# Patient Record
Sex: FEMALE | Race: BLACK OR AFRICAN AMERICAN | NOT HISPANIC OR LATINO | Employment: UNEMPLOYED | URBAN - NONMETROPOLITAN AREA
[De-identification: names, ages, dates, MRNs, and addresses within clinical notes are randomized per-mention and may not be internally consistent; named-entity substitution may affect disease eponyms.]

---

## 2017-05-12 ENCOUNTER — TELEPHONE (OUTPATIENT)
Dept: FAMILY MEDICINE | Age: 37
End: 2017-05-12

## 2023-01-01 ENCOUNTER — APPOINTMENT (OUTPATIENT)
Dept: GENERAL RADIOLOGY | Facility: CLINIC | Age: 43
End: 2023-01-01
Attending: NEUROLOGICAL SURGERY
Payer: COMMERCIAL

## 2023-01-01 ENCOUNTER — TELEPHONE (OUTPATIENT)
Dept: ENDOCRINOLOGY | Facility: CLINIC | Age: 43
End: 2023-01-01
Payer: COMMERCIAL

## 2023-01-01 ENCOUNTER — APPOINTMENT (OUTPATIENT)
Dept: CT IMAGING | Facility: CLINIC | Age: 43
End: 2023-01-01
Attending: NEUROLOGICAL SURGERY
Payer: COMMERCIAL

## 2023-01-01 ENCOUNTER — PATIENT OUTREACH (OUTPATIENT)
Dept: ONCOLOGY | Facility: CLINIC | Age: 43
End: 2023-01-01
Payer: COMMERCIAL

## 2023-01-01 ENCOUNTER — APPOINTMENT (OUTPATIENT)
Dept: PHYSICAL THERAPY | Facility: CLINIC | Age: 43
End: 2023-01-01
Attending: NEUROLOGICAL SURGERY
Payer: COMMERCIAL

## 2023-01-01 ENCOUNTER — APPOINTMENT (OUTPATIENT)
Dept: OCCUPATIONAL THERAPY | Facility: CLINIC | Age: 43
End: 2023-01-01
Attending: NEUROLOGICAL SURGERY
Payer: COMMERCIAL

## 2023-01-01 ENCOUNTER — ANESTHESIA EVENT (OUTPATIENT)
Dept: SURGERY | Facility: CLINIC | Age: 43
End: 2023-01-01
Payer: COMMERCIAL

## 2023-01-01 ENCOUNTER — APPOINTMENT (OUTPATIENT)
Dept: CARDIOLOGY | Facility: CLINIC | Age: 43
End: 2023-01-01
Attending: NURSE PRACTITIONER
Payer: COMMERCIAL

## 2023-01-01 ENCOUNTER — APPOINTMENT (OUTPATIENT)
Dept: OCCUPATIONAL THERAPY | Facility: CLINIC | Age: 43
End: 2023-01-01
Payer: COMMERCIAL

## 2023-01-01 ENCOUNTER — APPOINTMENT (OUTPATIENT)
Dept: SPEECH THERAPY | Facility: CLINIC | Age: 43
End: 2023-01-01
Attending: NEUROLOGICAL SURGERY
Payer: COMMERCIAL

## 2023-01-01 ENCOUNTER — OFFICE VISIT (OUTPATIENT)
Dept: OPHTHALMOLOGY | Facility: CLINIC | Age: 43
End: 2023-01-01
Payer: COMMERCIAL

## 2023-01-01 ENCOUNTER — ANESTHESIA (OUTPATIENT)
Dept: SURGERY | Facility: CLINIC | Age: 43
End: 2023-01-01
Payer: COMMERCIAL

## 2023-01-01 ENCOUNTER — APPOINTMENT (OUTPATIENT)
Dept: ULTRASOUND IMAGING | Facility: CLINIC | Age: 43
End: 2023-01-01
Payer: COMMERCIAL

## 2023-01-01 ENCOUNTER — APPOINTMENT (OUTPATIENT)
Dept: GENERAL RADIOLOGY | Facility: CLINIC | Age: 43
End: 2023-01-01
Payer: COMMERCIAL

## 2023-01-01 ENCOUNTER — PREP FOR PROCEDURE (OUTPATIENT)
Dept: NEUROSURGERY | Facility: CLINIC | Age: 43
End: 2023-01-01
Payer: COMMERCIAL

## 2023-01-01 ENCOUNTER — APPOINTMENT (OUTPATIENT)
Dept: SPEECH THERAPY | Facility: CLINIC | Age: 43
End: 2023-01-01
Payer: COMMERCIAL

## 2023-01-01 ENCOUNTER — APPOINTMENT (OUTPATIENT)
Dept: MRI IMAGING | Facility: CLINIC | Age: 43
End: 2023-01-01
Payer: COMMERCIAL

## 2023-01-01 ENCOUNTER — HOSPITAL ENCOUNTER (INPATIENT)
Facility: CLINIC | Age: 43
LOS: 58 days | Discharge: ANOTHER HOSPITAL | End: 2023-12-18
Attending: NEUROLOGICAL SURGERY | Admitting: NEUROLOGICAL SURGERY
Payer: COMMERCIAL

## 2023-01-01 ENCOUNTER — APPOINTMENT (OUTPATIENT)
Dept: CT IMAGING | Facility: CLINIC | Age: 43
End: 2023-01-01
Payer: COMMERCIAL

## 2023-01-01 ENCOUNTER — TELEPHONE (OUTPATIENT)
Dept: NEUROSURGERY | Facility: CLINIC | Age: 43
End: 2023-01-01

## 2023-01-01 ENCOUNTER — TELEPHONE (OUTPATIENT)
Dept: NEUROSURGERY | Facility: CLINIC | Age: 43
End: 2023-01-01
Payer: COMMERCIAL

## 2023-01-01 ENCOUNTER — APPOINTMENT (OUTPATIENT)
Dept: PHYSICAL THERAPY | Facility: CLINIC | Age: 43
End: 2023-01-01
Payer: COMMERCIAL

## 2023-01-01 ENCOUNTER — APPOINTMENT (OUTPATIENT)
Dept: GENERAL RADIOLOGY | Facility: CLINIC | Age: 43
End: 2023-01-01
Attending: NURSE PRACTITIONER
Payer: COMMERCIAL

## 2023-01-01 ENCOUNTER — APPOINTMENT (OUTPATIENT)
Dept: GENERAL RADIOLOGY | Facility: CLINIC | Age: 43
End: 2023-01-01
Attending: ANESTHESIOLOGY
Payer: COMMERCIAL

## 2023-01-01 ENCOUNTER — APPOINTMENT (OUTPATIENT)
Dept: GENERAL RADIOLOGY | Facility: CLINIC | Age: 43
End: 2023-01-01
Attending: STUDENT IN AN ORGANIZED HEALTH CARE EDUCATION/TRAINING PROGRAM
Payer: COMMERCIAL

## 2023-01-01 ENCOUNTER — TELEPHONE (OUTPATIENT)
Dept: OPHTHALMOLOGY | Facility: CLINIC | Age: 43
End: 2023-01-01
Payer: COMMERCIAL

## 2023-01-01 VITALS
RESPIRATION RATE: 18 BRPM | TEMPERATURE: 98.9 F | BODY MASS INDEX: 40.23 KG/M2 | HEART RATE: 94 BPM | HEIGHT: 61 IN | SYSTOLIC BLOOD PRESSURE: 111 MMHG | DIASTOLIC BLOOD PRESSURE: 80 MMHG | OXYGEN SATURATION: 98 % | WEIGHT: 213.1 LBS

## 2023-01-01 DIAGNOSIS — T88.4XXA HARD TO INTUBATE, INITIAL ENCOUNTER: ICD-10-CM

## 2023-01-01 DIAGNOSIS — H47.20 OPTIC ATROPHY: Primary | ICD-10-CM

## 2023-01-01 DIAGNOSIS — H47.529: ICD-10-CM

## 2023-01-01 DIAGNOSIS — D49.7 PITUITARY TUMOR: ICD-10-CM

## 2023-01-01 LAB
ABO/RH(D): NORMAL
ABO/RH(D): NORMAL
ALBUMIN SERPL BCG-MCNC: 3.1 G/DL (ref 3.5–5.2)
ALBUMIN SERPL BCG-MCNC: 3.5 G/DL (ref 3.5–5.2)
ALBUMIN SERPL BCG-MCNC: 3.9 G/DL (ref 3.5–5.2)
ALBUMIN UR-MCNC: NEGATIVE MG/DL
ALLEN'S TEST: ABNORMAL
ALLEN'S TEST: NO
ALLEN'S TEST: YES
ALP SERPL-CCNC: 30 U/L (ref 40–150)
ALP SERPL-CCNC: 56 U/L (ref 35–104)
ALP SERPL-CCNC: 76 U/L (ref 35–104)
ALT SERPL W P-5'-P-CCNC: 16 U/L (ref 0–50)
ALT SERPL W P-5'-P-CCNC: 20 U/L (ref 0–50)
ALT SERPL W P-5'-P-CCNC: 23 U/L (ref 0–50)
ANION GAP SERPL CALCULATED.3IONS-SCNC: 10 MMOL/L (ref 7–15)
ANION GAP SERPL CALCULATED.3IONS-SCNC: 11 MMOL/L (ref 7–15)
ANION GAP SERPL CALCULATED.3IONS-SCNC: 12 MMOL/L (ref 7–15)
ANION GAP SERPL CALCULATED.3IONS-SCNC: 6 MMOL/L (ref 7–15)
ANION GAP SERPL CALCULATED.3IONS-SCNC: 7 MMOL/L (ref 7–15)
ANION GAP SERPL CALCULATED.3IONS-SCNC: 8 MMOL/L (ref 7–15)
ANION GAP SERPL CALCULATED.3IONS-SCNC: 9 MMOL/L (ref 7–15)
ANTIBODY SCREEN: NEGATIVE
ANTIBODY SCREEN: NEGATIVE
APPEARANCE UR: CLEAR
APTT PPP: 26 SECONDS (ref 22–38)
APTT PPP: 27 SECONDS (ref 22–38)
APTT PPP: 28 SECONDS (ref 22–38)
APTT PPP: 28 SECONDS (ref 22–38)
AST SERPL W P-5'-P-CCNC: 24 U/L (ref 0–45)
AST SERPL W P-5'-P-CCNC: 32 U/L (ref 0–45)
AST SERPL W P-5'-P-CCNC: 62 U/L (ref 0–45)
ATRIAL RATE - MUSE: 106 BPM
ATRIAL RATE - MUSE: 114 BPM
ATRIAL RATE - MUSE: 115 BPM
ATRIAL RATE - MUSE: 126 BPM
ATRIAL RATE - MUSE: 64 BPM
ATRIAL RATE - MUSE: 82 BPM
B2 TRANSFERRIN FLD QL: NEGATIVE
B2 TRANSFERRIN INTERPRETATION: NORMAL
BACTERIA BLD CULT: NO GROWTH
BACTERIA BLD CULT: NO GROWTH
BASE EXCESS BLDA CALC-SCNC: -0.1 MMOL/L (ref -9.6–2)
BASE EXCESS BLDA CALC-SCNC: -0.2 MMOL/L (ref -9.6–2)
BASE EXCESS BLDA CALC-SCNC: -0.2 MMOL/L (ref -9.6–2)
BASE EXCESS BLDA CALC-SCNC: -0.3 MMOL/L (ref -9–1.8)
BASE EXCESS BLDA CALC-SCNC: -0.4 MMOL/L (ref -9.6–2)
BASE EXCESS BLDA CALC-SCNC: -0.7 MMOL/L (ref -9.6–2)
BASE EXCESS BLDA CALC-SCNC: -1 MMOL/L (ref -9.6–2)
BASE EXCESS BLDA CALC-SCNC: -1.3 MMOL/L (ref -9.6–2)
BASE EXCESS BLDA CALC-SCNC: -1.5 MMOL/L (ref -9.6–2)
BASE EXCESS BLDA CALC-SCNC: -1.6 MMOL/L (ref -9.6–2)
BASE EXCESS BLDA CALC-SCNC: -1.9 MMOL/L (ref -9.6–2)
BASE EXCESS BLDA CALC-SCNC: -2.8 MMOL/L (ref -9.6–2)
BASE EXCESS BLDA CALC-SCNC: -8.2 MMOL/L (ref -9.6–2)
BASE EXCESS BLDA CALC-SCNC: 0.1 MMOL/L (ref -9.6–2)
BASE EXCESS BLDA CALC-SCNC: 0.3 MMOL/L (ref -9.6–2)
BASE EXCESS BLDA CALC-SCNC: 1.7 MMOL/L (ref -9–1.8)
BASE EXCESS BLDA CALC-SCNC: 2.7 MMOL/L (ref -9–1.8)
BASE EXCESS BLDA CALC-SCNC: 3.2 MMOL/L (ref -9–1.8)
BASE EXCESS BLDA CALC-SCNC: 3.5 MMOL/L (ref -9–1.8)
BASE EXCESS BLDA CALC-SCNC: 3.8 MMOL/L (ref -9–1.8)
BASE EXCESS BLDV CALC-SCNC: -0.5 MMOL/L (ref -8.1–1.9)
BASE EXCESS BLDV CALC-SCNC: 1.9 MMOL/L (ref -7.7–1.9)
BASE EXCESS BLDV CALC-SCNC: 1.9 MMOL/L (ref -7.7–1.9)
BASE EXCESS BLDV CALC-SCNC: 3.8 MMOL/L (ref -7.7–1.9)
BASE EXCESS BLDV CALC-SCNC: 5.6 MMOL/L (ref -7.7–1.9)
BASE EXCESS BLDV CALC-SCNC: 5.6 MMOL/L (ref -7.7–1.9)
BASE EXCESS BLDV CALC-SCNC: 6.9 MMOL/L (ref -7.7–1.9)
BASOPHILS # BLD AUTO: 0 10E3/UL (ref 0–0.2)
BASOPHILS # BLD AUTO: 0 10E3/UL (ref 0–0.2)
BASOPHILS NFR BLD AUTO: 0 %
BASOPHILS NFR BLD AUTO: 1 %
BILIRUB DIRECT SERPL-MCNC: 0.24 MG/DL (ref 0–0.3)
BILIRUB DIRECT SERPL-MCNC: <0.2 MG/DL (ref 0–0.3)
BILIRUB SERPL-MCNC: 0.6 MG/DL
BILIRUB SERPL-MCNC: 0.6 MG/DL
BILIRUB SERPL-MCNC: 1 MG/DL
BILIRUB UR QL STRIP: NEGATIVE
BLD PROD TYP BPU: NORMAL
BLOOD COMPONENT TYPE: NORMAL
BUN SERPL-MCNC: 10.5 MG/DL (ref 6–20)
BUN SERPL-MCNC: 10.7 MG/DL (ref 6–20)
BUN SERPL-MCNC: 11.8 MG/DL (ref 6–20)
BUN SERPL-MCNC: 12.2 MG/DL (ref 6–20)
BUN SERPL-MCNC: 12.6 MG/DL (ref 6–20)
BUN SERPL-MCNC: 13 MG/DL (ref 6–20)
BUN SERPL-MCNC: 13.2 MG/DL (ref 6–20)
BUN SERPL-MCNC: 13.4 MG/DL (ref 6–20)
BUN SERPL-MCNC: 13.4 MG/DL (ref 6–20)
BUN SERPL-MCNC: 13.5 MG/DL (ref 6–20)
BUN SERPL-MCNC: 13.5 MG/DL (ref 6–20)
BUN SERPL-MCNC: 13.8 MG/DL (ref 6–20)
BUN SERPL-MCNC: 13.9 MG/DL (ref 6–20)
BUN SERPL-MCNC: 13.9 MG/DL (ref 6–20)
BUN SERPL-MCNC: 14.9 MG/DL (ref 6–20)
BUN SERPL-MCNC: 15.2 MG/DL (ref 6–20)
BUN SERPL-MCNC: 15.5 MG/DL (ref 6–20)
BUN SERPL-MCNC: 16.4 MG/DL (ref 6–20)
BUN SERPL-MCNC: 17.3 MG/DL (ref 6–20)
BUN SERPL-MCNC: 17.6 MG/DL (ref 6–20)
BUN SERPL-MCNC: 19 MG/DL (ref 6–20)
BUN SERPL-MCNC: 20.9 MG/DL (ref 6–20)
BUN SERPL-MCNC: 21.1 MG/DL (ref 6–20)
BUN SERPL-MCNC: 21.6 MG/DL (ref 6–20)
BUN SERPL-MCNC: 8.1 MG/DL (ref 6–20)
BUN SERPL-MCNC: 8.4 MG/DL (ref 6–20)
BUN SERPL-MCNC: 8.5 MG/DL (ref 6–20)
BUN SERPL-MCNC: 8.6 MG/DL (ref 6–20)
BUN SERPL-MCNC: 8.7 MG/DL (ref 6–20)
BUN SERPL-MCNC: 8.8 MG/DL (ref 6–20)
BUN SERPL-MCNC: 8.9 MG/DL (ref 6–20)
BUN SERPL-MCNC: 9.2 MG/DL (ref 6–20)
BUN SERPL-MCNC: 9.3 MG/DL (ref 6–20)
BUN SERPL-MCNC: 9.3 MG/DL (ref 6–20)
BUN SERPL-MCNC: 9.4 MG/DL (ref 6–20)
CA-I BLD-MCNC: 3.5 MG/DL (ref 4.4–5.2)
CA-I BLD-MCNC: 4.2 MG/DL (ref 4.4–5.2)
CA-I BLD-MCNC: 4.3 MG/DL (ref 4.4–5.2)
CA-I BLD-MCNC: 4.4 MG/DL (ref 4.4–5.2)
CA-I BLD-MCNC: 4.4 MG/DL (ref 4.4–5.2)
CA-I BLD-MCNC: 4.5 MG/DL (ref 4.4–5.2)
CA-I BLD-MCNC: 4.5 MG/DL (ref 4.4–5.2)
CA-I BLD-MCNC: 4.6 MG/DL (ref 4.4–5.2)
CA-I BLD-MCNC: 4.7 MG/DL (ref 4.4–5.2)
CA-I BLD-MCNC: 4.8 MG/DL (ref 4.4–5.2)
CA-I BLD-MCNC: 4.8 MG/DL (ref 4.4–5.2)
CA-I BLD-MCNC: 5 MG/DL (ref 4.4–5.2)
CA-I BLD-MCNC: 5.1 MG/DL (ref 4.4–5.2)
CALCIUM SERPL-MCNC: 7.9 MG/DL (ref 8.6–10)
CALCIUM SERPL-MCNC: 8 MG/DL (ref 8.6–10)
CALCIUM SERPL-MCNC: 8.1 MG/DL (ref 8.6–10)
CALCIUM SERPL-MCNC: 8.2 MG/DL (ref 8.6–10)
CALCIUM SERPL-MCNC: 8.2 MG/DL (ref 8.6–10)
CALCIUM SERPL-MCNC: 8.5 MG/DL (ref 8.6–10)
CALCIUM SERPL-MCNC: 8.5 MG/DL (ref 8.6–10)
CALCIUM SERPL-MCNC: 8.6 MG/DL (ref 8.6–10)
CALCIUM SERPL-MCNC: 8.7 MG/DL (ref 8.6–10)
CALCIUM SERPL-MCNC: 8.7 MG/DL (ref 8.6–10)
CALCIUM SERPL-MCNC: 8.8 MG/DL (ref 8.6–10)
CALCIUM SERPL-MCNC: 8.9 MG/DL (ref 8.6–10)
CALCIUM SERPL-MCNC: 8.9 MG/DL (ref 8.6–10)
CALCIUM SERPL-MCNC: 9 MG/DL (ref 8.6–10)
CALCIUM SERPL-MCNC: 9.1 MG/DL (ref 8.6–10)
CALCIUM SERPL-MCNC: 9.2 MG/DL (ref 8.6–10)
CALCIUM SERPL-MCNC: 9.3 MG/DL (ref 8.6–10)
CALCIUM SERPL-MCNC: 9.4 MG/DL (ref 8.6–10)
CALCIUM SERPL-MCNC: 9.5 MG/DL (ref 8.6–10)
CALCIUM SERPL-MCNC: 9.7 MG/DL (ref 8.6–10)
CALCIUM SERPL-MCNC: 9.8 MG/DL (ref 8.6–10)
CALCIUM SERPL-MCNC: 9.9 MG/DL (ref 8.6–10)
CALCIUM SERPL-MCNC: 9.9 MG/DL (ref 8.6–10)
CF REDUC 60M P MA LENFR BLD TEG: 4.6 % (ref 0–15)
CFT BLD TEG: 1.2 MINUTE (ref 1–3)
CHLORIDE SERPL-SCNC: 100 MMOL/L (ref 98–107)
CHLORIDE SERPL-SCNC: 101 MMOL/L (ref 98–107)
CHLORIDE SERPL-SCNC: 102 MMOL/L (ref 98–107)
CHLORIDE SERPL-SCNC: 102 MMOL/L (ref 98–107)
CHLORIDE SERPL-SCNC: 103 MMOL/L (ref 98–107)
CHLORIDE SERPL-SCNC: 104 MMOL/L (ref 98–107)
CHLORIDE SERPL-SCNC: 105 MMOL/L (ref 98–107)
CHLORIDE SERPL-SCNC: 106 MMOL/L (ref 98–107)
CHLORIDE SERPL-SCNC: 107 MMOL/L (ref 98–107)
CHLORIDE SERPL-SCNC: 108 MMOL/L (ref 98–107)
CHLORIDE SERPL-SCNC: 109 MMOL/L (ref 98–107)
CHLORIDE SERPL-SCNC: 110 MMOL/L (ref 98–107)
CHLORIDE SERPL-SCNC: 111 MMOL/L (ref 98–107)
CHLORIDE SERPL-SCNC: 112 MMOL/L (ref 98–107)
CHLORIDE SERPL-SCNC: 116 MMOL/L (ref 98–107)
CHLORIDE SERPL-SCNC: 119 MMOL/L (ref 98–107)
CI (COAGULATION INDEX)(Z) NON NATIVE: 4 (ref -3–3)
CLOT ANGLE BLD TEG: 72.8 DEGREES (ref 53–72)
CLOT INIT BLD TEG: 2.4 MINUTE (ref 5–10)
CLOT LYSIS 30M P MA LENFR BLD TEG: 1.3 % (ref 0–8)
CLOT STRENGTH BLD TEG: 10.5 KD/SC (ref 4.5–11)
CODING SYSTEM: NORMAL
COLOR UR AUTO: NORMAL
CORTIS SERPL-MCNC: 13.7 UG/DL
CORTIS SERPL-MCNC: 18.5 UG/DL
CORTIS SERPL-MCNC: 8.8 UG/DL
CREAT SERPL-MCNC: 0.47 MG/DL (ref 0.51–0.95)
CREAT SERPL-MCNC: 0.6 MG/DL (ref 0.51–0.95)
CREAT SERPL-MCNC: 0.68 MG/DL (ref 0.51–0.95)
CREAT SERPL-MCNC: 0.69 MG/DL (ref 0.51–0.95)
CREAT SERPL-MCNC: 0.7 MG/DL (ref 0.51–0.95)
CREAT SERPL-MCNC: 0.7 MG/DL (ref 0.51–0.95)
CREAT SERPL-MCNC: 0.71 MG/DL (ref 0.51–0.95)
CREAT SERPL-MCNC: 0.71 MG/DL (ref 0.51–0.95)
CREAT SERPL-MCNC: 0.73 MG/DL (ref 0.51–0.95)
CREAT SERPL-MCNC: 0.73 MG/DL (ref 0.51–0.95)
CREAT SERPL-MCNC: 0.74 MG/DL (ref 0.51–0.95)
CREAT SERPL-MCNC: 0.75 MG/DL (ref 0.51–0.95)
CREAT SERPL-MCNC: 0.76 MG/DL (ref 0.51–0.95)
CREAT SERPL-MCNC: 0.76 MG/DL (ref 0.51–0.95)
CREAT SERPL-MCNC: 0.77 MG/DL (ref 0.51–0.95)
CREAT SERPL-MCNC: 0.77 MG/DL (ref 0.51–0.95)
CREAT SERPL-MCNC: 0.78 MG/DL (ref 0.51–0.95)
CREAT SERPL-MCNC: 0.8 MG/DL (ref 0.51–0.95)
CREAT SERPL-MCNC: 0.81 MG/DL (ref 0.51–0.95)
CREAT SERPL-MCNC: 0.82 MG/DL (ref 0.51–0.95)
CREAT SERPL-MCNC: 0.82 MG/DL (ref 0.51–0.95)
CREAT SERPL-MCNC: 0.83 MG/DL (ref 0.51–0.95)
CREAT SERPL-MCNC: 0.84 MG/DL (ref 0.51–0.95)
CREAT SERPL-MCNC: 0.84 MG/DL (ref 0.51–0.95)
CREAT SERPL-MCNC: 0.85 MG/DL (ref 0.51–0.95)
CREAT SERPL-MCNC: 0.87 MG/DL (ref 0.51–0.95)
CREAT SERPL-MCNC: 0.88 MG/DL (ref 0.51–0.95)
CREAT SERPL-MCNC: 0.9 MG/DL (ref 0.51–0.95)
CREAT SERPL-MCNC: 0.92 MG/DL (ref 0.51–0.95)
CREAT SERPL-MCNC: 0.92 MG/DL (ref 0.51–0.95)
CREAT SERPL-MCNC: 0.97 MG/DL (ref 0.51–0.95)
CREAT SERPL-MCNC: 1 MG/DL (ref 0.51–0.95)
CROSSMATCH: NORMAL
CRP SERPL-MCNC: 18.8 MG/L
DEPRECATED HCO3 PLAS-SCNC: 22 MMOL/L (ref 22–29)
DEPRECATED HCO3 PLAS-SCNC: 23 MMOL/L (ref 22–29)
DEPRECATED HCO3 PLAS-SCNC: 24 MMOL/L (ref 22–29)
DEPRECATED HCO3 PLAS-SCNC: 24 MMOL/L (ref 22–29)
DEPRECATED HCO3 PLAS-SCNC: 25 MMOL/L (ref 22–29)
DEPRECATED HCO3 PLAS-SCNC: 25 MMOL/L (ref 22–29)
DEPRECATED HCO3 PLAS-SCNC: 26 MMOL/L (ref 22–29)
DEPRECATED HCO3 PLAS-SCNC: 27 MMOL/L (ref 22–29)
DEPRECATED HCO3 PLAS-SCNC: 28 MMOL/L (ref 22–29)
DEPRECATED HCO3 PLAS-SCNC: 29 MMOL/L (ref 22–29)
DEPRECATED HCO3 PLAS-SCNC: 30 MMOL/L (ref 22–29)
DEPRECATED HCO3 PLAS-SCNC: 31 MMOL/L (ref 22–29)
DEPRECATED HCO3 PLAS-SCNC: 32 MMOL/L (ref 22–29)
DIASTOLIC BLOOD PRESSURE - MUSE: NORMAL MMHG
EGFRCR SERPLBLD CKD-EPI 2021: 71 ML/MIN/1.73M2
EGFRCR SERPLBLD CKD-EPI 2021: 74 ML/MIN/1.73M2
EGFRCR SERPLBLD CKD-EPI 2021: 78 ML/MIN/1.73M2
EGFRCR SERPLBLD CKD-EPI 2021: 78 ML/MIN/1.73M2
EGFRCR SERPLBLD CKD-EPI 2021: 80 ML/MIN/1.73M2
EGFRCR SERPLBLD CKD-EPI 2021: 83 ML/MIN/1.73M2
EGFRCR SERPLBLD CKD-EPI 2021: 84 ML/MIN/1.73M2
EGFRCR SERPLBLD CKD-EPI 2021: 86 ML/MIN/1.73M2
EGFRCR SERPLBLD CKD-EPI 2021: 87 ML/MIN/1.73M2
EGFRCR SERPLBLD CKD-EPI 2021: 87 ML/MIN/1.73M2
EGFRCR SERPLBLD CKD-EPI 2021: 89 ML/MIN/1.73M2
EGFRCR SERPLBLD CKD-EPI 2021: 90 ML/MIN/1.73M2
EGFRCR SERPLBLD CKD-EPI 2021: 90 ML/MIN/1.73M2
EGFRCR SERPLBLD CKD-EPI 2021: >90 ML/MIN/1.73M2
EOSINOPHIL # BLD AUTO: 0 10E3/UL (ref 0–0.7)
EOSINOPHIL # BLD AUTO: 0.1 10E3/UL (ref 0–0.7)
EOSINOPHIL NFR BLD AUTO: 0 %
EOSINOPHIL NFR BLD AUTO: 2 %
ERYTHROCYTE [DISTWIDTH] IN BLOOD BY AUTOMATED COUNT: 13.4 % (ref 10–15)
ERYTHROCYTE [DISTWIDTH] IN BLOOD BY AUTOMATED COUNT: 13.5 % (ref 10–15)
ERYTHROCYTE [DISTWIDTH] IN BLOOD BY AUTOMATED COUNT: 13.6 % (ref 10–15)
ERYTHROCYTE [DISTWIDTH] IN BLOOD BY AUTOMATED COUNT: 13.8 % (ref 10–15)
ERYTHROCYTE [DISTWIDTH] IN BLOOD BY AUTOMATED COUNT: 13.9 % (ref 10–15)
ERYTHROCYTE [DISTWIDTH] IN BLOOD BY AUTOMATED COUNT: 14 % (ref 10–15)
ERYTHROCYTE [DISTWIDTH] IN BLOOD BY AUTOMATED COUNT: 14.1 % (ref 10–15)
ERYTHROCYTE [DISTWIDTH] IN BLOOD BY AUTOMATED COUNT: 14.3 % (ref 10–15)
ERYTHROCYTE [DISTWIDTH] IN BLOOD BY AUTOMATED COUNT: 14.3 % (ref 10–15)
ERYTHROCYTE [DISTWIDTH] IN BLOOD BY AUTOMATED COUNT: 14.4 % (ref 10–15)
ERYTHROCYTE [DISTWIDTH] IN BLOOD BY AUTOMATED COUNT: 14.4 % (ref 10–15)
ERYTHROCYTE [DISTWIDTH] IN BLOOD BY AUTOMATED COUNT: 14.5 % (ref 10–15)
ERYTHROCYTE [DISTWIDTH] IN BLOOD BY AUTOMATED COUNT: 14.6 % (ref 10–15)
ERYTHROCYTE [DISTWIDTH] IN BLOOD BY AUTOMATED COUNT: 14.7 % (ref 10–15)
ERYTHROCYTE [DISTWIDTH] IN BLOOD BY AUTOMATED COUNT: 14.9 % (ref 10–15)
ERYTHROCYTE [DISTWIDTH] IN BLOOD BY AUTOMATED COUNT: 14.9 % (ref 10–15)
ERYTHROCYTE [DISTWIDTH] IN BLOOD BY AUTOMATED COUNT: 16 % (ref 10–15)
ERYTHROCYTE [DISTWIDTH] IN BLOOD BY AUTOMATED COUNT: 16.1 % (ref 10–15)
ERYTHROCYTE [DISTWIDTH] IN BLOOD BY AUTOMATED COUNT: 16.1 % (ref 10–15)
ERYTHROCYTE [DISTWIDTH] IN BLOOD BY AUTOMATED COUNT: 16.3 % (ref 10–15)
ERYTHROCYTE [DISTWIDTH] IN BLOOD BY AUTOMATED COUNT: 16.4 % (ref 10–15)
ERYTHROCYTE [DISTWIDTH] IN BLOOD BY AUTOMATED COUNT: 16.6 % (ref 10–15)
ERYTHROCYTE [DISTWIDTH] IN BLOOD BY AUTOMATED COUNT: 16.6 % (ref 10–15)
ERYTHROCYTE [DISTWIDTH] IN BLOOD BY AUTOMATED COUNT: 16.8 % (ref 10–15)
ERYTHROCYTE [DISTWIDTH] IN BLOOD BY AUTOMATED COUNT: 16.9 % (ref 10–15)
ERYTHROCYTE [DISTWIDTH] IN BLOOD BY AUTOMATED COUNT: 17 % (ref 10–15)
ERYTHROCYTE [SEDIMENTATION RATE] IN BLOOD BY WESTERGREN METHOD: 18 MM/HR (ref 0–20)
ESTRADIOL SERPL-MCNC: <5 PG/ML
FIBRINOGEN PPP-MCNC: 225 MG/DL (ref 170–490)
FIBRINOGEN PPP-MCNC: 278 MG/DL (ref 170–490)
FIBRINOGEN PPP-MCNC: 431 MG/DL (ref 170–490)
FSH SERPL IRP2-ACNC: 18 MIU/ML
FSH SERPL IRP2-ACNC: 56.5 MIU/ML
GH SERPL-MCNC: 0.6 UG/L
GH SERPL-MCNC: 0.8 UG/L
GLUCOSE BLD-MCNC: 123 MG/DL (ref 70–99)
GLUCOSE BLD-MCNC: 127 MG/DL (ref 70–99)
GLUCOSE BLD-MCNC: 146 MG/DL (ref 70–99)
GLUCOSE BLD-MCNC: 146 MG/DL (ref 70–99)
GLUCOSE BLD-MCNC: 147 MG/DL (ref 70–99)
GLUCOSE BLD-MCNC: 149 MG/DL (ref 70–99)
GLUCOSE BLD-MCNC: 159 MG/DL (ref 70–99)
GLUCOSE BLD-MCNC: 160 MG/DL (ref 70–99)
GLUCOSE BLD-MCNC: 160 MG/DL (ref 70–99)
GLUCOSE BLD-MCNC: 162 MG/DL (ref 70–99)
GLUCOSE BLD-MCNC: 163 MG/DL (ref 70–99)
GLUCOSE BLD-MCNC: 164 MG/DL (ref 70–99)
GLUCOSE BLD-MCNC: 167 MG/DL (ref 70–99)
GLUCOSE BLD-MCNC: 168 MG/DL (ref 70–99)
GLUCOSE BLD-MCNC: 169 MG/DL (ref 70–99)
GLUCOSE BLD-MCNC: 192 MG/DL (ref 70–99)
GLUCOSE BLDC GLUCOMTR-MCNC: 100 MG/DL (ref 70–99)
GLUCOSE BLDC GLUCOMTR-MCNC: 100 MG/DL (ref 70–99)
GLUCOSE BLDC GLUCOMTR-MCNC: 101 MG/DL (ref 70–99)
GLUCOSE BLDC GLUCOMTR-MCNC: 102 MG/DL (ref 70–99)
GLUCOSE BLDC GLUCOMTR-MCNC: 104 MG/DL (ref 70–99)
GLUCOSE BLDC GLUCOMTR-MCNC: 104 MG/DL (ref 70–99)
GLUCOSE BLDC GLUCOMTR-MCNC: 106 MG/DL (ref 70–99)
GLUCOSE BLDC GLUCOMTR-MCNC: 107 MG/DL (ref 70–99)
GLUCOSE BLDC GLUCOMTR-MCNC: 110 MG/DL (ref 70–99)
GLUCOSE BLDC GLUCOMTR-MCNC: 110 MG/DL (ref 70–99)
GLUCOSE BLDC GLUCOMTR-MCNC: 111 MG/DL (ref 70–99)
GLUCOSE BLDC GLUCOMTR-MCNC: 111 MG/DL (ref 70–99)
GLUCOSE BLDC GLUCOMTR-MCNC: 112 MG/DL (ref 70–99)
GLUCOSE BLDC GLUCOMTR-MCNC: 114 MG/DL (ref 70–99)
GLUCOSE BLDC GLUCOMTR-MCNC: 117 MG/DL (ref 70–99)
GLUCOSE BLDC GLUCOMTR-MCNC: 117 MG/DL (ref 70–99)
GLUCOSE BLDC GLUCOMTR-MCNC: 118 MG/DL (ref 70–99)
GLUCOSE BLDC GLUCOMTR-MCNC: 118 MG/DL (ref 70–99)
GLUCOSE BLDC GLUCOMTR-MCNC: 119 MG/DL (ref 70–99)
GLUCOSE BLDC GLUCOMTR-MCNC: 121 MG/DL (ref 70–99)
GLUCOSE BLDC GLUCOMTR-MCNC: 121 MG/DL (ref 70–99)
GLUCOSE BLDC GLUCOMTR-MCNC: 122 MG/DL (ref 70–99)
GLUCOSE BLDC GLUCOMTR-MCNC: 125 MG/DL (ref 70–99)
GLUCOSE BLDC GLUCOMTR-MCNC: 127 MG/DL (ref 70–99)
GLUCOSE BLDC GLUCOMTR-MCNC: 127 MG/DL (ref 70–99)
GLUCOSE BLDC GLUCOMTR-MCNC: 129 MG/DL (ref 70–99)
GLUCOSE BLDC GLUCOMTR-MCNC: 130 MG/DL (ref 70–99)
GLUCOSE BLDC GLUCOMTR-MCNC: 131 MG/DL (ref 70–99)
GLUCOSE BLDC GLUCOMTR-MCNC: 131 MG/DL (ref 70–99)
GLUCOSE BLDC GLUCOMTR-MCNC: 132 MG/DL (ref 70–99)
GLUCOSE BLDC GLUCOMTR-MCNC: 133 MG/DL (ref 70–99)
GLUCOSE BLDC GLUCOMTR-MCNC: 133 MG/DL (ref 70–99)
GLUCOSE BLDC GLUCOMTR-MCNC: 134 MG/DL (ref 70–99)
GLUCOSE BLDC GLUCOMTR-MCNC: 134 MG/DL (ref 70–99)
GLUCOSE BLDC GLUCOMTR-MCNC: 135 MG/DL (ref 70–99)
GLUCOSE BLDC GLUCOMTR-MCNC: 137 MG/DL (ref 70–99)
GLUCOSE BLDC GLUCOMTR-MCNC: 137 MG/DL (ref 70–99)
GLUCOSE BLDC GLUCOMTR-MCNC: 138 MG/DL (ref 70–99)
GLUCOSE BLDC GLUCOMTR-MCNC: 138 MG/DL (ref 70–99)
GLUCOSE BLDC GLUCOMTR-MCNC: 139 MG/DL (ref 70–99)
GLUCOSE BLDC GLUCOMTR-MCNC: 139 MG/DL (ref 70–99)
GLUCOSE BLDC GLUCOMTR-MCNC: 141 MG/DL (ref 70–99)
GLUCOSE BLDC GLUCOMTR-MCNC: 141 MG/DL (ref 70–99)
GLUCOSE BLDC GLUCOMTR-MCNC: 143 MG/DL (ref 70–99)
GLUCOSE BLDC GLUCOMTR-MCNC: 144 MG/DL (ref 70–99)
GLUCOSE BLDC GLUCOMTR-MCNC: 144 MG/DL (ref 70–99)
GLUCOSE BLDC GLUCOMTR-MCNC: 146 MG/DL (ref 70–99)
GLUCOSE BLDC GLUCOMTR-MCNC: 147 MG/DL (ref 70–99)
GLUCOSE BLDC GLUCOMTR-MCNC: 148 MG/DL (ref 70–99)
GLUCOSE BLDC GLUCOMTR-MCNC: 149 MG/DL (ref 70–99)
GLUCOSE BLDC GLUCOMTR-MCNC: 151 MG/DL (ref 70–99)
GLUCOSE BLDC GLUCOMTR-MCNC: 152 MG/DL (ref 70–99)
GLUCOSE BLDC GLUCOMTR-MCNC: 152 MG/DL (ref 70–99)
GLUCOSE BLDC GLUCOMTR-MCNC: 153 MG/DL (ref 70–99)
GLUCOSE BLDC GLUCOMTR-MCNC: 154 MG/DL (ref 70–99)
GLUCOSE BLDC GLUCOMTR-MCNC: 156 MG/DL (ref 70–99)
GLUCOSE BLDC GLUCOMTR-MCNC: 157 MG/DL (ref 70–99)
GLUCOSE BLDC GLUCOMTR-MCNC: 157 MG/DL (ref 70–99)
GLUCOSE BLDC GLUCOMTR-MCNC: 159 MG/DL (ref 70–99)
GLUCOSE BLDC GLUCOMTR-MCNC: 164 MG/DL (ref 70–99)
GLUCOSE BLDC GLUCOMTR-MCNC: 165 MG/DL (ref 70–99)
GLUCOSE BLDC GLUCOMTR-MCNC: 166 MG/DL (ref 70–99)
GLUCOSE BLDC GLUCOMTR-MCNC: 167 MG/DL (ref 70–99)
GLUCOSE BLDC GLUCOMTR-MCNC: 167 MG/DL (ref 70–99)
GLUCOSE BLDC GLUCOMTR-MCNC: 169 MG/DL (ref 70–99)
GLUCOSE BLDC GLUCOMTR-MCNC: 170 MG/DL (ref 70–99)
GLUCOSE BLDC GLUCOMTR-MCNC: 173 MG/DL (ref 70–99)
GLUCOSE BLDC GLUCOMTR-MCNC: 173 MG/DL (ref 70–99)
GLUCOSE BLDC GLUCOMTR-MCNC: 179 MG/DL (ref 70–99)
GLUCOSE BLDC GLUCOMTR-MCNC: 181 MG/DL (ref 70–99)
GLUCOSE BLDC GLUCOMTR-MCNC: 188 MG/DL (ref 70–99)
GLUCOSE BLDC GLUCOMTR-MCNC: 195 MG/DL (ref 70–99)
GLUCOSE BLDC GLUCOMTR-MCNC: 199 MG/DL (ref 70–99)
GLUCOSE BLDC GLUCOMTR-MCNC: 202 MG/DL (ref 70–99)
GLUCOSE BLDC GLUCOMTR-MCNC: 223 MG/DL (ref 70–99)
GLUCOSE BLDC GLUCOMTR-MCNC: 252 MG/DL (ref 70–99)
GLUCOSE BLDC GLUCOMTR-MCNC: 321 MG/DL (ref 70–99)
GLUCOSE BLDC GLUCOMTR-MCNC: 363 MG/DL (ref 70–99)
GLUCOSE BLDC GLUCOMTR-MCNC: 77 MG/DL (ref 70–99)
GLUCOSE BLDC GLUCOMTR-MCNC: 80 MG/DL (ref 70–99)
GLUCOSE BLDC GLUCOMTR-MCNC: 80 MG/DL (ref 70–99)
GLUCOSE BLDC GLUCOMTR-MCNC: 84 MG/DL (ref 70–99)
GLUCOSE BLDC GLUCOMTR-MCNC: 84 MG/DL (ref 70–99)
GLUCOSE BLDC GLUCOMTR-MCNC: 85 MG/DL (ref 70–99)
GLUCOSE BLDC GLUCOMTR-MCNC: 85 MG/DL (ref 70–99)
GLUCOSE BLDC GLUCOMTR-MCNC: 86 MG/DL (ref 70–99)
GLUCOSE BLDC GLUCOMTR-MCNC: 87 MG/DL (ref 70–99)
GLUCOSE BLDC GLUCOMTR-MCNC: 88 MG/DL (ref 70–99)
GLUCOSE BLDC GLUCOMTR-MCNC: 89 MG/DL (ref 70–99)
GLUCOSE BLDC GLUCOMTR-MCNC: 89 MG/DL (ref 70–99)
GLUCOSE BLDC GLUCOMTR-MCNC: 90 MG/DL (ref 70–99)
GLUCOSE BLDC GLUCOMTR-MCNC: 91 MG/DL (ref 70–99)
GLUCOSE BLDC GLUCOMTR-MCNC: 92 MG/DL (ref 70–99)
GLUCOSE BLDC GLUCOMTR-MCNC: 92 MG/DL (ref 70–99)
GLUCOSE BLDC GLUCOMTR-MCNC: 93 MG/DL (ref 70–99)
GLUCOSE BLDC GLUCOMTR-MCNC: 94 MG/DL (ref 70–99)
GLUCOSE BLDC GLUCOMTR-MCNC: 95 MG/DL (ref 70–99)
GLUCOSE BLDC GLUCOMTR-MCNC: 97 MG/DL (ref 70–99)
GLUCOSE BLDC GLUCOMTR-MCNC: 98 MG/DL (ref 70–99)
GLUCOSE BLDC GLUCOMTR-MCNC: 98 MG/DL (ref 70–99)
GLUCOSE BLDC GLUCOMTR-MCNC: 99 MG/DL (ref 70–99)
GLUCOSE SERPL-MCNC: 101 MG/DL (ref 70–99)
GLUCOSE SERPL-MCNC: 102 MG/DL (ref 70–99)
GLUCOSE SERPL-MCNC: 107 MG/DL (ref 70–99)
GLUCOSE SERPL-MCNC: 110 MG/DL (ref 70–99)
GLUCOSE SERPL-MCNC: 115 MG/DL (ref 70–99)
GLUCOSE SERPL-MCNC: 121 MG/DL (ref 70–99)
GLUCOSE SERPL-MCNC: 122 MG/DL (ref 70–99)
GLUCOSE SERPL-MCNC: 123 MG/DL (ref 70–99)
GLUCOSE SERPL-MCNC: 123 MG/DL (ref 70–99)
GLUCOSE SERPL-MCNC: 126 MG/DL (ref 70–99)
GLUCOSE SERPL-MCNC: 128 MG/DL (ref 70–99)
GLUCOSE SERPL-MCNC: 131 MG/DL (ref 70–99)
GLUCOSE SERPL-MCNC: 133 MG/DL (ref 70–99)
GLUCOSE SERPL-MCNC: 134 MG/DL (ref 70–99)
GLUCOSE SERPL-MCNC: 140 MG/DL (ref 70–99)
GLUCOSE SERPL-MCNC: 141 MG/DL (ref 70–99)
GLUCOSE SERPL-MCNC: 142 MG/DL (ref 70–99)
GLUCOSE SERPL-MCNC: 153 MG/DL (ref 70–99)
GLUCOSE SERPL-MCNC: 158 MG/DL (ref 70–99)
GLUCOSE SERPL-MCNC: 169 MG/DL (ref 70–99)
GLUCOSE SERPL-MCNC: 174 MG/DL (ref 70–99)
GLUCOSE SERPL-MCNC: 186 MG/DL (ref 70–99)
GLUCOSE SERPL-MCNC: 214 MG/DL (ref 70–99)
GLUCOSE SERPL-MCNC: 337 MG/DL (ref 70–99)
GLUCOSE SERPL-MCNC: 87 MG/DL (ref 70–99)
GLUCOSE SERPL-MCNC: 87 MG/DL (ref 70–99)
GLUCOSE SERPL-MCNC: 88 MG/DL (ref 70–99)
GLUCOSE SERPL-MCNC: 89 MG/DL (ref 70–99)
GLUCOSE SERPL-MCNC: 90 MG/DL (ref 70–99)
GLUCOSE SERPL-MCNC: 91 MG/DL (ref 70–99)
GLUCOSE SERPL-MCNC: 91 MG/DL (ref 70–99)
GLUCOSE SERPL-MCNC: 95 MG/DL (ref 70–99)
GLUCOSE SERPL-MCNC: 98 MG/DL (ref 70–99)
GLUCOSE SERPL-MCNC: 99 MG/DL (ref 70–99)
GLUCOSE UR STRIP-MCNC: NEGATIVE MG/DL
HBA1C MFR BLD: 5.8 %
HCG UR QL: NEGATIVE
HCO3 BLD-SCNC: 24 MMOL/L (ref 21–28)
HCO3 BLD-SCNC: 25 MMOL/L (ref 21–28)
HCO3 BLD-SCNC: 29 MMOL/L (ref 21–28)
HCO3 BLD-SCNC: 30 MMOL/L (ref 21–28)
HCO3 BLDA-SCNC: 15 MMOL/L (ref 21–28)
HCO3 BLDA-SCNC: 22 MMOL/L (ref 21–28)
HCO3 BLDA-SCNC: 23 MMOL/L (ref 21–28)
HCO3 BLDA-SCNC: 24 MMOL/L (ref 21–28)
HCO3 BLDV-SCNC: 26 MMOL/L (ref 21–28)
HCO3 BLDV-SCNC: 27 MMOL/L (ref 21–28)
HCO3 BLDV-SCNC: 29 MMOL/L (ref 21–28)
HCO3 BLDV-SCNC: 30 MMOL/L (ref 21–28)
HCO3 BLDV-SCNC: 31 MMOL/L (ref 21–28)
HCO3 BLDV-SCNC: 32 MMOL/L (ref 21–28)
HCO3 BLDV-SCNC: 34 MMOL/L (ref 21–28)
HCT VFR BLD AUTO: 22.4 % (ref 35–47)
HCT VFR BLD AUTO: 22.6 % (ref 35–47)
HCT VFR BLD AUTO: 23.8 % (ref 35–47)
HCT VFR BLD AUTO: 24 % (ref 35–47)
HCT VFR BLD AUTO: 25.3 % (ref 35–47)
HCT VFR BLD AUTO: 25.4 % (ref 35–47)
HCT VFR BLD AUTO: 25.5 % (ref 35–47)
HCT VFR BLD AUTO: 25.6 % (ref 35–47)
HCT VFR BLD AUTO: 25.8 % (ref 35–47)
HCT VFR BLD AUTO: 25.8 % (ref 35–47)
HCT VFR BLD AUTO: 26.4 % (ref 35–47)
HCT VFR BLD AUTO: 26.9 % (ref 35–47)
HCT VFR BLD AUTO: 27.2 % (ref 35–47)
HCT VFR BLD AUTO: 27.2 % (ref 35–47)
HCT VFR BLD AUTO: 27.3 % (ref 35–47)
HCT VFR BLD AUTO: 27.5 % (ref 35–47)
HCT VFR BLD AUTO: 27.5 % (ref 35–47)
HCT VFR BLD AUTO: 28 % (ref 35–47)
HCT VFR BLD AUTO: 28.1 % (ref 35–47)
HCT VFR BLD AUTO: 29.1 % (ref 35–47)
HCT VFR BLD AUTO: 29.3 % (ref 35–47)
HCT VFR BLD AUTO: 34.1 % (ref 35–47)
HCT VFR BLD AUTO: 34.7 % (ref 35–47)
HCT VFR BLD AUTO: 35 % (ref 35–47)
HCT VFR BLD AUTO: 35.7 % (ref 35–47)
HCT VFR BLD AUTO: 36.1 % (ref 35–47)
HCT VFR BLD AUTO: 36.7 % (ref 35–47)
HCT VFR BLD AUTO: 37.1 % (ref 35–47)
HCT VFR BLD AUTO: 37.4 % (ref 35–47)
HCT VFR BLD AUTO: 38.6 % (ref 35–47)
HCT VFR BLD AUTO: 38.9 % (ref 35–47)
HCT VFR BLD AUTO: 38.9 % (ref 35–47)
HCT VFR BLD AUTO: 39.4 % (ref 35–47)
HCT VFR BLD AUTO: 39.6 % (ref 35–47)
HCT VFR BLD AUTO: 40.4 % (ref 35–47)
HCT VFR BLD AUTO: 40.8 % (ref 35–47)
HCT VFR BLD AUTO: 42.2 % (ref 35–47)
HCT VFR BLD AUTO: 43.2 % (ref 35–47)
HCT VFR BLD AUTO: 44.9 % (ref 35–47)
HCT VFR BLD AUTO: 47.9 % (ref 35–47)
HGB BLD-MCNC: 10.5 G/DL (ref 11.7–15.7)
HGB BLD-MCNC: 10.6 G/DL (ref 11.7–15.7)
HGB BLD-MCNC: 10.6 G/DL (ref 11.7–15.7)
HGB BLD-MCNC: 10.7 G/DL (ref 11.7–15.7)
HGB BLD-MCNC: 10.7 G/DL (ref 11.7–15.7)
HGB BLD-MCNC: 10.8 G/DL (ref 11.7–15.7)
HGB BLD-MCNC: 11.1 G/DL (ref 11.7–15.7)
HGB BLD-MCNC: 11.1 G/DL (ref 11.7–15.7)
HGB BLD-MCNC: 11.2 G/DL (ref 11.7–15.7)
HGB BLD-MCNC: 11.3 G/DL (ref 11.7–15.7)
HGB BLD-MCNC: 11.5 G/DL (ref 11.7–15.7)
HGB BLD-MCNC: 11.5 G/DL (ref 11.7–15.7)
HGB BLD-MCNC: 11.7 G/DL (ref 11.7–15.7)
HGB BLD-MCNC: 11.8 G/DL (ref 11.7–15.7)
HGB BLD-MCNC: 12 G/DL (ref 11.7–15.7)
HGB BLD-MCNC: 12.1 G/DL (ref 11.7–15.7)
HGB BLD-MCNC: 12.2 G/DL (ref 11.7–15.7)
HGB BLD-MCNC: 12.4 G/DL (ref 11.7–15.7)
HGB BLD-MCNC: 12.4 G/DL (ref 11.7–15.7)
HGB BLD-MCNC: 12.5 G/DL (ref 11.7–15.7)
HGB BLD-MCNC: 12.5 G/DL (ref 11.7–15.7)
HGB BLD-MCNC: 13.3 G/DL (ref 11.7–15.7)
HGB BLD-MCNC: 13.6 G/DL (ref 11.7–15.7)
HGB BLD-MCNC: 13.6 G/DL (ref 11.7–15.7)
HGB BLD-MCNC: 14.7 G/DL (ref 11.7–15.7)
HGB BLD-MCNC: 7.3 G/DL (ref 11.7–15.7)
HGB BLD-MCNC: 7.7 G/DL (ref 11.7–15.7)
HGB BLD-MCNC: 7.8 G/DL (ref 11.7–15.7)
HGB BLD-MCNC: 7.9 G/DL (ref 11.7–15.7)
HGB BLD-MCNC: 8 G/DL (ref 11.7–15.7)
HGB BLD-MCNC: 8.2 G/DL (ref 11.7–15.7)
HGB BLD-MCNC: 8.3 G/DL (ref 11.7–15.7)
HGB BLD-MCNC: 8.3 G/DL (ref 11.7–15.7)
HGB BLD-MCNC: 8.4 G/DL (ref 11.7–15.7)
HGB BLD-MCNC: 8.5 G/DL (ref 11.7–15.7)
HGB BLD-MCNC: 8.6 G/DL (ref 11.7–15.7)
HGB BLD-MCNC: 8.7 G/DL (ref 11.7–15.7)
HGB BLD-MCNC: 8.8 G/DL (ref 11.7–15.7)
HGB BLD-MCNC: 9 G/DL (ref 11.7–15.7)
HGB BLD-MCNC: 9.3 G/DL (ref 11.7–15.7)
HGB BLD-MCNC: 9.6 G/DL (ref 11.7–15.7)
HGB UR QL STRIP: NEGATIVE
HOLD SPECIMEN: NORMAL
IGF-I BLD-MCNC: 111 NG/ML (ref 69–253)
IGF-I BLD-MCNC: 112 NG/ML (ref 69–253)
IGF-I BLD-MCNC: 325 NG/ML (ref 69–253)
IMM GRANULOCYTES # BLD: 0 10E3/UL
IMM GRANULOCYTES # BLD: 0.1 10E3/UL
IMM GRANULOCYTES NFR BLD: 0 %
IMM GRANULOCYTES NFR BLD: 0 %
INR PPP: 1.02 (ref 0.85–1.15)
INR PPP: 1.19 (ref 0.85–1.15)
INR PPP: 1.25 (ref 0.85–1.15)
INR PPP: 1.42 (ref 0.85–1.15)
INR PPP: 1.44 (ref 0.85–1.15)
INTERPRETATION ECG - MUSE: NORMAL
ISSUE DATE AND TIME: NORMAL
KETONES UR STRIP-MCNC: NEGATIVE MG/DL
LACTATE BLD-SCNC: 2.3 MMOL/L
LACTATE BLD-SCNC: 2.4 MMOL/L
LACTATE BLD-SCNC: 2.5 MMOL/L
LACTATE BLD-SCNC: 2.5 MMOL/L
LACTATE BLD-SCNC: 2.6 MMOL/L
LACTATE BLD-SCNC: 2.6 MMOL/L
LACTATE BLD-SCNC: 2.8 MMOL/L
LACTATE BLD-SCNC: 2.9 MMOL/L
LACTATE BLD-SCNC: 2.9 MMOL/L
LACTATE BLD-SCNC: 3 MMOL/L
LACTATE BLD-SCNC: 4.5 MMOL/L
LACTATE BLD-SCNC: 4.6 MMOL/L
LACTATE BLD-SCNC: 4.9 MMOL/L
LACTATE SERPL-SCNC: 1 MMOL/L (ref 0.7–2)
LACTATE SERPL-SCNC: 1.6 MMOL/L (ref 0.7–2)
LACTATE SERPL-SCNC: 1.8 MMOL/L (ref 0.7–2)
LACTATE SERPL-SCNC: 2.1 MMOL/L (ref 0.7–2)
LACTATE SERPL-SCNC: 2.4 MMOL/L (ref 0.7–2)
LACTATE SERPL-SCNC: 3.6 MMOL/L (ref 0.7–2)
LACTATE SERPL-SCNC: 5.3 MMOL/L (ref 0.7–2)
LACTATE SERPL-SCNC: 5.3 MMOL/L (ref 0.7–2)
LEUKOCYTE ESTERASE UR QL STRIP: NEGATIVE
LH SERPL-ACNC: 24.6 MIU/ML
LH SERPL-ACNC: 8.4 MIU/ML
LVEF ECHO: NORMAL
LYMPHOCYTES # BLD AUTO: 0.6 10E3/UL (ref 0.8–5.3)
LYMPHOCYTES # BLD AUTO: 1.7 10E3/UL (ref 0.8–5.3)
LYMPHOCYTES NFR BLD AUTO: 34 %
LYMPHOCYTES NFR BLD AUTO: 5 %
MAGNESIUM SERPL-MCNC: 1.7 MG/DL (ref 1.7–2.3)
MAGNESIUM SERPL-MCNC: 1.8 MG/DL (ref 1.7–2.3)
MAGNESIUM SERPL-MCNC: 1.9 MG/DL (ref 1.7–2.3)
MAGNESIUM SERPL-MCNC: 1.9 MG/DL (ref 1.7–2.3)
MAGNESIUM SERPL-MCNC: 2 MG/DL (ref 1.7–2.3)
MAGNESIUM SERPL-MCNC: 2.1 MG/DL (ref 1.7–2.3)
MAGNESIUM SERPL-MCNC: 2.2 MG/DL (ref 1.7–2.3)
MAGNESIUM SERPL-MCNC: 2.4 MG/DL (ref 1.7–2.3)
MAGNESIUM SERPL-MCNC: 2.4 MG/DL (ref 1.7–2.3)
MAGNESIUM SERPL-MCNC: 2.5 MG/DL (ref 1.7–2.3)
MAGNESIUM SERPL-MCNC: 2.6 MG/DL (ref 1.7–2.3)
MCF BLD TEG: 67.7 MM (ref 50–70)
MCH RBC QN AUTO: 28.7 PG (ref 26.5–33)
MCH RBC QN AUTO: 28.9 PG (ref 26.5–33)
MCH RBC QN AUTO: 28.9 PG (ref 26.5–33)
MCH RBC QN AUTO: 29 PG (ref 26.5–33)
MCH RBC QN AUTO: 29.1 PG (ref 26.5–33)
MCH RBC QN AUTO: 29.2 PG (ref 26.5–33)
MCH RBC QN AUTO: 29.3 PG (ref 26.5–33)
MCH RBC QN AUTO: 29.5 PG (ref 26.5–33)
MCH RBC QN AUTO: 29.5 PG (ref 26.5–33)
MCH RBC QN AUTO: 29.6 PG (ref 26.5–33)
MCH RBC QN AUTO: 29.7 PG (ref 26.5–33)
MCH RBC QN AUTO: 29.7 PG (ref 26.5–33)
MCH RBC QN AUTO: 29.9 PG (ref 26.5–33)
MCH RBC QN AUTO: 30 PG (ref 26.5–33)
MCH RBC QN AUTO: 30 PG (ref 26.5–33)
MCH RBC QN AUTO: 30.3 PG (ref 26.5–33)
MCH RBC QN AUTO: 30.4 PG (ref 26.5–33)
MCH RBC QN AUTO: 30.4 PG (ref 26.5–33)
MCH RBC QN AUTO: 30.5 PG (ref 26.5–33)
MCH RBC QN AUTO: 30.6 PG (ref 26.5–33)
MCH RBC QN AUTO: 30.7 PG (ref 26.5–33)
MCH RBC QN AUTO: 30.8 PG (ref 26.5–33)
MCH RBC QN AUTO: 30.9 PG (ref 26.5–33)
MCH RBC QN AUTO: 30.9 PG (ref 26.5–33)
MCH RBC QN AUTO: 31 PG (ref 26.5–33)
MCH RBC QN AUTO: 31.1 PG (ref 26.5–33)
MCH RBC QN AUTO: 31.3 PG (ref 26.5–33)
MCH RBC QN AUTO: 31.4 PG (ref 26.5–33)
MCHC RBC AUTO-ENTMCNC: 29.8 G/DL (ref 31.5–36.5)
MCHC RBC AUTO-ENTMCNC: 30 G/DL (ref 31.5–36.5)
MCHC RBC AUTO-ENTMCNC: 30 G/DL (ref 31.5–36.5)
MCHC RBC AUTO-ENTMCNC: 30.3 G/DL (ref 31.5–36.5)
MCHC RBC AUTO-ENTMCNC: 30.5 G/DL (ref 31.5–36.5)
MCHC RBC AUTO-ENTMCNC: 30.6 G/DL (ref 31.5–36.5)
MCHC RBC AUTO-ENTMCNC: 30.7 G/DL (ref 31.5–36.5)
MCHC RBC AUTO-ENTMCNC: 30.8 G/DL (ref 31.5–36.5)
MCHC RBC AUTO-ENTMCNC: 30.9 G/DL (ref 31.5–36.5)
MCHC RBC AUTO-ENTMCNC: 31 G/DL (ref 31.5–36.5)
MCHC RBC AUTO-ENTMCNC: 31 G/DL (ref 31.5–36.5)
MCHC RBC AUTO-ENTMCNC: 31.1 G/DL (ref 31.5–36.5)
MCHC RBC AUTO-ENTMCNC: 31.1 G/DL (ref 31.5–36.5)
MCHC RBC AUTO-ENTMCNC: 31.4 G/DL (ref 31.5–36.5)
MCHC RBC AUTO-ENTMCNC: 31.4 G/DL (ref 31.5–36.5)
MCHC RBC AUTO-ENTMCNC: 31.5 G/DL (ref 31.5–36.5)
MCHC RBC AUTO-ENTMCNC: 31.6 G/DL (ref 31.5–36.5)
MCHC RBC AUTO-ENTMCNC: 32 G/DL (ref 31.5–36.5)
MCHC RBC AUTO-ENTMCNC: 32.2 G/DL (ref 31.5–36.5)
MCHC RBC AUTO-ENTMCNC: 32.6 G/DL (ref 31.5–36.5)
MCHC RBC AUTO-ENTMCNC: 33.1 G/DL (ref 31.5–36.5)
MCHC RBC AUTO-ENTMCNC: 33.2 G/DL (ref 31.5–36.5)
MCHC RBC AUTO-ENTMCNC: 33.7 G/DL (ref 31.5–36.5)
MCHC RBC AUTO-ENTMCNC: 34 G/DL (ref 31.5–36.5)
MCHC RBC AUTO-ENTMCNC: 34.1 G/DL (ref 31.5–36.5)
MCHC RBC AUTO-ENTMCNC: 34.2 G/DL (ref 31.5–36.5)
MCHC RBC AUTO-ENTMCNC: 35.6 G/DL (ref 31.5–36.5)
MCHC RBC AUTO-ENTMCNC: 35.7 G/DL (ref 31.5–36.5)
MCV RBC AUTO: 100 FL (ref 78–100)
MCV RBC AUTO: 101 FL (ref 78–100)
MCV RBC AUTO: 102 FL (ref 78–100)
MCV RBC AUTO: 84 FL (ref 78–100)
MCV RBC AUTO: 86 FL (ref 78–100)
MCV RBC AUTO: 87 FL (ref 78–100)
MCV RBC AUTO: 88 FL (ref 78–100)
MCV RBC AUTO: 89 FL (ref 78–100)
MCV RBC AUTO: 91 FL (ref 78–100)
MCV RBC AUTO: 91 FL (ref 78–100)
MCV RBC AUTO: 92 FL (ref 78–100)
MCV RBC AUTO: 92 FL (ref 78–100)
MCV RBC AUTO: 93 FL (ref 78–100)
MCV RBC AUTO: 94 FL (ref 78–100)
MCV RBC AUTO: 95 FL (ref 78–100)
MCV RBC AUTO: 96 FL (ref 78–100)
MCV RBC AUTO: 97 FL (ref 78–100)
MCV RBC AUTO: 97 FL (ref 78–100)
MCV RBC AUTO: 98 FL (ref 78–100)
MONOCYTES # BLD AUTO: 0.2 10E3/UL (ref 0–1.3)
MONOCYTES # BLD AUTO: 0.4 10E3/UL (ref 0–1.3)
MONOCYTES NFR BLD AUTO: 2 %
MONOCYTES NFR BLD AUTO: 8 %
MRSA DNA SPEC QL NAA+PROBE: NEGATIVE
NEUTROPHILS # BLD AUTO: 12.9 10E3/UL (ref 1.6–8.3)
NEUTROPHILS # BLD AUTO: 2.8 10E3/UL (ref 1.6–8.3)
NEUTROPHILS NFR BLD AUTO: 55 %
NEUTROPHILS NFR BLD AUTO: 93 %
NITRATE UR QL: NEGATIVE
NRBC # BLD AUTO: 0 10E3/UL
NRBC # BLD AUTO: 0 10E3/UL
NRBC BLD AUTO-RTO: 0 /100
NRBC BLD AUTO-RTO: 0 /100
NT-PROBNP SERPL-MCNC: <36 PG/ML (ref 0–450)
O2/TOTAL GAS SETTING VFR VENT: 100 %
O2/TOTAL GAS SETTING VFR VENT: 100 %
O2/TOTAL GAS SETTING VFR VENT: 30 %
O2/TOTAL GAS SETTING VFR VENT: 36 %
O2/TOTAL GAS SETTING VFR VENT: 36 %
O2/TOTAL GAS SETTING VFR VENT: 40 %
O2/TOTAL GAS SETTING VFR VENT: 42 %
O2/TOTAL GAS SETTING VFR VENT: 43 %
O2/TOTAL GAS SETTING VFR VENT: 43 %
O2/TOTAL GAS SETTING VFR VENT: 44 %
O2/TOTAL GAS SETTING VFR VENT: 45 %
O2/TOTAL GAS SETTING VFR VENT: 5 %
O2/TOTAL GAS SETTING VFR VENT: 5 %
O2/TOTAL GAS SETTING VFR VENT: 50 %
O2/TOTAL GAS SETTING VFR VENT: 50 %
O2/TOTAL GAS SETTING VFR VENT: 6 %
O2/TOTAL GAS SETTING VFR VENT: 60 %
O2/TOTAL GAS SETTING VFR VENT: 60 %
OSMOLALITY SERPL: 312 MMOL/KG (ref 275–295)
OSMOLALITY SERPL: 313 MMOL/KG (ref 275–295)
OSMOLALITY UR: 353 MMOL/KG (ref 100–1200)
OXYHGB MFR BLD: 92 % (ref 92–100)
OXYHGB MFR BLD: 94 % (ref 92–100)
OXYHGB MFR BLDV: 48 % (ref 70–75)
OXYHGB MFR BLDV: 69 % (ref 70–75)
OXYHGB MFR BLDV: 71 % (ref 70–75)
P AXIS - MUSE: 28 DEGREES
P AXIS - MUSE: 53 DEGREES
P AXIS - MUSE: 54 DEGREES
P AXIS - MUSE: 61 DEGREES
P AXIS - MUSE: 66 DEGREES
P AXIS - MUSE: 66 DEGREES
PATH REPORT.COMMENTS IMP SPEC: ABNORMAL
PATH REPORT.COMMENTS IMP SPEC: YES
PATH REPORT.FINAL DX SPEC: ABNORMAL
PATH REPORT.GROSS SPEC: ABNORMAL
PATH REPORT.INTRAOP OBS SPEC DOC: ABNORMAL
PATH REPORT.MICROSCOPIC SPEC OTHER STN: ABNORMAL
PATH REPORT.RELEVANT HX SPEC: ABNORMAL
PCO2 BLD: 31 MM HG (ref 35–45)
PCO2 BLD: 35 MM HG (ref 35–45)
PCO2 BLD: 47 MM HG (ref 35–45)
PCO2 BLD: 48 MM HG (ref 35–45)
PCO2 BLD: 49 MM HG (ref 35–45)
PCO2 BLD: 51 MM HG (ref 35–45)
PCO2 BLDA: 24 MM HG (ref 35–45)
PCO2 BLDA: 33 MM HG (ref 35–45)
PCO2 BLDA: 34 MM HG (ref 35–45)
PCO2 BLDA: 35 MM HG (ref 35–45)
PCO2 BLDA: 36 MM HG (ref 35–45)
PCO2 BLDA: 36 MM HG (ref 35–45)
PCO2 BLDA: 37 MM HG (ref 35–45)
PCO2 BLDA: 38 MM HG (ref 35–45)
PCO2 BLDA: 38 MM HG (ref 35–45)
PCO2 BLDV: 41 MM HG (ref 40–50)
PCO2 BLDV: 48 MM HG (ref 40–50)
PCO2 BLDV: 48 MM HG (ref 40–50)
PCO2 BLDV: 51 MM HG (ref 40–50)
PCO2 BLDV: 55 MM HG (ref 40–50)
PCO2 BLDV: 59 MM HG (ref 40–50)
PCO2 BLDV: 63 MM HG (ref 40–50)
PH BLD: 7.38 [PH] (ref 7.35–7.45)
PH BLD: 7.38 [PH] (ref 7.35–7.45)
PH BLD: 7.39 [PH] (ref 7.35–7.45)
PH BLD: 7.4 [PH] (ref 7.35–7.45)
PH BLD: 7.44 [PH] (ref 7.35–7.45)
PH BLD: 7.5 [PH] (ref 7.35–7.45)
PH BLDA: 7.38 [PH] (ref 7.35–7.45)
PH BLDA: 7.39 [PH] (ref 7.35–7.45)
PH BLDA: 7.41 [PH] (ref 7.35–7.45)
PH BLDA: 7.42 [PH] (ref 7.35–7.45)
PH BLDA: 7.43 [PH] (ref 7.35–7.45)
PH BLDA: 7.44 [PH] (ref 7.35–7.45)
PH BLDA: 7.45 [PH] (ref 7.35–7.45)
PH BLDA: 7.45 [PH] (ref 7.35–7.45)
PH BLDA: 7.46 [PH] (ref 7.35–7.45)
PH BLDV: 7.28 [PH] (ref 7.32–7.43)
PH BLDV: 7.34 [PH] (ref 7.32–7.43)
PH BLDV: 7.37 [PH] (ref 7.32–7.43)
PH BLDV: 7.37 [PH] (ref 7.32–7.43)
PH BLDV: 7.38 [PH] (ref 7.32–7.43)
PH BLDV: 7.42 [PH] (ref 7.32–7.43)
PH BLDV: 7.42 [PH] (ref 7.32–7.43)
PH UR STRIP: 5 [PH] (ref 5–7)
PHOSPHATE SERPL-MCNC: 1.6 MG/DL (ref 2.5–4.5)
PHOSPHATE SERPL-MCNC: 2 MG/DL (ref 2.5–4.5)
PHOSPHATE SERPL-MCNC: 2.1 MG/DL (ref 2.5–4.5)
PHOSPHATE SERPL-MCNC: 2.3 MG/DL (ref 2.5–4.5)
PHOSPHATE SERPL-MCNC: 2.3 MG/DL (ref 2.5–4.5)
PHOSPHATE SERPL-MCNC: 2.4 MG/DL (ref 2.5–4.5)
PHOSPHATE SERPL-MCNC: 2.5 MG/DL (ref 2.5–4.5)
PHOSPHATE SERPL-MCNC: 2.5 MG/DL (ref 2.5–4.5)
PHOSPHATE SERPL-MCNC: 2.6 MG/DL (ref 2.5–4.5)
PHOSPHATE SERPL-MCNC: 2.7 MG/DL (ref 2.5–4.5)
PHOSPHATE SERPL-MCNC: 2.8 MG/DL (ref 2.5–4.5)
PHOSPHATE SERPL-MCNC: 3 MG/DL (ref 2.5–4.5)
PHOSPHATE SERPL-MCNC: 3.2 MG/DL (ref 2.5–4.5)
PHOSPHATE SERPL-MCNC: 3.3 MG/DL (ref 2.5–4.5)
PHOSPHATE SERPL-MCNC: 3.4 MG/DL (ref 2.5–4.5)
PHOSPHATE SERPL-MCNC: 3.4 MG/DL (ref 2.5–4.5)
PHOSPHATE SERPL-MCNC: 3.5 MG/DL (ref 2.5–4.5)
PHOSPHATE SERPL-MCNC: 3.5 MG/DL (ref 2.5–4.5)
PHOSPHATE SERPL-MCNC: 3.6 MG/DL (ref 2.5–4.5)
PHOSPHATE SERPL-MCNC: 3.8 MG/DL (ref 2.5–4.5)
PHOSPHATE SERPL-MCNC: 3.8 MG/DL (ref 2.5–4.5)
PHOSPHATE SERPL-MCNC: 3.9 MG/DL (ref 2.5–4.5)
PHOSPHATE SERPL-MCNC: 4.1 MG/DL (ref 2.5–4.5)
PHOSPHATE SERPL-MCNC: 4.3 MG/DL (ref 2.5–4.5)
PHOTO IMAGE: ABNORMAL
PLATELET # BLD AUTO: 122 10E3/UL (ref 150–450)
PLATELET # BLD AUTO: 127 10E3/UL (ref 150–450)
PLATELET # BLD AUTO: 128 10E3/UL (ref 150–450)
PLATELET # BLD AUTO: 135 10E3/UL (ref 150–450)
PLATELET # BLD AUTO: 137 10E3/UL (ref 150–450)
PLATELET # BLD AUTO: 156 10E3/UL (ref 150–450)
PLATELET # BLD AUTO: 158 10E3/UL (ref 150–450)
PLATELET # BLD AUTO: 201 10E3/UL (ref 150–450)
PLATELET # BLD AUTO: 213 10E3/UL (ref 150–450)
PLATELET # BLD AUTO: 227 10E3/UL (ref 150–450)
PLATELET # BLD AUTO: 232 10E3/UL (ref 150–450)
PLATELET # BLD AUTO: 241 10E3/UL (ref 150–450)
PLATELET # BLD AUTO: 260 10E3/UL (ref 150–450)
PLATELET # BLD AUTO: 263 10E3/UL (ref 150–450)
PLATELET # BLD AUTO: 276 10E3/UL (ref 150–450)
PLATELET # BLD AUTO: 279 10E3/UL (ref 150–450)
PLATELET # BLD AUTO: 283 10E3/UL (ref 150–450)
PLATELET # BLD AUTO: 289 10E3/UL (ref 150–450)
PLATELET # BLD AUTO: 301 10E3/UL (ref 150–450)
PLATELET # BLD AUTO: 303 10E3/UL (ref 150–450)
PLATELET # BLD AUTO: 311 10E3/UL (ref 150–450)
PLATELET # BLD AUTO: 313 10E3/UL (ref 150–450)
PLATELET # BLD AUTO: 316 10E3/UL (ref 150–450)
PLATELET # BLD AUTO: 317 10E3/UL (ref 150–450)
PLATELET # BLD AUTO: 332 10E3/UL (ref 150–450)
PLATELET # BLD AUTO: 342 10E3/UL (ref 150–450)
PLATELET # BLD AUTO: 348 10E3/UL (ref 150–450)
PLATELET # BLD AUTO: 353 10E3/UL (ref 150–450)
PLATELET # BLD AUTO: 355 10E3/UL (ref 150–450)
PLATELET # BLD AUTO: 356 10E3/UL (ref 150–450)
PLATELET # BLD AUTO: 358 10E3/UL (ref 150–450)
PLATELET # BLD AUTO: 361 10E3/UL (ref 150–450)
PLATELET # BLD AUTO: 363 10E3/UL (ref 150–450)
PLATELET # BLD AUTO: 363 10E3/UL (ref 150–450)
PLATELET # BLD AUTO: 373 10E3/UL (ref 150–450)
PLATELET # BLD AUTO: 374 10E3/UL (ref 150–450)
PLATELET # BLD AUTO: 375 10E3/UL (ref 150–450)
PLATELET # BLD AUTO: 384 10E3/UL (ref 150–450)
PLATELET # BLD AUTO: 384 10E3/UL (ref 150–450)
PLATELET # BLD AUTO: 396 10E3/UL (ref 150–450)
PO2 BLD: 101 MM HG (ref 80–105)
PO2 BLD: 55 MM HG (ref 80–105)
PO2 BLD: 67 MM HG (ref 80–105)
PO2 BLD: 74 MM HG (ref 80–105)
PO2 BLD: 75 MM HG (ref 80–105)
PO2 BLD: 89 MM HG (ref 80–105)
PO2 BLDA: 102 MM HG (ref 80–105)
PO2 BLDA: 109 MM HG (ref 80–105)
PO2 BLDA: 120 MM HG (ref 80–105)
PO2 BLDA: 123 MM HG (ref 80–105)
PO2 BLDA: 126 MM HG (ref 80–105)
PO2 BLDA: 157 MM HG (ref 80–105)
PO2 BLDA: 79 MM HG (ref 80–105)
PO2 BLDA: 83 MM HG (ref 80–105)
PO2 BLDA: 86 MM HG (ref 80–105)
PO2 BLDA: 89 MM HG (ref 80–105)
PO2 BLDA: 90 MM HG (ref 80–105)
PO2 BLDA: 90 MM HG (ref 80–105)
PO2 BLDA: 94 MM HG (ref 80–105)
PO2 BLDA: 98 MM HG (ref 80–105)
PO2 BLDV: 29 MM HG (ref 25–47)
PO2 BLDV: 32 MM HG (ref 25–47)
PO2 BLDV: 37 MM HG (ref 25–47)
PO2 BLDV: 40 MM HG (ref 25–47)
PO2 BLDV: 42 MM HG (ref 25–47)
PO2 BLDV: 54 MM HG (ref 25–47)
PO2 BLDV: 86 MM HG (ref 25–47)
POTASSIUM BLD-SCNC: 2 MMOL/L (ref 3.5–5)
POTASSIUM BLD-SCNC: 3.3 MMOL/L (ref 3.5–5)
POTASSIUM BLD-SCNC: 3.9 MMOL/L (ref 3.5–5)
POTASSIUM BLD-SCNC: 4 MMOL/L (ref 3.4–5.3)
POTASSIUM BLD-SCNC: 4 MMOL/L (ref 3.5–5)
POTASSIUM BLD-SCNC: 4.1 MMOL/L (ref 3.5–5)
POTASSIUM BLD-SCNC: 4.2 MMOL/L (ref 3.5–5)
POTASSIUM BLD-SCNC: 4.3 MMOL/L (ref 3.5–5)
POTASSIUM BLD-SCNC: 4.4 MMOL/L (ref 3.5–5)
POTASSIUM BLD-SCNC: 4.5 MMOL/L (ref 3.5–5)
POTASSIUM BLD-SCNC: 4.6 MMOL/L (ref 3.5–5)
POTASSIUM BLD-SCNC: 4.7 MMOL/L (ref 3.5–5)
POTASSIUM SERPL-SCNC: 3.5 MMOL/L (ref 3.4–5.3)
POTASSIUM SERPL-SCNC: 3.6 MMOL/L (ref 3.4–5.3)
POTASSIUM SERPL-SCNC: 3.7 MMOL/L (ref 3.4–5.3)
POTASSIUM SERPL-SCNC: 3.8 MMOL/L (ref 3.4–5.3)
POTASSIUM SERPL-SCNC: 3.9 MMOL/L (ref 3.4–5.3)
POTASSIUM SERPL-SCNC: 3.9 MMOL/L (ref 3.4–5.3)
POTASSIUM SERPL-SCNC: 4 MMOL/L (ref 3.4–5.3)
POTASSIUM SERPL-SCNC: 4.1 MMOL/L (ref 3.4–5.3)
POTASSIUM SERPL-SCNC: 4.2 MMOL/L (ref 3.4–5.3)
POTASSIUM SERPL-SCNC: 4.3 MMOL/L (ref 3.4–5.3)
POTASSIUM SERPL-SCNC: 4.4 MMOL/L (ref 3.4–5.3)
POTASSIUM SERPL-SCNC: 4.5 MMOL/L (ref 3.4–5.3)
POTASSIUM SERPL-SCNC: 4.6 MMOL/L (ref 3.4–5.3)
POTASSIUM SERPL-SCNC: 4.7 MMOL/L (ref 3.4–5.3)
POTASSIUM SERPL-SCNC: 4.8 MMOL/L (ref 3.4–5.3)
POTASSIUM SERPL-SCNC: 4.9 MMOL/L (ref 3.4–5.3)
PR INTERVAL - MUSE: 142 MS
PR INTERVAL - MUSE: 154 MS
PR INTERVAL - MUSE: 156 MS
PR INTERVAL - MUSE: 160 MS
PR INTERVAL - MUSE: 160 MS
PR INTERVAL - MUSE: 172 MS
PROCALCITONIN SERPL IA-MCNC: 0.39 NG/ML
PROCALCITONIN SERPL IA-MCNC: 0.77 NG/ML
PROLACTIN SERPL 3RD IS-MCNC: 10 NG/ML (ref 5–23)
PROLACTIN SERPL 3RD IS-MCNC: 14 NG/ML (ref 5–23)
PROT SERPL-MCNC: 4.7 G/DL (ref 6.4–8.3)
PROT SERPL-MCNC: 6.6 G/DL (ref 6.4–8.3)
PROT SERPL-MCNC: 7.6 G/DL (ref 6.4–8.3)
QRS DURATION - MUSE: 72 MS
QRS DURATION - MUSE: 76 MS
QRS DURATION - MUSE: 80 MS
QRS DURATION - MUSE: 82 MS
QRS DURATION - MUSE: 88 MS
QRS DURATION - MUSE: 92 MS
QT - MUSE: 296 MS
QT - MUSE: 306 MS
QT - MUSE: 306 MS
QT - MUSE: 316 MS
QT - MUSE: 364 MS
QT - MUSE: 368 MS
QTC - MUSE: 375 MS
QTC - MUSE: 406 MS
QTC - MUSE: 421 MS
QTC - MUSE: 428 MS
QTC - MUSE: 429 MS
QTC - MUSE: 437 MS
R AXIS - MUSE: 45 DEGREES
R AXIS - MUSE: 46 DEGREES
R AXIS - MUSE: 52 DEGREES
R AXIS - MUSE: 60 DEGREES
R AXIS - MUSE: 60 DEGREES
R AXIS - MUSE: 61 DEGREES
RBC # BLD AUTO: 2.57 10E6/UL (ref 3.8–5.2)
RBC # BLD AUTO: 2.6 10E6/UL (ref 3.8–5.2)
RBC # BLD AUTO: 2.61 10E6/UL (ref 3.8–5.2)
RBC # BLD AUTO: 2.64 10E6/UL (ref 3.8–5.2)
RBC # BLD AUTO: 2.64 10E6/UL (ref 3.8–5.2)
RBC # BLD AUTO: 2.68 10E6/UL (ref 3.8–5.2)
RBC # BLD AUTO: 2.69 10E6/UL (ref 3.8–5.2)
RBC # BLD AUTO: 2.7 10E6/UL (ref 3.8–5.2)
RBC # BLD AUTO: 2.71 10E6/UL (ref 3.8–5.2)
RBC # BLD AUTO: 2.73 10E6/UL (ref 3.8–5.2)
RBC # BLD AUTO: 2.74 10E6/UL (ref 3.8–5.2)
RBC # BLD AUTO: 2.74 10E6/UL (ref 3.8–5.2)
RBC # BLD AUTO: 2.77 10E6/UL (ref 3.8–5.2)
RBC # BLD AUTO: 2.81 10E6/UL (ref 3.8–5.2)
RBC # BLD AUTO: 2.82 10E6/UL (ref 3.8–5.2)
RBC # BLD AUTO: 2.84 10E6/UL (ref 3.8–5.2)
RBC # BLD AUTO: 2.87 10E6/UL (ref 3.8–5.2)
RBC # BLD AUTO: 2.87 10E6/UL (ref 3.8–5.2)
RBC # BLD AUTO: 2.91 10E6/UL (ref 3.8–5.2)
RBC # BLD AUTO: 2.93 10E6/UL (ref 3.8–5.2)
RBC # BLD AUTO: 3.03 10E6/UL (ref 3.8–5.2)
RBC # BLD AUTO: 3.62 10E6/UL (ref 3.8–5.2)
RBC # BLD AUTO: 3.63 10E6/UL (ref 3.8–5.2)
RBC # BLD AUTO: 3.66 10E6/UL (ref 3.8–5.2)
RBC # BLD AUTO: 3.68 10E6/UL (ref 3.8–5.2)
RBC # BLD AUTO: 3.81 10E6/UL (ref 3.8–5.2)
RBC # BLD AUTO: 3.83 10E6/UL (ref 3.8–5.2)
RBC # BLD AUTO: 3.96 10E6/UL (ref 3.8–5.2)
RBC # BLD AUTO: 3.98 10E6/UL (ref 3.8–5.2)
RBC # BLD AUTO: 4.05 10E6/UL (ref 3.8–5.2)
RBC # BLD AUTO: 4.16 10E6/UL (ref 3.8–5.2)
RBC # BLD AUTO: 4.17 10E6/UL (ref 3.8–5.2)
RBC # BLD AUTO: 4.2 10E6/UL (ref 3.8–5.2)
RBC # BLD AUTO: 4.24 10E6/UL (ref 3.8–5.2)
RBC # BLD AUTO: 4.29 10E6/UL (ref 3.8–5.2)
RBC # BLD AUTO: 4.29 10E6/UL (ref 3.8–5.2)
RBC # BLD AUTO: 4.64 10E6/UL (ref 3.8–5.2)
RBC # BLD AUTO: 4.64 10E6/UL (ref 3.8–5.2)
RBC # BLD AUTO: 4.7 10E6/UL (ref 3.8–5.2)
RBC # BLD AUTO: 5.04 10E6/UL (ref 3.8–5.2)
SA TARGET DNA: POSITIVE
SHBG SERPL-SCNC: 11 NMOL/L (ref 30–135)
SODIUM BLD-SCNC: 139 MMOL/L (ref 135–145)
SODIUM BLD-SCNC: 140 MMOL/L (ref 135–145)
SODIUM BLD-SCNC: 141 MMOL/L (ref 135–145)
SODIUM BLD-SCNC: 142 MMOL/L (ref 135–145)
SODIUM BLD-SCNC: 143 MMOL/L (ref 135–145)
SODIUM BLD-SCNC: 145 MMOL/L (ref 135–145)
SODIUM SERPL-SCNC: 138 MMOL/L (ref 135–145)
SODIUM SERPL-SCNC: 139 MMOL/L (ref 135–145)
SODIUM SERPL-SCNC: 140 MMOL/L (ref 135–145)
SODIUM SERPL-SCNC: 141 MMOL/L (ref 135–145)
SODIUM SERPL-SCNC: 142 MMOL/L (ref 135–145)
SODIUM SERPL-SCNC: 143 MMOL/L (ref 135–145)
SODIUM SERPL-SCNC: 144 MMOL/L (ref 135–145)
SODIUM SERPL-SCNC: 145 MMOL/L (ref 135–145)
SODIUM SERPL-SCNC: 146 MMOL/L (ref 135–145)
SODIUM SERPL-SCNC: 147 MMOL/L (ref 135–145)
SODIUM SERPL-SCNC: 148 MMOL/L (ref 135–145)
SODIUM SERPL-SCNC: 149 MMOL/L (ref 135–145)
SODIUM SERPL-SCNC: 150 MMOL/L (ref 135–145)
SODIUM SERPL-SCNC: 151 MMOL/L (ref 135–145)
SODIUM SERPL-SCNC: 151 MMOL/L (ref 135–145)
SODIUM SERPL-SCNC: 152 MMOL/L (ref 135–145)
SODIUM SERPL-SCNC: 153 MMOL/L (ref 135–145)
SODIUM SERPL-SCNC: 153 MMOL/L (ref 135–145)
SP GR UR STRIP: 1 (ref 1–1.03)
SP GR UR STRIP: 1 (ref 1–1.03)
SP GR UR STRIP: 1.01 (ref 1–1.03)
SP GR UR STRIP: 1.01 (ref 1–1.03)
SPECIMEN EXPIRATION DATE: NORMAL
SPECIMEN EXPIRATION DATE: NORMAL
SYSTOLIC BLOOD PRESSURE - MUSE: NORMAL MMHG
T AXIS - MUSE: -77 DEGREES
T AXIS - MUSE: -87 DEGREES
T AXIS - MUSE: -9 DEGREES
T AXIS - MUSE: 11 DEGREES
T AXIS - MUSE: 8 DEGREES
T AXIS - MUSE: 9 DEGREES
T4 FREE SERPL-MCNC: 0.54 NG/DL (ref 0.9–1.7)
T4 FREE SERPL-MCNC: 0.64 NG/DL (ref 0.9–1.7)
T4 FREE SERPL-MCNC: 0.77 NG/DL (ref 0.9–1.7)
T4 FREE SERPL-MCNC: 0.87 NG/DL (ref 0.9–1.7)
T4 FREE SERPL-MCNC: 1.31 NG/DL (ref 0.9–1.7)
T4 SERPL-MCNC: 6.3 UG/DL (ref 4.5–11.7)
TESTOST FREE SERPL-MCNC: 0.61 NG/DL
TESTOST SERPL-MCNC: 21 NG/DL (ref 8–60)
TSH SERPL DL<=0.005 MIU/L-ACNC: 0.19 UIU/ML (ref 0.3–4.2)
TSH SERPL DL<=0.005 MIU/L-ACNC: 0.3 UIU/ML (ref 0.3–4.2)
TSH SERPL DL<=0.005 MIU/L-ACNC: 0.52 UIU/ML (ref 0.3–4.2)
TSH SERPL DL<=0.005 MIU/L-ACNC: 0.85 UIU/ML (ref 0.3–4.2)
TSH SERPL DL<=0.005 MIU/L-ACNC: 0.9 UIU/ML (ref 0.3–4.2)
TSH SERPL DL<=0.005 MIU/L-ACNC: 1 UIU/ML (ref 0.3–4.2)
UNIT ABO/RH: NORMAL
UNIT NUMBER: NORMAL
UNIT STATUS: NORMAL
UNIT TYPE ISBT: 6200
UROBILINOGEN UR STRIP-MCNC: NORMAL MG/DL
VENTRICULAR RATE- MUSE: 106 BPM
VENTRICULAR RATE- MUSE: 114 BPM
VENTRICULAR RATE- MUSE: 115 BPM
VENTRICULAR RATE- MUSE: 126 BPM
VENTRICULAR RATE- MUSE: 64 BPM
VENTRICULAR RATE- MUSE: 82 BPM
WBC # BLD AUTO: 10.1 10E3/UL (ref 4–11)
WBC # BLD AUTO: 11.4 10E3/UL (ref 4–11)
WBC # BLD AUTO: 12.2 10E3/UL (ref 4–11)
WBC # BLD AUTO: 12.3 10E3/UL (ref 4–11)
WBC # BLD AUTO: 12.7 10E3/UL (ref 4–11)
WBC # BLD AUTO: 12.7 10E3/UL (ref 4–11)
WBC # BLD AUTO: 13 10E3/UL (ref 4–11)
WBC # BLD AUTO: 13.3 10E3/UL (ref 4–11)
WBC # BLD AUTO: 13.5 10E3/UL (ref 4–11)
WBC # BLD AUTO: 13.8 10E3/UL (ref 4–11)
WBC # BLD AUTO: 13.8 10E3/UL (ref 4–11)
WBC # BLD AUTO: 13.9 10E3/UL (ref 4–11)
WBC # BLD AUTO: 14 10E3/UL (ref 4–11)
WBC # BLD AUTO: 14.1 10E3/UL (ref 4–11)
WBC # BLD AUTO: 14.1 10E3/UL (ref 4–11)
WBC # BLD AUTO: 14.5 10E3/UL (ref 4–11)
WBC # BLD AUTO: 14.6 10E3/UL (ref 4–11)
WBC # BLD AUTO: 14.6 10E3/UL (ref 4–11)
WBC # BLD AUTO: 14.7 10E3/UL (ref 4–11)
WBC # BLD AUTO: 15.9 10E3/UL (ref 4–11)
WBC # BLD AUTO: 18.5 10E3/UL (ref 4–11)
WBC # BLD AUTO: 25 10E3/UL (ref 4–11)
WBC # BLD AUTO: 4.5 10E3/UL (ref 4–11)
WBC # BLD AUTO: 5.1 10E3/UL (ref 4–11)
WBC # BLD AUTO: 5.1 10E3/UL (ref 4–11)
WBC # BLD AUTO: 5.2 10E3/UL (ref 4–11)
WBC # BLD AUTO: 5.2 10E3/UL (ref 4–11)
WBC # BLD AUTO: 5.5 10E3/UL (ref 4–11)
WBC # BLD AUTO: 5.8 10E3/UL (ref 4–11)
WBC # BLD AUTO: 5.9 10E3/UL (ref 4–11)
WBC # BLD AUTO: 6.2 10E3/UL (ref 4–11)
WBC # BLD AUTO: 6.8 10E3/UL (ref 4–11)
WBC # BLD AUTO: 7 10E3/UL (ref 4–11)
WBC # BLD AUTO: 8.8 10E3/UL (ref 4–11)
WBC # BLD AUTO: 8.8 10E3/UL (ref 4–11)
WBC # BLD AUTO: 9.4 10E3/UL (ref 4–11)
WBC # BLD AUTO: 9.7 10E3/UL (ref 4–11)
WBC # BLD AUTO: 9.9 10E3/UL (ref 4–11)

## 2023-01-01 PROCEDURE — 250N000013 HC RX MED GY IP 250 OP 250 PS 637

## 2023-01-01 PROCEDURE — 36415 COLL VENOUS BLD VENIPUNCTURE: CPT | Performed by: NEUROLOGICAL SURGERY

## 2023-01-01 PROCEDURE — 94640 AIRWAY INHALATION TREATMENT: CPT | Mod: 76

## 2023-01-01 PROCEDURE — 87040 BLOOD CULTURE FOR BACTERIA: CPT | Performed by: NEUROLOGICAL SURGERY

## 2023-01-01 PROCEDURE — 71045 X-RAY EXAM CHEST 1 VIEW: CPT | Mod: 26 | Performed by: RADIOLOGY

## 2023-01-01 PROCEDURE — 84295 ASSAY OF SERUM SODIUM: CPT

## 2023-01-01 PROCEDURE — 94640 AIRWAY INHALATION TREATMENT: CPT

## 2023-01-01 PROCEDURE — 84305 ASSAY OF SOMATOMEDIN: CPT

## 2023-01-01 PROCEDURE — 250N000011 HC RX IP 250 OP 636: Mod: JZ

## 2023-01-01 PROCEDURE — 250N000013 HC RX MED GY IP 250 OP 250 PS 637: Performed by: NEUROLOGICAL SURGERY

## 2023-01-01 PROCEDURE — 120N000002 HC R&B MED SURG/OB UMMC

## 2023-01-01 PROCEDURE — 74018 RADEX ABDOMEN 1 VIEW: CPT

## 2023-01-01 PROCEDURE — 85025 COMPLETE CBC W/AUTO DIFF WBC: CPT | Performed by: INTERNAL MEDICINE

## 2023-01-01 PROCEDURE — 250N000009 HC RX 250: Performed by: ANESTHESIOLOGY

## 2023-01-01 PROCEDURE — 36415 COLL VENOUS BLD VENIPUNCTURE: CPT

## 2023-01-01 PROCEDURE — 93005 ELECTROCARDIOGRAM TRACING: CPT

## 2023-01-01 PROCEDURE — 85027 COMPLETE CBC AUTOMATED: CPT | Performed by: NURSE PRACTITIONER

## 2023-01-01 PROCEDURE — 999N000128 HC STATISTIC PERIPHERAL IV START W/O US GUIDANCE

## 2023-01-01 PROCEDURE — 85610 PROTHROMBIN TIME: CPT

## 2023-01-01 PROCEDURE — 82533 TOTAL CORTISOL: CPT

## 2023-01-01 PROCEDURE — 250N000009 HC RX 250

## 2023-01-01 PROCEDURE — 36592 COLLECT BLOOD FROM PICC: CPT

## 2023-01-01 PROCEDURE — 99231 SBSQ HOSP IP/OBS SF/LOW 25: CPT | Mod: GC | Performed by: NEUROLOGICAL SURGERY

## 2023-01-01 PROCEDURE — 250N000009 HC RX 250: Performed by: NEUROLOGICAL SURGERY

## 2023-01-01 PROCEDURE — 999N000157 HC STATISTIC RCP TIME EA 10 MIN

## 2023-01-01 PROCEDURE — 85027 COMPLETE CBC AUTOMATED: CPT

## 2023-01-01 PROCEDURE — 99232 SBSQ HOSP IP/OBS MODERATE 35: CPT | Mod: 95 | Performed by: NURSE PRACTITIONER

## 2023-01-01 PROCEDURE — 250N000011 HC RX IP 250 OP 636: Performed by: NURSE PRACTITIONER

## 2023-01-01 PROCEDURE — 99291 CRITICAL CARE FIRST HOUR: CPT | Mod: GC | Performed by: PSYCHIATRY & NEUROLOGY

## 2023-01-01 PROCEDURE — 85396 CLOTTING ASSAY WHOLE BLOOD: CPT

## 2023-01-01 PROCEDURE — C1762 CONN TISS, HUMAN(INC FASCIA): HCPCS | Performed by: NEUROLOGICAL SURGERY

## 2023-01-01 PROCEDURE — 83605 ASSAY OF LACTIC ACID: CPT | Performed by: NURSE PRACTITIONER

## 2023-01-01 PROCEDURE — 258N000003 HC RX IP 258 OP 636: Performed by: NEUROLOGICAL SURGERY

## 2023-01-01 PROCEDURE — 250N000011 HC RX IP 250 OP 636: Mod: JZ | Performed by: NEUROLOGICAL SURGERY

## 2023-01-01 PROCEDURE — 84100 ASSAY OF PHOSPHORUS: CPT | Performed by: NEUROLOGICAL SURGERY

## 2023-01-01 PROCEDURE — 85730 THROMBOPLASTIN TIME PARTIAL: CPT

## 2023-01-01 PROCEDURE — 99207 PR NO CHARGE LOS: CPT | Mod: 24 | Performed by: INTERNAL MEDICINE

## 2023-01-01 PROCEDURE — 82803 BLOOD GASES ANY COMBINATION: CPT | Performed by: NURSE PRACTITIONER

## 2023-01-01 PROCEDURE — 83735 ASSAY OF MAGNESIUM: CPT | Performed by: NEUROLOGICAL SURGERY

## 2023-01-01 PROCEDURE — 97535 SELF CARE MNGMENT TRAINING: CPT | Mod: GO | Performed by: OCCUPATIONAL THERAPIST

## 2023-01-01 PROCEDURE — 99231 SBSQ HOSP IP/OBS SF/LOW 25: CPT | Mod: GC | Performed by: INTERNAL MEDICINE

## 2023-01-01 PROCEDURE — 36569 INSJ PICC 5 YR+ W/O IMAGING: CPT

## 2023-01-01 PROCEDURE — 250N000013 HC RX MED GY IP 250 OP 250 PS 637: Performed by: NURSE PRACTITIONER

## 2023-01-01 PROCEDURE — 82310 ASSAY OF CALCIUM: CPT

## 2023-01-01 PROCEDURE — 250N000013 HC RX MED GY IP 250 OP 250 PS 637: Performed by: INTERNAL MEDICINE

## 2023-01-01 PROCEDURE — 250N000011 HC RX IP 250 OP 636

## 2023-01-01 PROCEDURE — 82330 ASSAY OF CALCIUM: CPT

## 2023-01-01 PROCEDURE — 81025 URINE PREGNANCY TEST: CPT

## 2023-01-01 PROCEDURE — 09QW4ZZ REPAIR RIGHT SPHENOID SINUS, PERCUTANEOUS ENDOSCOPIC APPROACH: ICD-10-PCS | Performed by: STUDENT IN AN ORGANIZED HEALTH CARE EDUCATION/TRAINING PROGRAM

## 2023-01-01 PROCEDURE — 84439 ASSAY OF FREE THYROXINE: CPT | Performed by: INTERNAL MEDICINE

## 2023-01-01 PROCEDURE — 71045 X-RAY EXAM CHEST 1 VIEW: CPT

## 2023-01-01 PROCEDURE — 94660 CPAP INITIATION&MGMT: CPT

## 2023-01-01 PROCEDURE — 85610 PROTHROMBIN TIME: CPT | Performed by: NURSE ANESTHETIST, CERTIFIED REGISTERED

## 2023-01-01 PROCEDURE — 258N000003 HC RX IP 258 OP 636: Performed by: NURSE ANESTHETIST, CERTIFIED REGISTERED

## 2023-01-01 PROCEDURE — 85384 FIBRINOGEN ACTIVITY: CPT | Performed by: NURSE ANESTHETIST, CERTIFIED REGISTERED

## 2023-01-01 PROCEDURE — 258N000003 HC RX IP 258 OP 636: Performed by: ANESTHESIOLOGY

## 2023-01-01 PROCEDURE — 00B70ZZ EXCISION OF CEREBRAL HEMISPHERE, OPEN APPROACH: ICD-10-PCS | Performed by: NEUROLOGICAL SURGERY

## 2023-01-01 PROCEDURE — 70450 CT HEAD/BRAIN W/O DYE: CPT

## 2023-01-01 PROCEDURE — 97165 OT EVAL LOW COMPLEX 30 MIN: CPT | Mod: GO

## 2023-01-01 PROCEDURE — 250N000011 HC RX IP 250 OP 636: Mod: JZ | Performed by: NURSE PRACTITIONER

## 2023-01-01 PROCEDURE — 82805 BLOOD GASES W/O2 SATURATION: CPT

## 2023-01-01 PROCEDURE — 250N000012 HC RX MED GY IP 250 OP 636 PS 637

## 2023-01-01 PROCEDURE — 85610 PROTHROMBIN TIME: CPT | Performed by: INTERNAL MEDICINE

## 2023-01-01 PROCEDURE — 82947 ASSAY GLUCOSE BLOOD QUANT: CPT | Performed by: NEUROLOGICAL SURGERY

## 2023-01-01 PROCEDURE — 99232 SBSQ HOSP IP/OBS MODERATE 35: CPT | Performed by: STUDENT IN AN ORGANIZED HEALTH CARE EDUCATION/TRAINING PROGRAM

## 2023-01-01 PROCEDURE — 710N000010 HC RECOVERY PHASE 1, LEVEL 2, PER MIN

## 2023-01-01 PROCEDURE — 82670 ASSAY OF TOTAL ESTRADIOL: CPT

## 2023-01-01 PROCEDURE — 84100 ASSAY OF PHOSPHORUS: CPT

## 2023-01-01 PROCEDURE — 85014 HEMATOCRIT: CPT

## 2023-01-01 PROCEDURE — 85730 THROMBOPLASTIN TIME PARTIAL: CPT | Performed by: ANESTHESIOLOGY

## 2023-01-01 PROCEDURE — 80048 BASIC METABOLIC PNL TOTAL CA: CPT | Performed by: NURSE PRACTITIONER

## 2023-01-01 PROCEDURE — 82803 BLOOD GASES ANY COMBINATION: CPT

## 2023-01-01 PROCEDURE — 97116 GAIT TRAINING THERAPY: CPT | Mod: GP

## 2023-01-01 PROCEDURE — A9585 GADOBUTROL INJECTION: HCPCS | Performed by: NEUROLOGICAL SURGERY

## 2023-01-01 PROCEDURE — 258N000003 HC RX IP 258 OP 636

## 2023-01-01 PROCEDURE — 97535 SELF CARE MNGMENT TRAINING: CPT | Mod: GO

## 2023-01-01 PROCEDURE — 999N000248 HC STATISTIC IV INSERT WITH US BY RN

## 2023-01-01 PROCEDURE — 272N000004 HC RX 272: Performed by: STUDENT IN AN ORGANIZED HEALTH CARE EDUCATION/TRAINING PROGRAM

## 2023-01-01 PROCEDURE — 84436 ASSAY OF TOTAL THYROXINE: CPT

## 2023-01-01 PROCEDURE — 999N000066 HC STATISTIC EXTERNAL/OUTSIDE TRANSPORT

## 2023-01-01 PROCEDURE — 86901 BLOOD TYPING SEROLOGIC RH(D): CPT | Performed by: INTERNAL MEDICINE

## 2023-01-01 PROCEDURE — 92526 ORAL FUNCTION THERAPY: CPT | Mod: GN

## 2023-01-01 PROCEDURE — 250N000009 HC RX 250: Performed by: INTERNAL MEDICINE

## 2023-01-01 PROCEDURE — 80048 BASIC METABOLIC PNL TOTAL CA: CPT

## 2023-01-01 PROCEDURE — 70544 MR ANGIOGRAPHY HEAD W/O DYE: CPT

## 2023-01-01 PROCEDURE — 74018 RADEX ABDOMEN 1 VIEW: CPT | Mod: 26 | Performed by: RADIOLOGY

## 2023-01-01 PROCEDURE — 999N000127 HC STATISTIC PERIPHERAL IV START W US GUIDANCE

## 2023-01-01 PROCEDURE — 93010 ELECTROCARDIOGRAM REPORT: CPT | Performed by: INTERNAL MEDICINE

## 2023-01-01 PROCEDURE — 84443 ASSAY THYROID STIM HORMONE: CPT

## 2023-01-01 PROCEDURE — 200N000002 HC R&B ICU UMMC

## 2023-01-01 PROCEDURE — 84146 ASSAY OF PROLACTIN: CPT

## 2023-01-01 PROCEDURE — 92133 CPTRZD OPH DX IMG PST SGM ON: CPT

## 2023-01-01 PROCEDURE — 250N000011 HC RX IP 250 OP 636: Mod: JZ | Performed by: ANESTHESIOLOGY

## 2023-01-01 PROCEDURE — 83735 ASSAY OF MAGNESIUM: CPT | Performed by: NURSE PRACTITIONER

## 2023-01-01 PROCEDURE — 36415 COLL VENOUS BLD VENIPUNCTURE: CPT | Performed by: NURSE PRACTITIONER

## 2023-01-01 PROCEDURE — 85025 COMPLETE CBC W/AUTO DIFF WBC: CPT | Performed by: NURSE PRACTITIONER

## 2023-01-01 PROCEDURE — 272N000001 HC OR GENERAL SUPPLY STERILE: Performed by: NEUROLOGICAL SURGERY

## 2023-01-01 PROCEDURE — 70553 MRI BRAIN STEM W/O & W/DYE: CPT

## 2023-01-01 PROCEDURE — 80053 COMPREHEN METABOLIC PANEL: CPT | Performed by: NURSE PRACTITIONER

## 2023-01-01 PROCEDURE — 85610 PROTHROMBIN TIME: CPT | Performed by: ANESTHESIOLOGY

## 2023-01-01 PROCEDURE — 85652 RBC SED RATE AUTOMATED: CPT | Performed by: NURSE PRACTITIONER

## 2023-01-01 PROCEDURE — 258N000003 HC RX IP 258 OP 636: Performed by: STUDENT IN AN ORGANIZED HEALTH CARE EDUCATION/TRAINING PROGRAM

## 2023-01-01 PROCEDURE — 250N000013 HC RX MED GY IP 250 OP 250 PS 637: Performed by: STUDENT IN AN ORGANIZED HEALTH CARE EDUCATION/TRAINING PROGRAM

## 2023-01-01 PROCEDURE — 3E033XZ INTRODUCTION OF VASOPRESSOR INTO PERIPHERAL VEIN, PERCUTANEOUS APPROACH: ICD-10-PCS | Performed by: NURSE PRACTITIONER

## 2023-01-01 PROCEDURE — 93306 TTE W/DOPPLER COMPLETE: CPT | Mod: 26 | Performed by: INTERNAL MEDICINE

## 2023-01-01 PROCEDURE — 85014 HEMATOCRIT: CPT | Performed by: STUDENT IN AN ORGANIZED HEALTH CARE EDUCATION/TRAINING PROGRAM

## 2023-01-01 PROCEDURE — 999N000141 HC STATISTIC PRE-PROCEDURE NURSING ASSESSMENT: Performed by: NEUROLOGICAL SURGERY

## 2023-01-01 PROCEDURE — 250N000011 HC RX IP 250 OP 636: Performed by: ANESTHESIOLOGY

## 2023-01-01 PROCEDURE — 84443 ASSAY THYROID STIM HORMONE: CPT | Performed by: INTERNAL MEDICINE

## 2023-01-01 PROCEDURE — 83935 ASSAY OF URINE OSMOLALITY: CPT

## 2023-01-01 PROCEDURE — 88305 TISSUE EXAM BY PATHOLOGIST: CPT | Mod: TC | Performed by: STUDENT IN AN ORGANIZED HEALTH CARE EDUCATION/TRAINING PROGRAM

## 2023-01-01 PROCEDURE — 82330 ASSAY OF CALCIUM: CPT | Performed by: NURSE PRACTITIONER

## 2023-01-01 PROCEDURE — 09BW4ZZ EXCISION OF RIGHT SPHENOID SINUS, PERCUTANEOUS ENDOSCOPIC APPROACH: ICD-10-PCS | Performed by: STUDENT IN AN ORGANIZED HEALTH CARE EDUCATION/TRAINING PROGRAM

## 2023-01-01 PROCEDURE — 255N000002 HC RX 255 OP 636: Mod: JZ | Performed by: NEUROLOGICAL SURGERY

## 2023-01-01 PROCEDURE — 84132 ASSAY OF SERUM POTASSIUM: CPT | Performed by: NEUROLOGICAL SURGERY

## 2023-01-01 PROCEDURE — 94003 VENT MGMT INPAT SUBQ DAY: CPT

## 2023-01-01 PROCEDURE — 84100 ASSAY OF PHOSPHORUS: CPT | Performed by: NURSE PRACTITIONER

## 2023-01-01 PROCEDURE — 83930 ASSAY OF BLOOD OSMOLALITY: CPT | Performed by: NEUROLOGICAL SURGERY

## 2023-01-01 PROCEDURE — 250N000009 HC RX 250: Performed by: NURSE ANESTHETIST, CERTIFIED REGISTERED

## 2023-01-01 PROCEDURE — 999N000185 HC STATISTIC TRANSPORT TIME EA 15 MIN

## 2023-01-01 PROCEDURE — 85384 FIBRINOGEN ACTIVITY: CPT | Performed by: ANESTHESIOLOGY

## 2023-01-01 PROCEDURE — 83735 ASSAY OF MAGNESIUM: CPT

## 2023-01-01 PROCEDURE — 88305 TISSUE EXAM BY PATHOLOGIST: CPT | Mod: 26 | Performed by: PATHOLOGY

## 2023-01-01 PROCEDURE — 99291 CRITICAL CARE FIRST HOUR: CPT | Mod: FS | Performed by: NURSE PRACTITIONER

## 2023-01-01 PROCEDURE — 00U20KZ SUPPLEMENT DURA MATER WITH NONAUTOLOGOUS TISSUE SUBSTITUTE, OPEN APPROACH: ICD-10-PCS | Performed by: NEUROLOGICAL SURGERY

## 2023-01-01 PROCEDURE — 250N000009 HC RX 250: Performed by: NURSE PRACTITIONER

## 2023-01-01 PROCEDURE — 82310 ASSAY OF CALCIUM: CPT | Performed by: NURSE PRACTITIONER

## 2023-01-01 PROCEDURE — 99232 SBSQ HOSP IP/OBS MODERATE 35: CPT | Mod: GC | Performed by: NEUROLOGICAL SURGERY

## 2023-01-01 PROCEDURE — 099R4ZZ DRAINAGE OF LEFT MAXILLARY SINUS, PERCUTANEOUS ENDOSCOPIC APPROACH: ICD-10-PCS | Performed by: STUDENT IN AN ORGANIZED HEALTH CARE EDUCATION/TRAINING PROGRAM

## 2023-01-01 PROCEDURE — 94642 AEROSOL INHALATION TREATMENT: CPT

## 2023-01-01 PROCEDURE — 09TV4ZZ RESECTION OF LEFT ETHMOID SINUS, PERCUTANEOUS ENDOSCOPIC APPROACH: ICD-10-PCS | Performed by: STUDENT IN AN ORGANIZED HEALTH CARE EDUCATION/TRAINING PROGRAM

## 2023-01-01 PROCEDURE — 82248 BILIRUBIN DIRECT: CPT | Performed by: NURSE PRACTITIONER

## 2023-01-01 PROCEDURE — 87641 MR-STAPH DNA AMP PROBE: CPT | Performed by: NURSE PRACTITIONER

## 2023-01-01 PROCEDURE — 85018 HEMOGLOBIN: CPT | Performed by: NURSE PRACTITIONER

## 2023-01-01 PROCEDURE — 70450 CT HEAD/BRAIN W/O DYE: CPT | Mod: 26 | Performed by: RADIOLOGY

## 2023-01-01 PROCEDURE — 83036 HEMOGLOBIN GLYCOSYLATED A1C: CPT | Performed by: NURSE PRACTITIONER

## 2023-01-01 PROCEDURE — 99292 CRITICAL CARE ADDL 30 MIN: CPT | Mod: GC | Performed by: PSYCHIATRY & NEUROLOGY

## 2023-01-01 PROCEDURE — 36415 COLL VENOUS BLD VENIPUNCTURE: CPT | Performed by: INTERNAL MEDICINE

## 2023-01-01 PROCEDURE — P9016 RBC LEUKOCYTES REDUCED: HCPCS | Performed by: ANESTHESIOLOGY

## 2023-01-01 PROCEDURE — 99233 SBSQ HOSP IP/OBS HIGH 50: CPT | Performed by: STUDENT IN AN ORGANIZED HEALTH CARE EDUCATION/TRAINING PROGRAM

## 2023-01-01 PROCEDURE — 250N000011 HC RX IP 250 OP 636: Mod: JZ | Performed by: NURSE ANESTHETIST, CERTIFIED REGISTERED

## 2023-01-01 PROCEDURE — 99254 IP/OBS CNSLTJ NEW/EST MOD 60: CPT | Mod: FS | Performed by: PEDIATRICS

## 2023-01-01 PROCEDURE — 86334 IMMUNOFIX E-PHORESIS SERUM: CPT | Performed by: PSYCHIATRY & NEUROLOGY

## 2023-01-01 PROCEDURE — 64999 UNLISTED PX NERVOUS SYSTEM: CPT | Performed by: NEUROLOGICAL SURGERY

## 2023-01-01 PROCEDURE — 84145 PROCALCITONIN (PCT): CPT

## 2023-01-01 PROCEDURE — 83002 ASSAY OF GONADOTROPIN (LH): CPT

## 2023-01-01 PROCEDURE — 99231 SBSQ HOSP IP/OBS SF/LOW 25: CPT | Mod: 24 | Performed by: INTERNAL MEDICINE

## 2023-01-01 PROCEDURE — 255N000002 HC RX 255 OP 636: Performed by: NEUROLOGICAL SURGERY

## 2023-01-01 PROCEDURE — 250N000011 HC RX IP 250 OP 636: Performed by: NURSE ANESTHETIST, CERTIFIED REGISTERED

## 2023-01-01 PROCEDURE — P9016 RBC LEUKOCYTES REDUCED: HCPCS

## 2023-01-01 PROCEDURE — 250N000009 HC RX 250: Performed by: STUDENT IN AN ORGANIZED HEALTH CARE EDUCATION/TRAINING PROGRAM

## 2023-01-01 PROCEDURE — 4A11X4G MONITORING OF PERIPHERAL NERVOUS ELECTRICAL ACTIVITY, INTRAOPERATIVE, EXTERNAL APPROACH: ICD-10-PCS | Performed by: NEUROLOGICAL SURGERY

## 2023-01-01 PROCEDURE — 70450 CT HEAD/BRAIN W/O DYE: CPT | Mod: 26 | Performed by: STUDENT IN AN ORGANIZED HEALTH CARE EDUCATION/TRAINING PROGRAM

## 2023-01-01 PROCEDURE — 0GB00ZZ EXCISION OF PITUITARY GLAND, OPEN APPROACH: ICD-10-PCS | Performed by: NEUROLOGICAL SURGERY

## 2023-01-01 PROCEDURE — 92610 EVALUATE SWALLOWING FUNCTION: CPT | Mod: GN

## 2023-01-01 PROCEDURE — 84132 ASSAY OF SERUM POTASSIUM: CPT | Performed by: ANESTHESIOLOGY

## 2023-01-01 PROCEDURE — 84295 ASSAY OF SERUM SODIUM: CPT | Performed by: INTERNAL MEDICINE

## 2023-01-01 PROCEDURE — 272N000451 HC KIT SHRLOCK 5FR POWER PICC DOUBLE LUMEN

## 2023-01-01 PROCEDURE — 84295 ASSAY OF SERUM SODIUM: CPT | Performed by: NEUROLOGICAL SURGERY

## 2023-01-01 PROCEDURE — 99222 1ST HOSP IP/OBS MODERATE 55: CPT | Mod: 95 | Performed by: NURSE PRACTITIONER

## 2023-01-01 PROCEDURE — 82330 ASSAY OF CALCIUM: CPT | Performed by: ANESTHESIOLOGY

## 2023-01-01 PROCEDURE — 84132 ASSAY OF SERUM POTASSIUM: CPT

## 2023-01-01 PROCEDURE — 97530 THERAPEUTIC ACTIVITIES: CPT | Mod: GP

## 2023-01-01 PROCEDURE — 61781 SCAN PROC CRANIAL INTRA: CPT | Mod: GC | Performed by: NEUROLOGICAL SURGERY

## 2023-01-01 PROCEDURE — 81003 URINALYSIS AUTO W/O SCOPE: CPT

## 2023-01-01 PROCEDURE — 99232 SBSQ HOSP IP/OBS MODERATE 35: CPT | Mod: GC | Performed by: INTERNAL MEDICINE

## 2023-01-01 PROCEDURE — 92526 ORAL FUNCTION THERAPY: CPT | Mod: GN | Performed by: SPEECH-LANGUAGE PATHOLOGIST

## 2023-01-01 PROCEDURE — 80048 BASIC METABOLIC PNL TOTAL CA: CPT | Performed by: INTERNAL MEDICINE

## 2023-01-01 PROCEDURE — 97530 THERAPEUTIC ACTIVITIES: CPT | Mod: GO

## 2023-01-01 PROCEDURE — 93970 EXTREMITY STUDY: CPT | Mod: 26 | Performed by: RADIOLOGY

## 2023-01-01 PROCEDURE — 250N000011 HC RX IP 250 OP 636: Performed by: NEUROLOGICAL SURGERY

## 2023-01-01 PROCEDURE — 86334 IMMUNOFIX E-PHORESIS SERUM: CPT | Mod: 26 | Performed by: PATHOLOGY

## 2023-01-01 PROCEDURE — 99232 SBSQ HOSP IP/OBS MODERATE 35: CPT | Mod: 24 | Performed by: INTERNAL MEDICINE

## 2023-01-01 PROCEDURE — 36592 COLLECT BLOOD FROM PICC: CPT | Performed by: INTERNAL MEDICINE

## 2023-01-01 PROCEDURE — 97162 PT EVAL MOD COMPLEX 30 MIN: CPT | Mod: GP

## 2023-01-01 PROCEDURE — 258N000003 HC RX IP 258 OP 636: Performed by: INTERNAL MEDICINE

## 2023-01-01 PROCEDURE — 360N000079 HC SURGERY LEVEL 6, PER MIN: Performed by: NEUROLOGICAL SURGERY

## 2023-01-01 PROCEDURE — 99221 1ST HOSP IP/OBS SF/LOW 40: CPT | Mod: GC | Performed by: INTERNAL MEDICINE

## 2023-01-01 PROCEDURE — 00NG0ZZ RELEASE OPTIC NERVE, OPEN APPROACH: ICD-10-PCS | Performed by: NEUROLOGICAL SURGERY

## 2023-01-01 PROCEDURE — 61583 CRANIOFACIAL APPROACH SKULL: CPT | Mod: 22 | Performed by: NEUROLOGICAL SURGERY

## 2023-01-01 PROCEDURE — 61601 RESECT/EXCISE CRANIAL LESION: CPT | Mod: 22 | Performed by: NEUROLOGICAL SURGERY

## 2023-01-01 PROCEDURE — 99233 SBSQ HOSP IP/OBS HIGH 50: CPT | Performed by: INTERNAL MEDICINE

## 2023-01-01 PROCEDURE — 84145 PROCALCITONIN (PCT): CPT | Performed by: NURSE PRACTITIONER

## 2023-01-01 PROCEDURE — 86140 C-REACTIVE PROTEIN: CPT | Performed by: NURSE PRACTITIONER

## 2023-01-01 PROCEDURE — 09QX4ZZ REPAIR LEFT SPHENOID SINUS, PERCUTANEOUS ENDOSCOPIC APPROACH: ICD-10-PCS | Performed by: STUDENT IN AN ORGANIZED HEALTH CARE EDUCATION/TRAINING PROGRAM

## 2023-01-01 PROCEDURE — P9045 ALBUMIN (HUMAN), 5%, 250 ML: HCPCS | Mod: JZ | Performed by: NURSE ANESTHETIST, CERTIFIED REGISTERED

## 2023-01-01 PROCEDURE — 83001 ASSAY OF GONADOTROPIN (FSH): CPT

## 2023-01-01 PROCEDURE — 82803 BLOOD GASES ANY COMBINATION: CPT | Performed by: ANESTHESIOLOGY

## 2023-01-01 PROCEDURE — 70553 MRI BRAIN STEM W/O & W/DYE: CPT | Mod: 26 | Performed by: STUDENT IN AN ORGANIZED HEALTH CARE EDUCATION/TRAINING PROGRAM

## 2023-01-01 PROCEDURE — 88331 PATH CONSLTJ SURG 1 BLK 1SPC: CPT | Mod: 26 | Performed by: SPECIALIST

## 2023-01-01 PROCEDURE — 999N000044 HC STATISTIC CVC DRESSING CHANGE

## 2023-01-01 PROCEDURE — 99292 CRITICAL CARE ADDL 30 MIN: CPT | Mod: FS | Performed by: NURSE PRACTITIONER

## 2023-01-01 PROCEDURE — 09BX4ZZ EXCISION OF LEFT SPHENOID SINUS, PERCUTANEOUS ENDOSCOPIC APPROACH: ICD-10-PCS | Performed by: STUDENT IN AN ORGANIZED HEALTH CARE EDUCATION/TRAINING PROGRAM

## 2023-01-01 PROCEDURE — 84270 ASSAY OF SEX HORMONE GLOBUL: CPT

## 2023-01-01 PROCEDURE — 99231 SBSQ HOSP IP/OBS SF/LOW 25: CPT

## 2023-01-01 PROCEDURE — 8E09XBZ COMPUTER ASSISTED PROCEDURE OF HEAD AND NECK REGION: ICD-10-PCS | Performed by: NEUROLOGICAL SURGERY

## 2023-01-01 PROCEDURE — 93970 EXTREMITY STUDY: CPT

## 2023-01-01 PROCEDURE — 999N000253 HC STATISTIC WEANING TRIALS

## 2023-01-01 PROCEDURE — 272N000473 HC KIT, VPS RHYTHM STYLET

## 2023-01-01 PROCEDURE — 85027 COMPLETE CBC AUTOMATED: CPT | Performed by: NURSE ANESTHETIST, CERTIFIED REGISTERED

## 2023-01-01 PROCEDURE — G0463 HOSPITAL OUTPT CLINIC VISIT: HCPCS

## 2023-01-01 PROCEDURE — 86923 COMPATIBILITY TEST ELECTRIC: CPT

## 2023-01-01 PROCEDURE — 70553 MRI BRAIN STEM W/O & W/DYE: CPT | Mod: 26 | Performed by: RADIOLOGY

## 2023-01-01 PROCEDURE — 999N000065 XR CHEST PORT 1 VIEW

## 2023-01-01 PROCEDURE — 83003 ASSAY GROWTH HORMONE (HGH): CPT

## 2023-01-01 PROCEDURE — 999N000259 HC STATISTIC EXTUBATION

## 2023-01-01 PROCEDURE — 80053 COMPREHEN METABOLIC PANEL: CPT

## 2023-01-01 PROCEDURE — 85027 COMPLETE CBC AUTOMATED: CPT | Performed by: NEUROLOGICAL SURGERY

## 2023-01-01 PROCEDURE — 71045 X-RAY EXAM CHEST 1 VIEW: CPT | Mod: 26 | Performed by: STUDENT IN AN ORGANIZED HEALTH CARE EDUCATION/TRAINING PROGRAM

## 2023-01-01 PROCEDURE — 86901 BLOOD TYPING SEROLOGIC RH(D): CPT | Performed by: NURSE PRACTITIONER

## 2023-01-01 PROCEDURE — 83930 ASSAY OF BLOOD OSMOLALITY: CPT

## 2023-01-01 PROCEDURE — 278N000051 HC OR IMPLANT GENERAL: Performed by: NEUROLOGICAL SURGERY

## 2023-01-01 PROCEDURE — 5A09357 ASSISTANCE WITH RESPIRATORY VENTILATION, LESS THAN 24 CONSECUTIVE HOURS, CONTINUOUS POSITIVE AIRWAY PRESSURE: ICD-10-PCS | Performed by: NEUROLOGICAL SURGERY

## 2023-01-01 PROCEDURE — 250N000011 HC RX IP 250 OP 636: Performed by: STUDENT IN AN ORGANIZED HEALTH CARE EDUCATION/TRAINING PROGRAM

## 2023-01-01 PROCEDURE — C1713 ANCHOR/SCREW BN/BN,TIS/BN: HCPCS | Performed by: NEUROLOGICAL SURGERY

## 2023-01-01 PROCEDURE — 99222 1ST HOSP IP/OBS MODERATE 55: CPT | Mod: GC | Performed by: INTERNAL MEDICINE

## 2023-01-01 PROCEDURE — 370N000017 HC ANESTHESIA TECHNICAL FEE, PER MIN: Performed by: NEUROLOGICAL SURGERY

## 2023-01-01 PROCEDURE — 85730 THROMBOPLASTIN TIME PARTIAL: CPT | Performed by: NURSE ANESTHETIST, CERTIFIED REGISTERED

## 2023-01-01 PROCEDURE — 61210 BURR HOLE IMPLT VENTR CATH: CPT | Mod: 51 | Performed by: NEUROLOGICAL SURGERY

## 2023-01-01 PROCEDURE — 82533 TOTAL CORTISOL: CPT | Performed by: INTERNAL MEDICINE

## 2023-01-01 PROCEDURE — 97530 THERAPEUTIC ACTIVITIES: CPT | Mod: GP | Performed by: REHABILITATION PRACTITIONER

## 2023-01-01 PROCEDURE — 85027 COMPLETE CBC AUTOMATED: CPT | Performed by: ANESTHESIOLOGY

## 2023-01-01 PROCEDURE — 250N000012 HC RX MED GY IP 250 OP 636 PS 637: Mod: JZ | Performed by: NEUROLOGICAL SURGERY

## 2023-01-01 PROCEDURE — 999N000285 HC STATISTIC VASC ACCESS LAB DRAW WITH PIV START

## 2023-01-01 PROCEDURE — 255N000002 HC RX 255 OP 636: Performed by: INTERNAL MEDICINE

## 2023-01-01 PROCEDURE — 258N000003 HC RX IP 258 OP 636: Performed by: NURSE PRACTITIONER

## 2023-01-01 PROCEDURE — 84403 ASSAY OF TOTAL TESTOSTERONE: CPT

## 2023-01-01 PROCEDURE — 999N000208 ECHOCARDIOGRAM COMPLETE

## 2023-01-01 PROCEDURE — 09TU4ZZ RESECTION OF RIGHT ETHMOID SINUS, PERCUTANEOUS ENDOSCOPIC APPROACH: ICD-10-PCS | Performed by: STUDENT IN AN ORGANIZED HEALTH CARE EDUCATION/TRAINING PROGRAM

## 2023-01-01 PROCEDURE — 80048 BASIC METABOLIC PNL TOTAL CA: CPT | Performed by: NEUROLOGICAL SURGERY

## 2023-01-01 PROCEDURE — 92083 EXTENDED VISUAL FIELD XM: CPT

## 2023-01-01 PROCEDURE — 099Q4ZZ DRAINAGE OF RIGHT MAXILLARY SINUS, PERCUTANEOUS ENDOSCOPIC APPROACH: ICD-10-PCS | Performed by: STUDENT IN AN ORGANIZED HEALTH CARE EDUCATION/TRAINING PROGRAM

## 2023-01-01 PROCEDURE — 86923 COMPATIBILITY TEST ELECTRIC: CPT | Performed by: ANESTHESIOLOGY

## 2023-01-01 PROCEDURE — 370N000017 HC ANESTHESIA TECHNICAL FEE, PER MIN

## 2023-01-01 PROCEDURE — 84439 ASSAY OF FREE THYROXINE: CPT

## 2023-01-01 PROCEDURE — 09TM4ZZ RESECTION OF NASAL SEPTUM, PERCUTANEOUS ENDOSCOPIC APPROACH: ICD-10-PCS | Performed by: STUDENT IN AN ORGANIZED HEALTH CARE EDUCATION/TRAINING PROGRAM

## 2023-01-01 PROCEDURE — 250N000025 HC SEVOFLURANE, PER MIN

## 2023-01-01 PROCEDURE — 009600Z DRAINAGE OF CEREBRAL VENTRICLE WITH DRAINAGE DEVICE, OPEN APPROACH: ICD-10-PCS | Performed by: NEUROLOGICAL SURGERY

## 2023-01-01 PROCEDURE — 88331 PATH CONSLTJ SURG 1 BLK 1SPC: CPT | Mod: TC | Performed by: STUDENT IN AN ORGANIZED HEALTH CARE EDUCATION/TRAINING PROGRAM

## 2023-01-01 PROCEDURE — 99207 PR APP CREDIT; MD BILLING SHARED VISIT: CPT | Performed by: STUDENT IN AN ORGANIZED HEALTH CARE EDUCATION/TRAINING PROGRAM

## 2023-01-01 PROCEDURE — 99232 SBSQ HOSP IP/OBS MODERATE 35: CPT | Performed by: PSYCHIATRY & NEUROLOGY

## 2023-01-01 PROCEDURE — 99207 PR NO CHARGE LOS: CPT | Performed by: INTERNAL MEDICINE

## 2023-01-01 PROCEDURE — 36600 WITHDRAWAL OF ARTERIAL BLOOD: CPT

## 2023-01-01 PROCEDURE — A9585 GADOBUTROL INJECTION: HCPCS | Mod: JZ | Performed by: NEUROLOGICAL SURGERY

## 2023-01-01 PROCEDURE — 99222 1ST HOSP IP/OBS MODERATE 55: CPT | Performed by: PHYSICIAN ASSISTANT

## 2023-01-01 PROCEDURE — 250N000025 HC SEVOFLURANE, PER MIN: Performed by: NEUROLOGICAL SURGERY

## 2023-01-01 PROCEDURE — 5A1945Z RESPIRATORY VENTILATION, 24-96 CONSECUTIVE HOURS: ICD-10-PCS | Performed by: NEUROLOGICAL SURGERY

## 2023-01-01 PROCEDURE — 83605 ASSAY OF LACTIC ACID: CPT | Performed by: ANESTHESIOLOGY

## 2023-01-01 PROCEDURE — 86850 RBC ANTIBODY SCREEN: CPT | Performed by: INTERNAL MEDICINE

## 2023-01-01 PROCEDURE — 82374 ASSAY BLOOD CARBON DIOXIDE: CPT | Performed by: NEUROLOGICAL SURGERY

## 2023-01-01 PROCEDURE — C1763 CONN TISS, NON-HUMAN: HCPCS | Performed by: NEUROLOGICAL SURGERY

## 2023-01-01 PROCEDURE — 85730 THROMBOPLASTIN TIME PARTIAL: CPT | Performed by: INTERNAL MEDICINE

## 2023-01-01 PROCEDURE — 99204 OFFICE O/P NEW MOD 45 MIN: CPT

## 2023-01-01 PROCEDURE — 999N000147 HC STATISTIC PT IP EVAL DEFER

## 2023-01-01 PROCEDURE — 85384 FIBRINOGEN ACTIVITY: CPT

## 2023-01-01 PROCEDURE — 83880 ASSAY OF NATRIURETIC PEPTIDE: CPT | Performed by: NURSE PRACTITIONER

## 2023-01-01 PROCEDURE — 999N000076 HC STATISTIC ICP MONITORING

## 2023-01-01 PROCEDURE — 84520 ASSAY OF UREA NITROGEN: CPT | Performed by: NURSE PRACTITIONER

## 2023-01-01 DEVICE — IMP PLATE SYN BURR HOLE COVER 17MM 04.503.023: Type: IMPLANTABLE DEVICE | Site: CRANIAL | Status: FUNCTIONAL

## 2023-01-01 DEVICE — IMPLANTABLE DEVICE: Type: IMPLANTABLE DEVICE | Site: BRAIN | Status: FUNCTIONAL

## 2023-01-01 DEVICE — IMP PLATE SYN BOX 4 HOLE 14X14MM 04.503.065: Type: IMPLANTABLE DEVICE | Site: CRANIAL | Status: FUNCTIONAL

## 2023-01-01 DEVICE — GRAFT DURAGEN 2X2" ID220: Type: IMPLANTABLE DEVICE | Site: BRAIN | Status: FUNCTIONAL

## 2023-01-01 DEVICE — IMP SCR SYN MATRIX LOW PRO 1.5X04MM SELF DRILL 04.503.104.01: Type: IMPLANTABLE DEVICE | Site: CRANIAL | Status: FUNCTIONAL

## 2023-01-01 DEVICE — CLIP L-MINI PERM STR 5MM 45.710: Type: IMPLANTABLE DEVICE | Site: BRAIN | Status: FUNCTIONAL

## 2023-01-01 RX ORDER — HEPARIN SODIUM 5000 [USP'U]/.5ML
5000 INJECTION, SOLUTION INTRAVENOUS; SUBCUTANEOUS EVERY 8 HOURS
Status: DISCONTINUED | OUTPATIENT
Start: 2023-01-01 | End: 2023-01-01

## 2023-01-01 RX ORDER — DEXTROSE MONOHYDRATE 25 G/50ML
25-50 INJECTION, SOLUTION INTRAVENOUS
Status: DISCONTINUED | OUTPATIENT
Start: 2023-01-01 | End: 2023-01-01 | Stop reason: HOSPADM

## 2023-01-01 RX ORDER — LIDOCAINE 40 MG/G
CREAM TOPICAL
Status: DISCONTINUED | OUTPATIENT
Start: 2023-01-01 | End: 2023-01-01 | Stop reason: HOSPADM

## 2023-01-01 RX ORDER — ONDANSETRON 2 MG/ML
4 INJECTION INTRAMUSCULAR; INTRAVENOUS EVERY 30 MIN PRN
Status: CANCELLED | OUTPATIENT
Start: 2023-01-01

## 2023-01-01 RX ORDER — MAGNESIUM SULFATE HEPTAHYDRATE 40 MG/ML
2 INJECTION, SOLUTION INTRAVENOUS ONCE
Status: COMPLETED | OUTPATIENT
Start: 2023-01-01 | End: 2023-01-01

## 2023-01-01 RX ORDER — HEPARIN SODIUM,PORCINE 10 UNIT/ML
5-20 VIAL (ML) INTRAVENOUS
Status: DISCONTINUED | OUTPATIENT
Start: 2023-01-01 | End: 2023-01-01 | Stop reason: HOSPADM

## 2023-01-01 RX ORDER — GADOBUTROL 604.72 MG/ML
10 INJECTION INTRAVENOUS ONCE
Status: COMPLETED | OUTPATIENT
Start: 2023-01-01 | End: 2023-01-01

## 2023-01-01 RX ORDER — LIDOCAINE HYDROCHLORIDE AND EPINEPHRINE 10; 10 MG/ML; UG/ML
INJECTION, SOLUTION INFILTRATION; PERINEURAL PRN
Status: DISCONTINUED | OUTPATIENT
Start: 2023-01-01 | End: 2023-01-01 | Stop reason: HOSPADM

## 2023-01-01 RX ORDER — CALCIUM CARBONATE 500 MG/1
1 TABLET, CHEWABLE ORAL DAILY PRN
DISCHARGE
Start: 2023-01-01 | End: 2023-01-01

## 2023-01-01 RX ORDER — ACETAMINOPHEN 325 MG/1
650 TABLET ORAL EVERY 4 HOURS PRN
Status: DISCONTINUED | OUTPATIENT
Start: 2023-01-01 | End: 2023-01-01

## 2023-01-01 RX ORDER — POTASSIUM PHOS IN 0.9 % NACL 15MMOL/250
15 PLASTIC BAG, INJECTION (ML) INTRAVENOUS ONCE
Status: COMPLETED | OUTPATIENT
Start: 2023-01-01 | End: 2023-01-01

## 2023-01-01 RX ORDER — NOREPINEPHRINE BITARTRATE 0.06 MG/ML
.01-.6 INJECTION, SOLUTION INTRAVENOUS CONTINUOUS
Status: DISCONTINUED | OUTPATIENT
Start: 2023-01-01 | End: 2023-01-01

## 2023-01-01 RX ORDER — METHOCARBAMOL 500 MG/1
500 TABLET, FILM COATED ORAL 4 TIMES DAILY PRN
Status: DISCONTINUED | OUTPATIENT
Start: 2023-01-01 | End: 2023-01-01 | Stop reason: HOSPADM

## 2023-01-01 RX ORDER — CEFAZOLIN SODIUM 500 MG/2.2ML
INJECTION, POWDER, FOR SOLUTION INTRAMUSCULAR; INTRAVENOUS PRN
Status: DISCONTINUED | OUTPATIENT
Start: 2023-01-01 | End: 2023-01-01

## 2023-01-01 RX ORDER — LIDOCAINE 4 G/G
3 PATCH TOPICAL
Status: DISCONTINUED | OUTPATIENT
Start: 2023-01-01 | End: 2023-01-01 | Stop reason: HOSPADM

## 2023-01-01 RX ORDER — DIPHENHYDRAMINE HCL 25 MG
25 CAPSULE ORAL
Status: DISCONTINUED | OUTPATIENT
Start: 2023-01-01 | End: 2023-01-01 | Stop reason: HOSPADM

## 2023-01-01 RX ORDER — HYDROCORTISONE 10 MG/1
10 TABLET ORAL DAILY
Status: DISCONTINUED | OUTPATIENT
Start: 2023-01-01 | End: 2023-01-01

## 2023-01-01 RX ORDER — DESMOPRESSIN ACETATE 0.2 MG/1
0.2 TABLET ORAL AT BEDTIME
Status: DISCONTINUED | OUTPATIENT
Start: 2023-01-01 | End: 2023-01-01

## 2023-01-01 RX ORDER — POTASSIUM CHLORIDE 1.5 G/1.58G
20 POWDER, FOR SOLUTION ORAL ONCE
Status: COMPLETED | OUTPATIENT
Start: 2023-01-01 | End: 2023-01-01

## 2023-01-01 RX ORDER — DESMOPRESSIN ACETATE 0.1 MG/1
0.1 TABLET ORAL ONCE
Status: COMPLETED | OUTPATIENT
Start: 2023-01-01 | End: 2023-01-01

## 2023-01-01 RX ORDER — SENNOSIDES 8.6 MG
8.6 TABLET ORAL 2 TIMES DAILY PRN
Status: DISCONTINUED | OUTPATIENT
Start: 2023-01-01 | End: 2023-01-01

## 2023-01-01 RX ORDER — NALOXONE HYDROCHLORIDE 0.4 MG/ML
0.4 INJECTION, SOLUTION INTRAMUSCULAR; INTRAVENOUS; SUBCUTANEOUS
Status: DISCONTINUED | OUTPATIENT
Start: 2023-01-01 | End: 2023-01-01

## 2023-01-01 RX ORDER — HEPARIN SODIUM 5000 [USP'U]/.5ML
5000 INJECTION, SOLUTION INTRAVENOUS; SUBCUTANEOUS EVERY 8 HOURS
DISCHARGE
Start: 2023-01-01

## 2023-01-01 RX ORDER — NALOXONE HYDROCHLORIDE 0.4 MG/ML
0.2 INJECTION, SOLUTION INTRAMUSCULAR; INTRAVENOUS; SUBCUTANEOUS
Status: DISCONTINUED | OUTPATIENT
Start: 2023-01-01 | End: 2023-01-01 | Stop reason: HOSPADM

## 2023-01-01 RX ORDER — QUETIAPINE FUMARATE 25 MG/1
25 TABLET, FILM COATED ORAL 2 TIMES DAILY PRN
Status: DISCONTINUED | OUTPATIENT
Start: 2023-01-01 | End: 2023-01-01

## 2023-01-01 RX ORDER — CEFAZOLIN SODIUM 2 G/100ML
2 INJECTION, SOLUTION INTRAVENOUS SEE ADMIN INSTRUCTIONS
Status: DISCONTINUED | OUTPATIENT
Start: 2023-01-01 | End: 2023-01-01

## 2023-01-01 RX ORDER — ENOXAPARIN SODIUM 100 MG/ML
40 INJECTION SUBCUTANEOUS EVERY 24 HOURS
Status: DISCONTINUED | OUTPATIENT
Start: 2023-01-01 | End: 2023-01-01

## 2023-01-01 RX ORDER — PANTOPRAZOLE SODIUM 40 MG/1
40 TABLET, DELAYED RELEASE ORAL
Status: DISCONTINUED | OUTPATIENT
Start: 2023-01-01 | End: 2023-01-01

## 2023-01-01 RX ORDER — OXYCODONE HYDROCHLORIDE 5 MG/1
5 TABLET ORAL EVERY 4 HOURS PRN
Status: DISCONTINUED | OUTPATIENT
Start: 2023-01-01 | End: 2023-01-01 | Stop reason: HOSPADM

## 2023-01-01 RX ORDER — GADOBUTROL 604.72 MG/ML
0.1 INJECTION INTRAVENOUS ONCE
Status: COMPLETED | OUTPATIENT
Start: 2023-01-01 | End: 2023-01-01

## 2023-01-01 RX ORDER — AMOXICILLIN 250 MG
1 CAPSULE ORAL 2 TIMES DAILY
Status: DISCONTINUED | OUTPATIENT
Start: 2023-01-01 | End: 2023-01-01

## 2023-01-01 RX ORDER — FUROSEMIDE 10 MG/ML
40 INJECTION INTRAMUSCULAR; INTRAVENOUS 2 TIMES DAILY
Status: DISCONTINUED | OUTPATIENT
Start: 2023-01-01 | End: 2023-01-01

## 2023-01-01 RX ORDER — LIDOCAINE 4 G/G
1 PATCH TOPICAL EVERY 24 HOURS
Status: ON HOLD | COMMUNITY
End: 2023-01-01

## 2023-01-01 RX ORDER — LORAZEPAM 2 MG/ML
INJECTION INTRAMUSCULAR
Status: DISCONTINUED
Start: 2023-01-01 | End: 2023-01-01 | Stop reason: WASHOUT

## 2023-01-01 RX ORDER — NALOXONE HYDROCHLORIDE 0.4 MG/ML
0.4 INJECTION, SOLUTION INTRAMUSCULAR; INTRAVENOUS; SUBCUTANEOUS
Status: DISCONTINUED | OUTPATIENT
Start: 2023-01-01 | End: 2023-01-01 | Stop reason: HOSPADM

## 2023-01-01 RX ORDER — CALCIUM CHLORIDE 100 MG/ML
INJECTION INTRAVENOUS; INTRAVENTRICULAR PRN
Status: DISCONTINUED | OUTPATIENT
Start: 2023-01-01 | End: 2023-01-01

## 2023-01-01 RX ORDER — PREDNISONE 20 MG/1
20 TABLET ORAL DAILY
Status: DISCONTINUED | OUTPATIENT
Start: 2023-01-01 | End: 2023-01-01

## 2023-01-01 RX ORDER — LEVETIRACETAM 500 MG/1
500 TABLET ORAL 2 TIMES DAILY
Status: ON HOLD | COMMUNITY
End: 2023-01-01

## 2023-01-01 RX ORDER — FLUTICASONE PROPIONATE AND SALMETEROL 100; 50 UG/1; UG/1
1 POWDER RESPIRATORY (INHALATION) EVERY 12 HOURS
Status: DISCONTINUED | OUTPATIENT
Start: 2023-01-01 | End: 2023-01-01

## 2023-01-01 RX ORDER — HYDROMORPHONE HCL IN WATER/PF 6 MG/30 ML
0.2 PATIENT CONTROLLED ANALGESIA SYRINGE INTRAVENOUS
Status: DISCONTINUED | OUTPATIENT
Start: 2023-01-01 | End: 2023-01-01

## 2023-01-01 RX ORDER — HYDROCORTISONE 5 MG/1
TABLET ORAL
DISCHARGE
Start: 2023-01-01

## 2023-01-01 RX ORDER — PREDNISONE 20 MG/1
20 TABLET ORAL DAILY
Status: ON HOLD | COMMUNITY
End: 2023-01-01

## 2023-01-01 RX ORDER — SODIUM CHLORIDE, SODIUM LACTATE, POTASSIUM CHLORIDE, CALCIUM CHLORIDE 600; 310; 30; 20 MG/100ML; MG/100ML; MG/100ML; MG/100ML
INJECTION, SOLUTION INTRAVENOUS CONTINUOUS
Status: DISCONTINUED | OUTPATIENT
Start: 2023-01-01 | End: 2023-01-01

## 2023-01-01 RX ORDER — CEFAZOLIN SODIUM 2 G/100ML
2 INJECTION, SOLUTION INTRAVENOUS EVERY 8 HOURS
Qty: 300 ML | Refills: 0 | Status: DISCONTINUED | OUTPATIENT
Start: 2023-01-01 | End: 2023-01-01

## 2023-01-01 RX ORDER — SODIUM CHLORIDE, SODIUM LACTATE, POTASSIUM CHLORIDE, CALCIUM CHLORIDE 600; 310; 30; 20 MG/100ML; MG/100ML; MG/100ML; MG/100ML
INJECTION, SOLUTION INTRAVENOUS CONTINUOUS PRN
Status: DISCONTINUED | OUTPATIENT
Start: 2023-01-01 | End: 2023-01-01

## 2023-01-01 RX ORDER — LIDOCAINE 4 G/G
3 PATCH TOPICAL EVERY 24 HOURS
DISCHARGE
Start: 2023-01-01

## 2023-01-01 RX ORDER — IPRATROPIUM BROMIDE AND ALBUTEROL SULFATE 2.5; .5 MG/3ML; MG/3ML
3 SOLUTION RESPIRATORY (INHALATION)
Status: DISCONTINUED | OUTPATIENT
Start: 2023-01-01 | End: 2023-01-01

## 2023-01-01 RX ORDER — LEVETIRACETAM 500 MG/1
500 TABLET ORAL 2 TIMES DAILY
Status: DISCONTINUED | OUTPATIENT
Start: 2023-01-01 | End: 2023-01-01

## 2023-01-01 RX ORDER — LEVOTHYROXINE SODIUM 100 UG/1
100 TABLET ORAL
DISCHARGE
Start: 2023-01-01 | End: 2023-01-01

## 2023-01-01 RX ORDER — ALBUTEROL SULFATE 90 UG/1
2 AEROSOL, METERED RESPIRATORY (INHALATION) EVERY 6 HOURS PRN
Status: DISCONTINUED | OUTPATIENT
Start: 2023-01-01 | End: 2023-01-01

## 2023-01-01 RX ORDER — ALBUTEROL SULFATE 0.83 MG/ML
1.25 SOLUTION RESPIRATORY (INHALATION) EVERY 8 HOURS PRN
Status: DISCONTINUED | OUTPATIENT
Start: 2023-01-01 | End: 2023-01-01

## 2023-01-01 RX ORDER — OXYMETAZOLINE HYDROCHLORIDE 0.05 G/100ML
SPRAY NASAL PRN
Status: DISCONTINUED | OUTPATIENT
Start: 2023-01-01 | End: 2023-01-01 | Stop reason: HOSPADM

## 2023-01-01 RX ORDER — NICOTINE POLACRILEX 4 MG
15-30 LOZENGE BUCCAL
Status: DISCONTINUED | OUTPATIENT
Start: 2023-01-01 | End: 2023-01-01 | Stop reason: HOSPADM

## 2023-01-01 RX ORDER — PANTOPRAZOLE SODIUM 20 MG/1
20 TABLET, DELAYED RELEASE ORAL
Status: DISCONTINUED | OUTPATIENT
Start: 2023-01-01 | End: 2023-01-01

## 2023-01-01 RX ORDER — LABETALOL HYDROCHLORIDE 5 MG/ML
10 INJECTION, SOLUTION INTRAVENOUS EVERY 4 HOURS PRN
Status: DISCONTINUED | OUTPATIENT
Start: 2023-01-01 | End: 2023-01-01

## 2023-01-01 RX ORDER — LEVOTHYROXINE SODIUM 112 UG/1
112 TABLET ORAL
DISCHARGE
Start: 2023-01-01

## 2023-01-01 RX ORDER — TRETINOIN 0.25 MG/G
CREAM TOPICAL AT BEDTIME
Status: ON HOLD | COMMUNITY
End: 2023-01-01

## 2023-01-01 RX ORDER — LIDOCAINE 4 G/G
2 PATCH TOPICAL
Status: DISCONTINUED | OUTPATIENT
Start: 2023-01-01 | End: 2023-01-01

## 2023-01-01 RX ORDER — SIMETHICONE 80 MG
80 TABLET,CHEWABLE ORAL EVERY 6 HOURS PRN
DISCHARGE
Start: 2023-01-01 | End: 2023-01-01

## 2023-01-01 RX ORDER — CEFEPIME HYDROCHLORIDE 2 G/1
2 INJECTION, POWDER, FOR SOLUTION INTRAVENOUS EVERY 8 HOURS
Qty: 112.5 ML | Refills: 0 | Status: COMPLETED | OUTPATIENT
Start: 2023-01-01 | End: 2023-01-01

## 2023-01-01 RX ORDER — MENTHOL AND METHYL SALICYLATE 7.6; 29 G/100G; G/100G
OINTMENT TOPICAL EVERY 6 HOURS PRN
Status: DISCONTINUED | OUTPATIENT
Start: 2023-01-01 | End: 2023-01-01

## 2023-01-01 RX ORDER — DESMOPRESSIN ACETATE 0.2 MG/1
0.2 TABLET ORAL AT BEDTIME
Qty: 90 TABLET | Refills: 0 | Status: SHIPPED | OUTPATIENT
Start: 2023-01-01 | End: 2023-01-01

## 2023-01-01 RX ORDER — DESMOPRESSIN ACETATE 4 UG/ML
1 INJECTION, SOLUTION INTRAVENOUS; SUBCUTANEOUS EVERY 12 HOURS PRN
Status: DISCONTINUED | OUTPATIENT
Start: 2023-01-01 | End: 2023-01-01

## 2023-01-01 RX ORDER — HYDROMORPHONE HCL IN WATER/PF 6 MG/30 ML
0.2 PATIENT CONTROLLED ANALGESIA SYRINGE INTRAVENOUS EVERY 5 MIN PRN
Status: DISCONTINUED | OUTPATIENT
Start: 2023-01-01 | End: 2023-01-01 | Stop reason: HOSPADM

## 2023-01-01 RX ORDER — AMOXICILLIN 250 MG
2 CAPSULE ORAL 2 TIMES DAILY
DISCHARGE
Start: 2023-01-01 | End: 2023-01-01

## 2023-01-01 RX ORDER — FENTANYL CITRATE 50 UG/ML
INJECTION, SOLUTION INTRAMUSCULAR; INTRAVENOUS PRN
Status: DISCONTINUED | OUTPATIENT
Start: 2023-01-01 | End: 2023-01-01

## 2023-01-01 RX ORDER — PROCHLORPERAZINE MALEATE 10 MG
10 TABLET ORAL EVERY 6 HOURS PRN
Status: DISCONTINUED | OUTPATIENT
Start: 2023-01-01 | End: 2023-01-01 | Stop reason: HOSPADM

## 2023-01-01 RX ORDER — PROPOFOL 10 MG/ML
5-75 INJECTION, EMULSION INTRAVENOUS CONTINUOUS
Status: DISCONTINUED | OUTPATIENT
Start: 2023-01-01 | End: 2023-01-01

## 2023-01-01 RX ORDER — METOPROLOL TARTRATE 1 MG/ML
INJECTION, SOLUTION INTRAVENOUS PRN
Status: DISCONTINUED | OUTPATIENT
Start: 2023-01-01 | End: 2023-01-01

## 2023-01-01 RX ORDER — METHOCARBAMOL 500 MG/1
500 TABLET, FILM COATED ORAL 4 TIMES DAILY PRN
DISCHARGE
Start: 2023-01-01 | End: 2023-01-01

## 2023-01-01 RX ORDER — ONDANSETRON 4 MG/1
4 TABLET, ORALLY DISINTEGRATING ORAL EVERY 6 HOURS PRN
DISCHARGE
Start: 2023-01-01

## 2023-01-01 RX ORDER — DEXAMETHASONE SODIUM PHOSPHATE 4 MG/ML
INJECTION, SOLUTION INTRA-ARTICULAR; INTRALESIONAL; INTRAMUSCULAR; INTRAVENOUS; SOFT TISSUE PRN
Status: DISCONTINUED | OUTPATIENT
Start: 2023-01-01 | End: 2023-01-01

## 2023-01-01 RX ORDER — DIPHENHYDRAMINE HCL 25 MG
25 CAPSULE ORAL
DISCHARGE
Start: 2023-01-01

## 2023-01-01 RX ORDER — FENTANYL CITRATE 50 UG/ML
50 INJECTION, SOLUTION INTRAMUSCULAR; INTRAVENOUS EVERY 5 MIN PRN
Status: DISCONTINUED | OUTPATIENT
Start: 2023-01-01 | End: 2023-01-01 | Stop reason: HOSPADM

## 2023-01-01 RX ORDER — FAMOTIDINE 20 MG/1
20 TABLET, FILM COATED ORAL 2 TIMES DAILY
DISCHARGE
Start: 2023-01-01

## 2023-01-01 RX ORDER — ALBUTEROL SULFATE 0.83 MG/ML
2.5 SOLUTION RESPIRATORY (INHALATION) EVERY 8 HOURS PRN
Status: DISCONTINUED | OUTPATIENT
Start: 2023-01-01 | End: 2023-01-01 | Stop reason: HOSPADM

## 2023-01-01 RX ORDER — ONDANSETRON 4 MG/1
4 TABLET, ORALLY DISINTEGRATING ORAL EVERY 6 HOURS PRN
Status: DISCONTINUED | OUTPATIENT
Start: 2023-01-01 | End: 2023-01-01 | Stop reason: HOSPADM

## 2023-01-01 RX ORDER — NALOXONE HYDROCHLORIDE 0.4 MG/ML
0.2 INJECTION, SOLUTION INTRAMUSCULAR; INTRAVENOUS; SUBCUTANEOUS
Status: DISCONTINUED | OUTPATIENT
Start: 2023-01-01 | End: 2023-01-01

## 2023-01-01 RX ORDER — LIDOCAINE 40 MG/G
CREAM TOPICAL
Status: DISCONTINUED | OUTPATIENT
Start: 2023-01-01 | End: 2023-01-01

## 2023-01-01 RX ORDER — IPRATROPIUM BROMIDE AND ALBUTEROL SULFATE 2.5; .5 MG/3ML; MG/3ML
3 SOLUTION RESPIRATORY (INHALATION) EVERY 4 HOURS PRN
Status: DISCONTINUED | OUTPATIENT
Start: 2023-01-01 | End: 2023-01-01 | Stop reason: HOSPADM

## 2023-01-01 RX ORDER — TRETINOIN 0.25 MG/G
CREAM TOPICAL AT BEDTIME
Status: DISCONTINUED | OUTPATIENT
Start: 2023-01-01 | End: 2023-01-01

## 2023-01-01 RX ORDER — HALOPERIDOL 5 MG/ML
5-10 INJECTION INTRAMUSCULAR ONCE
Status: COMPLETED | OUTPATIENT
Start: 2023-01-01 | End: 2023-01-01

## 2023-01-01 RX ORDER — FUROSEMIDE 10 MG/ML
40 INJECTION INTRAMUSCULAR; INTRAVENOUS ONCE
Status: DISCONTINUED | OUTPATIENT
Start: 2023-01-01 | End: 2023-01-01

## 2023-01-01 RX ORDER — DEXTROSE MONOHYDRATE 25 G/50ML
25-50 INJECTION, SOLUTION INTRAVENOUS
Status: DISCONTINUED | OUTPATIENT
Start: 2023-01-01 | End: 2023-01-01

## 2023-01-01 RX ORDER — LABETALOL HYDROCHLORIDE 5 MG/ML
10-40 INJECTION, SOLUTION INTRAVENOUS EVERY 10 MIN PRN
Status: DISCONTINUED | OUTPATIENT
Start: 2023-01-01 | End: 2023-01-01 | Stop reason: HOSPADM

## 2023-01-01 RX ORDER — CEFAZOLIN SODIUM 2 G/100ML
2 INJECTION, SOLUTION INTRAVENOUS
Status: DISCONTINUED | OUTPATIENT
Start: 2023-01-01 | End: 2023-01-01

## 2023-01-01 RX ORDER — HYDRALAZINE HYDROCHLORIDE 20 MG/ML
10-20 INJECTION INTRAMUSCULAR; INTRAVENOUS EVERY 30 MIN PRN
Status: DISCONTINUED | OUTPATIENT
Start: 2023-01-01 | End: 2023-01-01 | Stop reason: HOSPADM

## 2023-01-01 RX ORDER — FLUTICASONE PROPIONATE AND SALMETEROL 100; 50 UG/1; UG/1
1 POWDER RESPIRATORY (INHALATION) 2 TIMES DAILY
Status: ON HOLD | COMMUNITY
End: 2023-01-01

## 2023-01-01 RX ORDER — IPRATROPIUM BROMIDE AND ALBUTEROL SULFATE 2.5; .5 MG/3ML; MG/3ML
3 SOLUTION RESPIRATORY (INHALATION) EVERY 4 HOURS PRN
DISCHARGE
Start: 2023-01-01 | End: 2023-01-01

## 2023-01-01 RX ORDER — LIDOCAINE 4 G/G
3 PATCH TOPICAL EVERY 24 HOURS
DISCHARGE
Start: 2023-01-01 | End: 2023-01-01

## 2023-01-01 RX ORDER — MULTIPLE VITAMINS W/ MINERALS TAB 9MG-400MCG
1 TAB ORAL DAILY
Status: DISCONTINUED | OUTPATIENT
Start: 2023-01-01 | End: 2023-01-01

## 2023-01-01 RX ORDER — MUSCLE RUB CREAM 100; 150 MG/G; MG/G
CREAM TOPICAL EVERY 6 HOURS PRN
Status: DISCONTINUED | OUTPATIENT
Start: 2023-01-01 | End: 2023-01-01

## 2023-01-01 RX ORDER — DESMOPRESSIN ACETATE 0.2 MG/1
0.2 TABLET ORAL AT BEDTIME
DISCHARGE
Start: 2023-01-01

## 2023-01-01 RX ORDER — DESMOPRESSIN ACETATE 0.1 MG/1
0.15 TABLET ORAL EVERY MORNING
Qty: 135 TABLET | Refills: 0 | Status: SHIPPED | OUTPATIENT
Start: 2023-01-01 | End: 2023-01-01

## 2023-01-01 RX ORDER — ONDANSETRON 4 MG/1
4 TABLET, ORALLY DISINTEGRATING ORAL EVERY 30 MIN PRN
Status: CANCELLED | OUTPATIENT
Start: 2023-01-01

## 2023-01-01 RX ORDER — NICOTINE POLACRILEX 4 MG
15-30 LOZENGE BUCCAL
Status: DISCONTINUED | OUTPATIENT
Start: 2023-01-01 | End: 2023-01-01

## 2023-01-01 RX ORDER — ACETAMINOPHEN 325 MG/1
650 TABLET ORAL EVERY 4 HOURS PRN
DISCHARGE
Start: 2023-01-01

## 2023-01-01 RX ORDER — SODIUM CHLORIDE 9 MG/ML
INJECTION, SOLUTION INTRAVENOUS CONTINUOUS PRN
Status: DISCONTINUED | OUTPATIENT
Start: 2023-01-01 | End: 2023-01-01

## 2023-01-01 RX ORDER — LORAZEPAM 2 MG/ML
1 INJECTION INTRAMUSCULAR ONCE
Status: DISCONTINUED | OUTPATIENT
Start: 2023-01-01 | End: 2023-01-01

## 2023-01-01 RX ORDER — MAGNESIUM OXIDE 400 MG/1
400 TABLET ORAL EVERY 4 HOURS
Status: COMPLETED | OUTPATIENT
Start: 2023-01-01 | End: 2023-01-01

## 2023-01-01 RX ORDER — ALBUTEROL SULFATE 1.25 MG/3ML
1.25 SOLUTION RESPIRATORY (INHALATION) EVERY 8 HOURS PRN
Status: DISCONTINUED | OUTPATIENT
Start: 2023-01-01 | End: 2023-01-01

## 2023-01-01 RX ORDER — GUAIFENESIN 600 MG/1
15 TABLET, EXTENDED RELEASE ORAL DAILY
Status: DISCONTINUED | OUTPATIENT
Start: 2023-01-01 | End: 2023-01-01

## 2023-01-01 RX ORDER — CEFAZOLIN SODIUM 2 G/100ML
2 INJECTION, SOLUTION INTRAVENOUS
Status: DISCONTINUED | OUTPATIENT
Start: 2023-01-01 | End: 2023-01-01 | Stop reason: HOSPADM

## 2023-01-01 RX ORDER — DESMOPRESSIN ACETATE 0.1 MG/1
0.1 TABLET ORAL 2 TIMES DAILY
Status: DISCONTINUED | OUTPATIENT
Start: 2023-01-01 | End: 2023-01-01

## 2023-01-01 RX ORDER — HEPARIN SODIUM,PORCINE 10 UNIT/ML
5-20 VIAL (ML) INTRAVENOUS EVERY 24 HOURS
Status: DISCONTINUED | OUTPATIENT
Start: 2023-01-01 | End: 2023-01-01

## 2023-01-01 RX ORDER — FAMOTIDINE 20 MG/1
20 TABLET, FILM COATED ORAL 2 TIMES DAILY
Status: DISCONTINUED | OUTPATIENT
Start: 2023-01-01 | End: 2023-01-01 | Stop reason: HOSPADM

## 2023-01-01 RX ORDER — LIDOCAINE HYDROCHLORIDE 20 MG/ML
INJECTION, SOLUTION INFILTRATION; PERINEURAL PRN
Status: DISCONTINUED | OUTPATIENT
Start: 2023-01-01 | End: 2023-01-01

## 2023-01-01 RX ORDER — ACETAMINOPHEN 325 MG/1
650 TABLET ORAL EVERY 4 HOURS PRN
Status: DISCONTINUED | OUTPATIENT
Start: 2023-01-01 | End: 2023-01-01 | Stop reason: HOSPADM

## 2023-01-01 RX ORDER — ALBUTEROL SULFATE 90 UG/1
2 AEROSOL, METERED RESPIRATORY (INHALATION) EVERY 6 HOURS PRN
DISCHARGE
Start: 2023-01-01

## 2023-01-01 RX ORDER — DEXTROSE MONOHYDRATE 50 MG/ML
INJECTION, SOLUTION INTRAVENOUS CONTINUOUS
Status: DISCONTINUED | OUTPATIENT
Start: 2023-01-01 | End: 2023-01-01

## 2023-01-01 RX ORDER — LORAZEPAM 2 MG/ML
1 INJECTION INTRAMUSCULAR
Status: DISCONTINUED | OUTPATIENT
Start: 2023-01-01 | End: 2023-01-01

## 2023-01-01 RX ORDER — PROPOFOL 10 MG/ML
INJECTION, EMULSION INTRAVENOUS PRN
Status: DISCONTINUED | OUTPATIENT
Start: 2023-01-01 | End: 2023-01-01

## 2023-01-01 RX ORDER — CALCIUM CARBONATE 500 MG/1
500 TABLET, CHEWABLE ORAL DAILY PRN
Status: DISCONTINUED | OUTPATIENT
Start: 2023-01-01 | End: 2023-01-01 | Stop reason: HOSPADM

## 2023-01-01 RX ORDER — FLUTICASONE PROPIONATE AND SALMETEROL XINAFOATE 115; 21 UG/1; UG/1
1 AEROSOL, METERED RESPIRATORY (INHALATION) EVERY 12 HOURS
Status: DISCONTINUED | OUTPATIENT
Start: 2023-01-01 | End: 2023-01-01 | Stop reason: HOSPADM

## 2023-01-01 RX ORDER — DESMOPRESSIN ACETATE 4 UG/ML
1 INJECTION, SOLUTION INTRAVENOUS; SUBCUTANEOUS ONCE
Status: COMPLETED | OUTPATIENT
Start: 2023-01-01 | End: 2023-01-01

## 2023-01-01 RX ORDER — POLYETHYLENE GLYCOL 3350 17 G/17G
17 POWDER, FOR SOLUTION ORAL DAILY
Status: DISCONTINUED | OUTPATIENT
Start: 2023-01-01 | End: 2023-01-01

## 2023-01-01 RX ORDER — CHLORHEXIDINE GLUCONATE ORAL RINSE 1.2 MG/ML
15 SOLUTION DENTAL EVERY 12 HOURS
Status: DISCONTINUED | OUTPATIENT
Start: 2023-01-01 | End: 2023-01-01

## 2023-01-01 RX ORDER — ALBUTEROL SULFATE 1.25 MG/3ML
1.25 SOLUTION RESPIRATORY (INHALATION) EVERY 8 HOURS PRN
Status: ON HOLD | COMMUNITY
End: 2023-01-01

## 2023-01-01 RX ORDER — POLYETHYLENE GLYCOL 3350 17 G/17G
17 POWDER, FOR SOLUTION ORAL 2 TIMES DAILY
DISCHARGE
Start: 2023-01-01 | End: 2023-01-01

## 2023-01-01 RX ORDER — AMOXICILLIN 250 MG
2 CAPSULE ORAL 2 TIMES DAILY
DISCHARGE
Start: 2023-01-01

## 2023-01-01 RX ORDER — FLUTICASONE PROPIONATE 50 MCG
1 SPRAY, SUSPENSION (ML) NASAL 2 TIMES DAILY
Status: DISCONTINUED | OUTPATIENT
Start: 2023-01-01 | End: 2023-01-01

## 2023-01-01 RX ORDER — DIPHENHYDRAMINE HCL 25 MG
25 CAPSULE ORAL
DISCHARGE
Start: 2023-01-01 | End: 2023-01-01

## 2023-01-01 RX ORDER — POTASSIUM CHLORIDE 7.45 MG/ML
10 INJECTION INTRAVENOUS
Status: COMPLETED | OUTPATIENT
Start: 2023-01-01 | End: 2023-01-01

## 2023-01-01 RX ORDER — LORAZEPAM 0.5 MG/1
2 TABLET ORAL ONCE
Status: DISCONTINUED | OUTPATIENT
Start: 2023-01-01 | End: 2023-01-01

## 2023-01-01 RX ORDER — HYDROMORPHONE HCL IN WATER/PF 6 MG/30 ML
0.4 PATIENT CONTROLLED ANALGESIA SYRINGE INTRAVENOUS
Status: DISCONTINUED | OUTPATIENT
Start: 2023-01-01 | End: 2023-01-01

## 2023-01-01 RX ORDER — DESMOPRESSIN ACETATE 4 UG/ML
1 INJECTION, SOLUTION INTRAVENOUS; SUBCUTANEOUS EVERY 8 HOURS PRN
Status: DISCONTINUED | OUTPATIENT
Start: 2023-01-01 | End: 2023-01-01

## 2023-01-01 RX ORDER — FAMOTIDINE 20 MG/1
20 TABLET, FILM COATED ORAL 2 TIMES DAILY
Status: DISCONTINUED | OUTPATIENT
Start: 2023-01-01 | End: 2023-01-01

## 2023-01-01 RX ORDER — PAPAVERINE HYDROCHLORIDE 30 MG/ML
INJECTION INTRAMUSCULAR; INTRAVENOUS PRN
Status: DISCONTINUED | OUTPATIENT
Start: 2023-01-01 | End: 2023-01-01 | Stop reason: HOSPADM

## 2023-01-01 RX ORDER — ONDANSETRON 2 MG/ML
4 INJECTION INTRAMUSCULAR; INTRAVENOUS EVERY 30 MIN PRN
Status: DISCONTINUED | OUTPATIENT
Start: 2023-01-01 | End: 2023-01-01 | Stop reason: HOSPADM

## 2023-01-01 RX ORDER — LEVETIRACETAM 5 MG/ML
500 INJECTION INTRAVASCULAR 2 TIMES DAILY
Qty: 900 ML | Refills: 0 | Status: COMPLETED | OUTPATIENT
Start: 2023-01-01 | End: 2023-01-01

## 2023-01-01 RX ORDER — VANCOMYCIN HYDROCHLORIDE 1 G/200ML
1000 INJECTION, SOLUTION INTRAVENOUS EVERY 12 HOURS
Status: COMPLETED | OUTPATIENT
Start: 2023-01-01 | End: 2023-01-01

## 2023-01-01 RX ORDER — LEVOTHYROXINE SODIUM 100 UG/1
100 TABLET ORAL
Status: DISCONTINUED | OUTPATIENT
Start: 2023-01-01 | End: 2023-01-01

## 2023-01-01 RX ORDER — LACTOBACILLUS RHAMNOSUS GG 10B CELL
1 CAPSULE ORAL 2 TIMES DAILY
Status: DISCONTINUED | OUTPATIENT
Start: 2023-01-01 | End: 2023-01-01

## 2023-01-01 RX ORDER — HALOPERIDOL 5 MG/ML
5 INJECTION INTRAMUSCULAR ONCE
Status: DISCONTINUED | OUTPATIENT
Start: 2023-01-01 | End: 2023-01-01

## 2023-01-01 RX ORDER — ALBUTEROL SULFATE 90 UG/1
2 AEROSOL, METERED RESPIRATORY (INHALATION) EVERY 6 HOURS PRN
Status: ON HOLD | COMMUNITY
End: 2023-01-01

## 2023-01-01 RX ORDER — LORAZEPAM 2 MG/ML
1 INJECTION INTRAMUSCULAR ONCE
Status: COMPLETED | OUTPATIENT
Start: 2023-01-01 | End: 2023-01-01

## 2023-01-01 RX ORDER — BISACODYL 10 MG
10 SUPPOSITORY, RECTAL RECTAL DAILY PRN
Status: DISCONTINUED | OUTPATIENT
Start: 2023-01-01 | End: 2023-01-01 | Stop reason: HOSPADM

## 2023-01-01 RX ORDER — FUROSEMIDE 10 MG/ML
40 INJECTION INTRAMUSCULAR; INTRAVENOUS EVERY 12 HOURS
Status: DISCONTINUED | OUTPATIENT
Start: 2023-01-01 | End: 2023-01-01

## 2023-01-01 RX ORDER — DESMOPRESSIN ACETATE 0.1 MG/1
0.1 TABLET ORAL EVERY 8 HOURS PRN
Status: DISCONTINUED | OUTPATIENT
Start: 2023-01-01 | End: 2023-01-01

## 2023-01-01 RX ORDER — FLUMAZENIL 0.1 MG/ML
INJECTION, SOLUTION INTRAVENOUS PRN
Status: DISCONTINUED | OUTPATIENT
Start: 2023-01-01 | End: 2023-01-01

## 2023-01-01 RX ORDER — DESMOPRESSIN ACETATE 0.1 MG/1
0.1 TABLET ORAL ONCE
Status: DISCONTINUED | OUTPATIENT
Start: 2023-01-01 | End: 2023-01-01

## 2023-01-01 RX ORDER — DEXMEDETOMIDINE HYDROCHLORIDE 4 UG/ML
.1-1.2 INJECTION, SOLUTION INTRAVENOUS CONTINUOUS
Status: DISCONTINUED | OUTPATIENT
Start: 2023-01-01 | End: 2023-01-01

## 2023-01-01 RX ORDER — IPRATROPIUM BROMIDE AND ALBUTEROL SULFATE 2.5; .5 MG/3ML; MG/3ML
3 SOLUTION RESPIRATORY (INHALATION) EVERY 6 HOURS
Status: DISCONTINUED | OUTPATIENT
Start: 2023-01-01 | End: 2023-01-01

## 2023-01-01 RX ORDER — FLUTICASONE PROPIONATE AND SALMETEROL 100; 50 UG/1; UG/1
1 POWDER RESPIRATORY (INHALATION) 2 TIMES DAILY
DISCHARGE
Start: 2023-01-01

## 2023-01-01 RX ORDER — LIDOCAINE 40 MG/G
CREAM TOPICAL
Status: ACTIVE | OUTPATIENT
Start: 2023-01-01 | End: 2023-01-01

## 2023-01-01 RX ORDER — FENTANYL CITRATE 50 UG/ML
100 INJECTION, SOLUTION INTRAMUSCULAR; INTRAVENOUS ONCE
Status: COMPLETED | OUTPATIENT
Start: 2023-01-01 | End: 2023-01-01

## 2023-01-01 RX ORDER — DEXAMETHASONE SODIUM PHOSPHATE 4 MG/ML
2 INJECTION, SOLUTION INTRA-ARTICULAR; INTRALESIONAL; INTRAMUSCULAR; INTRAVENOUS; SOFT TISSUE 3 TIMES DAILY
Status: COMPLETED | OUTPATIENT
Start: 2023-01-01 | End: 2023-01-01

## 2023-01-01 RX ORDER — DEXTROSE MONOHYDRATE 50 MG/ML
INJECTION, SOLUTION INTRAVENOUS CONTINUOUS PRN
Status: DISCONTINUED | OUTPATIENT
Start: 2023-01-01 | End: 2023-01-01

## 2023-01-01 RX ORDER — FENTANYL CITRATE 50 UG/ML
25-50 INJECTION, SOLUTION INTRAMUSCULAR; INTRAVENOUS
Status: DISCONTINUED | OUTPATIENT
Start: 2023-01-01 | End: 2023-01-01

## 2023-01-01 RX ORDER — HYDROMORPHONE HCL IN WATER/PF 6 MG/30 ML
0.4 PATIENT CONTROLLED ANALGESIA SYRINGE INTRAVENOUS EVERY 5 MIN PRN
Status: DISCONTINUED | OUTPATIENT
Start: 2023-01-01 | End: 2023-01-01 | Stop reason: HOSPADM

## 2023-01-01 RX ORDER — LORAZEPAM 2 MG/ML
2 INJECTION INTRAMUSCULAR
Status: COMPLETED | OUTPATIENT
Start: 2023-01-01 | End: 2023-01-01

## 2023-01-01 RX ORDER — OXYCODONE HYDROCHLORIDE 5 MG/1
5 TABLET ORAL
Status: CANCELLED | OUTPATIENT
Start: 2023-01-01

## 2023-01-01 RX ORDER — ONDANSETRON 2 MG/ML
INJECTION INTRAMUSCULAR; INTRAVENOUS PRN
Status: DISCONTINUED | OUTPATIENT
Start: 2023-01-01 | End: 2023-01-01

## 2023-01-01 RX ORDER — DIPHENHYDRAMINE HCL 25 MG
25 CAPSULE ORAL ONCE
Status: COMPLETED | OUTPATIENT
Start: 2023-01-01 | End: 2023-01-01

## 2023-01-01 RX ORDER — METHOCARBAMOL 500 MG/1
500 TABLET, FILM COATED ORAL 4 TIMES DAILY PRN
DISCHARGE
Start: 2023-01-01

## 2023-01-01 RX ORDER — MENTHOL
LOZENGE MUCOUS MEMBRANE
Status: DISCONTINUED | OUTPATIENT
Start: 2023-01-01 | End: 2023-01-01

## 2023-01-01 RX ORDER — CEFAZOLIN SODIUM 1 G/3ML
INJECTION, POWDER, FOR SOLUTION INTRAMUSCULAR; INTRAVENOUS PRN
Status: DISCONTINUED | OUTPATIENT
Start: 2023-01-01 | End: 2023-01-01

## 2023-01-01 RX ORDER — FLUMAZENIL 0.1 MG/ML
0.2 INJECTION, SOLUTION INTRAVENOUS
Status: DISCONTINUED | OUTPATIENT
Start: 2023-01-01 | End: 2023-01-01

## 2023-01-01 RX ORDER — DEXMEDETOMIDINE HYDROCHLORIDE 4 UG/ML
.1-.7 INJECTION, SOLUTION INTRAVENOUS CONTINUOUS
Status: DISCONTINUED | OUTPATIENT
Start: 2023-01-01 | End: 2023-01-01

## 2023-01-01 RX ORDER — ONDANSETRON 2 MG/ML
4 INJECTION INTRAMUSCULAR; INTRAVENOUS EVERY 6 HOURS PRN
Status: DISCONTINUED | OUTPATIENT
Start: 2023-01-01 | End: 2023-01-01 | Stop reason: HOSPADM

## 2023-01-01 RX ORDER — CALCIUM GLUCONATE 20 MG/ML
1 INJECTION, SOLUTION INTRAVENOUS ONCE
Status: COMPLETED | OUTPATIENT
Start: 2023-01-01 | End: 2023-01-01

## 2023-01-01 RX ORDER — DEXAMETHASONE SODIUM PHOSPHATE 4 MG/ML
1 INJECTION, SOLUTION INTRA-ARTICULAR; INTRALESIONAL; INTRAMUSCULAR; INTRAVENOUS; SOFT TISSUE 3 TIMES DAILY
Status: DISCONTINUED | OUTPATIENT
Start: 2023-01-01 | End: 2023-01-01

## 2023-01-01 RX ORDER — MANNITOL 20 G/100ML
INJECTION, SOLUTION INTRAVENOUS PRN
Status: DISCONTINUED | OUTPATIENT
Start: 2023-01-01 | End: 2023-01-01

## 2023-01-01 RX ORDER — FUROSEMIDE 10 MG/ML
40 INJECTION INTRAMUSCULAR; INTRAVENOUS ONCE
Status: COMPLETED | OUTPATIENT
Start: 2023-01-01 | End: 2023-01-01

## 2023-01-01 RX ORDER — LEVOTHYROXINE SODIUM 50 UG/1
50 TABLET ORAL
Status: DISCONTINUED | OUTPATIENT
Start: 2023-01-01 | End: 2023-01-01

## 2023-01-01 RX ORDER — ACETAMINOPHEN 325 MG/1
975 TABLET ORAL EVERY 8 HOURS
Qty: 27 TABLET | Refills: 0 | Status: COMPLETED | OUTPATIENT
Start: 2023-01-01 | End: 2023-01-01

## 2023-01-01 RX ORDER — HYDROCORTISONE 5 MG/1
TABLET ORAL
Qty: 400 TABLET | Refills: 3 | Status: SHIPPED | OUTPATIENT
Start: 2023-01-01 | End: 2023-01-01

## 2023-01-01 RX ORDER — FUROSEMIDE 40 MG
40 TABLET ORAL DAILY
Status: DISCONTINUED | OUTPATIENT
Start: 2023-01-01 | End: 2023-01-01

## 2023-01-01 RX ORDER — HYDROCORTISONE 10 MG/1
10 TABLET ORAL DAILY
Status: COMPLETED | OUTPATIENT
Start: 2023-01-01 | End: 2023-01-01

## 2023-01-01 RX ORDER — DEXAMETHASONE SODIUM PHOSPHATE 4 MG/ML
4 INJECTION, SOLUTION INTRA-ARTICULAR; INTRALESIONAL; INTRAMUSCULAR; INTRAVENOUS; SOFT TISSUE 3 TIMES DAILY
Status: COMPLETED | OUTPATIENT
Start: 2023-01-01 | End: 2023-01-01

## 2023-01-01 RX ORDER — HEPARIN SODIUM 5000 [USP'U]/.5ML
5000 INJECTION, SOLUTION INTRAVENOUS; SUBCUTANEOUS EVERY 8 HOURS
Status: DISCONTINUED | OUTPATIENT
Start: 2023-01-01 | End: 2023-01-01 | Stop reason: HOSPADM

## 2023-01-01 RX ORDER — SIMETHICONE 80 MG
80 TABLET,CHEWABLE ORAL EVERY 6 HOURS PRN
Status: DISCONTINUED | OUTPATIENT
Start: 2023-01-01 | End: 2023-01-01 | Stop reason: HOSPADM

## 2023-01-01 RX ORDER — EPINEPHRINE 1 MG/ML
INJECTION, SOLUTION, CONCENTRATE INTRAVENOUS PRN
Status: DISCONTINUED | OUTPATIENT
Start: 2023-01-01 | End: 2023-01-01 | Stop reason: HOSPADM

## 2023-01-01 RX ORDER — LEVOTHYROXINE SODIUM 112 UG/1
112 TABLET ORAL
Status: DISCONTINUED | OUTPATIENT
Start: 2023-01-01 | End: 2023-01-01 | Stop reason: HOSPADM

## 2023-01-01 RX ORDER — VANCOMYCIN HYDROCHLORIDE 1 G/200ML
1000 INJECTION, SOLUTION INTRAVENOUS EVERY 12 HOURS
Status: DISCONTINUED | OUTPATIENT
Start: 2023-01-01 | End: 2023-01-01

## 2023-01-01 RX ORDER — DESMOPRESSIN ACETATE 0.1 MG/1
0.15 TABLET ORAL EVERY MORNING
DISCHARGE
Start: 2023-01-01

## 2023-01-01 RX ORDER — HYDROCORTISONE 20 MG/1
20 TABLET ORAL DAILY
Status: DISCONTINUED | OUTPATIENT
Start: 2023-01-01 | End: 2023-01-01

## 2023-01-01 RX ORDER — HEPARIN SODIUM 5000 [USP'U]/.5ML
5000 INJECTION, SOLUTION INTRAVENOUS; SUBCUTANEOUS EVERY 8 HOURS
DISCHARGE
Start: 2023-01-01 | End: 2023-01-01

## 2023-01-01 RX ORDER — FLUTICASONE PROPIONATE AND SALMETEROL 100; 50 UG/1; UG/1
1 POWDER RESPIRATORY (INHALATION) 2 TIMES DAILY
Status: DISCONTINUED | OUTPATIENT
Start: 2023-01-01 | End: 2023-01-01

## 2023-01-01 RX ORDER — HYDROCORTISONE 5 MG/1
5 TABLET ORAL DAILY
Status: DISCONTINUED | OUTPATIENT
Start: 2023-01-01 | End: 2023-01-01 | Stop reason: HOSPADM

## 2023-01-01 RX ORDER — CEFAZOLIN SODIUM 2 G/100ML
2 INJECTION, SOLUTION INTRAVENOUS SEE ADMIN INSTRUCTIONS
Status: DISCONTINUED | OUTPATIENT
Start: 2023-01-01 | End: 2023-01-01 | Stop reason: HOSPADM

## 2023-01-01 RX ORDER — ONDANSETRON 4 MG/1
4 TABLET, ORALLY DISINTEGRATING ORAL EVERY 6 HOURS PRN
DISCHARGE
Start: 2023-01-01 | End: 2023-01-01

## 2023-01-01 RX ORDER — DIPHENHYDRAMINE HCL 25 MG
25 CAPSULE ORAL EVERY 6 HOURS PRN
Status: DISCONTINUED | OUTPATIENT
Start: 2023-01-01 | End: 2023-01-01

## 2023-01-01 RX ORDER — PROCHLORPERAZINE MALEATE 10 MG
10 TABLET ORAL EVERY 6 HOURS PRN
DISCHARGE
Start: 2023-01-01 | End: 2023-01-01

## 2023-01-01 RX ORDER — OXYCODONE HYDROCHLORIDE 10 MG/1
10 TABLET ORAL
Status: CANCELLED | OUTPATIENT
Start: 2023-01-01

## 2023-01-01 RX ORDER — DESMOPRESSIN ACETATE 0.2 MG/1
0.2 TABLET ORAL AT BEDTIME
Status: DISCONTINUED | OUTPATIENT
Start: 2023-01-01 | End: 2023-01-01 | Stop reason: HOSPADM

## 2023-01-01 RX ORDER — FLUTICASONE FUROATE AND VILANTEROL 100; 25 UG/1; UG/1
1 POWDER RESPIRATORY (INHALATION) DAILY
Status: DISCONTINUED | OUTPATIENT
Start: 2023-01-01 | End: 2023-01-01

## 2023-01-01 RX ORDER — TRETINOIN 0.25 MG/G
GEL TOPICAL AT BEDTIME
Status: DISCONTINUED | OUTPATIENT
Start: 2023-01-01 | End: 2023-01-01

## 2023-01-01 RX ORDER — ACETAMINOPHEN 325 MG/1
650 TABLET ORAL EVERY 4 HOURS PRN
DISCHARGE
Start: 2023-01-01 | End: 2023-01-01

## 2023-01-01 RX ORDER — HYDROCORTISONE 20 MG/1
40 TABLET ORAL DAILY
Status: DISCONTINUED | OUTPATIENT
Start: 2023-01-01 | End: 2023-01-01

## 2023-01-01 RX ORDER — CALCIUM CARBONATE 500 MG/1
1 TABLET, CHEWABLE ORAL DAILY PRN
DISCHARGE
Start: 2023-01-01

## 2023-01-01 RX ORDER — ALBUTEROL SULFATE 0.83 MG/ML
2.5 SOLUTION RESPIRATORY (INHALATION)
Status: DISCONTINUED | OUTPATIENT
Start: 2023-01-01 | End: 2023-01-01

## 2023-01-01 RX ORDER — ONDANSETRON 4 MG/1
4 TABLET, ORALLY DISINTEGRATING ORAL EVERY 6 HOURS PRN
Status: DISCONTINUED | OUTPATIENT
Start: 2023-01-01 | End: 2023-01-01

## 2023-01-01 RX ORDER — SODIUM CHLORIDE, SODIUM GLUCONATE, SODIUM ACETATE, POTASSIUM CHLORIDE AND MAGNESIUM CHLORIDE 526; 502; 368; 37; 30 MG/100ML; MG/100ML; MG/100ML; MG/100ML; MG/100ML
INJECTION, SOLUTION INTRAVENOUS CONTINUOUS PRN
Status: DISCONTINUED | OUTPATIENT
Start: 2023-01-01 | End: 2023-01-01

## 2023-01-01 RX ORDER — POTASSIUM CHLORIDE 750 MG/1
20 TABLET, EXTENDED RELEASE ORAL ONCE
Status: COMPLETED | OUTPATIENT
Start: 2023-01-01 | End: 2023-01-01

## 2023-01-01 RX ORDER — METRONIDAZOLE 500 MG/100ML
500 INJECTION, SOLUTION INTRAVENOUS EVERY 8 HOURS
Qty: 900 ML | Refills: 0 | Status: DISCONTINUED | OUTPATIENT
Start: 2023-01-01 | End: 2023-01-01

## 2023-01-01 RX ORDER — SOD CHLORD/LANOLIN/MIN.OIL/PET
LOTION (ML) TOPICAL
Status: DISCONTINUED | OUTPATIENT
Start: 2023-01-01 | End: 2023-01-01 | Stop reason: DRUGHIGH

## 2023-01-01 RX ORDER — SODIUM CHLORIDE 9 MG/ML
INJECTION, SOLUTION INTRAVENOUS CONTINUOUS
Status: DISCONTINUED | OUTPATIENT
Start: 2023-01-01 | End: 2023-01-01

## 2023-01-01 RX ORDER — PIPERACILLIN SODIUM, TAZOBACTAM SODIUM 4; .5 G/20ML; G/20ML
4.5 INJECTION, POWDER, LYOPHILIZED, FOR SOLUTION INTRAVENOUS EVERY 6 HOURS
Status: COMPLETED | OUTPATIENT
Start: 2023-01-01 | End: 2023-01-01

## 2023-01-01 RX ORDER — POLYETHYLENE GLYCOL 3350 17 G/17G
17 POWDER, FOR SOLUTION ORAL 2 TIMES DAILY
Status: DISCONTINUED | OUTPATIENT
Start: 2023-01-01 | End: 2023-01-01 | Stop reason: HOSPADM

## 2023-01-01 RX ORDER — ONDANSETRON 4 MG/1
4 TABLET, ORALLY DISINTEGRATING ORAL EVERY 30 MIN PRN
Status: DISCONTINUED | OUTPATIENT
Start: 2023-01-01 | End: 2023-01-01 | Stop reason: HOSPADM

## 2023-01-01 RX ORDER — ALBUTEROL SULFATE 1.25 MG/3ML
1.25 SOLUTION RESPIRATORY (INHALATION) EVERY 8 HOURS PRN
DISCHARGE
Start: 2023-01-01

## 2023-01-01 RX ORDER — IPRATROPIUM BROMIDE AND ALBUTEROL SULFATE 2.5; .5 MG/3ML; MG/3ML
3 SOLUTION RESPIRATORY (INHALATION) EVERY 4 HOURS PRN
DISCHARGE
Start: 2023-01-01

## 2023-01-01 RX ORDER — POLYETHYLENE GLYCOL 3350 17 G/17G
17 POWDER, FOR SOLUTION ORAL DAILY
DISCHARGE
Start: 2023-01-01

## 2023-01-01 RX ORDER — FAMOTIDINE 20 MG/1
20 TABLET, FILM COATED ORAL 2 TIMES DAILY
DISCHARGE
Start: 2023-01-01 | End: 2023-01-01

## 2023-01-01 RX ORDER — FLUTICASONE PROPIONATE 50 MCG
1 SPRAY, SUSPENSION (ML) NASAL 2 TIMES DAILY
Status: ON HOLD | COMMUNITY
End: 2023-01-01

## 2023-01-01 RX ORDER — AMOXICILLIN 250 MG
2 CAPSULE ORAL 2 TIMES DAILY
Status: DISCONTINUED | OUTPATIENT
Start: 2023-01-01 | End: 2023-01-01 | Stop reason: HOSPADM

## 2023-01-01 RX ORDER — METRONIDAZOLE 250 MG/1
500 TABLET ORAL 2 TIMES DAILY
Qty: 16 TABLET | Refills: 0 | Status: DISCONTINUED | OUTPATIENT
Start: 2023-01-01 | End: 2023-01-01

## 2023-01-01 RX ORDER — DEXTROSE 50 G/100ML
INJECTION, SOLUTION INTRAVENOUS CONTINUOUS
Status: DISCONTINUED | OUTPATIENT
Start: 2023-01-01 | End: 2023-01-01

## 2023-01-01 RX ORDER — PROCHLORPERAZINE MALEATE 10 MG
10 TABLET ORAL EVERY 6 HOURS PRN
DISCHARGE
Start: 2023-01-01

## 2023-01-01 RX ORDER — LEVETIRACETAM 5 MG/ML
500 INJECTION INTRAVASCULAR 2 TIMES DAILY
Status: DISCONTINUED | OUTPATIENT
Start: 2023-01-01 | End: 2023-01-01

## 2023-01-01 RX ORDER — LABETALOL HYDROCHLORIDE 5 MG/ML
10 INJECTION, SOLUTION INTRAVENOUS ONCE
Status: COMPLETED | OUTPATIENT
Start: 2023-01-01 | End: 2023-01-01

## 2023-01-01 RX ORDER — METRONIDAZOLE 250 MG/1
500 TABLET ORAL 2 TIMES DAILY
Status: COMPLETED | OUTPATIENT
Start: 2023-01-01 | End: 2023-01-01

## 2023-01-01 RX ORDER — LEVOTHYROXINE SODIUM 112 UG/1
112 TABLET ORAL
Qty: 90 TABLET | Refills: 0 | Status: SHIPPED | OUTPATIENT
Start: 2023-01-01 | End: 2023-01-01

## 2023-01-01 RX ORDER — PROPOFOL 10 MG/ML
5-30 INJECTION, EMULSION INTRAVENOUS CONTINUOUS
Status: DISCONTINUED | OUTPATIENT
Start: 2023-01-01 | End: 2023-01-01

## 2023-01-01 RX ORDER — SIMETHICONE 80 MG
80 TABLET,CHEWABLE ORAL EVERY 6 HOURS PRN
Status: DISCONTINUED | OUTPATIENT
Start: 2023-01-01 | End: 2023-01-01

## 2023-01-01 RX ORDER — FENTANYL CITRATE 50 UG/ML
25 INJECTION, SOLUTION INTRAMUSCULAR; INTRAVENOUS EVERY 5 MIN PRN
Status: DISCONTINUED | OUTPATIENT
Start: 2023-01-01 | End: 2023-01-01 | Stop reason: HOSPADM

## 2023-01-01 RX ORDER — SIMETHICONE 80 MG
80 TABLET,CHEWABLE ORAL EVERY 6 HOURS PRN
DISCHARGE
Start: 2023-01-01

## 2023-01-01 RX ADMIN — CALCIUM CHLORIDE INJECTION 0.5 G: 100 INJECTION, SOLUTION INTRAVENOUS at 21:05

## 2023-01-01 RX ADMIN — DIPHENHYDRAMINE HYDROCHLORIDE 25 MG: 25 CAPSULE ORAL at 21:01

## 2023-01-01 RX ADMIN — SALINE NASAL SPRAY 1 SPRAY: 1.5 SOLUTION NASAL at 08:44

## 2023-01-01 RX ADMIN — INSULIN ASPART 2 UNITS: 100 INJECTION, SOLUTION INTRAVENOUS; SUBCUTANEOUS at 12:10

## 2023-01-01 RX ADMIN — Medication 20 MG: at 17:44

## 2023-01-01 RX ADMIN — LEVETIRACETAM 500 MG: 500 TABLET, FILM COATED ORAL at 08:25

## 2023-01-01 RX ADMIN — DESMOPRESSIN ACETATE 0.1 MG: 0.1 TABLET ORAL at 20:56

## 2023-01-01 RX ADMIN — POTASSIUM & SODIUM PHOSPHATES POWDER PACK 280-160-250 MG 1 PACKET: 280-160-250 PACK at 16:18

## 2023-01-01 RX ADMIN — ACETAMINOPHEN 650 MG: 325 TABLET, FILM COATED ORAL at 20:11

## 2023-01-01 RX ADMIN — HEPARIN SODIUM 5000 UNITS: 5000 INJECTION, SOLUTION INTRAVENOUS; SUBCUTANEOUS at 08:28

## 2023-01-01 RX ADMIN — CEFEPIME HYDROCHLORIDE 2 G: 2 INJECTION, POWDER, FOR SOLUTION INTRAVENOUS at 20:28

## 2023-01-01 RX ADMIN — POTASSIUM & SODIUM PHOSPHATES POWDER PACK 280-160-250 MG 1 PACKET: 280-160-250 PACK at 08:43

## 2023-01-01 RX ADMIN — MAGNESIUM HYDROXIDE 30 ML: 400 SUSPENSION ORAL at 08:39

## 2023-01-01 RX ADMIN — SALINE NASAL SPRAY 1 SPRAY: 1.5 SOLUTION NASAL at 17:06

## 2023-01-01 RX ADMIN — ACETAMINOPHEN 650 MG: 325 TABLET, FILM COATED ORAL at 02:48

## 2023-01-01 RX ADMIN — ACETAMINOPHEN 650 MG: 325 TABLET, FILM COATED ORAL at 00:03

## 2023-01-01 RX ADMIN — FLUTICASONE PROPIONATE AND SALMETEROL XINAFOATE 1 PUFF: 115; 21 AEROSOL, METERED RESPIRATORY (INHALATION) at 08:29

## 2023-01-01 RX ADMIN — ACETAMINOPHEN 650 MG: 325 TABLET, FILM COATED ORAL at 08:28

## 2023-01-01 RX ADMIN — ALBUTEROL SULFATE 2.5 MG: 2.5 SOLUTION RESPIRATORY (INHALATION) at 10:24

## 2023-01-01 RX ADMIN — DEXAMETHASONE SODIUM PHOSPHATE 4 MG: 4 INJECTION, SOLUTION INTRA-ARTICULAR; INTRALESIONAL; INTRAMUSCULAR; INTRAVENOUS; SOFT TISSUE at 11:00

## 2023-01-01 RX ADMIN — LABETALOL HYDROCHLORIDE 20 MG: 5 INJECTION, SOLUTION INTRAVENOUS at 17:13

## 2023-01-01 RX ADMIN — CEFAZOLIN SODIUM 2 G: 2 INJECTION, SOLUTION INTRAVENOUS at 14:13

## 2023-01-01 RX ADMIN — ACETAMINOPHEN 650 MG: 325 TABLET, FILM COATED ORAL at 12:06

## 2023-01-01 RX ADMIN — FLUTICASONE PROPIONATE 1 SPRAY: 50 SPRAY, METERED NASAL at 08:19

## 2023-01-01 RX ADMIN — FLUTICASONE PROPIONATE AND SALMETEROL 1 PUFF: 50; 100 POWDER RESPIRATORY (INHALATION) at 20:46

## 2023-01-01 RX ADMIN — ALBUTEROL SULFATE 2.5 MG: 2.5 SOLUTION RESPIRATORY (INHALATION) at 04:45

## 2023-01-01 RX ADMIN — ACETAMINOPHEN 650 MG: 325 TABLET, FILM COATED ORAL at 22:20

## 2023-01-01 RX ADMIN — Medication 1 TABLET: at 08:34

## 2023-01-01 RX ADMIN — SIMETHICONE 80 MG: 80 TABLET, CHEWABLE ORAL at 08:04

## 2023-01-01 RX ADMIN — TRETIONIN: 0.25 GEL TOPICAL at 22:21

## 2023-01-01 RX ADMIN — PIPERACILLIN SODIUM AND TAZOBACTAM SODIUM 4.5 G: 4; .5 INJECTION, POWDER, LYOPHILIZED, FOR SOLUTION INTRAVENOUS at 12:06

## 2023-01-01 RX ADMIN — CEFAZOLIN 2 G: 1 INJECTION, POWDER, FOR SOLUTION INTRAMUSCULAR; INTRAVENOUS at 22:05

## 2023-01-01 RX ADMIN — FLUTICASONE PROPIONATE AND SALMETEROL 1 PUFF: 50; 100 POWDER RESPIRATORY (INHALATION) at 21:19

## 2023-01-01 RX ADMIN — Medication 10 MG: at 01:14

## 2023-01-01 RX ADMIN — Medication 20 MG: at 20:02

## 2023-01-01 RX ADMIN — ALBUTEROL SULFATE 2.5 MG: 2.5 SOLUTION RESPIRATORY (INHALATION) at 03:52

## 2023-01-01 RX ADMIN — SALINE NASAL SPRAY 1 SPRAY: 1.5 SOLUTION NASAL at 08:15

## 2023-01-01 RX ADMIN — ALBUTEROL SULFATE 2.5 MG: 2.5 SOLUTION RESPIRATORY (INHALATION) at 00:21

## 2023-01-01 RX ADMIN — PIPERACILLIN SODIUM AND TAZOBACTAM SODIUM 4.5 G: 4; .5 INJECTION, POWDER, LYOPHILIZED, FOR SOLUTION INTRAVENOUS at 07:47

## 2023-01-01 RX ADMIN — DIPHENHYDRAMINE HYDROCHLORIDE 25 MG: 25 CAPSULE ORAL at 13:47

## 2023-01-01 RX ADMIN — CALCIUM CHLORIDE INJECTION 1 G: 100 INJECTION, SOLUTION INTRAVENOUS at 16:40

## 2023-01-01 RX ADMIN — SENNOSIDES AND DOCUSATE SODIUM 2 TABLET: 8.6; 5 TABLET ORAL at 19:35

## 2023-01-01 RX ADMIN — FAMOTIDINE 20 MG: 20 TABLET ORAL at 20:40

## 2023-01-01 RX ADMIN — POTASSIUM & SODIUM PHOSPHATES POWDER PACK 280-160-250 MG 1 PACKET: 280-160-250 PACK at 20:06

## 2023-01-01 RX ADMIN — CALCIUM CHLORIDE INJECTION 500 MG: 100 INJECTION, SOLUTION INTRAVENOUS at 05:49

## 2023-01-01 RX ADMIN — ACETAMINOPHEN 650 MG: 325 TABLET, FILM COATED ORAL at 08:25

## 2023-01-01 RX ADMIN — POTASSIUM & SODIUM PHOSPHATES POWDER PACK 280-160-250 MG 1 PACKET: 280-160-250 PACK at 08:10

## 2023-01-01 RX ADMIN — Medication 1 TABLET: at 08:39

## 2023-01-01 RX ADMIN — SALINE NASAL SPRAY 1 SPRAY: 1.5 SOLUTION NASAL at 19:58

## 2023-01-01 RX ADMIN — FLUTICASONE PROPIONATE AND SALMETEROL 1 PUFF: 50; 100 POWDER RESPIRATORY (INHALATION) at 08:54

## 2023-01-01 RX ADMIN — Medication 30 MG: at 22:34

## 2023-01-01 RX ADMIN — Medication 5 ML: at 11:09

## 2023-01-01 RX ADMIN — LABETALOL HYDROCHLORIDE 10 MG: 5 INJECTION, SOLUTION INTRAVENOUS at 01:33

## 2023-01-01 RX ADMIN — CHLORHEXIDINE GLUCONATE 15 ML: 1.2 RINSE ORAL at 07:34

## 2023-01-01 RX ADMIN — MAGNESIUM OXIDE TAB 400 MG (241.3 MG ELEMENTAL MG) 400 MG: 400 (241.3 MG) TAB at 12:32

## 2023-01-01 RX ADMIN — INSULIN ASPART 1 UNITS: 100 INJECTION, SOLUTION INTRAVENOUS; SUBCUTANEOUS at 16:14

## 2023-01-01 RX ADMIN — FLUTICASONE PROPIONATE 1 SPRAY: 50 SPRAY, METERED NASAL at 08:11

## 2023-01-01 RX ADMIN — HEPARIN SODIUM 5000 UNITS: 5000 INJECTION, SOLUTION INTRAVENOUS; SUBCUTANEOUS at 07:22

## 2023-01-01 RX ADMIN — FLUTICASONE PROPIONATE 1 SPRAY: 50 SPRAY, METERED NASAL at 08:35

## 2023-01-01 RX ADMIN — Medication 1 TABLET: at 08:58

## 2023-01-01 RX ADMIN — SODIUM CHLORIDE 500 ML: 9 INJECTION, SOLUTION INTRAVENOUS at 10:01

## 2023-01-01 RX ADMIN — FLUTICASONE PROPIONATE 1 SPRAY: 50 SPRAY, METERED NASAL at 08:41

## 2023-01-01 RX ADMIN — Medication 20 MG: at 21:06

## 2023-01-01 RX ADMIN — FLUTICASONE PROPIONATE 1 SPRAY: 50 SPRAY, METERED NASAL at 20:06

## 2023-01-01 RX ADMIN — SALINE NASAL SPRAY 1 SPRAY: 1.5 SOLUTION NASAL at 05:53

## 2023-01-01 RX ADMIN — ACETAMINOPHEN 975 MG: 325 TABLET, FILM COATED ORAL at 03:44

## 2023-01-01 RX ADMIN — CEFAZOLIN SODIUM 2 G: 2 INJECTION, SOLUTION INTRAVENOUS at 13:31

## 2023-01-01 RX ADMIN — METRONIDAZOLE 500 MG: 250 TABLET ORAL at 19:35

## 2023-01-01 RX ADMIN — CEFEPIME HYDROCHLORIDE 2 G: 2 INJECTION, POWDER, FOR SOLUTION INTRAVENOUS at 11:25

## 2023-01-01 RX ADMIN — HYDROCORTISONE 15 MG: 10 TABLET ORAL at 07:23

## 2023-01-01 RX ADMIN — ENOXAPARIN SODIUM 40 MG: 40 INJECTION SUBCUTANEOUS at 10:19

## 2023-01-01 RX ADMIN — SUGAMMADEX 200 MG: 100 INJECTION, SOLUTION INTRAVENOUS at 14:31

## 2023-01-01 RX ADMIN — CEFAZOLIN SODIUM 2 G: 2 INJECTION, SOLUTION INTRAVENOUS at 06:05

## 2023-01-01 RX ADMIN — POTASSIUM & SODIUM PHOSPHATES POWDER PACK 280-160-250 MG 1 PACKET: 280-160-250 PACK at 07:23

## 2023-01-01 RX ADMIN — ALBUTEROL SULFATE 2.5 MG: 2.5 SOLUTION RESPIRATORY (INHALATION) at 20:45

## 2023-01-01 RX ADMIN — Medication 1 TABLET: at 08:41

## 2023-01-01 RX ADMIN — IPRATROPIUM BROMIDE 0.5 MG: 0.5 SOLUTION RESPIRATORY (INHALATION) at 08:14

## 2023-01-01 RX ADMIN — HEPARIN SODIUM 5000 UNITS: 5000 INJECTION, SOLUTION INTRAVENOUS; SUBCUTANEOUS at 08:24

## 2023-01-01 RX ADMIN — FLUTICASONE PROPIONATE AND SALMETEROL 1 PUFF: 50; 100 POWDER RESPIRATORY (INHALATION) at 08:39

## 2023-01-01 RX ADMIN — POTASSIUM & SODIUM PHOSPHATES POWDER PACK 280-160-250 MG 1 PACKET: 280-160-250 PACK at 20:11

## 2023-01-01 RX ADMIN — HYDROCORTISONE 5 MG: 5 TABLET ORAL at 14:13

## 2023-01-01 RX ADMIN — POTASSIUM & SODIUM PHOSPHATES POWDER PACK 280-160-250 MG 1 PACKET: 280-160-250 PACK at 11:47

## 2023-01-01 RX ADMIN — FLUTICASONE PROPIONATE AND SALMETEROL 1 PUFF: 50; 100 POWDER RESPIRATORY (INHALATION) at 08:41

## 2023-01-01 RX ADMIN — PROPOFOL 25 MCG/KG/MIN: 10 INJECTION, EMULSION INTRAVENOUS at 00:55

## 2023-01-01 RX ADMIN — PROPOFOL 25 MCG/KG/MIN: 10 INJECTION, EMULSION INTRAVENOUS at 12:49

## 2023-01-01 RX ADMIN — SALINE NASAL SPRAY 1 SPRAY: 1.5 SOLUTION NASAL at 16:56

## 2023-01-01 RX ADMIN — Medication 50 MCG/HR: at 10:36

## 2023-01-01 RX ADMIN — INSULIN ASPART 1 UNITS: 100 INJECTION, SOLUTION INTRAVENOUS; SUBCUTANEOUS at 03:48

## 2023-01-01 RX ADMIN — FLUTICASONE PROPIONATE AND SALMETEROL 1 PUFF: 50; 100 POWDER RESPIRATORY (INHALATION) at 22:10

## 2023-01-01 RX ADMIN — IPRATROPIUM BROMIDE 0.5 MG: 0.5 SOLUTION RESPIRATORY (INHALATION) at 05:28

## 2023-01-01 RX ADMIN — FLUTICASONE PROPIONATE AND SALMETEROL XINAFOATE 1 PUFF: 115; 21 AEROSOL, METERED RESPIRATORY (INHALATION) at 07:53

## 2023-01-01 RX ADMIN — IPRATROPIUM BROMIDE AND ALBUTEROL SULFATE 3 ML: .5; 3 SOLUTION RESPIRATORY (INHALATION) at 01:43

## 2023-01-01 RX ADMIN — LIDOCAINE PATCH 4% 2 PATCH: 40 PATCH TOPICAL at 21:28

## 2023-01-01 RX ADMIN — SALINE NASAL SPRAY 1 SPRAY: 1.5 SOLUTION NASAL at 22:46

## 2023-01-01 RX ADMIN — METRONIDAZOLE 500 MG: 250 TABLET ORAL at 08:38

## 2023-01-01 RX ADMIN — Medication 20 MG: at 02:12

## 2023-01-01 RX ADMIN — VANCOMYCIN HYDROCHLORIDE 1000 MG: 1 INJECTION, SOLUTION INTRAVENOUS at 12:13

## 2023-01-01 RX ADMIN — SODIUM CHLORIDE, SODIUM GLUCONATE, SODIUM ACETATE, POTASSIUM CHLORIDE AND MAGNESIUM CHLORIDE: 526; 502; 368; 37; 30 INJECTION, SOLUTION INTRAVENOUS at 01:46

## 2023-01-01 RX ADMIN — FLUTICASONE PROPIONATE AND SALMETEROL XINAFOATE 1 PUFF: 115; 21 AEROSOL, METERED RESPIRATORY (INHALATION) at 07:55

## 2023-01-01 RX ADMIN — POTASSIUM & SODIUM PHOSPHATES POWDER PACK 280-160-250 MG 1 PACKET: 280-160-250 PACK at 16:03

## 2023-01-01 RX ADMIN — IPRATROPIUM BROMIDE 0.5 MG: 0.5 SOLUTION RESPIRATORY (INHALATION) at 08:07

## 2023-01-01 RX ADMIN — HEPARIN SODIUM 5000 UNITS: 5000 INJECTION, SOLUTION INTRAVENOUS; SUBCUTANEOUS at 08:10

## 2023-01-01 RX ADMIN — DIPHENHYDRAMINE HYDROCHLORIDE 25 MG: 25 CAPSULE ORAL at 09:38

## 2023-01-01 RX ADMIN — ACETAMINOPHEN 650 MG: 325 TABLET, FILM COATED ORAL at 04:44

## 2023-01-01 RX ADMIN — FLUTICASONE PROPIONATE 1 SPRAY: 50 SPRAY, METERED NASAL at 21:43

## 2023-01-01 RX ADMIN — DEXMEDETOMIDINE HYDROCHLORIDE 0.2 MCG/KG/HR: 400 INJECTION INTRAVENOUS at 18:08

## 2023-01-01 RX ADMIN — HYDROCORTISONE 40 MG: 20 TABLET ORAL at 07:59

## 2023-01-01 RX ADMIN — INSULIN ASPART 2 UNITS: 100 INJECTION, SOLUTION INTRAVENOUS; SUBCUTANEOUS at 16:18

## 2023-01-01 RX ADMIN — DESMOPRESSIN ACETATE 0.2 MG: 0.2 TABLET ORAL at 19:58

## 2023-01-01 RX ADMIN — HEPARIN SODIUM 5000 UNITS: 5000 INJECTION, SOLUTION INTRAVENOUS; SUBCUTANEOUS at 00:37

## 2023-01-01 RX ADMIN — DIPHENHYDRAMINE HYDROCHLORIDE 25 MG: 25 CAPSULE ORAL at 09:33

## 2023-01-01 RX ADMIN — IPRATROPIUM BROMIDE 0.5 MG: 0.5 SOLUTION RESPIRATORY (INHALATION) at 22:05

## 2023-01-01 RX ADMIN — DEXAMETHASONE SODIUM PHOSPHATE 2 MG: 4 INJECTION, SOLUTION INTRA-ARTICULAR; INTRALESIONAL; INTRAMUSCULAR; INTRAVENOUS; SOFT TISSUE at 14:15

## 2023-01-01 RX ADMIN — HEPARIN SODIUM 5000 UNITS: 5000 INJECTION, SOLUTION INTRAVENOUS; SUBCUTANEOUS at 01:12

## 2023-01-01 RX ADMIN — ACETAMINOPHEN 650 MG: 325 TABLET, FILM COATED ORAL at 20:42

## 2023-01-01 RX ADMIN — ACETAMINOPHEN 650 MG: 325 TABLET, FILM COATED ORAL at 12:56

## 2023-01-01 RX ADMIN — ALBUTEROL SULFATE 2 PUFF: 90 AEROSOL, METERED RESPIRATORY (INHALATION) at 21:13

## 2023-01-01 RX ADMIN — PANTOPRAZOLE SODIUM 40 MG: 40 TABLET, DELAYED RELEASE ORAL at 09:19

## 2023-01-01 RX ADMIN — CEFAZOLIN SODIUM 2 G: 2 INJECTION, SOLUTION INTRAVENOUS at 22:08

## 2023-01-01 RX ADMIN — CEFAZOLIN 2 G: 1 INJECTION, POWDER, FOR SOLUTION INTRAMUSCULAR; INTRAVENOUS at 14:01

## 2023-01-01 RX ADMIN — POLYETHYLENE GLYCOL 3350 17 G: 17 POWDER, FOR SOLUTION ORAL at 08:38

## 2023-01-01 RX ADMIN — DEXMEDETOMIDINE HYDROCHLORIDE 1.2 MCG/KG/HR: 400 INJECTION INTRAVENOUS at 01:37

## 2023-01-01 RX ADMIN — POTASSIUM & SODIUM PHOSPHATES POWDER PACK 280-160-250 MG 1 PACKET: 280-160-250 PACK at 15:36

## 2023-01-01 RX ADMIN — HEPARIN SODIUM 5000 UNITS: 5000 INJECTION, SOLUTION INTRAVENOUS; SUBCUTANEOUS at 23:47

## 2023-01-01 RX ADMIN — ALBUTEROL SULFATE 2.5 MG: 2.5 SOLUTION RESPIRATORY (INHALATION) at 10:25

## 2023-01-01 RX ADMIN — ACETAMINOPHEN 650 MG: 325 TABLET, FILM COATED ORAL at 11:49

## 2023-01-01 RX ADMIN — HEPARIN SODIUM 5000 UNITS: 5000 INJECTION, SOLUTION INTRAVENOUS; SUBCUTANEOUS at 08:38

## 2023-01-01 RX ADMIN — HYDROCORTISONE 15 MG: 10 TABLET ORAL at 08:34

## 2023-01-01 RX ADMIN — IPRATROPIUM BROMIDE AND ALBUTEROL SULFATE 3 ML: .5; 3 SOLUTION RESPIRATORY (INHALATION) at 14:16

## 2023-01-01 RX ADMIN — Medication 1 TABLET: at 07:51

## 2023-01-01 RX ADMIN — ALBUTEROL SULFATE 2.5 MG: 2.5 SOLUTION RESPIRATORY (INHALATION) at 10:44

## 2023-01-01 RX ADMIN — ACETAMINOPHEN 650 MG: 325 TABLET, FILM COATED ORAL at 21:40

## 2023-01-01 RX ADMIN — PIPERACILLIN SODIUM AND TAZOBACTAM SODIUM 4.5 G: 4; .5 INJECTION, POWDER, LYOPHILIZED, FOR SOLUTION INTRAVENOUS at 18:08

## 2023-01-01 RX ADMIN — ACETAMINOPHEN 650 MG: 325 TABLET, FILM COATED ORAL at 17:29

## 2023-01-01 RX ADMIN — FLUTICASONE PROPIONATE AND SALMETEROL 1 PUFF: 50; 100 POWDER RESPIRATORY (INHALATION) at 05:48

## 2023-01-01 RX ADMIN — PIPERACILLIN SODIUM AND TAZOBACTAM SODIUM 4.5 G: 4; .5 INJECTION, POWDER, LYOPHILIZED, FOR SOLUTION INTRAVENOUS at 12:11

## 2023-01-01 RX ADMIN — METOPROLOL TARTRATE 1 MG: 5 INJECTION INTRAVENOUS at 10:58

## 2023-01-01 RX ADMIN — FLUTICASONE PROPIONATE AND SALMETEROL 1 PUFF: 50; 100 POWDER RESPIRATORY (INHALATION) at 07:41

## 2023-01-01 RX ADMIN — DIPHENHYDRAMINE HYDROCHLORIDE 25 MG: 25 CAPSULE ORAL at 20:42

## 2023-01-01 RX ADMIN — ACETAMINOPHEN 975 MG: 325 TABLET, FILM COATED ORAL at 04:25

## 2023-01-01 RX ADMIN — FLUTICASONE PROPIONATE 1 SPRAY: 50 SPRAY, METERED NASAL at 21:50

## 2023-01-01 RX ADMIN — PHENYLEPHRINE HYDROCHLORIDE 100 MCG: 10 INJECTION INTRAVENOUS at 03:58

## 2023-01-01 RX ADMIN — Medication 1 TABLET: at 08:01

## 2023-01-01 RX ADMIN — FAMOTIDINE 20 MG: 20 TABLET ORAL at 08:44

## 2023-01-01 RX ADMIN — IPRATROPIUM BROMIDE 0.5 MG: 0.5 SOLUTION RESPIRATORY (INHALATION) at 16:40

## 2023-01-01 RX ADMIN — FUROSEMIDE 40 MG: 10 INJECTION, SOLUTION INTRAVENOUS at 08:28

## 2023-01-01 RX ADMIN — ALBUTEROL SULFATE 2 PUFF: 90 AEROSOL, METERED RESPIRATORY (INHALATION) at 02:00

## 2023-01-01 RX ADMIN — DIPHENHYDRAMINE HYDROCHLORIDE 25 MG: 25 CAPSULE ORAL at 20:11

## 2023-01-01 RX ADMIN — ALBUTEROL SULFATE 2.5 MG: 2.5 SOLUTION RESPIRATORY (INHALATION) at 22:25

## 2023-01-01 RX ADMIN — ALBUTEROL SULFATE 2 PUFF: 90 AEROSOL, METERED RESPIRATORY (INHALATION) at 20:20

## 2023-01-01 RX ADMIN — FAMOTIDINE 20 MG: 10 INJECTION, SOLUTION INTRAVENOUS at 11:22

## 2023-01-01 RX ADMIN — Medication 5 ML: at 14:01

## 2023-01-01 RX ADMIN — INSULIN ASPART 1 UNITS: 100 INJECTION, SOLUTION INTRAVENOUS; SUBCUTANEOUS at 16:17

## 2023-01-01 RX ADMIN — Medication 50 MG: at 12:47

## 2023-01-01 RX ADMIN — ACETAMINOPHEN 650 MG: 325 TABLET, FILM COATED ORAL at 05:31

## 2023-01-01 RX ADMIN — DIPHENHYDRAMINE HYDROCHLORIDE 25 MG: 25 CAPSULE ORAL at 08:54

## 2023-01-01 RX ADMIN — POTASSIUM & SODIUM PHOSPHATES POWDER PACK 280-160-250 MG 1 PACKET: 280-160-250 PACK at 12:44

## 2023-01-01 RX ADMIN — ALBUTEROL SULFATE 2.5 MG: 2.5 SOLUTION RESPIRATORY (INHALATION) at 00:04

## 2023-01-01 RX ADMIN — METHOCARBAMOL 500 MG: 500 TABLET ORAL at 08:04

## 2023-01-01 RX ADMIN — DIPHENHYDRAMINE HYDROCHLORIDE 25 MG: 25 CAPSULE ORAL at 08:00

## 2023-01-01 RX ADMIN — LEVETIRACETAM 500 MG: 5 INJECTION INTRAVENOUS at 07:58

## 2023-01-01 RX ADMIN — VANCOMYCIN HYDROCHLORIDE 1500 MG: 10 INJECTION, POWDER, LYOPHILIZED, FOR SOLUTION INTRAVENOUS at 12:12

## 2023-01-01 RX ADMIN — MAGNESIUM HYDROXIDE 30 ML: 400 SUSPENSION ORAL at 13:58

## 2023-01-01 RX ADMIN — SALINE NASAL SPRAY 1 SPRAY: 1.5 SOLUTION NASAL at 10:00

## 2023-01-01 RX ADMIN — IPRATROPIUM BROMIDE 0.5 MG: 0.5 SOLUTION RESPIRATORY (INHALATION) at 21:06

## 2023-01-01 RX ADMIN — FLUTICASONE PROPIONATE AND SALMETEROL XINAFOATE 1 PUFF: 115; 21 AEROSOL, METERED RESPIRATORY (INHALATION) at 19:30

## 2023-01-01 RX ADMIN — Medication 1 TABLET: at 08:19

## 2023-01-01 RX ADMIN — DIPHENHYDRAMINE HYDROCHLORIDE 25 MG: 25 CAPSULE ORAL at 19:57

## 2023-01-01 RX ADMIN — PANTOPRAZOLE SODIUM 20 MG: 20 TABLET, DELAYED RELEASE ORAL at 08:11

## 2023-01-01 RX ADMIN — DIPHENHYDRAMINE HYDROCHLORIDE 25 MG: 25 CAPSULE ORAL at 11:59

## 2023-01-01 RX ADMIN — HYDROCORTISONE 15 MG: 10 TABLET ORAL at 08:09

## 2023-01-01 RX ADMIN — DIPHENHYDRAMINE HYDROCHLORIDE 25 MG: 25 CAPSULE ORAL at 20:20

## 2023-01-01 RX ADMIN — GADOBUTROL 9.2 ML: 604.72 INJECTION INTRAVENOUS at 18:00

## 2023-01-01 RX ADMIN — ACETAMINOPHEN 975 MG: 325 TABLET, FILM COATED ORAL at 20:51

## 2023-01-01 RX ADMIN — PIPERACILLIN SODIUM AND TAZOBACTAM SODIUM 4.5 G: 4; .5 INJECTION, POWDER, LYOPHILIZED, FOR SOLUTION INTRAVENOUS at 06:20

## 2023-01-01 RX ADMIN — FLUTICASONE PROPIONATE AND SALMETEROL XINAFOATE 1 PUFF: 115; 21 AEROSOL, METERED RESPIRATORY (INHALATION) at 08:16

## 2023-01-01 RX ADMIN — DESMOPRESSIN ACETATE 0.2 MG: 0.2 TABLET ORAL at 20:40

## 2023-01-01 RX ADMIN — IPRATROPIUM BROMIDE 0.5 MG: 0.5 SOLUTION RESPIRATORY (INHALATION) at 20:44

## 2023-01-01 RX ADMIN — LEVOTHYROXINE SODIUM 50 MCG: 0.05 TABLET ORAL at 08:57

## 2023-01-01 RX ADMIN — FLUTICASONE PROPIONATE AND SALMETEROL XINAFOATE 1 PUFF: 115; 21 AEROSOL, METERED RESPIRATORY (INHALATION) at 21:12

## 2023-01-01 RX ADMIN — PROPOFOL 40 MCG/KG/MIN: 10 INJECTION, EMULSION INTRAVENOUS at 17:32

## 2023-01-01 RX ADMIN — ALBUTEROL SULFATE 2.5 MG: 2.5 SOLUTION RESPIRATORY (INHALATION) at 16:37

## 2023-01-01 RX ADMIN — ACETAMINOPHEN 975 MG: 325 TABLET, FILM COATED ORAL at 05:04

## 2023-01-01 RX ADMIN — SALINE NASAL SPRAY 1 SPRAY: 1.5 SOLUTION NASAL at 20:08

## 2023-01-01 RX ADMIN — ALBUTEROL SULFATE 2.5 MG: 2.5 SOLUTION RESPIRATORY (INHALATION) at 13:40

## 2023-01-01 RX ADMIN — HALOPERIDOL LACTATE 10 MG: 5 INJECTION, SOLUTION INTRAMUSCULAR at 11:44

## 2023-01-01 RX ADMIN — Medication 5 ML: at 05:42

## 2023-01-01 RX ADMIN — SALINE NASAL SPRAY 1 SPRAY: 1.5 SOLUTION NASAL at 08:11

## 2023-01-01 RX ADMIN — INSULIN ASPART 1 UNITS: 100 INJECTION, SOLUTION INTRAVENOUS; SUBCUTANEOUS at 20:40

## 2023-01-01 RX ADMIN — ACETAMINOPHEN 650 MG: 325 TABLET, FILM COATED ORAL at 19:57

## 2023-01-01 RX ADMIN — ALBUTEROL SULFATE 2.5 MG: 2.5 SOLUTION RESPIRATORY (INHALATION) at 21:13

## 2023-01-01 RX ADMIN — IPRATROPIUM BROMIDE 0.5 MG: 0.5 SOLUTION RESPIRATORY (INHALATION) at 08:58

## 2023-01-01 RX ADMIN — DIPHENHYDRAMINE HYDROCHLORIDE 25 MG: 25 CAPSULE ORAL at 22:31

## 2023-01-01 RX ADMIN — DIPHENHYDRAMINE HYDROCHLORIDE 25 MG: 25 CAPSULE ORAL at 22:14

## 2023-01-01 RX ADMIN — POTASSIUM & SODIUM PHOSPHATES POWDER PACK 280-160-250 MG 1 PACKET: 280-160-250 PACK at 12:10

## 2023-01-01 RX ADMIN — HYDROCORTISONE 15 MG: 10 TABLET ORAL at 08:44

## 2023-01-01 RX ADMIN — IPRATROPIUM BROMIDE AND ALBUTEROL SULFATE 3 ML: .5; 3 SOLUTION RESPIRATORY (INHALATION) at 08:16

## 2023-01-01 RX ADMIN — IPRATROPIUM BROMIDE 0.5 MG: 0.5 SOLUTION RESPIRATORY (INHALATION) at 22:17

## 2023-01-01 RX ADMIN — HEPARIN SODIUM 5000 UNITS: 5000 INJECTION, SOLUTION INTRAVENOUS; SUBCUTANEOUS at 17:15

## 2023-01-01 RX ADMIN — IPRATROPIUM BROMIDE 0.5 MG: 0.5 SOLUTION RESPIRATORY (INHALATION) at 09:23

## 2023-01-01 RX ADMIN — HEPARIN SODIUM 5000 UNITS: 5000 INJECTION, SOLUTION INTRAVENOUS; SUBCUTANEOUS at 08:08

## 2023-01-01 RX ADMIN — Medication 20 MG: at 15:35

## 2023-01-01 RX ADMIN — IPRATROPIUM BROMIDE 0.5 MG: 0.5 SOLUTION RESPIRATORY (INHALATION) at 22:34

## 2023-01-01 RX ADMIN — ALBUTEROL SULFATE 2 PUFF: 90 AEROSOL, METERED RESPIRATORY (INHALATION) at 08:08

## 2023-01-01 RX ADMIN — POTASSIUM & SODIUM PHOSPHATES POWDER PACK 280-160-250 MG 1 PACKET: 280-160-250 PACK at 11:48

## 2023-01-01 RX ADMIN — DIPHENHYDRAMINE HYDROCHLORIDE 25 MG: 25 CAPSULE ORAL at 08:01

## 2023-01-01 RX ADMIN — Medication 15 ML: at 08:06

## 2023-01-01 RX ADMIN — SENNOSIDES AND DOCUSATE SODIUM 2 TABLET: 8.6; 5 TABLET ORAL at 08:38

## 2023-01-01 RX ADMIN — ALBUTEROL SULFATE 2 PUFF: 90 AEROSOL, METERED RESPIRATORY (INHALATION) at 08:58

## 2023-01-01 RX ADMIN — ALBUTEROL SULFATE 2 PUFF: 90 AEROSOL, METERED RESPIRATORY (INHALATION) at 08:33

## 2023-01-01 RX ADMIN — Medication 1 TABLET: at 07:46

## 2023-01-01 RX ADMIN — FLUTICASONE PROPIONATE 1 SPRAY: 50 SPRAY, METERED NASAL at 03:57

## 2023-01-01 RX ADMIN — FAMOTIDINE 20 MG: 20 TABLET ORAL at 19:36

## 2023-01-01 RX ADMIN — Medication: at 00:33

## 2023-01-01 RX ADMIN — ONDANSETRON 4 MG: 2 INJECTION INTRAMUSCULAR; INTRAVENOUS at 14:33

## 2023-01-01 RX ADMIN — FUROSEMIDE 40 MG: 10 INJECTION, SOLUTION INTRAVENOUS at 07:38

## 2023-01-01 RX ADMIN — FLUTICASONE PROPIONATE 1 SPRAY: 50 SPRAY, METERED NASAL at 05:49

## 2023-01-01 RX ADMIN — VANCOMYCIN HYDROCHLORIDE 1000 MG: 1 INJECTION, SOLUTION INTRAVENOUS at 11:11

## 2023-01-01 RX ADMIN — ACETAMINOPHEN 650 MG: 325 TABLET, FILM COATED ORAL at 11:39

## 2023-01-01 RX ADMIN — DIPHENHYDRAMINE HYDROCHLORIDE 25 MG: 25 CAPSULE ORAL at 18:32

## 2023-01-01 RX ADMIN — FLUTICASONE PROPIONATE AND SALMETEROL XINAFOATE 1 PUFF: 115; 21 AEROSOL, METERED RESPIRATORY (INHALATION) at 09:01

## 2023-01-01 RX ADMIN — FLUTICASONE PROPIONATE AND SALMETEROL 1 PUFF: 50; 100 POWDER RESPIRATORY (INHALATION) at 13:08

## 2023-01-01 RX ADMIN — FLUTICASONE PROPIONATE AND SALMETEROL 1 PUFF: 50; 100 POWDER RESPIRATORY (INHALATION) at 08:48

## 2023-01-01 RX ADMIN — DEXTROSE MONOHYDRATE: 50 INJECTION, SOLUTION INTRAVENOUS at 17:02

## 2023-01-01 RX ADMIN — Medication 10 MG: at 10:00

## 2023-01-01 RX ADMIN — DIPHENHYDRAMINE HYDROCHLORIDE 25 MG: 25 CAPSULE ORAL at 19:56

## 2023-01-01 RX ADMIN — FLUTICASONE PROPIONATE AND SALMETEROL XINAFOATE 1 PUFF: 115; 21 AEROSOL, METERED RESPIRATORY (INHALATION) at 20:11

## 2023-01-01 RX ADMIN — HYDROCORTISONE 5 MG: 5 TABLET ORAL at 14:12

## 2023-01-01 RX ADMIN — HEPARIN SODIUM 5000 UNITS: 5000 INJECTION, SOLUTION INTRAVENOUS; SUBCUTANEOUS at 08:00

## 2023-01-01 RX ADMIN — LEVOTHYROXINE SODIUM 100 MCG: 100 TABLET ORAL at 07:23

## 2023-01-01 RX ADMIN — PHENYLEPHRINE HYDROCHLORIDE 200 MCG: 10 INJECTION INTRAVENOUS at 04:23

## 2023-01-01 RX ADMIN — PREDNISONE 20 MG: 20 TABLET ORAL at 08:57

## 2023-01-01 RX ADMIN — ACETAMINOPHEN 650 MG: 325 TABLET, FILM COATED ORAL at 20:57

## 2023-01-01 RX ADMIN — LEVETIRACETAM 500 MG: 500 TABLET, FILM COATED ORAL at 08:53

## 2023-01-01 RX ADMIN — MAGNESIUM OXIDE TAB 400 MG (241.3 MG ELEMENTAL MG) 400 MG: 400 (241.3 MG) TAB at 08:57

## 2023-01-01 RX ADMIN — FAMOTIDINE 20 MG: 20 TABLET ORAL at 20:54

## 2023-01-01 RX ADMIN — FLUTICASONE PROPIONATE AND SALMETEROL XINAFOATE 1 PUFF: 115; 21 AEROSOL, METERED RESPIRATORY (INHALATION) at 20:41

## 2023-01-01 RX ADMIN — Medication: at 22:19

## 2023-01-01 RX ADMIN — FLUTICASONE PROPIONATE 1 SPRAY: 50 SPRAY, METERED NASAL at 10:01

## 2023-01-01 RX ADMIN — HYDRALAZINE HYDROCHLORIDE 20 MG: 20 INJECTION INTRAMUSCULAR; INTRAVENOUS at 02:45

## 2023-01-01 RX ADMIN — IPRATROPIUM BROMIDE AND ALBUTEROL SULFATE 3 ML: .5; 3 SOLUTION RESPIRATORY (INHALATION) at 20:27

## 2023-01-01 RX ADMIN — TRETIONIN: 0.25 GEL TOPICAL at 21:51

## 2023-01-01 RX ADMIN — POTASSIUM & SODIUM PHOSPHATES POWDER PACK 280-160-250 MG 1 PACKET: 280-160-250 PACK at 21:14

## 2023-01-01 RX ADMIN — SALINE NASAL SPRAY 1 SPRAY: 1.5 SOLUTION NASAL at 09:38

## 2023-01-01 RX ADMIN — IPRATROPIUM BROMIDE 0.5 MG: 0.5 SOLUTION RESPIRATORY (INHALATION) at 13:44

## 2023-01-01 RX ADMIN — LABETALOL HYDROCHLORIDE 20 MG: 5 INJECTION, SOLUTION INTRAVENOUS at 01:25

## 2023-01-01 RX ADMIN — PHENYLEPHRINE HYDROCHLORIDE 50 MCG: 10 INJECTION INTRAVENOUS at 13:57

## 2023-01-01 RX ADMIN — INSULIN ASPART 1 UNITS: 100 INJECTION, SOLUTION INTRAVENOUS; SUBCUTANEOUS at 20:08

## 2023-01-01 RX ADMIN — POTASSIUM & SODIUM PHOSPHATES POWDER PACK 280-160-250 MG 1 PACKET: 280-160-250 PACK at 15:50

## 2023-01-01 RX ADMIN — ALBUTEROL SULFATE 2 PUFF: 90 AEROSOL, METERED RESPIRATORY (INHALATION) at 21:03

## 2023-01-01 RX ADMIN — POTASSIUM PHOSPHATE, MONOBASIC POTASSIUM PHOSPHATE, DIBASIC 15 MMOL: 224; 236 INJECTION, SOLUTION, CONCENTRATE INTRAVENOUS at 18:42

## 2023-01-01 RX ADMIN — IPRATROPIUM BROMIDE 0.5 MG: 0.5 SOLUTION RESPIRATORY (INHALATION) at 09:01

## 2023-01-01 RX ADMIN — FENTANYL CITRATE 100 MCG: 50 INJECTION INTRAMUSCULAR; INTRAVENOUS at 09:28

## 2023-01-01 RX ADMIN — SALINE NASAL SPRAY 1 SPRAY: 1.5 SOLUTION NASAL at 07:58

## 2023-01-01 RX ADMIN — POTASSIUM & SODIUM PHOSPHATES POWDER PACK 280-160-250 MG 1 PACKET: 280-160-250 PACK at 16:52

## 2023-01-01 RX ADMIN — FLUTICASONE PROPIONATE AND SALMETEROL 1 PUFF: 50; 100 POWDER RESPIRATORY (INHALATION) at 08:11

## 2023-01-01 RX ADMIN — ACETAMINOPHEN 650 MG: 325 TABLET, FILM COATED ORAL at 18:23

## 2023-01-01 RX ADMIN — FLUTICASONE PROPIONATE 1 SPRAY: 50 SPRAY, METERED NASAL at 08:38

## 2023-01-01 RX ADMIN — LIDOCAINE PATCH 4% 2 PATCH: 40 PATCH TOPICAL at 22:59

## 2023-01-01 RX ADMIN — DIPHENHYDRAMINE HYDROCHLORIDE 25 MG: 25 CAPSULE ORAL at 13:09

## 2023-01-01 RX ADMIN — Medication 10 ML: at 17:05

## 2023-01-01 RX ADMIN — LABETALOL HYDROCHLORIDE 20 MG: 5 INJECTION, SOLUTION INTRAVENOUS at 20:02

## 2023-01-01 RX ADMIN — IPRATROPIUM BROMIDE 0.5 MG: 0.5 SOLUTION RESPIRATORY (INHALATION) at 14:55

## 2023-01-01 RX ADMIN — SODIUM CHLORIDE, SODIUM GLUCONATE, SODIUM ACETATE, POTASSIUM CHLORIDE AND MAGNESIUM CHLORIDE: 526; 502; 368; 37; 30 INJECTION, SOLUTION INTRAVENOUS at 18:21

## 2023-01-01 RX ADMIN — SENNOSIDES AND DOCUSATE SODIUM 2 TABLET: 8.6; 5 TABLET ORAL at 08:28

## 2023-01-01 RX ADMIN — FLUTICASONE PROPIONATE 1 SPRAY: 50 SPRAY, METERED NASAL at 07:54

## 2023-01-01 RX ADMIN — VANCOMYCIN HYDROCHLORIDE 1000 MG: 1 INJECTION, SOLUTION INTRAVENOUS at 22:56

## 2023-01-01 RX ADMIN — DIPHENHYDRAMINE HYDROCHLORIDE 25 MG: 25 CAPSULE ORAL at 08:28

## 2023-01-01 RX ADMIN — DEXAMETHASONE SODIUM PHOSPHATE 4 MG: 4 INJECTION, SOLUTION INTRA-ARTICULAR; INTRALESIONAL; INTRAMUSCULAR; INTRAVENOUS; SOFT TISSUE at 14:03

## 2023-01-01 RX ADMIN — DESMOPRESSIN ACETATE 1 MCG: 4 INJECTION, SOLUTION INTRAVENOUS; SUBCUTANEOUS at 15:31

## 2023-01-01 RX ADMIN — ACETAMINOPHEN 650 MG: 325 TABLET, FILM COATED ORAL at 09:33

## 2023-01-01 RX ADMIN — CEFAZOLIN SODIUM 2 G: 2 INJECTION, SOLUTION INTRAVENOUS at 06:10

## 2023-01-01 RX ADMIN — INSULIN ASPART 1 UNITS: 100 INJECTION, SOLUTION INTRAVENOUS; SUBCUTANEOUS at 19:56

## 2023-01-01 RX ADMIN — Medication 50 MCG/HR: at 03:42

## 2023-01-01 RX ADMIN — FLUMAZENIL 0.3 MG: 0.1 INJECTION, SOLUTION INTRAVENOUS at 15:05

## 2023-01-01 RX ADMIN — FENTANYL CITRATE 50 MCG: 50 INJECTION INTRAMUSCULAR; INTRAVENOUS at 08:21

## 2023-01-01 RX ADMIN — DEXMEDETOMIDINE HYDROCHLORIDE 1.2 MCG/KG/HR: 400 INJECTION INTRAVENOUS at 00:41

## 2023-01-01 RX ADMIN — LIDOCAINE PATCH 4% 2 PATCH: 40 PATCH TOPICAL at 22:35

## 2023-01-01 RX ADMIN — SALINE NASAL SPRAY 1 SPRAY: 1.5 SOLUTION NASAL at 16:36

## 2023-01-01 RX ADMIN — FLUTICASONE PROPIONATE AND SALMETEROL 1 PUFF: 50; 100 POWDER RESPIRATORY (INHALATION) at 12:00

## 2023-01-01 RX ADMIN — HEPARIN SODIUM 5000 UNITS: 5000 INJECTION, SOLUTION INTRAVENOUS; SUBCUTANEOUS at 16:33

## 2023-01-01 RX ADMIN — LIDOCAINE PATCH 4% 2 PATCH: 40 PATCH TOPICAL at 00:11

## 2023-01-01 RX ADMIN — Medication 15 ML: at 08:41

## 2023-01-01 RX ADMIN — ALBUTEROL SULFATE 2 PUFF: 90 AEROSOL, METERED RESPIRATORY (INHALATION) at 22:44

## 2023-01-01 RX ADMIN — ACETAMINOPHEN 650 MG: 325 TABLET, FILM COATED ORAL at 00:52

## 2023-01-01 RX ADMIN — CEFAZOLIN SODIUM 2 G: 2 INJECTION, SOLUTION INTRAVENOUS at 15:04

## 2023-01-01 RX ADMIN — ACETAMINOPHEN 650 MG: 325 TABLET, FILM COATED ORAL at 07:46

## 2023-01-01 RX ADMIN — DEXMEDETOMIDINE HYDROCHLORIDE 0.9 MCG/KG/HR: 400 INJECTION INTRAVENOUS at 16:52

## 2023-01-01 RX ADMIN — HEPARIN SODIUM 5000 UNITS: 5000 INJECTION, SOLUTION INTRAVENOUS; SUBCUTANEOUS at 16:42

## 2023-01-01 RX ADMIN — Medication 1 TABLET: at 07:57

## 2023-01-01 RX ADMIN — VANCOMYCIN HYDROCHLORIDE 1000 MG: 1 INJECTION, SOLUTION INTRAVENOUS at 23:54

## 2023-01-01 RX ADMIN — LEVOTHYROXINE SODIUM 50 MCG: 0.05 TABLET ORAL at 08:52

## 2023-01-01 RX ADMIN — TRETIONIN: 0.25 GEL TOPICAL at 22:18

## 2023-01-01 RX ADMIN — ACETAMINOPHEN 650 MG: 325 TABLET, FILM COATED ORAL at 11:00

## 2023-01-01 RX ADMIN — ACETAMINOPHEN 650 MG: 325 TABLET, FILM COATED ORAL at 09:50

## 2023-01-01 RX ADMIN — SODIUM CHLORIDE: 9 INJECTION, SOLUTION INTRAVENOUS at 10:15

## 2023-01-01 RX ADMIN — DEXAMETHASONE SODIUM PHOSPHATE 2 MG: 4 INJECTION, SOLUTION INTRA-ARTICULAR; INTRALESIONAL; INTRAMUSCULAR; INTRAVENOUS; SOFT TISSUE at 08:38

## 2023-01-01 RX ADMIN — FLUTICASONE PROPIONATE AND SALMETEROL 1 PUFF: 50; 100 POWDER RESPIRATORY (INHALATION) at 08:32

## 2023-01-01 RX ADMIN — ALBUTEROL SULFATE 2.5 MG: 2.5 SOLUTION RESPIRATORY (INHALATION) at 21:21

## 2023-01-01 RX ADMIN — DIPHENHYDRAMINE HYDROCHLORIDE 25 MG: 25 CAPSULE ORAL at 21:13

## 2023-01-01 RX ADMIN — Medication: at 20:39

## 2023-01-01 RX ADMIN — SODIUM CHLORIDE, POTASSIUM CHLORIDE, SODIUM LACTATE AND CALCIUM CHLORIDE: 600; 310; 30; 20 INJECTION, SOLUTION INTRAVENOUS at 08:01

## 2023-01-01 RX ADMIN — FLUTICASONE PROPIONATE AND SALMETEROL XINAFOATE 1 PUFF: 115; 21 AEROSOL, METERED RESPIRATORY (INHALATION) at 07:23

## 2023-01-01 RX ADMIN — SALINE NASAL SPRAY 1 SPRAY: 1.5 SOLUTION NASAL at 17:47

## 2023-01-01 RX ADMIN — POTASSIUM & SODIUM PHOSPHATES POWDER PACK 280-160-250 MG 1 PACKET: 280-160-250 PACK at 07:59

## 2023-01-01 RX ADMIN — FLUTICASONE PROPIONATE AND SALMETEROL 1 PUFF: 50; 100 POWDER RESPIRATORY (INHALATION) at 08:31

## 2023-01-01 RX ADMIN — SALINE NASAL SPRAY 1 SPRAY: 1.5 SOLUTION NASAL at 22:21

## 2023-01-01 RX ADMIN — METRONIDAZOLE 500 MG: 250 TABLET ORAL at 20:17

## 2023-01-01 RX ADMIN — IPRATROPIUM BROMIDE 0.5 MG: 0.5 SOLUTION RESPIRATORY (INHALATION) at 17:48

## 2023-01-01 RX ADMIN — Medication 10 MG: at 10:15

## 2023-01-01 RX ADMIN — CALCIUM GLUCONATE 1 G: 20 INJECTION, SOLUTION INTRAVENOUS at 14:03

## 2023-01-01 RX ADMIN — PHENYLEPHRINE HYDROCHLORIDE 100 MCG: 10 INJECTION INTRAVENOUS at 04:03

## 2023-01-01 RX ADMIN — Medication 10 MG: at 04:56

## 2023-01-01 RX ADMIN — FLUTICASONE PROPIONATE 1 SPRAY: 50 SPRAY, METERED NASAL at 07:46

## 2023-01-01 RX ADMIN — ACETAMINOPHEN 650 MG: 325 TABLET, FILM COATED ORAL at 21:42

## 2023-01-01 RX ADMIN — ALBUTEROL SULFATE 2 PUFF: 90 AEROSOL, METERED RESPIRATORY (INHALATION) at 09:26

## 2023-01-01 RX ADMIN — ALBUTEROL SULFATE 2 PUFF: 90 AEROSOL, METERED RESPIRATORY (INHALATION) at 20:08

## 2023-01-01 RX ADMIN — POTASSIUM CHLORIDE 20 MEQ: 1.5 POWDER, FOR SOLUTION ORAL at 08:57

## 2023-01-01 RX ADMIN — ACETAMINOPHEN 650 MG: 325 TABLET, FILM COATED ORAL at 08:45

## 2023-01-01 RX ADMIN — ACETAMINOPHEN 975 MG: 325 TABLET, FILM COATED ORAL at 11:25

## 2023-01-01 RX ADMIN — ACETAMINOPHEN 975 MG: 325 TABLET, FILM COATED ORAL at 21:51

## 2023-01-01 RX ADMIN — INSULIN ASPART 2 UNITS: 100 INJECTION, SOLUTION INTRAVENOUS; SUBCUTANEOUS at 23:44

## 2023-01-01 RX ADMIN — DESMOPRESSIN ACETATE 0.1 MG: 0.1 TABLET ORAL at 13:58

## 2023-01-01 RX ADMIN — Medication 1 TABLET: at 09:36

## 2023-01-01 RX ADMIN — ALBUTEROL SULFATE 2.5 MG: 2.5 SOLUTION RESPIRATORY (INHALATION) at 02:30

## 2023-01-01 RX ADMIN — Medication: at 00:55

## 2023-01-01 RX ADMIN — FLUTICASONE PROPIONATE AND SALMETEROL 1 PUFF: 50; 100 POWDER RESPIRATORY (INHALATION) at 20:32

## 2023-01-01 RX ADMIN — SALINE NASAL SPRAY 1 SPRAY: 1.5 SOLUTION NASAL at 08:43

## 2023-01-01 RX ADMIN — FLUTICASONE PROPIONATE 1 SPRAY: 50 SPRAY, METERED NASAL at 22:15

## 2023-01-01 RX ADMIN — ALBUTEROL SULFATE 2 PUFF: 90 AEROSOL, METERED RESPIRATORY (INHALATION) at 10:02

## 2023-01-01 RX ADMIN — DIPHENHYDRAMINE HYDROCHLORIDE 25 MG: 25 CAPSULE ORAL at 07:55

## 2023-01-01 RX ADMIN — IPRATROPIUM BROMIDE 0.5 MG: 0.5 SOLUTION RESPIRATORY (INHALATION) at 12:38

## 2023-01-01 RX ADMIN — FLUTICASONE PROPIONATE AND SALMETEROL 1 PUFF: 50; 100 POWDER RESPIRATORY (INHALATION) at 08:21

## 2023-01-01 RX ADMIN — PANTOPRAZOLE SODIUM 20 MG: 20 TABLET, DELAYED RELEASE ORAL at 08:53

## 2023-01-01 RX ADMIN — FLUTICASONE PROPIONATE AND SALMETEROL 1 PUFF: 50; 100 POWDER RESPIRATORY (INHALATION) at 10:19

## 2023-01-01 RX ADMIN — ACETAMINOPHEN 650 MG: 325 TABLET, FILM COATED ORAL at 15:49

## 2023-01-01 RX ADMIN — FLUTICASONE PROPIONATE 1 SPRAY: 50 SPRAY, METERED NASAL at 21:03

## 2023-01-01 RX ADMIN — ALBUTEROL SULFATE 2 PUFF: 90 AEROSOL, METERED RESPIRATORY (INHALATION) at 18:34

## 2023-01-01 RX ADMIN — ALBUTEROL SULFATE 2 PUFF: 90 AEROSOL, METERED RESPIRATORY (INHALATION) at 14:10

## 2023-01-01 RX ADMIN — HYDRALAZINE HYDROCHLORIDE 20 MG: 20 INJECTION INTRAMUSCULAR; INTRAVENOUS at 16:57

## 2023-01-01 RX ADMIN — POTASSIUM & SODIUM PHOSPHATES POWDER PACK 280-160-250 MG 1 PACKET: 280-160-250 PACK at 11:00

## 2023-01-01 RX ADMIN — FLUTICASONE PROPIONATE AND SALMETEROL 1 PUFF: 50; 100 POWDER RESPIRATORY (INHALATION) at 10:49

## 2023-01-01 RX ADMIN — HEPARIN SODIUM 5000 UNITS: 5000 INJECTION, SOLUTION INTRAVENOUS; SUBCUTANEOUS at 00:57

## 2023-01-01 RX ADMIN — SALINE NASAL SPRAY 1 SPRAY: 1.5 SOLUTION NASAL at 08:00

## 2023-01-01 RX ADMIN — ACETAMINOPHEN 650 MG: 325 TABLET, FILM COATED ORAL at 22:21

## 2023-01-01 RX ADMIN — FLUTICASONE PROPIONATE 1 SPRAY: 50 SPRAY, METERED NASAL at 08:58

## 2023-01-01 RX ADMIN — POTASSIUM & SODIUM PHOSPHATES POWDER PACK 280-160-250 MG 1 PACKET: 280-160-250 PACK at 20:38

## 2023-01-01 RX ADMIN — DEXAMETHASONE SODIUM PHOSPHATE 4 MG: 4 INJECTION, SOLUTION INTRA-ARTICULAR; INTRALESIONAL; INTRAMUSCULAR; INTRAVENOUS; SOFT TISSUE at 20:17

## 2023-01-01 RX ADMIN — DIPHENHYDRAMINE HYDROCHLORIDE 25 MG: 25 CAPSULE ORAL at 20:10

## 2023-01-01 RX ADMIN — IPRATROPIUM BROMIDE 0.5 MG: 0.5 SOLUTION RESPIRATORY (INHALATION) at 20:10

## 2023-01-01 RX ADMIN — POTASSIUM & SODIUM PHOSPHATES POWDER PACK 280-160-250 MG 1 PACKET: 280-160-250 PACK at 16:21

## 2023-01-01 RX ADMIN — MAGNESIUM OXIDE TAB 400 MG (241.3 MG ELEMENTAL MG) 400 MG: 400 (241.3 MG) TAB at 17:41

## 2023-01-01 RX ADMIN — IPRATROPIUM BROMIDE 0.5 MG: 0.5 SOLUTION RESPIRATORY (INHALATION) at 20:16

## 2023-01-01 RX ADMIN — IPRATROPIUM BROMIDE AND ALBUTEROL SULFATE 3 ML: .5; 3 SOLUTION RESPIRATORY (INHALATION) at 09:57

## 2023-01-01 RX ADMIN — DEXMEDETOMIDINE HYDROCHLORIDE 1.2 MCG/KG/HR: 400 INJECTION INTRAVENOUS at 06:55

## 2023-01-01 RX ADMIN — SALINE NASAL SPRAY 1 SPRAY: 1.5 SOLUTION NASAL at 13:04

## 2023-01-01 RX ADMIN — PIPERACILLIN SODIUM AND TAZOBACTAM SODIUM 4.5 G: 4; .5 INJECTION, POWDER, LYOPHILIZED, FOR SOLUTION INTRAVENOUS at 17:48

## 2023-01-01 RX ADMIN — IPRATROPIUM BROMIDE 0.5 MG: 0.5 SOLUTION RESPIRATORY (INHALATION) at 13:40

## 2023-01-01 RX ADMIN — Medication 1 TABLET: at 08:35

## 2023-01-01 RX ADMIN — FLUTICASONE PROPIONATE AND SALMETEROL 1 PUFF: 50; 100 POWDER RESPIRATORY (INHALATION) at 10:01

## 2023-01-01 RX ADMIN — DIPHENHYDRAMINE HYDROCHLORIDE 25 MG: 25 CAPSULE ORAL at 08:31

## 2023-01-01 RX ADMIN — FLUTICASONE PROPIONATE 1 SPRAY: 50 SPRAY, METERED NASAL at 08:39

## 2023-01-01 RX ADMIN — SALINE NASAL SPRAY 1 SPRAY: 1.5 SOLUTION NASAL at 11:45

## 2023-01-01 RX ADMIN — ACETAMINOPHEN 650 MG: 325 TABLET, FILM COATED ORAL at 02:22

## 2023-01-01 RX ADMIN — TRETIONIN: 0.25 GEL TOPICAL at 21:34

## 2023-01-01 RX ADMIN — DESMOPRESSIN ACETATE 0.1 MG: 0.1 TABLET ORAL at 06:10

## 2023-01-01 RX ADMIN — LEVOTHYROXINE SODIUM 50 MCG: 0.05 TABLET ORAL at 13:57

## 2023-01-01 RX ADMIN — HYDRALAZINE HYDROCHLORIDE 20 MG: 20 INJECTION INTRAMUSCULAR; INTRAVENOUS at 00:22

## 2023-01-01 RX ADMIN — TRETIONIN: 0.25 GEL TOPICAL at 22:59

## 2023-01-01 RX ADMIN — POTASSIUM & SODIUM PHOSPHATES POWDER PACK 280-160-250 MG 1 PACKET: 280-160-250 PACK at 05:42

## 2023-01-01 RX ADMIN — POTASSIUM & SODIUM PHOSPHATES POWDER PACK 280-160-250 MG 1 PACKET: 280-160-250 PACK at 20:41

## 2023-01-01 RX ADMIN — HEPARIN SODIUM 5000 UNITS: 5000 INJECTION, SOLUTION INTRAVENOUS; SUBCUTANEOUS at 00:27

## 2023-01-01 RX ADMIN — FLUTICASONE PROPIONATE AND SALMETEROL 1 PUFF: 50; 100 POWDER RESPIRATORY (INHALATION) at 10:32

## 2023-01-01 RX ADMIN — FAMOTIDINE 20 MG: 20 TABLET ORAL at 09:38

## 2023-01-01 RX ADMIN — CEFAZOLIN SODIUM 2 G: 2 INJECTION, SOLUTION INTRAVENOUS at 14:12

## 2023-01-01 RX ADMIN — SODIUM CHLORIDE, SODIUM GLUCONATE, SODIUM ACETATE, POTASSIUM CHLORIDE AND MAGNESIUM CHLORIDE: 526; 502; 368; 37; 30 INJECTION, SOLUTION INTRAVENOUS at 22:35

## 2023-01-01 RX ADMIN — FLUTICASONE PROPIONATE AND SALMETEROL 1 PUFF: 50; 100 POWDER RESPIRATORY (INHALATION) at 07:59

## 2023-01-01 RX ADMIN — Medication 20 MG: at 06:41

## 2023-01-01 RX ADMIN — MAGNESIUM SULFATE IN WATER 2 G: 40 INJECTION, SOLUTION INTRAVENOUS at 12:57

## 2023-01-01 RX ADMIN — IPRATROPIUM BROMIDE AND ALBUTEROL SULFATE 3 ML: .5; 3 SOLUTION RESPIRATORY (INHALATION) at 21:00

## 2023-01-01 RX ADMIN — POTASSIUM CHLORIDE 10 MEQ: 7.46 INJECTION, SOLUTION INTRAVENOUS at 13:50

## 2023-01-01 RX ADMIN — ACETAMINOPHEN 650 MG: 325 TABLET, FILM COATED ORAL at 19:28

## 2023-01-01 RX ADMIN — IPRATROPIUM BROMIDE AND ALBUTEROL SULFATE 3 ML: .5; 3 SOLUTION RESPIRATORY (INHALATION) at 19:47

## 2023-01-01 RX ADMIN — PIPERACILLIN SODIUM AND TAZOBACTAM SODIUM 4.5 G: 4; .5 INJECTION, POWDER, LYOPHILIZED, FOR SOLUTION INTRAVENOUS at 17:24

## 2023-01-01 RX ADMIN — ACETAMINOPHEN 650 MG: 325 TABLET, FILM COATED ORAL at 22:00

## 2023-01-01 RX ADMIN — FLUTICASONE PROPIONATE AND SALMETEROL XINAFOATE 1 PUFF: 115; 21 AEROSOL, METERED RESPIRATORY (INHALATION) at 09:38

## 2023-01-01 RX ADMIN — PROPOFOL 200 MG: 10 INJECTION, EMULSION INTRAVENOUS at 08:24

## 2023-01-01 RX ADMIN — ALBUTEROL SULFATE 2 PUFF: 90 AEROSOL, METERED RESPIRATORY (INHALATION) at 22:14

## 2023-01-01 RX ADMIN — FLUTICASONE PROPIONATE 1 SPRAY: 50 SPRAY, METERED NASAL at 08:54

## 2023-01-01 RX ADMIN — IPRATROPIUM BROMIDE AND ALBUTEROL SULFATE 3 ML: .5; 3 SOLUTION RESPIRATORY (INHALATION) at 18:06

## 2023-01-01 RX ADMIN — HYDRALAZINE HYDROCHLORIDE 20 MG: 20 INJECTION INTRAMUSCULAR; INTRAVENOUS at 11:45

## 2023-01-01 RX ADMIN — INSULIN ASPART 2 UNITS: 100 INJECTION, SOLUTION INTRAVENOUS; SUBCUTANEOUS at 18:04

## 2023-01-01 RX ADMIN — Medication 1 TABLET: at 09:23

## 2023-01-01 RX ADMIN — Medication 20 MG: at 18:54

## 2023-01-01 RX ADMIN — ACETAMINOPHEN 650 MG: 325 TABLET, FILM COATED ORAL at 17:46

## 2023-01-01 RX ADMIN — POTASSIUM & SODIUM PHOSPHATES POWDER PACK 280-160-250 MG 1 PACKET: 280-160-250 PACK at 16:56

## 2023-01-01 RX ADMIN — Medication 1 TABLET: at 08:57

## 2023-01-01 RX ADMIN — SALINE NASAL SPRAY 1 SPRAY: 1.5 SOLUTION NASAL at 10:37

## 2023-01-01 RX ADMIN — SALINE NASAL SPRAY 1 SPRAY: 1.5 SOLUTION NASAL at 12:28

## 2023-01-01 RX ADMIN — Medication 1 TABLET: at 08:00

## 2023-01-01 RX ADMIN — HYDROCORTISONE 5 MG: 5 TABLET ORAL at 14:02

## 2023-01-01 RX ADMIN — IPRATROPIUM BROMIDE 0.5 MG: 0.5 SOLUTION RESPIRATORY (INHALATION) at 16:14

## 2023-01-01 RX ADMIN — TRETIONIN: 0.25 GEL TOPICAL at 22:22

## 2023-01-01 RX ADMIN — POTASSIUM CHLORIDE 20 MEQ: 1.5 POWDER, FOR SOLUTION ORAL at 05:09

## 2023-01-01 RX ADMIN — ACETAMINOPHEN 650 MG: 325 TABLET, FILM COATED ORAL at 17:57

## 2023-01-01 RX ADMIN — HYDROCORTISONE 5 MG: 5 TABLET ORAL at 14:55

## 2023-01-01 RX ADMIN — ALTEPLASE 4 MG: 2.2 INJECTION, POWDER, LYOPHILIZED, FOR SOLUTION INTRAVENOUS at 09:42

## 2023-01-01 RX ADMIN — INSULIN ASPART 5 UNITS: 100 INJECTION, SOLUTION INTRAVENOUS; SUBCUTANEOUS at 13:42

## 2023-01-01 RX ADMIN — ALBUTEROL SULFATE 2 PUFF: 90 AEROSOL, METERED RESPIRATORY (INHALATION) at 22:16

## 2023-01-01 RX ADMIN — SALINE NASAL SPRAY 1 SPRAY: 1.5 SOLUTION NASAL at 22:09

## 2023-01-01 RX ADMIN — DESMOPRESSIN ACETATE 0.1 MG: 0.1 TABLET ORAL at 14:30

## 2023-01-01 RX ADMIN — FAMOTIDINE 20 MG: 20 TABLET ORAL at 19:28

## 2023-01-01 RX ADMIN — FLUTICASONE PROPIONATE 1 SPRAY: 50 SPRAY, METERED NASAL at 08:04

## 2023-01-01 RX ADMIN — CEFEPIME HYDROCHLORIDE 2 G: 2 INJECTION, POWDER, FOR SOLUTION INTRAVENOUS at 05:04

## 2023-01-01 RX ADMIN — POTASSIUM & SODIUM PHOSPHATES POWDER PACK 280-160-250 MG 1 PACKET: 280-160-250 PACK at 20:56

## 2023-01-01 RX ADMIN — DIPHENHYDRAMINE HYDROCHLORIDE 25 MG: 25 CAPSULE ORAL at 04:31

## 2023-01-01 RX ADMIN — INSULIN ASPART 1 UNITS: 100 INJECTION, SOLUTION INTRAVENOUS; SUBCUTANEOUS at 20:58

## 2023-01-01 RX ADMIN — DIPHENHYDRAMINE HYDROCHLORIDE 25 MG: 25 CAPSULE ORAL at 09:50

## 2023-01-01 RX ADMIN — Medication: at 22:04

## 2023-01-01 RX ADMIN — Medication 0.15 MG: at 08:43

## 2023-01-01 RX ADMIN — HEPARIN SODIUM 5000 UNITS: 5000 INJECTION, SOLUTION INTRAVENOUS; SUBCUTANEOUS at 23:34

## 2023-01-01 RX ADMIN — DIPHENHYDRAMINE HYDROCHLORIDE 25 MG: 25 CAPSULE ORAL at 10:37

## 2023-01-01 RX ADMIN — LEVETIRACETAM 500 MG: 5 INJECTION INTRAVENOUS at 19:40

## 2023-01-01 RX ADMIN — Medication 0.15 MG: at 11:02

## 2023-01-01 RX ADMIN — ACETAMINOPHEN 650 MG: 325 TABLET, FILM COATED ORAL at 01:46

## 2023-01-01 RX ADMIN — IPRATROPIUM BROMIDE 0.5 MG: 0.5 SOLUTION RESPIRATORY (INHALATION) at 16:52

## 2023-01-01 RX ADMIN — ALBUMIN (HUMAN): 12.5 SOLUTION INTRAVENOUS at 14:00

## 2023-01-01 RX ADMIN — HEPARIN SODIUM 5000 UNITS: 5000 INJECTION, SOLUTION INTRAVENOUS; SUBCUTANEOUS at 16:52

## 2023-01-01 RX ADMIN — DIPHENHYDRAMINE HYDROCHLORIDE 25 MG: 25 CAPSULE ORAL at 20:34

## 2023-01-01 RX ADMIN — IPRATROPIUM BROMIDE 0.5 MG: 0.5 SOLUTION RESPIRATORY (INHALATION) at 21:46

## 2023-01-01 RX ADMIN — TRETIONIN: 0.25 GEL TOPICAL at 02:02

## 2023-01-01 RX ADMIN — FLUTICASONE PROPIONATE 1 SPRAY: 50 SPRAY, METERED NASAL at 08:00

## 2023-01-01 RX ADMIN — DEXAMETHASONE SODIUM PHOSPHATE 1 MG: 4 INJECTION, SOLUTION INTRA-ARTICULAR; INTRALESIONAL; INTRAMUSCULAR; INTRAVENOUS; SOFT TISSUE at 07:43

## 2023-01-01 RX ADMIN — PANTOPRAZOLE SODIUM 20 MG: 20 TABLET, DELAYED RELEASE ORAL at 11:40

## 2023-01-01 RX ADMIN — ACETAMINOPHEN 650 MG: 325 TABLET, FILM COATED ORAL at 13:09

## 2023-01-01 RX ADMIN — FLUTICASONE PROPIONATE 1 SPRAY: 50 SPRAY, METERED NASAL at 19:12

## 2023-01-01 RX ADMIN — SODIUM CHLORIDE: 0.9 INJECTION, SOLUTION INTRAVENOUS at 10:25

## 2023-01-01 RX ADMIN — VANCOMYCIN HYDROCHLORIDE 1000 MG: 1 INJECTION, SOLUTION INTRAVENOUS at 23:02

## 2023-01-01 RX ADMIN — FAMOTIDINE 20 MG: 10 INJECTION, SOLUTION INTRAVENOUS at 01:15

## 2023-01-01 RX ADMIN — ACETAMINOPHEN 650 MG: 325 TABLET, FILM COATED ORAL at 09:19

## 2023-01-01 RX ADMIN — ACETAMINOPHEN 650 MG: 325 TABLET, FILM COATED ORAL at 21:01

## 2023-01-01 RX ADMIN — FLUTICASONE PROPIONATE 1 SPRAY: 50 SPRAY, METERED NASAL at 08:21

## 2023-01-01 RX ADMIN — ACETAMINOPHEN 650 MG: 325 TABLET, FILM COATED ORAL at 19:56

## 2023-01-01 RX ADMIN — POTASSIUM & SODIUM PHOSPHATES POWDER PACK 280-160-250 MG 1 PACKET: 280-160-250 PACK at 08:53

## 2023-01-01 RX ADMIN — FAMOTIDINE 20 MG: 10 INJECTION, SOLUTION INTRAVENOUS at 20:19

## 2023-01-01 RX ADMIN — DEXMEDETOMIDINE HYDROCHLORIDE 0.9 MCG/KG/HR: 400 INJECTION INTRAVENOUS at 21:03

## 2023-01-01 RX ADMIN — INSULIN ASPART 2 UNITS: 100 INJECTION, SOLUTION INTRAVENOUS; SUBCUTANEOUS at 11:53

## 2023-01-01 RX ADMIN — SALINE NASAL SPRAY 1 SPRAY: 1.5 SOLUTION NASAL at 08:28

## 2023-01-01 RX ADMIN — CEFAZOLIN SODIUM 2 G: 2 INJECTION, SOLUTION INTRAVENOUS at 21:51

## 2023-01-01 RX ADMIN — Medication 20 MG: at 11:27

## 2023-01-01 RX ADMIN — Medication 5 ML: at 13:15

## 2023-01-01 RX ADMIN — SALINE NASAL SPRAY 1 SPRAY: 1.5 SOLUTION NASAL at 08:39

## 2023-01-01 RX ADMIN — LIDOCAINE 1 PATCH: 4 PATCH TOPICAL at 09:39

## 2023-01-01 RX ADMIN — SALINE NASAL SPRAY 1 SPRAY: 1.5 SOLUTION NASAL at 15:36

## 2023-01-01 RX ADMIN — FLUTICASONE PROPIONATE 1 SPRAY: 50 SPRAY, METERED NASAL at 20:24

## 2023-01-01 RX ADMIN — FLUTICASONE PROPIONATE AND SALMETEROL 1 PUFF: 50; 100 POWDER RESPIRATORY (INHALATION) at 22:16

## 2023-01-01 RX ADMIN — Medication 20 MG: at 21:56

## 2023-01-01 RX ADMIN — DIPHENHYDRAMINE HYDROCHLORIDE 25 MG: 25 CAPSULE ORAL at 22:04

## 2023-01-01 RX ADMIN — ACETAMINOPHEN 650 MG: 325 TABLET, FILM COATED ORAL at 08:00

## 2023-01-01 RX ADMIN — MAGNESIUM OXIDE TAB 400 MG (241.3 MG ELEMENTAL MG) 400 MG: 400 (241.3 MG) TAB at 09:38

## 2023-01-01 RX ADMIN — FLUTICASONE PROPIONATE 1 SPRAY: 50 SPRAY, METERED NASAL at 08:31

## 2023-01-01 RX ADMIN — FENTANYL CITRATE 50 MCG: 50 INJECTION INTRAMUSCULAR; INTRAVENOUS at 20:56

## 2023-01-01 RX ADMIN — MAGNESIUM HYDROXIDE 30 ML: 400 SUSPENSION ORAL at 08:15

## 2023-01-01 RX ADMIN — DESMOPRESSIN ACETATE 0.2 MG: 0.2 TABLET ORAL at 22:19

## 2023-01-01 RX ADMIN — IPRATROPIUM BROMIDE 0.5 MG: 0.5 SOLUTION RESPIRATORY (INHALATION) at 17:45

## 2023-01-01 RX ADMIN — Medication 1 TABLET: at 10:32

## 2023-01-01 RX ADMIN — FLUTICASONE PROPIONATE AND SALMETEROL 1 PUFF: 50; 100 POWDER RESPIRATORY (INHALATION) at 20:20

## 2023-01-01 RX ADMIN — ACETAMINOPHEN 975 MG: 325 TABLET, FILM COATED ORAL at 14:11

## 2023-01-01 RX ADMIN — ALBUTEROL SULFATE 2 PUFF: 90 AEROSOL, METERED RESPIRATORY (INHALATION) at 08:09

## 2023-01-01 RX ADMIN — SALINE NASAL SPRAY 1 SPRAY: 1.5 SOLUTION NASAL at 08:10

## 2023-01-01 RX ADMIN — FLUTICASONE PROPIONATE 1 SPRAY: 50 SPRAY, METERED NASAL at 20:12

## 2023-01-01 RX ADMIN — ACETAMINOPHEN 650 MG: 325 TABLET, FILM COATED ORAL at 01:01

## 2023-01-01 RX ADMIN — IPRATROPIUM BROMIDE 0.5 MG: 0.5 SOLUTION RESPIRATORY (INHALATION) at 08:03

## 2023-01-01 RX ADMIN — POLYETHYLENE GLYCOL 3350 17 G: 17 POWDER, FOR SOLUTION ORAL at 08:28

## 2023-01-01 RX ADMIN — FLUTICASONE PROPIONATE AND SALMETEROL XINAFOATE 1 PUFF: 115; 21 AEROSOL, METERED RESPIRATORY (INHALATION) at 21:11

## 2023-01-01 RX ADMIN — Medication 1 CAPSULE: at 10:19

## 2023-01-01 RX ADMIN — POTASSIUM & SODIUM PHOSPHATES POWDER PACK 280-160-250 MG 1 PACKET: 280-160-250 PACK at 16:42

## 2023-01-01 RX ADMIN — FLUTICASONE PROPIONATE 1 SPRAY: 50 SPRAY, METERED NASAL at 13:09

## 2023-01-01 RX ADMIN — FLUTICASONE PROPIONATE 1 SPRAY: 50 SPRAY, METERED NASAL at 08:56

## 2023-01-01 RX ADMIN — DEXMEDETOMIDINE HYDROCHLORIDE 0.6 MCG/KG/HR: 400 INJECTION INTRAVENOUS at 20:37

## 2023-01-01 RX ADMIN — FAMOTIDINE 20 MG: 20 TABLET ORAL at 21:00

## 2023-01-01 RX ADMIN — LEVOTHYROXINE SODIUM 50 MCG: 0.05 TABLET ORAL at 06:55

## 2023-01-01 RX ADMIN — PROPOFOL 25 MCG/KG/MIN: 10 INJECTION, EMULSION INTRAVENOUS at 20:25

## 2023-01-01 RX ADMIN — FLUTICASONE PROPIONATE 1 SPRAY: 50 SPRAY, METERED NASAL at 22:14

## 2023-01-01 RX ADMIN — FLUTICASONE PROPIONATE AND SALMETEROL 1 PUFF: 50; 100 POWDER RESPIRATORY (INHALATION) at 19:36

## 2023-01-01 RX ADMIN — IPRATROPIUM BROMIDE AND ALBUTEROL SULFATE 3 ML: .5; 3 SOLUTION RESPIRATORY (INHALATION) at 23:28

## 2023-01-01 RX ADMIN — SALINE NASAL SPRAY 1 SPRAY: 1.5 SOLUTION NASAL at 19:59

## 2023-01-01 RX ADMIN — HYDRALAZINE HYDROCHLORIDE 20 MG: 20 INJECTION INTRAMUSCULAR; INTRAVENOUS at 22:15

## 2023-01-01 RX ADMIN — ALBUTEROL SULFATE 2.5 MG: 2.5 SOLUTION RESPIRATORY (INHALATION) at 04:56

## 2023-01-01 RX ADMIN — POTASSIUM & SODIUM PHOSPHATES POWDER PACK 280-160-250 MG 1 PACKET: 280-160-250 PACK at 20:43

## 2023-01-01 RX ADMIN — CALCIUM CHLORIDE INJECTION 0.5 G: 100 INJECTION, SOLUTION INTRAVENOUS at 21:10

## 2023-01-01 RX ADMIN — TRETIONIN: 0.25 GEL TOPICAL at 22:15

## 2023-01-01 RX ADMIN — SALINE NASAL SPRAY 1 SPRAY: 1.5 SOLUTION NASAL at 14:10

## 2023-01-01 RX ADMIN — FAMOTIDINE 20 MG: 20 TABLET ORAL at 20:52

## 2023-01-01 RX ADMIN — FLUTICASONE PROPIONATE AND SALMETEROL 1 PUFF: 50; 100 POWDER RESPIRATORY (INHALATION) at 08:01

## 2023-01-01 RX ADMIN — DIPHENHYDRAMINE HYDROCHLORIDE 25 MG: 25 CAPSULE ORAL at 03:29

## 2023-01-01 RX ADMIN — FAMOTIDINE 20 MG: 10 INJECTION, SOLUTION INTRAVENOUS at 20:38

## 2023-01-01 RX ADMIN — MAGNESIUM HYDROXIDE 30 ML: 400 SUSPENSION ORAL at 08:24

## 2023-01-01 RX ADMIN — DEXMEDETOMIDINE HYDROCHLORIDE 1.2 MCG/KG/HR: 400 INJECTION INTRAVENOUS at 03:51

## 2023-01-01 RX ADMIN — IPRATROPIUM BROMIDE 0.5 MG: 0.5 SOLUTION RESPIRATORY (INHALATION) at 09:32

## 2023-01-01 RX ADMIN — POTASSIUM & SODIUM PHOSPHATES POWDER PACK 280-160-250 MG 1 PACKET: 280-160-250 PACK at 05:31

## 2023-01-01 RX ADMIN — SALINE NASAL SPRAY 1 SPRAY: 1.5 SOLUTION NASAL at 16:10

## 2023-01-01 RX ADMIN — ACETAMINOPHEN 650 MG: 325 TABLET, FILM COATED ORAL at 21:00

## 2023-01-01 RX ADMIN — HEPARIN SODIUM 5000 UNITS: 5000 INJECTION, SOLUTION INTRAVENOUS; SUBCUTANEOUS at 00:29

## 2023-01-01 RX ADMIN — ACETAMINOPHEN 975 MG: 325 TABLET, FILM COATED ORAL at 13:28

## 2023-01-01 RX ADMIN — INSULIN ASPART 1 UNITS: 100 INJECTION, SOLUTION INTRAVENOUS; SUBCUTANEOUS at 16:21

## 2023-01-01 RX ADMIN — POTASSIUM & SODIUM PHOSPHATES POWDER PACK 280-160-250 MG 1 PACKET: 280-160-250 PACK at 20:21

## 2023-01-01 RX ADMIN — IPRATROPIUM BROMIDE 0.5 MG: 0.5 SOLUTION RESPIRATORY (INHALATION) at 09:02

## 2023-01-01 RX ADMIN — DEXAMETHASONE SODIUM PHOSPHATE 4 MG: 4 INJECTION, SOLUTION INTRA-ARTICULAR; INTRALESIONAL; INTRAMUSCULAR; INTRAVENOUS; SOFT TISSUE at 22:09

## 2023-01-01 RX ADMIN — FLUTICASONE PROPIONATE AND SALMETEROL 1 PUFF: 50; 100 POWDER RESPIRATORY (INHALATION) at 22:02

## 2023-01-01 RX ADMIN — HEPARIN SODIUM 5000 UNITS: 5000 INJECTION, SOLUTION INTRAVENOUS; SUBCUTANEOUS at 17:02

## 2023-01-01 RX ADMIN — SALINE NASAL SPRAY 1 SPRAY: 1.5 SOLUTION NASAL at 21:33

## 2023-01-01 RX ADMIN — Medication 1 TABLET: at 08:56

## 2023-01-01 RX ADMIN — Medication 20 MG: at 17:04

## 2023-01-01 RX ADMIN — ACETAMINOPHEN 650 MG: 325 TABLET, FILM COATED ORAL at 03:45

## 2023-01-01 RX ADMIN — INSULIN ASPART 1 UNITS: 100 INJECTION, SOLUTION INTRAVENOUS; SUBCUTANEOUS at 07:49

## 2023-01-01 RX ADMIN — FLUTICASONE PROPIONATE 1 SPRAY: 50 SPRAY, METERED NASAL at 20:46

## 2023-01-01 RX ADMIN — IPRATROPIUM BROMIDE AND ALBUTEROL SULFATE 3 ML: .5; 3 SOLUTION RESPIRATORY (INHALATION) at 20:21

## 2023-01-01 RX ADMIN — DEXAMETHASONE SODIUM PHOSPHATE 4 MG: 4 INJECTION, SOLUTION INTRA-ARTICULAR; INTRALESIONAL; INTRAMUSCULAR; INTRAVENOUS; SOFT TISSUE at 07:34

## 2023-01-01 RX ADMIN — FLUTICASONE PROPIONATE AND SALMETEROL XINAFOATE 1 PUFF: 115; 21 AEROSOL, METERED RESPIRATORY (INHALATION) at 08:11

## 2023-01-01 RX ADMIN — FLUTICASONE PROPIONATE 1 SPRAY: 50 SPRAY, METERED NASAL at 20:32

## 2023-01-01 RX ADMIN — PHENYLEPHRINE HYDROCHLORIDE 150 MCG: 10 INJECTION INTRAVENOUS at 16:35

## 2023-01-01 RX ADMIN — POTASSIUM & SODIUM PHOSPHATES POWDER PACK 280-160-250 MG 1 PACKET: 280-160-250 PACK at 12:33

## 2023-01-01 RX ADMIN — DIPHENHYDRAMINE HYDROCHLORIDE 25 MG: 25 CAPSULE ORAL at 20:40

## 2023-01-01 RX ADMIN — CEFEPIME HYDROCHLORIDE 2 G: 2 INJECTION, POWDER, FOR SOLUTION INTRAVENOUS at 03:46

## 2023-01-01 RX ADMIN — DEXAMETHASONE SODIUM PHOSPHATE 1 MG: 4 INJECTION, SOLUTION INTRA-ARTICULAR; INTRALESIONAL; INTRAMUSCULAR; INTRAVENOUS; SOFT TISSUE at 14:12

## 2023-01-01 RX ADMIN — ACETAMINOPHEN 650 MG: 325 TABLET, FILM COATED ORAL at 04:31

## 2023-01-01 RX ADMIN — LABETALOL HYDROCHLORIDE 20 MG: 5 INJECTION, SOLUTION INTRAVENOUS at 13:33

## 2023-01-01 RX ADMIN — ACETAMINOPHEN 650 MG: 325 TABLET, FILM COATED ORAL at 12:55

## 2023-01-01 RX ADMIN — PHENYLEPHRINE HYDROCHLORIDE 100 MCG: 10 INJECTION INTRAVENOUS at 04:19

## 2023-01-01 RX ADMIN — Medication 1 TABLET: at 08:18

## 2023-01-01 RX ADMIN — ALBUTEROL SULFATE 2 PUFF: 90 AEROSOL, METERED RESPIRATORY (INHALATION) at 22:10

## 2023-01-01 RX ADMIN — Medication 5 ML: at 22:04

## 2023-01-01 RX ADMIN — FLUTICASONE PROPIONATE 1 SPRAY: 50 SPRAY, METERED NASAL at 09:49

## 2023-01-01 RX ADMIN — INSULIN ASPART 5 UNITS: 100 INJECTION, SOLUTION INTRAVENOUS; SUBCUTANEOUS at 05:05

## 2023-01-01 RX ADMIN — DIPHENHYDRAMINE HYDROCHLORIDE 25 MG: 25 CAPSULE ORAL at 03:44

## 2023-01-01 RX ADMIN — IPRATROPIUM BROMIDE 0.5 MG: 0.5 SOLUTION RESPIRATORY (INHALATION) at 21:01

## 2023-01-01 RX ADMIN — ACETAMINOPHEN 975 MG: 325 TABLET, FILM COATED ORAL at 05:31

## 2023-01-01 RX ADMIN — LEVETIRACETAM 500 MG: 500 TABLET, FILM COATED ORAL at 08:57

## 2023-01-01 RX ADMIN — IPRATROPIUM BROMIDE 0.5 MG: 0.5 SOLUTION RESPIRATORY (INHALATION) at 08:22

## 2023-01-01 RX ADMIN — SALINE NASAL SPRAY 1 SPRAY: 1.5 SOLUTION NASAL at 20:11

## 2023-01-01 RX ADMIN — FLUTICASONE PROPIONATE 1 SPRAY: 50 SPRAY, METERED NASAL at 20:36

## 2023-01-01 RX ADMIN — FLUTICASONE PROPIONATE AND SALMETEROL 1 PUFF: 50; 100 POWDER RESPIRATORY (INHALATION) at 08:58

## 2023-01-01 RX ADMIN — HEPARIN SODIUM 5000 UNITS: 5000 INJECTION, SOLUTION INTRAVENOUS; SUBCUTANEOUS at 16:21

## 2023-01-01 RX ADMIN — PROPOFOL 25 MCG/KG/MIN: 10 INJECTION, EMULSION INTRAVENOUS at 18:58

## 2023-01-01 RX ADMIN — PROPOFOL 200 MG: 10 INJECTION, EMULSION INTRAVENOUS at 12:44

## 2023-01-01 RX ADMIN — ALBUTEROL SULFATE 2 PUFF: 90 AEROSOL, METERED RESPIRATORY (INHALATION) at 08:01

## 2023-01-01 RX ADMIN — Medication 1 TABLET: at 11:00

## 2023-01-01 RX ADMIN — LIDOCAINE PATCH 4% 2 PATCH: 40 PATCH TOPICAL at 22:15

## 2023-01-01 RX ADMIN — FAMOTIDINE 20 MG: 10 INJECTION, SOLUTION INTRAVENOUS at 07:45

## 2023-01-01 RX ADMIN — DIPHENHYDRAMINE HYDROCHLORIDE 25 MG: 25 CAPSULE ORAL at 02:11

## 2023-01-01 RX ADMIN — DEXMEDETOMIDINE HYDROCHLORIDE 0.4 MCG/KG/HR: 400 INJECTION INTRAVENOUS at 13:46

## 2023-01-01 RX ADMIN — LEVETIRACETAM 500 MG: 5 INJECTION INTRAVENOUS at 00:56

## 2023-01-01 RX ADMIN — DIPHENHYDRAMINE HYDROCHLORIDE 25 MG: 25 CAPSULE ORAL at 02:53

## 2023-01-01 RX ADMIN — IPRATROPIUM BROMIDE 0.5 MG: 0.5 SOLUTION RESPIRATORY (INHALATION) at 09:04

## 2023-01-01 RX ADMIN — DEXAMETHASONE SODIUM PHOSPHATE 1 MG: 4 INJECTION, SOLUTION INTRA-ARTICULAR; INTRALESIONAL; INTRAMUSCULAR; INTRAVENOUS; SOFT TISSUE at 20:17

## 2023-01-01 RX ADMIN — LIDOCAINE 3 PATCH: 4 PATCH TOPICAL at 10:31

## 2023-01-01 RX ADMIN — CEFAZOLIN 2 G: 1 INJECTION, POWDER, FOR SOLUTION INTRAMUSCULAR; INTRAVENOUS at 06:05

## 2023-01-01 RX ADMIN — Medication 20 MG: at 12:06

## 2023-01-01 RX ADMIN — DIPHENHYDRAMINE HYDROCHLORIDE 25 MG: 25 CAPSULE ORAL at 20:31

## 2023-01-01 RX ADMIN — ACETAMINOPHEN 650 MG: 325 TABLET, FILM COATED ORAL at 11:20

## 2023-01-01 RX ADMIN — INSULIN ASPART 2 UNITS: 100 INJECTION, SOLUTION INTRAVENOUS; SUBCUTANEOUS at 11:39

## 2023-01-01 RX ADMIN — ACETAMINOPHEN 650 MG: 325 TABLET, FILM COATED ORAL at 00:11

## 2023-01-01 RX ADMIN — SALINE NASAL SPRAY 1 SPRAY: 1.5 SOLUTION NASAL at 08:41

## 2023-01-01 RX ADMIN — ACETAMINOPHEN 650 MG: 325 TABLET, FILM COATED ORAL at 08:54

## 2023-01-01 RX ADMIN — IPRATROPIUM BROMIDE 0.5 MG: 0.5 SOLUTION RESPIRATORY (INHALATION) at 08:40

## 2023-01-01 RX ADMIN — FLUTICASONE PROPIONATE AND SALMETEROL 1 PUFF: 50; 100 POWDER RESPIRATORY (INHALATION) at 07:52

## 2023-01-01 RX ADMIN — DESMOPRESSIN ACETATE 0.1 MG: 0.1 TABLET ORAL at 09:38

## 2023-01-01 RX ADMIN — DEXAMETHASONE SODIUM PHOSPHATE 2 MG: 4 INJECTION, SOLUTION INTRA-ARTICULAR; INTRALESIONAL; INTRAMUSCULAR; INTRAVENOUS; SOFT TISSUE at 13:27

## 2023-01-01 RX ADMIN — CEFAZOLIN 2 G: 1 INJECTION, POWDER, FOR SOLUTION INTRAMUSCULAR; INTRAVENOUS at 18:10

## 2023-01-01 RX ADMIN — DEXTROSE MONOHYDRATE: 50 INJECTION, SOLUTION INTRAVENOUS at 20:52

## 2023-01-01 RX ADMIN — DIPHENHYDRAMINE HYDROCHLORIDE 25 MG: 25 CAPSULE ORAL at 00:03

## 2023-01-01 RX ADMIN — HYDROCORTISONE 30 MG: 10 TABLET ORAL at 08:57

## 2023-01-01 RX ADMIN — PIPERACILLIN SODIUM AND TAZOBACTAM SODIUM 4.5 G: 4; .5 INJECTION, POWDER, LYOPHILIZED, FOR SOLUTION INTRAVENOUS at 12:23

## 2023-01-01 RX ADMIN — FAMOTIDINE 20 MG: 20 TABLET ORAL at 12:32

## 2023-01-01 RX ADMIN — IPRATROPIUM BROMIDE 0.5 MG: 0.5 SOLUTION RESPIRATORY (INHALATION) at 08:31

## 2023-01-01 RX ADMIN — ALBUTEROL SULFATE 2 PUFF: 90 AEROSOL, METERED RESPIRATORY (INHALATION) at 08:28

## 2023-01-01 RX ADMIN — LABETALOL HYDROCHLORIDE 20 MG: 5 INJECTION, SOLUTION INTRAVENOUS at 05:04

## 2023-01-01 RX ADMIN — IPRATROPIUM BROMIDE AND ALBUTEROL SULFATE 3 ML: .5; 3 SOLUTION RESPIRATORY (INHALATION) at 22:10

## 2023-01-01 RX ADMIN — Medication 1 TABLET: at 09:08

## 2023-01-01 RX ADMIN — INSULIN ASPART 2 UNITS: 100 INJECTION, SOLUTION INTRAVENOUS; SUBCUTANEOUS at 17:15

## 2023-01-01 RX ADMIN — HEPARIN SODIUM 5000 UNITS: 5000 INJECTION, SOLUTION INTRAVENOUS; SUBCUTANEOUS at 08:34

## 2023-01-01 RX ADMIN — LIDOCAINE PATCH 4% 2 PATCH: 40 PATCH TOPICAL at 22:20

## 2023-01-01 RX ADMIN — ACETAMINOPHEN 650 MG: 325 TABLET, FILM COATED ORAL at 08:59

## 2023-01-01 RX ADMIN — IPRATROPIUM BROMIDE 0.5 MG: 0.5 SOLUTION RESPIRATORY (INHALATION) at 08:44

## 2023-01-01 RX ADMIN — DIPHENHYDRAMINE HYDROCHLORIDE 25 MG: 25 CAPSULE ORAL at 22:22

## 2023-01-01 RX ADMIN — POTASSIUM & SODIUM PHOSPHATES POWDER PACK 280-160-250 MG 1 PACKET: 280-160-250 PACK at 19:56

## 2023-01-01 RX ADMIN — FLUTICASONE PROPIONATE 1 SPRAY: 50 SPRAY, METERED NASAL at 22:00

## 2023-01-01 RX ADMIN — PROPOFOL 100 MG: 10 INJECTION, EMULSION INTRAVENOUS at 09:21

## 2023-01-01 RX ADMIN — IPRATROPIUM BROMIDE 0.5 MG: 0.5 SOLUTION RESPIRATORY (INHALATION) at 21:52

## 2023-01-01 RX ADMIN — DEXTROSE MONOHYDRATE: 50 INJECTION, SOLUTION INTRAVENOUS at 22:15

## 2023-01-01 RX ADMIN — SALINE NASAL SPRAY 1 SPRAY: 1.5 SOLUTION NASAL at 21:12

## 2023-01-01 RX ADMIN — INSULIN ASPART 2 UNITS: 100 INJECTION, SOLUTION INTRAVENOUS; SUBCUTANEOUS at 17:00

## 2023-01-01 RX ADMIN — FLUTICASONE PROPIONATE 1 SPRAY: 50 SPRAY, METERED NASAL at 12:01

## 2023-01-01 RX ADMIN — DESMOPRESSIN ACETATE 0.1 MG: 0.1 TABLET ORAL at 20:10

## 2023-01-01 RX ADMIN — ALBUMIN (HUMAN): 12.5 SOLUTION INTRAVENOUS at 07:39

## 2023-01-01 RX ADMIN — ACETAMINOPHEN 650 MG: 325 TABLET, FILM COATED ORAL at 08:58

## 2023-01-01 RX ADMIN — SIMETHICONE 80 MG: 80 TABLET, CHEWABLE ORAL at 03:50

## 2023-01-01 RX ADMIN — IPRATROPIUM BROMIDE 0.5 MG: 0.5 SOLUTION RESPIRATORY (INHALATION) at 20:14

## 2023-01-01 RX ADMIN — DIPHENHYDRAMINE HYDROCHLORIDE 25 MG: 25 CAPSULE ORAL at 15:49

## 2023-01-01 RX ADMIN — DIPHENHYDRAMINE HYDROCHLORIDE 25 MG: 25 CAPSULE ORAL at 14:10

## 2023-01-01 RX ADMIN — HYDRALAZINE HYDROCHLORIDE 20 MG: 20 INJECTION INTRAMUSCULAR; INTRAVENOUS at 15:49

## 2023-01-01 RX ADMIN — SALINE NASAL SPRAY 1 SPRAY: 1.5 SOLUTION NASAL at 08:20

## 2023-01-01 RX ADMIN — DEXAMETHASONE SODIUM PHOSPHATE 10 MG: 4 INJECTION, SOLUTION INTRA-ARTICULAR; INTRALESIONAL; INTRAMUSCULAR; INTRAVENOUS; SOFT TISSUE at 09:00

## 2023-01-01 RX ADMIN — ACETAMINOPHEN 650 MG: 325 TABLET, FILM COATED ORAL at 22:31

## 2023-01-01 RX ADMIN — SALINE NASAL SPRAY 1 SPRAY: 1.5 SOLUTION NASAL at 15:46

## 2023-01-01 RX ADMIN — FLUTICASONE PROPIONATE AND SALMETEROL 1 PUFF: 50; 100 POWDER RESPIRATORY (INHALATION) at 20:36

## 2023-01-01 RX ADMIN — FLUTICASONE PROPIONATE AND SALMETEROL 1 PUFF: 50; 100 POWDER RESPIRATORY (INHALATION) at 09:52

## 2023-01-01 RX ADMIN — DIPHENHYDRAMINE HYDROCHLORIDE 25 MG: 25 CAPSULE ORAL at 19:14

## 2023-01-01 RX ADMIN — VANCOMYCIN HYDROCHLORIDE 2250 MG: 10 INJECTION, POWDER, LYOPHILIZED, FOR SOLUTION INTRAVENOUS at 11:16

## 2023-01-01 RX ADMIN — ALBUTEROL SULFATE 2.5 MG: 2.5 SOLUTION RESPIRATORY (INHALATION) at 15:12

## 2023-01-01 RX ADMIN — PROPOFOL 40 MCG/KG/MIN: 10 INJECTION, EMULSION INTRAVENOUS at 13:29

## 2023-01-01 RX ADMIN — ALBUTEROL SULFATE 2.5 MG: 2.5 SOLUTION RESPIRATORY (INHALATION) at 20:35

## 2023-01-01 RX ADMIN — DIPHENHYDRAMINE HYDROCHLORIDE 25 MG: 25 CAPSULE ORAL at 22:11

## 2023-01-01 RX ADMIN — Medication 10 MG: at 10:30

## 2023-01-01 RX ADMIN — POLYETHYLENE GLYCOL 3350 17 G: 17 POWDER, FOR SOLUTION ORAL at 20:29

## 2023-01-01 RX ADMIN — IPRATROPIUM BROMIDE AND ALBUTEROL SULFATE 3 ML: .5; 3 SOLUTION RESPIRATORY (INHALATION) at 03:11

## 2023-01-01 RX ADMIN — LABETALOL HYDROCHLORIDE 20 MG: 5 INJECTION, SOLUTION INTRAVENOUS at 18:42

## 2023-01-01 RX ADMIN — SODIUM CHLORIDE 500 ML: 9 INJECTION, SOLUTION INTRAVENOUS at 15:32

## 2023-01-01 RX ADMIN — LIDOCAINE PATCH 4% 2 PATCH: 40 PATCH TOPICAL at 22:46

## 2023-01-01 RX ADMIN — INSULIN ASPART 1 UNITS: 100 INJECTION, SOLUTION INTRAVENOUS; SUBCUTANEOUS at 16:13

## 2023-01-01 RX ADMIN — SALINE NASAL SPRAY 1 SPRAY: 1.5 SOLUTION NASAL at 08:53

## 2023-01-01 RX ADMIN — INSULIN ASPART 1 UNITS: 100 INJECTION, SOLUTION INTRAVENOUS; SUBCUTANEOUS at 16:26

## 2023-01-01 RX ADMIN — Medication 1 TABLET: at 08:10

## 2023-01-01 RX ADMIN — FAMOTIDINE 20 MG: 20 TABLET ORAL at 08:28

## 2023-01-01 RX ADMIN — SODIUM CHLORIDE, SODIUM GLUCONATE, SODIUM ACETATE, POTASSIUM CHLORIDE AND MAGNESIUM CHLORIDE: 526; 502; 368; 37; 30 INJECTION, SOLUTION INTRAVENOUS at 20:45

## 2023-01-01 RX ADMIN — HYDROCORTISONE 15 MG: 10 TABLET ORAL at 08:28

## 2023-01-01 RX ADMIN — ACETAMINOPHEN 650 MG: 325 TABLET, FILM COATED ORAL at 09:20

## 2023-01-01 RX ADMIN — FENTANYL CITRATE 50 MCG: 50 INJECTION, SOLUTION INTRAMUSCULAR; INTRAVENOUS at 14:32

## 2023-01-01 RX ADMIN — ALBUTEROL SULFATE 2 PUFF: 90 AEROSOL, METERED RESPIRATORY (INHALATION) at 09:49

## 2023-01-01 RX ADMIN — DIPHENHYDRAMINE HYDROCHLORIDE 25 MG: 25 CAPSULE ORAL at 22:20

## 2023-01-01 RX ADMIN — Medication 30 MG: at 01:00

## 2023-01-01 RX ADMIN — DIPHENHYDRAMINE HYDROCHLORIDE 25 MG: 25 CAPSULE ORAL at 21:40

## 2023-01-01 RX ADMIN — ACETAMINOPHEN 650 MG: 325 TABLET, FILM COATED ORAL at 09:35

## 2023-01-01 RX ADMIN — FLUTICASONE PROPIONATE 1 SPRAY: 50 SPRAY, METERED NASAL at 19:36

## 2023-01-01 RX ADMIN — FAMOTIDINE 20 MG: 20 TABLET ORAL at 08:38

## 2023-01-01 RX ADMIN — POTASSIUM & SODIUM PHOSPHATES POWDER PACK 280-160-250 MG 1 PACKET: 280-160-250 PACK at 14:12

## 2023-01-01 RX ADMIN — POTASSIUM & SODIUM PHOSPHATES POWDER PACK 280-160-250 MG 1 PACKET: 280-160-250 PACK at 08:44

## 2023-01-01 RX ADMIN — LEVETIRACETAM 500 MG: 500 TABLET, FILM COATED ORAL at 21:41

## 2023-01-01 RX ADMIN — Medication 0.15 MG: at 08:11

## 2023-01-01 RX ADMIN — Medication 1 TABLET: at 09:19

## 2023-01-01 RX ADMIN — FENTANYL CITRATE 50 MCG: 50 INJECTION INTRAMUSCULAR; INTRAVENOUS at 09:02

## 2023-01-01 RX ADMIN — POLYETHYLENE GLYCOL 3350 17 G: 17 POWDER, FOR SOLUTION ORAL at 08:24

## 2023-01-01 RX ADMIN — CALCIUM CHLORIDE INJECTION 500 MG: 100 INJECTION, SOLUTION INTRAVENOUS at 05:54

## 2023-01-01 RX ADMIN — OXYCODONE HYDROCHLORIDE 5 MG: 5 TABLET ORAL at 12:00

## 2023-01-01 RX ADMIN — Medication 0.15 MG: at 09:43

## 2023-01-01 RX ADMIN — DIPHENHYDRAMINE HYDROCHLORIDE 25 MG: 25 CAPSULE ORAL at 08:07

## 2023-01-01 RX ADMIN — Medication 10 ML: at 16:49

## 2023-01-01 RX ADMIN — FLUTICASONE PROPIONATE 1 SPRAY: 50 SPRAY, METERED NASAL at 07:43

## 2023-01-01 RX ADMIN — LIDOCAINE PATCH 4% 2 PATCH: 40 PATCH TOPICAL at 20:12

## 2023-01-01 RX ADMIN — SALINE NASAL SPRAY 1 SPRAY: 1.5 SOLUTION NASAL at 12:36

## 2023-01-01 RX ADMIN — SALINE NASAL SPRAY 1 SPRAY: 1.5 SOLUTION NASAL at 12:35

## 2023-01-01 RX ADMIN — FLUTICASONE PROPIONATE 1 SPRAY: 50 SPRAY, METERED NASAL at 09:21

## 2023-01-01 RX ADMIN — MAGNESIUM OXIDE TAB 400 MG (241.3 MG ELEMENTAL MG) 400 MG: 400 (241.3 MG) TAB at 12:10

## 2023-01-01 RX ADMIN — ALBUMIN (HUMAN): 12.5 SOLUTION INTRAVENOUS at 03:56

## 2023-01-01 RX ADMIN — FLUTICASONE PROPIONATE 1 SPRAY: 50 SPRAY, METERED NASAL at 07:59

## 2023-01-01 RX ADMIN — LEVETIRACETAM 500 MG: 5 INJECTION INTRAVENOUS at 20:39

## 2023-01-01 RX ADMIN — ALBUTEROL SULFATE 2.5 MG: 2.5 SOLUTION RESPIRATORY (INHALATION) at 03:51

## 2023-01-01 RX ADMIN — IPRATROPIUM BROMIDE 0.5 MG: 0.5 SOLUTION RESPIRATORY (INHALATION) at 00:21

## 2023-01-01 RX ADMIN — FLUTICASONE PROPIONATE 1 SPRAY: 50 SPRAY, METERED NASAL at 07:44

## 2023-01-01 RX ADMIN — DIPHENHYDRAMINE HYDROCHLORIDE 25 MG: 25 CAPSULE ORAL at 03:45

## 2023-01-01 RX ADMIN — MAGNESIUM SULFATE HEPTAHYDRATE 2 G: 40 INJECTION, SOLUTION INTRAVENOUS at 06:39

## 2023-01-01 RX ADMIN — CEFAZOLIN SODIUM 2 G: 2 INJECTION, SOLUTION INTRAVENOUS at 21:44

## 2023-01-01 RX ADMIN — HYDROCORTISONE 10 MG: 10 TABLET ORAL at 14:13

## 2023-01-01 RX ADMIN — FLUTICASONE PROPIONATE AND SALMETEROL 1 PUFF: 50; 100 POWDER RESPIRATORY (INHALATION) at 19:55

## 2023-01-01 RX ADMIN — FLUTICASONE PROPIONATE 1 SPRAY: 50 SPRAY, METERED NASAL at 21:26

## 2023-01-01 RX ADMIN — FLUTICASONE PROPIONATE 1 SPRAY: 50 SPRAY, METERED NASAL at 21:33

## 2023-01-01 RX ADMIN — SENNOSIDES AND DOCUSATE SODIUM 1 TABLET: 8.6; 5 TABLET ORAL at 20:17

## 2023-01-01 RX ADMIN — IPRATROPIUM BROMIDE 0.5 MG: 0.5 SOLUTION RESPIRATORY (INHALATION) at 13:14

## 2023-01-01 RX ADMIN — LEVETIRACETAM 500 MG: 5 INJECTION INTRAVENOUS at 08:06

## 2023-01-01 RX ADMIN — ALBUTEROL SULFATE 2.5 MG: 2.5 SOLUTION RESPIRATORY (INHALATION) at 22:16

## 2023-01-01 RX ADMIN — SENNOSIDES AND DOCUSATE SODIUM 1 TABLET: 8.6; 5 TABLET ORAL at 07:34

## 2023-01-01 RX ADMIN — FLUTICASONE PROPIONATE AND SALMETEROL 1 PUFF: 50; 100 POWDER RESPIRATORY (INHALATION) at 08:18

## 2023-01-01 RX ADMIN — FLUTICASONE PROPIONATE 1 SPRAY: 50 SPRAY, METERED NASAL at 22:21

## 2023-01-01 RX ADMIN — ACETAMINOPHEN 650 MG: 325 TABLET, FILM COATED ORAL at 18:32

## 2023-01-01 RX ADMIN — DEXAMETHASONE SODIUM PHOSPHATE 4 MG: 4 INJECTION, SOLUTION INTRA-ARTICULAR; INTRALESIONAL; INTRAMUSCULAR; INTRAVENOUS; SOFT TISSUE at 15:32

## 2023-01-01 RX ADMIN — PIPERACILLIN SODIUM AND TAZOBACTAM SODIUM 4.5 G: 4; .5 INJECTION, POWDER, LYOPHILIZED, FOR SOLUTION INTRAVENOUS at 05:53

## 2023-01-01 RX ADMIN — SALINE NASAL SPRAY 1 SPRAY: 1.5 SOLUTION NASAL at 22:22

## 2023-01-01 RX ADMIN — Medication 20 MG: at 13:15

## 2023-01-01 RX ADMIN — ACETAMINOPHEN 650 MG: 325 TABLET, FILM COATED ORAL at 00:53

## 2023-01-01 RX ADMIN — FAMOTIDINE 20 MG: 20 TABLET ORAL at 08:06

## 2023-01-01 RX ADMIN — POLYETHYLENE GLYCOL 3350 17 G: 17 POWDER, FOR SOLUTION ORAL at 19:35

## 2023-01-01 RX ADMIN — ALBUTEROL SULFATE 2 PUFF: 90 AEROSOL, METERED RESPIRATORY (INHALATION) at 08:41

## 2023-01-01 RX ADMIN — POTASSIUM & SODIUM PHOSPHATES POWDER PACK 280-160-250 MG 1 PACKET: 280-160-250 PACK at 12:32

## 2023-01-01 RX ADMIN — VANCOMYCIN HYDROCHLORIDE 1000 MG: 1 INJECTION, SOLUTION INTRAVENOUS at 12:35

## 2023-01-01 RX ADMIN — IPRATROPIUM BROMIDE 0.5 MG: 0.5 SOLUTION RESPIRATORY (INHALATION) at 16:19

## 2023-01-01 RX ADMIN — DIPHENHYDRAMINE HYDROCHLORIDE 25 MG: 25 CAPSULE ORAL at 21:19

## 2023-01-01 RX ADMIN — HEPARIN SODIUM 5000 UNITS: 5000 INJECTION, SOLUTION INTRAVENOUS; SUBCUTANEOUS at 16:14

## 2023-01-01 RX ADMIN — INSULIN ASPART 1 UNITS: 100 INJECTION, SOLUTION INTRAVENOUS; SUBCUTANEOUS at 20:22

## 2023-01-01 RX ADMIN — ALBUTEROL SULFATE 2 PUFF: 90 AEROSOL, METERED RESPIRATORY (INHALATION) at 07:58

## 2023-01-01 RX ADMIN — ALBUTEROL SULFATE 2 PUFF: 90 AEROSOL, METERED RESPIRATORY (INHALATION) at 21:19

## 2023-01-01 RX ADMIN — ACETAMINOPHEN 650 MG: 325 TABLET, FILM COATED ORAL at 14:10

## 2023-01-01 RX ADMIN — FLUTICASONE PROPIONATE AND SALMETEROL XINAFOATE 1 PUFF: 115; 21 AEROSOL, METERED RESPIRATORY (INHALATION) at 20:09

## 2023-01-01 RX ADMIN — Medication 15 ML: at 12:44

## 2023-01-01 RX ADMIN — IPRATROPIUM BROMIDE AND ALBUTEROL SULFATE 3 ML: .5; 3 SOLUTION RESPIRATORY (INHALATION) at 12:36

## 2023-01-01 RX ADMIN — SODIUM CHLORIDE: 0.9 INJECTION, SOLUTION INTRAVENOUS at 15:35

## 2023-01-01 RX ADMIN — Medication 0.15 MG: at 10:50

## 2023-01-01 RX ADMIN — HYDROCORTISONE 5 MG: 5 TABLET ORAL at 13:13

## 2023-01-01 RX ADMIN — LIDOCAINE 3 PATCH: 4 PATCH TOPICAL at 07:57

## 2023-01-01 RX ADMIN — FLUTICASONE PROPIONATE AND SALMETEROL 1 PUFF: 50; 100 POWDER RESPIRATORY (INHALATION) at 22:14

## 2023-01-01 RX ADMIN — MAGNESIUM OXIDE TAB 400 MG (241.3 MG ELEMENTAL MG) 400 MG: 400 (241.3 MG) TAB at 13:13

## 2023-01-01 RX ADMIN — LIDOCAINE PATCH 4% 2 PATCH: 40 PATCH TOPICAL at 22:30

## 2023-01-01 RX ADMIN — FLUTICASONE PROPIONATE 1 SPRAY: 50 SPRAY, METERED NASAL at 21:19

## 2023-01-01 RX ADMIN — SIMETHICONE 80 MG: 80 TABLET, CHEWABLE ORAL at 02:11

## 2023-01-01 RX ADMIN — CEFAZOLIN 2 G: 1 INJECTION, POWDER, FOR SOLUTION INTRAMUSCULAR; INTRAVENOUS at 02:05

## 2023-01-01 RX ADMIN — SALINE NASAL SPRAY 1 SPRAY: 1.5 SOLUTION NASAL at 19:57

## 2023-01-01 RX ADMIN — HYDROCORTISONE 15 MG: 10 TABLET ORAL at 08:11

## 2023-01-01 RX ADMIN — LABETALOL HYDROCHLORIDE 20 MG: 5 INJECTION, SOLUTION INTRAVENOUS at 06:24

## 2023-01-01 RX ADMIN — FLUTICASONE PROPIONATE 1 SPRAY: 50 SPRAY, METERED NASAL at 08:48

## 2023-01-01 RX ADMIN — SALINE NASAL SPRAY 1 SPRAY: 1.5 SOLUTION NASAL at 08:54

## 2023-01-01 RX ADMIN — METOPROLOL TARTRATE 1 MG: 5 INJECTION INTRAVENOUS at 10:38

## 2023-01-01 RX ADMIN — SALINE NASAL SPRAY 1 SPRAY: 1.5 SOLUTION NASAL at 08:32

## 2023-01-01 RX ADMIN — SODIUM CHLORIDE, SODIUM GLUCONATE, SODIUM ACETATE, POTASSIUM CHLORIDE AND MAGNESIUM CHLORIDE: 526; 502; 368; 37; 30 INJECTION, SOLUTION INTRAVENOUS at 06:01

## 2023-01-01 RX ADMIN — Medication 1 TABLET: at 08:53

## 2023-01-01 RX ADMIN — PHENYLEPHRINE HYDROCHLORIDE 100 MCG: 10 INJECTION INTRAVENOUS at 07:12

## 2023-01-01 RX ADMIN — PIPERACILLIN SODIUM AND TAZOBACTAM SODIUM 4.5 G: 4; .5 INJECTION, POWDER, LYOPHILIZED, FOR SOLUTION INTRAVENOUS at 20:26

## 2023-01-01 RX ADMIN — GADOBUTROL 9 ML: 604.72 INJECTION INTRAVENOUS at 13:59

## 2023-01-01 RX ADMIN — ALBUMIN (HUMAN): 12.5 SOLUTION INTRAVENOUS at 04:15

## 2023-01-01 RX ADMIN — LEVETIRACETAM 500 MG: 5 INJECTION INTRAVENOUS at 07:34

## 2023-01-01 RX ADMIN — DESMOPRESSIN ACETATE 0.1 MG: 0.1 TABLET ORAL at 11:08

## 2023-01-01 RX ADMIN — INSULIN ASPART 1 UNITS: 100 INJECTION, SOLUTION INTRAVENOUS; SUBCUTANEOUS at 12:29

## 2023-01-01 RX ADMIN — MAGNESIUM SULFATE IN WATER 2 G: 40 INJECTION, SOLUTION INTRAVENOUS at 05:10

## 2023-01-01 RX ADMIN — PROPOFOL 25 MCG/KG/MIN: 10 INJECTION, EMULSION INTRAVENOUS at 06:47

## 2023-01-01 RX ADMIN — FLUTICASONE PROPIONATE 1 SPRAY: 50 SPRAY, METERED NASAL at 08:42

## 2023-01-01 RX ADMIN — HEPARIN SODIUM 5000 UNITS: 5000 INJECTION, SOLUTION INTRAVENOUS; SUBCUTANEOUS at 00:47

## 2023-01-01 RX ADMIN — IPRATROPIUM BROMIDE AND ALBUTEROL SULFATE 3 ML: .5; 3 SOLUTION RESPIRATORY (INHALATION) at 02:11

## 2023-01-01 RX ADMIN — ALBUTEROL SULFATE 2.5 MG: 2.5 SOLUTION RESPIRATORY (INHALATION) at 15:37

## 2023-01-01 RX ADMIN — IPRATROPIUM BROMIDE 0.5 MG: 0.5 SOLUTION RESPIRATORY (INHALATION) at 21:05

## 2023-01-01 RX ADMIN — FAMOTIDINE 20 MG: 20 TABLET ORAL at 20:11

## 2023-01-01 RX ADMIN — FLUTICASONE PROPIONATE AND SALMETEROL XINAFOATE 1 PUFF: 115; 21 AEROSOL, METERED RESPIRATORY (INHALATION) at 20:21

## 2023-01-01 RX ADMIN — IPRATROPIUM BROMIDE 0.5 MG: 0.5 SOLUTION RESPIRATORY (INHALATION) at 20:22

## 2023-01-01 RX ADMIN — DEXAMETHASONE SODIUM PHOSPHATE 2 MG: 4 INJECTION, SOLUTION INTRA-ARTICULAR; INTRALESIONAL; INTRAMUSCULAR; INTRAVENOUS; SOFT TISSUE at 20:29

## 2023-01-01 RX ADMIN — SALINE NASAL SPRAY 1 SPRAY: 1.5 SOLUTION NASAL at 21:01

## 2023-01-01 RX ADMIN — IPRATROPIUM BROMIDE 0.5 MG: 0.5 SOLUTION RESPIRATORY (INHALATION) at 21:59

## 2023-01-01 RX ADMIN — FAMOTIDINE 20 MG: 20 TABLET ORAL at 08:35

## 2023-01-01 RX ADMIN — Medication 1 TABLET: at 08:42

## 2023-01-01 RX ADMIN — Medication 5 ML: at 11:21

## 2023-01-01 RX ADMIN — POTASSIUM & SODIUM PHOSPHATES POWDER PACK 280-160-250 MG 1 PACKET: 280-160-250 PACK at 11:08

## 2023-01-01 RX ADMIN — SODIUM CHLORIDE, POTASSIUM CHLORIDE, SODIUM LACTATE AND CALCIUM CHLORIDE: 600; 310; 30; 20 INJECTION, SOLUTION INTRAVENOUS at 11:30

## 2023-01-01 RX ADMIN — PHENYLEPHRINE HYDROCHLORIDE 100 MCG: 10 INJECTION INTRAVENOUS at 07:29

## 2023-01-01 RX ADMIN — DIPHENHYDRAMINE HYDROCHLORIDE 25 MG: 25 CAPSULE ORAL at 21:58

## 2023-01-01 RX ADMIN — IPRATROPIUM BROMIDE 0.5 MG: 0.5 SOLUTION RESPIRATORY (INHALATION) at 08:59

## 2023-01-01 RX ADMIN — POTASSIUM & SODIUM PHOSPHATES POWDER PACK 280-160-250 MG 1 PACKET: 280-160-250 PACK at 12:28

## 2023-01-01 RX ADMIN — SALINE NASAL SPRAY 1 SPRAY: 1.5 SOLUTION NASAL at 08:58

## 2023-01-01 RX ADMIN — ALBUTEROL SULFATE 2 PUFF: 90 AEROSOL, METERED RESPIRATORY (INHALATION) at 08:19

## 2023-01-01 RX ADMIN — ACETAMINOPHEN 650 MG: 325 TABLET, FILM COATED ORAL at 04:43

## 2023-01-01 RX ADMIN — FAMOTIDINE 20 MG: 10 INJECTION, SOLUTION INTRAVENOUS at 11:47

## 2023-01-01 RX ADMIN — CEFEPIME HYDROCHLORIDE 2 G: 2 INJECTION, POWDER, FOR SOLUTION INTRAVENOUS at 12:13

## 2023-01-01 RX ADMIN — LABETALOL HYDROCHLORIDE 20 MG: 5 INJECTION, SOLUTION INTRAVENOUS at 09:24

## 2023-01-01 RX ADMIN — PROPOFOL 25 MCG/KG/MIN: 10 INJECTION, EMULSION INTRAVENOUS at 18:50

## 2023-01-01 RX ADMIN — LEVOTHYROXINE SODIUM 50 MCG: 0.05 TABLET ORAL at 07:59

## 2023-01-01 RX ADMIN — ACETAMINOPHEN 975 MG: 325 TABLET, FILM COATED ORAL at 21:57

## 2023-01-01 RX ADMIN — ACETAMINOPHEN 650 MG: 325 TABLET, FILM COATED ORAL at 00:33

## 2023-01-01 RX ADMIN — FLUTICASONE PROPIONATE AND SALMETEROL XINAFOATE 1 PUFF: 115; 21 AEROSOL, METERED RESPIRATORY (INHALATION) at 08:52

## 2023-01-01 RX ADMIN — Medication 1 TABLET: at 09:53

## 2023-01-01 RX ADMIN — HEPARIN SODIUM 5000 UNITS: 5000 INJECTION, SOLUTION INTRAVENOUS; SUBCUTANEOUS at 15:49

## 2023-01-01 RX ADMIN — SALINE NASAL SPRAY 1 SPRAY: 1.5 SOLUTION NASAL at 16:52

## 2023-01-01 RX ADMIN — TRETIONIN: 0.25 GEL TOPICAL at 22:04

## 2023-01-01 RX ADMIN — IPRATROPIUM BROMIDE 0.5 MG: 0.5 SOLUTION RESPIRATORY (INHALATION) at 12:49

## 2023-01-01 RX ADMIN — HEPARIN SODIUM 5000 UNITS: 5000 INJECTION, SOLUTION INTRAVENOUS; SUBCUTANEOUS at 16:22

## 2023-01-01 RX ADMIN — HYDRALAZINE HYDROCHLORIDE 20 MG: 20 INJECTION INTRAMUSCULAR; INTRAVENOUS at 08:41

## 2023-01-01 RX ADMIN — ACETAMINOPHEN 650 MG: 325 TABLET, FILM COATED ORAL at 08:08

## 2023-01-01 RX ADMIN — DEXMEDETOMIDINE HYDROCHLORIDE 0.9 MCG/KG/HR: 400 INJECTION INTRAVENOUS at 07:46

## 2023-01-01 RX ADMIN — HYDRALAZINE HYDROCHLORIDE 20 MG: 20 INJECTION INTRAMUSCULAR; INTRAVENOUS at 05:10

## 2023-01-01 RX ADMIN — MAGNESIUM OXIDE TAB 400 MG (241.3 MG ELEMENTAL MG) 400 MG: 400 (241.3 MG) TAB at 20:40

## 2023-01-01 RX ADMIN — HEPARIN SODIUM 5000 UNITS: 5000 INJECTION, SOLUTION INTRAVENOUS; SUBCUTANEOUS at 01:54

## 2023-01-01 RX ADMIN — LABETALOL HYDROCHLORIDE 20 MG: 5 INJECTION, SOLUTION INTRAVENOUS at 10:41

## 2023-01-01 RX ADMIN — ALBUTEROL SULFATE 2.5 MG: 2.5 SOLUTION RESPIRATORY (INHALATION) at 16:34

## 2023-01-01 RX ADMIN — Medication 5 ML: at 14:37

## 2023-01-01 RX ADMIN — CEFEPIME HYDROCHLORIDE 2 G: 2 INJECTION, POWDER, FOR SOLUTION INTRAVENOUS at 20:29

## 2023-01-01 RX ADMIN — FAMOTIDINE 20 MG: 20 TABLET ORAL at 07:34

## 2023-01-01 RX ADMIN — HYDRALAZINE HYDROCHLORIDE 10 MG: 20 INJECTION INTRAMUSCULAR; INTRAVENOUS at 01:46

## 2023-01-01 RX ADMIN — INSULIN ASPART 1 UNITS: 100 INJECTION, SOLUTION INTRAVENOUS; SUBCUTANEOUS at 13:14

## 2023-01-01 RX ADMIN — HEPARIN SODIUM 5000 UNITS: 5000 INJECTION, SOLUTION INTRAVENOUS; SUBCUTANEOUS at 08:44

## 2023-01-01 RX ADMIN — PIPERACILLIN SODIUM AND TAZOBACTAM SODIUM 4.5 G: 4; .5 INJECTION, POWDER, LYOPHILIZED, FOR SOLUTION INTRAVENOUS at 11:42

## 2023-01-01 RX ADMIN — DIPHENHYDRAMINE HYDROCHLORIDE 25 MG: 25 CAPSULE ORAL at 03:24

## 2023-01-01 RX ADMIN — SALINE NASAL SPRAY 1 SPRAY: 1.5 SOLUTION NASAL at 11:02

## 2023-01-01 RX ADMIN — PIPERACILLIN SODIUM AND TAZOBACTAM SODIUM 4.5 G: 4; .5 INJECTION, POWDER, LYOPHILIZED, FOR SOLUTION INTRAVENOUS at 13:40

## 2023-01-01 RX ADMIN — DEXAMETHASONE SODIUM PHOSPHATE 8 MG: 4 INJECTION, SOLUTION INTRA-ARTICULAR; INTRALESIONAL; INTRAMUSCULAR; INTRAVENOUS; SOFT TISSUE at 14:35

## 2023-01-01 RX ADMIN — SENNOSIDES AND DOCUSATE SODIUM 2 TABLET: 8.6; 5 TABLET ORAL at 20:29

## 2023-01-01 RX ADMIN — FLUTICASONE PROPIONATE 1 SPRAY: 50 SPRAY, METERED NASAL at 09:36

## 2023-01-01 RX ADMIN — POTASSIUM PHOSPHATE, MONOBASIC POTASSIUM PHOSPHATE, DIBASIC 9 MMOL: 224; 236 INJECTION, SOLUTION, CONCENTRATE INTRAVENOUS at 05:47

## 2023-01-01 RX ADMIN — SALINE NASAL SPRAY 1 SPRAY: 1.5 SOLUTION NASAL at 09:49

## 2023-01-01 RX ADMIN — SALINE NASAL SPRAY 1 SPRAY: 1.5 SOLUTION NASAL at 22:14

## 2023-01-01 RX ADMIN — FLUTICASONE PROPIONATE 1 SPRAY: 50 SPRAY, METERED NASAL at 19:55

## 2023-01-01 RX ADMIN — PHENYLEPHRINE HYDROCHLORIDE 150 MCG: 10 INJECTION INTRAVENOUS at 04:28

## 2023-01-01 RX ADMIN — ALBUTEROL SULFATE 2 PUFF: 90 AEROSOL, METERED RESPIRATORY (INHALATION) at 08:20

## 2023-01-01 RX ADMIN — HYDRALAZINE HYDROCHLORIDE 20 MG: 20 INJECTION INTRAMUSCULAR; INTRAVENOUS at 09:32

## 2023-01-01 RX ADMIN — POTASSIUM CHLORIDE 10 MEQ: 7.46 INJECTION, SOLUTION INTRAVENOUS at 14:57

## 2023-01-01 RX ADMIN — Medication: at 18:10

## 2023-01-01 RX ADMIN — FLUTICASONE PROPIONATE AND SALMETEROL 1 PUFF: 50; 100 POWDER RESPIRATORY (INHALATION) at 09:21

## 2023-01-01 RX ADMIN — DESMOPRESSIN ACETATE 0.1 MG: 0.1 TABLET ORAL at 12:32

## 2023-01-01 RX ADMIN — HEPARIN SODIUM 5000 UNITS: 5000 INJECTION, SOLUTION INTRAVENOUS; SUBCUTANEOUS at 17:54

## 2023-01-01 RX ADMIN — IPRATROPIUM BROMIDE 0.5 MG: 0.5 SOLUTION RESPIRATORY (INHALATION) at 21:07

## 2023-01-01 RX ADMIN — DEXAMETHASONE SODIUM PHOSPHATE 1 MG: 4 INJECTION, SOLUTION INTRA-ARTICULAR; INTRALESIONAL; INTRAMUSCULAR; INTRAVENOUS; SOFT TISSUE at 08:24

## 2023-01-01 RX ADMIN — Medication 15 ML: at 08:38

## 2023-01-01 RX ADMIN — ALBUTEROL SULFATE 2 PUFF: 90 AEROSOL, METERED RESPIRATORY (INHALATION) at 21:33

## 2023-01-01 RX ADMIN — SALINE NASAL SPRAY 1 SPRAY: 1.5 SOLUTION NASAL at 16:13

## 2023-01-01 RX ADMIN — INSULIN ASPART 2 UNITS: 100 INJECTION, SOLUTION INTRAVENOUS; SUBCUTANEOUS at 01:09

## 2023-01-01 RX ADMIN — DEXTROSE MONOHYDRATE 500 ML: 50 INJECTION, SOLUTION INTRAVENOUS at 22:00

## 2023-01-01 RX ADMIN — SODIUM CHLORIDE, SODIUM LACTATE, POTASSIUM CHLORIDE, CALCIUM CHLORIDE: 600; 310; 30; 20 INJECTION, SOLUTION INTRAVENOUS at 08:15

## 2023-01-01 RX ADMIN — ALBUTEROL SULFATE 2.5 MG: 2.5 SOLUTION RESPIRATORY (INHALATION) at 02:40

## 2023-01-01 RX ADMIN — SALINE NASAL SPRAY 1 SPRAY: 1.5 SOLUTION NASAL at 07:52

## 2023-01-01 RX ADMIN — IPRATROPIUM BROMIDE 0.5 MG: 0.5 SOLUTION RESPIRATORY (INHALATION) at 08:27

## 2023-01-01 RX ADMIN — FLUTICASONE PROPIONATE 1 SPRAY: 50 SPRAY, METERED NASAL at 08:28

## 2023-01-01 RX ADMIN — PROPOFOL 75 MCG/KG/MIN: 10 INJECTION, EMULSION INTRAVENOUS at 10:25

## 2023-01-01 RX ADMIN — ALBUTEROL SULFATE 2 PUFF: 90 AEROSOL, METERED RESPIRATORY (INHALATION) at 02:21

## 2023-01-01 RX ADMIN — CEFAZOLIN SODIUM 2 G: 2 INJECTION, SOLUTION INTRAVENOUS at 21:57

## 2023-01-01 RX ADMIN — PANTOPRAZOLE SODIUM 20 MG: 20 TABLET, DELAYED RELEASE ORAL at 08:25

## 2023-01-01 RX ADMIN — LORAZEPAM 1 MG: 2 INJECTION INTRAMUSCULAR; INTRAVENOUS at 11:46

## 2023-01-01 RX ADMIN — FLUTICASONE PROPIONATE AND SALMETEROL 1 PUFF: 50; 100 POWDER RESPIRATORY (INHALATION) at 08:56

## 2023-01-01 RX ADMIN — ACETAMINOPHEN 650 MG: 325 TABLET, FILM COATED ORAL at 22:15

## 2023-01-01 RX ADMIN — IPRATROPIUM BROMIDE 0.5 MG: 0.5 SOLUTION RESPIRATORY (INHALATION) at 21:10

## 2023-01-01 RX ADMIN — ACETAMINOPHEN 650 MG: 325 TABLET, FILM COATED ORAL at 14:13

## 2023-01-01 RX ADMIN — PHENYLEPHRINE HYDROCHLORIDE 100 MCG: 10 INJECTION INTRAVENOUS at 17:09

## 2023-01-01 RX ADMIN — LABETALOL HYDROCHLORIDE 20 MG: 5 INJECTION, SOLUTION INTRAVENOUS at 14:05

## 2023-01-01 RX ADMIN — DIPHENHYDRAMINE HYDROCHLORIDE 25 MG: 25 CAPSULE ORAL at 01:46

## 2023-01-01 RX ADMIN — LEVETIRACETAM 500 MG: 500 TABLET, FILM COATED ORAL at 08:11

## 2023-01-01 RX ADMIN — LABETALOL HYDROCHLORIDE 20 MG: 5 INJECTION, SOLUTION INTRAVENOUS at 01:45

## 2023-01-01 RX ADMIN — ALBUTEROL SULFATE 2 PUFF: 90 AEROSOL, METERED RESPIRATORY (INHALATION) at 08:54

## 2023-01-01 RX ADMIN — Medication 5 ML: at 06:23

## 2023-01-01 RX ADMIN — INSULIN ASPART 1 UNITS: 100 INJECTION, SOLUTION INTRAVENOUS; SUBCUTANEOUS at 20:20

## 2023-01-01 RX ADMIN — ALBUTEROL SULFATE 2 PUFF: 90 AEROSOL, METERED RESPIRATORY (INHALATION) at 19:58

## 2023-01-01 RX ADMIN — HEPARIN SODIUM 5000 UNITS: 5000 INJECTION, SOLUTION INTRAVENOUS; SUBCUTANEOUS at 00:02

## 2023-01-01 RX ADMIN — SALINE NASAL SPRAY 1 SPRAY: 1.5 SOLUTION NASAL at 08:59

## 2023-01-01 RX ADMIN — DESMOPRESSIN ACETATE 0.1 MG: 0.1 TABLET ORAL at 20:47

## 2023-01-01 RX ADMIN — SALINE NASAL SPRAY 1 SPRAY: 1.5 SOLUTION NASAL at 12:13

## 2023-01-01 RX ADMIN — Medication 0.15 MG: at 08:42

## 2023-01-01 RX ADMIN — DESMOPRESSIN ACETATE 0.2 MG: 0.2 TABLET ORAL at 20:53

## 2023-01-01 RX ADMIN — INSULIN ASPART 1 UNITS: 100 INJECTION, SOLUTION INTRAVENOUS; SUBCUTANEOUS at 20:55

## 2023-01-01 RX ADMIN — HEPARIN SODIUM 5000 UNITS: 5000 INJECTION, SOLUTION INTRAVENOUS; SUBCUTANEOUS at 08:43

## 2023-01-01 RX ADMIN — POTASSIUM & SODIUM PHOSPHATES POWDER PACK 280-160-250 MG 1 PACKET: 280-160-250 PACK at 08:57

## 2023-01-01 RX ADMIN — METRONIDAZOLE 500 MG: 250 TABLET ORAL at 08:06

## 2023-01-01 RX ADMIN — ACETAMINOPHEN 650 MG: 325 TABLET, FILM COATED ORAL at 08:19

## 2023-01-01 RX ADMIN — SALINE NASAL SPRAY 1 SPRAY: 1.5 SOLUTION NASAL at 16:22

## 2023-01-01 RX ADMIN — PIPERACILLIN SODIUM AND TAZOBACTAM SODIUM 4.5 G: 4; .5 INJECTION, POWDER, LYOPHILIZED, FOR SOLUTION INTRAVENOUS at 05:33

## 2023-01-01 RX ADMIN — ACETAMINOPHEN 650 MG: 325 TABLET, FILM COATED ORAL at 09:13

## 2023-01-01 RX ADMIN — HYDROCORTISONE 15 MG: 10 TABLET ORAL at 09:38

## 2023-01-01 RX ADMIN — IPRATROPIUM BROMIDE AND ALBUTEROL SULFATE 3 ML: .5; 3 SOLUTION RESPIRATORY (INHALATION) at 14:09

## 2023-01-01 RX ADMIN — FLUTICASONE PROPIONATE 1 SPRAY: 50 SPRAY, METERED NASAL at 19:58

## 2023-01-01 RX ADMIN — PROPOFOL 50 MCG/KG/MIN: 10 INJECTION, EMULSION INTRAVENOUS at 08:37

## 2023-01-01 RX ADMIN — ALBUTEROL SULFATE 2 PUFF: 90 AEROSOL, METERED RESPIRATORY (INHALATION) at 22:21

## 2023-01-01 RX ADMIN — CEFEPIME HYDROCHLORIDE 2 G: 2 INJECTION, POWDER, FOR SOLUTION INTRAVENOUS at 04:25

## 2023-01-01 RX ADMIN — Medication 50 MCG: at 10:15

## 2023-01-01 RX ADMIN — DEXTROSE MONOHYDRATE: 50 INJECTION, SOLUTION INTRAVENOUS at 02:45

## 2023-01-01 RX ADMIN — ALBUTEROL SULFATE 2.5 MG: 2.5 SOLUTION RESPIRATORY (INHALATION) at 17:31

## 2023-01-01 RX ADMIN — INSULIN ASPART 1 UNITS: 100 INJECTION, SOLUTION INTRAVENOUS; SUBCUTANEOUS at 15:46

## 2023-01-01 RX ADMIN — ALBUTEROL SULFATE 2.5 MG: 2.5 SOLUTION RESPIRATORY (INHALATION) at 14:16

## 2023-01-01 RX ADMIN — MIDAZOLAM 2 MG: 1 INJECTION INTRAMUSCULAR; INTRAVENOUS at 12:25

## 2023-01-01 RX ADMIN — Medication 0.15 MG: at 20:42

## 2023-01-01 RX ADMIN — PIPERACILLIN SODIUM AND TAZOBACTAM SODIUM 4.5 G: 4; .5 INJECTION, POWDER, LYOPHILIZED, FOR SOLUTION INTRAVENOUS at 00:54

## 2023-01-01 RX ADMIN — IPRATROPIUM BROMIDE 0.5 MG: 0.5 SOLUTION RESPIRATORY (INHALATION) at 22:12

## 2023-01-01 RX ADMIN — HYDROCORTISONE 15 MG: 10 TABLET ORAL at 08:43

## 2023-01-01 RX ADMIN — ALBUTEROL SULFATE 2.5 MG: 2.5 SOLUTION RESPIRATORY (INHALATION) at 01:52

## 2023-01-01 RX ADMIN — ALBUTEROL SULFATE 2 PUFF: 90 AEROSOL, METERED RESPIRATORY (INHALATION) at 22:33

## 2023-01-01 RX ADMIN — LEVOTHYROXINE SODIUM 50 MCG: 0.05 TABLET ORAL at 08:35

## 2023-01-01 RX ADMIN — LIDOCAINE HYDROCHLORIDE 100 MG: 20 INJECTION, SOLUTION INFILTRATION; PERINEURAL at 12:42

## 2023-01-01 RX ADMIN — SALINE NASAL SPRAY 1 SPRAY: 1.5 SOLUTION NASAL at 08:30

## 2023-01-01 RX ADMIN — HYDRALAZINE HYDROCHLORIDE 20 MG: 20 INJECTION INTRAMUSCULAR; INTRAVENOUS at 14:30

## 2023-01-01 RX ADMIN — LEVETIRACETAM 500 MG: 500 TABLET, FILM COATED ORAL at 20:37

## 2023-01-01 RX ADMIN — ACETAMINOPHEN 650 MG: 325 TABLET, FILM COATED ORAL at 21:19

## 2023-01-01 RX ADMIN — ALBUTEROL SULFATE 2 PUFF: 90 AEROSOL, METERED RESPIRATORY (INHALATION) at 16:30

## 2023-01-01 RX ADMIN — SENNOSIDES AND DOCUSATE SODIUM 1 TABLET: 8.6; 5 TABLET ORAL at 08:06

## 2023-01-01 RX ADMIN — LEVOTHYROXINE SODIUM 50 MCG: 0.05 TABLET ORAL at 09:38

## 2023-01-01 RX ADMIN — ALBUTEROL SULFATE 2 PUFF: 90 AEROSOL, METERED RESPIRATORY (INHALATION) at 22:23

## 2023-01-01 RX ADMIN — Medication 5 ML: at 00:45

## 2023-01-01 RX ADMIN — ALBUTEROL SULFATE 2 PUFF: 90 AEROSOL, METERED RESPIRATORY (INHALATION) at 17:46

## 2023-01-01 RX ADMIN — Medication 20 MG: at 13:54

## 2023-01-01 RX ADMIN — METRONIDAZOLE 500 MG: 500 INJECTION, SOLUTION INTRAVENOUS at 19:31

## 2023-01-01 RX ADMIN — PIPERACILLIN SODIUM AND TAZOBACTAM SODIUM 4.5 G: 4; .5 INJECTION, POWDER, LYOPHILIZED, FOR SOLUTION INTRAVENOUS at 00:53

## 2023-01-01 RX ADMIN — ALBUTEROL SULFATE 2 PUFF: 90 AEROSOL, METERED RESPIRATORY (INHALATION) at 19:36

## 2023-01-01 RX ADMIN — IPRATROPIUM BROMIDE 0.5 MG: 0.5 SOLUTION RESPIRATORY (INHALATION) at 11:05

## 2023-01-01 RX ADMIN — IPRATROPIUM BROMIDE AND ALBUTEROL SULFATE 3 ML: .5; 3 SOLUTION RESPIRATORY (INHALATION) at 11:50

## 2023-01-01 RX ADMIN — ACETAMINOPHEN 650 MG: 325 TABLET, FILM COATED ORAL at 21:34

## 2023-01-01 RX ADMIN — LABETALOL HYDROCHLORIDE 10 MG: 5 INJECTION, SOLUTION INTRAVENOUS at 20:18

## 2023-01-01 RX ADMIN — INSULIN ASPART 1 UNITS: 100 INJECTION, SOLUTION INTRAVENOUS; SUBCUTANEOUS at 20:38

## 2023-01-01 RX ADMIN — IPRATROPIUM BROMIDE 0.5 MG: 0.5 SOLUTION RESPIRATORY (INHALATION) at 12:00

## 2023-01-01 RX ADMIN — MANNITOL 50 G: 20 INJECTION, SOLUTION INTRAVENOUS at 12:25

## 2023-01-01 RX ADMIN — ACETAMINOPHEN 650 MG: 325 TABLET, FILM COATED ORAL at 10:50

## 2023-01-01 RX ADMIN — DEXMEDETOMIDINE HYDROCHLORIDE 1 MCG/KG/HR: 100 INJECTION, SOLUTION INTRAVENOUS at 12:55

## 2023-01-01 RX ADMIN — LEVOTHYROXINE SODIUM 112 MCG: 0.11 TABLET ORAL at 08:44

## 2023-01-01 RX ADMIN — SENNOSIDES AND DOCUSATE SODIUM 2 TABLET: 8.6; 5 TABLET ORAL at 08:24

## 2023-01-01 RX ADMIN — INSULIN ASPART 1 UNITS: 100 INJECTION, SOLUTION INTRAVENOUS; SUBCUTANEOUS at 08:43

## 2023-01-01 RX ADMIN — CEFAZOLIN 2 G: 1 INJECTION, POWDER, FOR SOLUTION INTRAMUSCULAR; INTRAVENOUS at 08:35

## 2023-01-01 RX ADMIN — ACETAMINOPHEN 650 MG: 325 TABLET, FILM COATED ORAL at 20:34

## 2023-01-01 RX ADMIN — PROPOFOL 100 MG: 10 INJECTION, EMULSION INTRAVENOUS at 09:23

## 2023-01-01 RX ADMIN — LEVETIRACETAM 500 MG: 500 TABLET, FILM COATED ORAL at 20:42

## 2023-01-01 RX ADMIN — IPRATROPIUM BROMIDE 0.5 MG: 0.5 SOLUTION RESPIRATORY (INHALATION) at 09:19

## 2023-01-01 RX ADMIN — FAMOTIDINE 20 MG: 20 TABLET ORAL at 08:24

## 2023-01-01 RX ADMIN — LEVETIRACETAM 500 MG: 5 INJECTION INTRAVENOUS at 20:51

## 2023-01-01 RX ADMIN — ALBUTEROL SULFATE 2.5 MG: 2.5 SOLUTION RESPIRATORY (INHALATION) at 21:08

## 2023-01-01 RX ADMIN — INSULIN ASPART 1 UNITS: 100 INJECTION, SOLUTION INTRAVENOUS; SUBCUTANEOUS at 20:44

## 2023-01-01 RX ADMIN — FAMOTIDINE 20 MG: 20 TABLET ORAL at 07:23

## 2023-01-01 RX ADMIN — Medication 1 TABLET: at 08:31

## 2023-01-01 RX ADMIN — CEFAZOLIN SODIUM 2 G: 2 INJECTION, SOLUTION INTRAVENOUS at 05:53

## 2023-01-01 RX ADMIN — ALBUTEROL SULFATE 2.5 MG: 2.5 SOLUTION RESPIRATORY (INHALATION) at 20:26

## 2023-01-01 RX ADMIN — Medication 50 MCG: at 19:52

## 2023-01-01 RX ADMIN — SALINE NASAL SPRAY 1 SPRAY: 1.5 SOLUTION NASAL at 09:35

## 2023-01-01 RX ADMIN — FLUTICASONE PROPIONATE 1 SPRAY: 50 SPRAY, METERED NASAL at 10:32

## 2023-01-01 RX ADMIN — FLUTICASONE PROPIONATE AND SALMETEROL 1 PUFF: 50; 100 POWDER RESPIRATORY (INHALATION) at 08:19

## 2023-01-01 RX ADMIN — Medication 20 MG: at 03:26

## 2023-01-01 RX ADMIN — ALBUTEROL SULFATE 2 PUFF: 90 AEROSOL, METERED RESPIRATORY (INHALATION) at 08:31

## 2023-01-01 RX ADMIN — FAMOTIDINE 20 MG: 20 TABLET ORAL at 20:29

## 2023-01-01 RX ADMIN — DESMOPRESSIN ACETATE 0.2 MG: 0.2 TABLET ORAL at 21:15

## 2023-01-01 RX ADMIN — ACETAMINOPHEN 650 MG: 325 TABLET, FILM COATED ORAL at 19:14

## 2023-01-01 RX ADMIN — ALBUTEROL SULFATE 2 PUFF: 90 AEROSOL, METERED RESPIRATORY (INHALATION) at 20:34

## 2023-01-01 RX ADMIN — POTASSIUM & SODIUM PHOSPHATES POWDER PACK 280-160-250 MG 1 PACKET: 280-160-250 PACK at 12:22

## 2023-01-01 RX ADMIN — ACETAMINOPHEN 650 MG: 325 TABLET, FILM COATED ORAL at 20:09

## 2023-01-01 RX ADMIN — DEXMEDETOMIDINE HYDROCHLORIDE 0.2 MCG/KG/HR: 400 INJECTION INTRAVENOUS at 09:01

## 2023-01-01 RX ADMIN — MAGNESIUM OXIDE TAB 400 MG (241.3 MG ELEMENTAL MG) 400 MG: 400 (241.3 MG) TAB at 16:52

## 2023-01-01 RX ADMIN — IPRATROPIUM BROMIDE 0.5 MG: 0.5 SOLUTION RESPIRATORY (INHALATION) at 20:56

## 2023-01-01 RX ADMIN — Medication 1 CAPSULE: at 10:32

## 2023-01-01 RX ADMIN — Medication 20 MG: at 23:46

## 2023-01-01 RX ADMIN — LIDOCAINE PATCH 4% 2 PATCH: 40 PATCH TOPICAL at 22:14

## 2023-01-01 RX ADMIN — IPRATROPIUM BROMIDE 0.5 MG: 0.5 SOLUTION RESPIRATORY (INHALATION) at 08:00

## 2023-01-01 RX ADMIN — FLUTICASONE PROPIONATE AND SALMETEROL 1 PUFF: 50; 100 POWDER RESPIRATORY (INHALATION) at 08:35

## 2023-01-01 RX ADMIN — ACETAMINOPHEN 650 MG: 325 TABLET, FILM COATED ORAL at 04:48

## 2023-01-01 RX ADMIN — FLUTICASONE PROPIONATE AND SALMETEROL 1 PUFF: 50; 100 POWDER RESPIRATORY (INHALATION) at 08:28

## 2023-01-01 RX ADMIN — LABETALOL HYDROCHLORIDE 20 MG: 5 INJECTION, SOLUTION INTRAVENOUS at 00:40

## 2023-01-01 RX ADMIN — HYDROCORTISONE 10 MG: 10 TABLET ORAL at 15:04

## 2023-01-01 RX ADMIN — FLUTICASONE PROPIONATE AND SALMETEROL XINAFOATE 1 PUFF: 115; 21 AEROSOL, METERED RESPIRATORY (INHALATION) at 08:43

## 2023-01-01 RX ADMIN — Medication 1 TABLET: at 08:25

## 2023-01-01 RX ADMIN — IPRATROPIUM BROMIDE 0.5 MG: 0.5 SOLUTION RESPIRATORY (INHALATION) at 20:31

## 2023-01-01 RX ADMIN — LIDOCAINE PATCH 4% 2 PATCH: 40 PATCH TOPICAL at 22:49

## 2023-01-01 RX ADMIN — LIDOCAINE PATCH 4% 2 PATCH: 40 PATCH TOPICAL at 19:57

## 2023-01-01 RX ADMIN — SALINE NASAL SPRAY 1 SPRAY: 1.5 SOLUTION NASAL at 12:57

## 2023-01-01 RX ADMIN — LEVETIRACETAM 500 MG: 500 TABLET, FILM COATED ORAL at 08:24

## 2023-01-01 RX ADMIN — SALINE NASAL SPRAY 1 SPRAY: 1.5 SOLUTION NASAL at 20:30

## 2023-01-01 RX ADMIN — FUROSEMIDE 40 MG: 10 INJECTION, SOLUTION INTRAVENOUS at 17:30

## 2023-01-01 RX ADMIN — Medication 10 ML: at 16:25

## 2023-01-01 RX ADMIN — ACETAMINOPHEN 650 MG: 325 TABLET, FILM COATED ORAL at 18:08

## 2023-01-01 RX ADMIN — PROPOFOL 25 MCG/KG/MIN: 10 INJECTION, EMULSION INTRAVENOUS at 00:27

## 2023-01-01 RX ADMIN — ACETAMINOPHEN 650 MG: 325 TABLET, FILM COATED ORAL at 09:56

## 2023-01-01 RX ADMIN — CEFAZOLIN SODIUM 2 G: 2 INJECTION, SOLUTION INTRAVENOUS at 22:37

## 2023-01-01 RX ADMIN — PHENYLEPHRINE HYDROCHLORIDE 100 MCG: 10 INJECTION INTRAVENOUS at 04:10

## 2023-01-01 RX ADMIN — Medication 0.15 MG: at 08:29

## 2023-01-01 RX ADMIN — HYDROCORTISONE 5 MG: 5 TABLET ORAL at 14:35

## 2023-01-01 RX ADMIN — MAGNESIUM OXIDE TAB 400 MG (241.3 MG ELEMENTAL MG) 400 MG: 400 (241.3 MG) TAB at 14:02

## 2023-01-01 RX ADMIN — FLUTICASONE PROPIONATE 1 SPRAY: 50 SPRAY, METERED NASAL at 10:19

## 2023-01-01 RX ADMIN — SALINE NASAL SPRAY 1 SPRAY: 1.5 SOLUTION NASAL at 08:29

## 2023-01-01 RX ADMIN — SALINE NASAL SPRAY 1 SPRAY: 1.5 SOLUTION NASAL at 18:35

## 2023-01-01 RX ADMIN — DEXAMETHASONE SODIUM PHOSPHATE 2 MG: 4 INJECTION, SOLUTION INTRA-ARTICULAR; INTRALESIONAL; INTRAMUSCULAR; INTRAVENOUS; SOFT TISSUE at 13:37

## 2023-01-01 RX ADMIN — IPRATROPIUM BROMIDE 0.5 MG: 0.5 SOLUTION RESPIRATORY (INHALATION) at 20:12

## 2023-01-01 RX ADMIN — ACETAMINOPHEN 650 MG: 325 TABLET, FILM COATED ORAL at 03:26

## 2023-01-01 RX ADMIN — SALINE NASAL SPRAY 1 SPRAY: 1.5 SOLUTION NASAL at 07:24

## 2023-01-01 RX ADMIN — SALINE NASAL SPRAY 1 SPRAY: 1.5 SOLUTION NASAL at 21:13

## 2023-01-01 RX ADMIN — ACETAMINOPHEN 650 MG: 325 TABLET, FILM COATED ORAL at 22:45

## 2023-01-01 RX ADMIN — FLUTICASONE PROPIONATE AND SALMETEROL 1 PUFF: 50; 100 POWDER RESPIRATORY (INHALATION) at 20:41

## 2023-01-01 RX ADMIN — IPRATROPIUM BROMIDE 0.5 MG: 0.5 SOLUTION RESPIRATORY (INHALATION) at 21:56

## 2023-01-01 RX ADMIN — FENTANYL CITRATE 50 MCG: 50 INJECTION INTRAMUSCULAR; INTRAVENOUS at 13:55

## 2023-01-01 RX ADMIN — ACETAMINOPHEN 650 MG: 325 TABLET, FILM COATED ORAL at 08:01

## 2023-01-01 RX ADMIN — ALBUTEROL SULFATE 2.5 MG: 2.5 SOLUTION RESPIRATORY (INHALATION) at 23:12

## 2023-01-01 RX ADMIN — DIPHENHYDRAMINE HYDROCHLORIDE 25 MG: 25 CAPSULE ORAL at 09:13

## 2023-01-01 RX ADMIN — SALINE NASAL SPRAY 1 SPRAY: 1.5 SOLUTION NASAL at 12:29

## 2023-01-01 RX ADMIN — SALINE NASAL SPRAY 1 SPRAY: 1.5 SOLUTION NASAL at 11:49

## 2023-01-01 RX ADMIN — FLUTICASONE PROPIONATE AND SALMETEROL XINAFOATE 1 PUFF: 115; 21 AEROSOL, METERED RESPIRATORY (INHALATION) at 21:01

## 2023-01-01 RX ADMIN — LIDOCAINE PATCH 4% 2 PATCH: 40 PATCH TOPICAL at 20:22

## 2023-01-01 RX ADMIN — FAMOTIDINE 20 MG: 20 TABLET ORAL at 21:15

## 2023-01-01 RX ADMIN — SALINE NASAL SPRAY 1 SPRAY: 1.5 SOLUTION NASAL at 08:21

## 2023-01-01 RX ADMIN — BISACODYL 10 MG: 10 SUPPOSITORY RECTAL at 08:38

## 2023-01-01 RX ADMIN — SIMETHICONE 80 MG: 80 TABLET, CHEWABLE ORAL at 22:01

## 2023-01-01 RX ADMIN — LEVETIRACETAM 500 MG: 5 INJECTION INTRAVENOUS at 20:21

## 2023-01-01 RX ADMIN — FLUTICASONE PROPIONATE 1 SPRAY: 50 SPRAY, METERED NASAL at 08:32

## 2023-01-01 RX ADMIN — METRONIDAZOLE 500 MG: 500 INJECTION, SOLUTION INTRAVENOUS at 12:09

## 2023-01-01 RX ADMIN — LIDOCAINE PATCH 4% 2 PATCH: 40 PATCH TOPICAL at 20:25

## 2023-01-01 RX ADMIN — Medication 0.15 MG: at 20:09

## 2023-01-01 RX ADMIN — MAGNESIUM OXIDE TAB 400 MG (241.3 MG ELEMENTAL MG) 400 MG: 400 (241.3 MG) TAB at 10:50

## 2023-01-01 RX ADMIN — HEPARIN SODIUM 5000 UNITS: 5000 INJECTION, SOLUTION INTRAVENOUS; SUBCUTANEOUS at 08:32

## 2023-01-01 RX ADMIN — FLUTICASONE PROPIONATE AND SALMETEROL 1 PUFF: 50; 100 POWDER RESPIRATORY (INHALATION) at 09:35

## 2023-01-01 RX ADMIN — ACETAMINOPHEN 650 MG: 325 TABLET, FILM COATED ORAL at 11:56

## 2023-01-01 RX ADMIN — POTASSIUM & SODIUM PHOSPHATES POWDER PACK 280-160-250 MG 1 PACKET: 280-160-250 PACK at 08:28

## 2023-01-01 RX ADMIN — DIPHENHYDRAMINE HYDROCHLORIDE 25 MG: 25 CAPSULE ORAL at 21:34

## 2023-01-01 RX ADMIN — LEVETIRACETAM 500 MG: 500 TABLET, FILM COATED ORAL at 08:38

## 2023-01-01 RX ADMIN — FENTANYL CITRATE 50 MCG: 50 INJECTION INTRAMUSCULAR; INTRAVENOUS at 21:56

## 2023-01-01 RX ADMIN — ALBUTEROL SULFATE 2.5 MG: 2.5 SOLUTION RESPIRATORY (INHALATION) at 01:15

## 2023-01-01 RX ADMIN — INSULIN ASPART 1 UNITS: 100 INJECTION, SOLUTION INTRAVENOUS; SUBCUTANEOUS at 20:09

## 2023-01-01 RX ADMIN — ALBUMIN (HUMAN): 12.5 SOLUTION INTRAVENOUS at 07:53

## 2023-01-01 RX ADMIN — FLUTICASONE PROPIONATE AND SALMETEROL 1 PUFF: 50; 100 POWDER RESPIRATORY (INHALATION) at 19:11

## 2023-01-01 RX ADMIN — FLUTICASONE PROPIONATE 1 SPRAY: 50 SPRAY, METERED NASAL at 20:20

## 2023-01-01 RX ADMIN — ACETAMINOPHEN 650 MG: 325 TABLET, FILM COATED ORAL at 06:05

## 2023-01-01 RX ADMIN — ALBUTEROL SULFATE 2 PUFF: 90 AEROSOL, METERED RESPIRATORY (INHALATION) at 20:49

## 2023-01-01 RX ADMIN — PHENYLEPHRINE HYDROCHLORIDE 100 MCG: 10 INJECTION INTRAVENOUS at 13:44

## 2023-01-01 RX ADMIN — DIPHENHYDRAMINE HYDROCHLORIDE 25 MG: 25 CAPSULE ORAL at 11:01

## 2023-01-01 RX ADMIN — SALINE NASAL SPRAY 1 SPRAY: 1.5 SOLUTION NASAL at 08:25

## 2023-01-01 RX ADMIN — ALBUTEROL SULFATE 2.5 MG: 2.5 SOLUTION RESPIRATORY (INHALATION) at 09:01

## 2023-01-01 RX ADMIN — SALINE NASAL SPRAY 1 SPRAY: 1.5 SOLUTION NASAL at 20:42

## 2023-01-01 RX ADMIN — Medication 10 MG: at 09:24

## 2023-01-01 RX ADMIN — DEXMEDETOMIDINE HYDROCHLORIDE 0.9 MCG/KG/HR: 400 INJECTION INTRAVENOUS at 12:31

## 2023-01-01 RX ADMIN — POTASSIUM & SODIUM PHOSPHATES POWDER PACK 280-160-250 MG 1 PACKET: 280-160-250 PACK at 16:13

## 2023-01-01 RX ADMIN — Medication 1 TABLET: at 07:55

## 2023-01-01 RX ADMIN — POTASSIUM & SODIUM PHOSPHATES POWDER PACK 280-160-250 MG 1 PACKET: 280-160-250 PACK at 05:09

## 2023-01-01 RX ADMIN — DIPHENHYDRAMINE HYDROCHLORIDE 25 MG: 25 CAPSULE ORAL at 00:11

## 2023-01-01 RX ADMIN — Medication 50 MCG: at 12:44

## 2023-01-01 RX ADMIN — ACETAMINOPHEN 650 MG: 325 TABLET, FILM COATED ORAL at 07:59

## 2023-01-01 RX ADMIN — ACETAMINOPHEN 650 MG: 325 TABLET, FILM COATED ORAL at 22:18

## 2023-01-01 RX ADMIN — FLUTICASONE PROPIONATE AND SALMETEROL 1 PUFF: 50; 100 POWDER RESPIRATORY (INHALATION) at 20:05

## 2023-01-01 RX ADMIN — SALINE NASAL SPRAY 1 SPRAY: 1.5 SOLUTION NASAL at 13:14

## 2023-01-01 RX ADMIN — ACETAMINOPHEN 650 MG: 325 TABLET, FILM COATED ORAL at 13:47

## 2023-01-01 RX ADMIN — LEVETIRACETAM 500 MG: 500 TABLET, FILM COATED ORAL at 19:36

## 2023-01-01 RX ADMIN — Medication 1 TABLET: at 09:50

## 2023-01-01 RX ADMIN — PIPERACILLIN SODIUM AND TAZOBACTAM SODIUM 4.5 G: 4; .5 INJECTION, POWDER, LYOPHILIZED, FOR SOLUTION INTRAVENOUS at 00:33

## 2023-01-01 RX ADMIN — POTASSIUM & SODIUM PHOSPHATES POWDER PACK 280-160-250 MG 1 PACKET: 280-160-250 PACK at 12:57

## 2023-01-01 RX ADMIN — POTASSIUM & SODIUM PHOSPHATES POWDER PACK 280-160-250 MG 1 PACKET: 280-160-250 PACK at 21:10

## 2023-01-01 RX ADMIN — LABETALOL HYDROCHLORIDE 10 MG: 5 INJECTION, SOLUTION INTRAVENOUS at 17:55

## 2023-01-01 RX ADMIN — ALBUTEROL SULFATE 2.5 MG: 2.5 SOLUTION RESPIRATORY (INHALATION) at 08:40

## 2023-01-01 RX ADMIN — FAMOTIDINE 20 MG: 20 TABLET ORAL at 20:43

## 2023-01-01 RX ADMIN — PIPERACILLIN SODIUM AND TAZOBACTAM SODIUM 4.5 G: 4; .5 INJECTION, POWDER, LYOPHILIZED, FOR SOLUTION INTRAVENOUS at 23:25

## 2023-01-01 RX ADMIN — LABETALOL HYDROCHLORIDE 20 MG: 5 INJECTION, SOLUTION INTRAVENOUS at 13:46

## 2023-01-01 RX ADMIN — DIPHENHYDRAMINE HYDROCHLORIDE 25 MG: 25 CAPSULE ORAL at 20:57

## 2023-01-01 RX ADMIN — FLUTICASONE PROPIONATE AND SALMETEROL XINAFOATE 1 PUFF: 115; 21 AEROSOL, METERED RESPIRATORY (INHALATION) at 14:19

## 2023-01-01 RX ADMIN — IPRATROPIUM BROMIDE 0.5 MG: 0.5 SOLUTION RESPIRATORY (INHALATION) at 20:30

## 2023-01-01 RX ADMIN — FLUTICASONE PROPIONATE AND SALMETEROL 1 PUFF: 50; 100 POWDER RESPIRATORY (INHALATION) at 08:38

## 2023-01-01 RX ADMIN — ALBUTEROL SULFATE 2 PUFF: 90 AEROSOL, METERED RESPIRATORY (INHALATION) at 00:34

## 2023-01-01 RX ADMIN — POLYETHYLENE GLYCOL 3350 17 G: 17 POWDER, FOR SOLUTION ORAL at 07:34

## 2023-01-01 RX ADMIN — SENNOSIDES AND DOCUSATE SODIUM 1 TABLET: 8.6; 5 TABLET ORAL at 20:52

## 2023-01-01 RX ADMIN — ACETAMINOPHEN 650 MG: 325 TABLET, FILM COATED ORAL at 09:11

## 2023-01-01 RX ADMIN — DESMOPRESSIN ACETATE 0.1 MG: 0.1 TABLET ORAL at 11:26

## 2023-01-01 RX ADMIN — IPRATROPIUM BROMIDE 0.5 MG: 0.5 SOLUTION RESPIRATORY (INHALATION) at 08:32

## 2023-01-01 RX ADMIN — METRONIDAZOLE 500 MG: 250 TABLET ORAL at 20:29

## 2023-01-01 RX ADMIN — METRONIDAZOLE 500 MG: 250 TABLET ORAL at 07:35

## 2023-01-01 RX ADMIN — ACETAMINOPHEN 650 MG: 325 TABLET, FILM COATED ORAL at 18:06

## 2023-01-01 RX ADMIN — DIPHENHYDRAMINE HYDROCHLORIDE 25 MG: 25 CAPSULE ORAL at 08:20

## 2023-01-01 RX ADMIN — ACETAMINOPHEN 650 MG: 325 TABLET, FILM COATED ORAL at 04:04

## 2023-01-01 RX ADMIN — INSULIN ASPART 1 UNITS: 100 INJECTION, SOLUTION INTRAVENOUS; SUBCUTANEOUS at 12:31

## 2023-01-01 RX ADMIN — SODIUM CHLORIDE, SODIUM GLUCONATE, SODIUM ACETATE, POTASSIUM CHLORIDE AND MAGNESIUM CHLORIDE: 526; 502; 368; 37; 30 INJECTION, SOLUTION INTRAVENOUS at 03:30

## 2023-01-01 RX ADMIN — FAMOTIDINE 20 MG: 20 TABLET ORAL at 20:17

## 2023-01-01 RX ADMIN — DIPHENHYDRAMINE HYDROCHLORIDE 25 MG: 25 CAPSULE ORAL at 03:26

## 2023-01-01 RX ADMIN — IPRATROPIUM BROMIDE AND ALBUTEROL SULFATE 3 ML: .5; 3 SOLUTION RESPIRATORY (INHALATION) at 13:07

## 2023-01-01 RX ADMIN — LABETALOL HYDROCHLORIDE 20 MG: 5 INJECTION, SOLUTION INTRAVENOUS at 09:17

## 2023-01-01 RX ADMIN — ALBUTEROL SULFATE 2.5 MG: 2.5 SOLUTION RESPIRATORY (INHALATION) at 18:28

## 2023-01-01 RX ADMIN — PHENYLEPHRINE HYDROCHLORIDE 50 MCG: 10 INJECTION INTRAVENOUS at 04:07

## 2023-01-01 RX ADMIN — SALINE NASAL SPRAY 1 SPRAY: 1.5 SOLUTION NASAL at 20:20

## 2023-01-01 RX ADMIN — CEFEPIME HYDROCHLORIDE 2 G: 2 INJECTION, POWDER, FOR SOLUTION INTRAVENOUS at 17:11

## 2023-01-01 RX ADMIN — ALBUMIN (HUMAN): 12.5 SOLUTION INTRAVENOUS at 13:27

## 2023-01-01 RX ADMIN — IPRATROPIUM BROMIDE 0.5 MG: 0.5 SOLUTION RESPIRATORY (INHALATION) at 19:31

## 2023-01-01 RX ADMIN — ACETAMINOPHEN 650 MG: 325 TABLET, FILM COATED ORAL at 08:41

## 2023-01-01 RX ADMIN — FLUTICASONE PROPIONATE AND SALMETEROL 1 PUFF: 50; 100 POWDER RESPIRATORY (INHALATION) at 21:40

## 2023-01-01 RX ADMIN — SUGAMMADEX 200 MG: 100 INJECTION, SOLUTION INTRAVENOUS at 09:24

## 2023-01-01 RX ADMIN — POTASSIUM & SODIUM PHOSPHATES POWDER PACK 280-160-250 MG 1 PACKET: 280-160-250 PACK at 08:15

## 2023-01-01 RX ADMIN — DEXAMETHASONE SODIUM PHOSPHATE 2 MG: 4 INJECTION, SOLUTION INTRA-ARTICULAR; INTRALESIONAL; INTRAMUSCULAR; INTRAVENOUS; SOFT TISSUE at 08:06

## 2023-01-01 RX ADMIN — TRETIONIN: 0.25 GEL TOPICAL at 23:04

## 2023-01-01 RX ADMIN — DIPHENHYDRAMINE HYDROCHLORIDE 25 MG: 25 CAPSULE ORAL at 22:45

## 2023-01-01 RX ADMIN — FLUTICASONE PROPIONATE AND SALMETEROL XINAFOATE 1 PUFF: 115; 21 AEROSOL, METERED RESPIRATORY (INHALATION) at 20:08

## 2023-01-01 RX ADMIN — CEFAZOLIN SODIUM 2 G: 2 INJECTION, SOLUTION INTRAVENOUS at 06:14

## 2023-01-01 RX ADMIN — LIDOCAINE PATCH 4% 2 PATCH: 40 PATCH TOPICAL at 19:33

## 2023-01-01 RX ADMIN — Medication 50 MG: at 08:24

## 2023-01-01 RX ADMIN — SALINE NASAL SPRAY 1 SPRAY: 1.5 SOLUTION NASAL at 20:05

## 2023-01-01 RX ADMIN — FLUTICASONE PROPIONATE 1 SPRAY: 50 SPRAY, METERED NASAL at 22:24

## 2023-01-01 RX ADMIN — POTASSIUM & SODIUM PHOSPHATES POWDER PACK 280-160-250 MG 1 PACKET: 280-160-250 PACK at 12:48

## 2023-01-01 RX ADMIN — ALBUTEROL SULFATE 2 PUFF: 90 AEROSOL, METERED RESPIRATORY (INHALATION) at 20:40

## 2023-01-01 RX ADMIN — CHLORHEXIDINE GLUCONATE 15 ML: 1.2 RINSE ORAL at 12:40

## 2023-01-01 RX ADMIN — FAMOTIDINE 20 MG: 20 TABLET ORAL at 08:15

## 2023-01-01 RX ADMIN — DESMOPRESSIN ACETATE 0.1 MG: 0.1 TABLET ORAL at 11:53

## 2023-01-01 RX ADMIN — ENOXAPARIN SODIUM 40 MG: 40 INJECTION SUBCUTANEOUS at 10:31

## 2023-01-01 RX ADMIN — FLUTICASONE PROPIONATE AND SALMETEROL 1 PUFF: 50; 100 POWDER RESPIRATORY (INHALATION) at 08:25

## 2023-01-01 RX ADMIN — Medication 20 MG: at 16:19

## 2023-01-01 RX ADMIN — FLUTICASONE PROPIONATE AND SALMETEROL 1 PUFF: 50; 100 POWDER RESPIRATORY (INHALATION) at 07:58

## 2023-01-01 RX ADMIN — FLUTICASONE PROPIONATE 1 SPRAY: 50 SPRAY, METERED NASAL at 22:03

## 2023-01-01 RX ADMIN — FLUTICASONE PROPIONATE AND SALMETEROL 1 PUFF: 50; 100 POWDER RESPIRATORY (INHALATION) at 20:24

## 2023-01-01 RX ADMIN — POTASSIUM & SODIUM PHOSPHATES POWDER PACK 280-160-250 MG 1 PACKET: 280-160-250 PACK at 12:29

## 2023-01-01 RX ADMIN — SALINE NASAL SPRAY 1 SPRAY: 1.5 SOLUTION NASAL at 12:23

## 2023-01-01 RX ADMIN — LIDOCAINE PATCH 4% 2 PATCH: 40 PATCH TOPICAL at 19:46

## 2023-01-01 RX ADMIN — ACETAMINOPHEN 650 MG: 325 TABLET, FILM COATED ORAL at 03:04

## 2023-01-01 RX ADMIN — DIPHENHYDRAMINE HYDROCHLORIDE 25 MG: 25 CAPSULE ORAL at 09:54

## 2023-01-01 RX ADMIN — FAMOTIDINE 20 MG: 20 TABLET ORAL at 19:58

## 2023-01-01 RX ADMIN — PHENYLEPHRINE HYDROCHLORIDE 100 MCG: 10 INJECTION INTRAVENOUS at 04:14

## 2023-01-01 RX ADMIN — POTASSIUM & SODIUM PHOSPHATES POWDER PACK 280-160-250 MG 1 PACKET: 280-160-250 PACK at 09:54

## 2023-01-01 RX ADMIN — FLUTICASONE PROPIONATE AND SALMETEROL 1 PUFF: 50; 100 POWDER RESPIRATORY (INHALATION) at 20:08

## 2023-01-01 RX ADMIN — Medication 15 ML: at 08:24

## 2023-01-01 RX ADMIN — SODIUM CHLORIDE: 0.9 INJECTION, SOLUTION INTRAVENOUS at 11:36

## 2023-01-01 RX ADMIN — LABETALOL HYDROCHLORIDE 40 MG: 5 INJECTION, SOLUTION INTRAVENOUS at 20:48

## 2023-01-01 RX ADMIN — FLUTICASONE PROPIONATE AND SALMETEROL 1 PUFF: 50; 100 POWDER RESPIRATORY (INHALATION) at 07:46

## 2023-01-01 RX ADMIN — FLUTICASONE PROPIONATE AND SALMETEROL 1 PUFF: 50; 100 POWDER RESPIRATORY (INHALATION) at 20:12

## 2023-01-01 RX ADMIN — FLUTICASONE PROPIONATE AND SALMETEROL XINAFOATE 1 PUFF: 115; 21 AEROSOL, METERED RESPIRATORY (INHALATION) at 20:43

## 2023-01-01 RX ADMIN — LIDOCAINE PATCH 4% 2 PATCH: 40 PATCH TOPICAL at 21:49

## 2023-01-01 RX ADMIN — SODIUM CHLORIDE, POTASSIUM CHLORIDE, SODIUM LACTATE AND CALCIUM CHLORIDE: 600; 310; 30; 20 INJECTION, SOLUTION INTRAVENOUS at 12:35

## 2023-01-01 RX ADMIN — SALINE NASAL SPRAY 1 SPRAY: 1.5 SOLUTION NASAL at 20:41

## 2023-01-01 RX ADMIN — DEXMEDETOMIDINE HYDROCHLORIDE 1.2 MCG/KG/HR: 400 INJECTION INTRAVENOUS at 14:11

## 2023-01-01 RX ADMIN — SALINE NASAL SPRAY 1 SPRAY: 1.5 SOLUTION NASAL at 16:03

## 2023-01-01 RX ADMIN — ACETAMINOPHEN 650 MG: 325 TABLET, FILM COATED ORAL at 02:11

## 2023-01-01 RX ADMIN — IPRATROPIUM BROMIDE 0.5 MG: 0.5 SOLUTION RESPIRATORY (INHALATION) at 07:34

## 2023-01-01 RX ADMIN — FENTANYL CITRATE 50 MCG: 50 INJECTION INTRAMUSCULAR; INTRAVENOUS at 09:34

## 2023-01-01 RX ADMIN — PHENYLEPHRINE HYDROCHLORIDE 0.5 MCG/KG/MIN: 10 INJECTION INTRAVENOUS at 08:15

## 2023-01-01 RX ADMIN — FLUTICASONE PROPIONATE AND SALMETEROL 1 PUFF: 50; 100 POWDER RESPIRATORY (INHALATION) at 22:00

## 2023-01-01 RX ADMIN — FENTANYL CITRATE 100 MCG: 50 INJECTION INTRAMUSCULAR; INTRAVENOUS at 10:06

## 2023-01-01 RX ADMIN — SALINE NASAL SPRAY 1 SPRAY: 1.5 SOLUTION NASAL at 02:01

## 2023-01-01 RX ADMIN — ALBUTEROL SULFATE 2.5 MG: 2.5 SOLUTION RESPIRATORY (INHALATION) at 04:44

## 2023-01-01 RX ADMIN — Medication 1 TABLET: at 08:54

## 2023-01-01 RX ADMIN — ACETAMINOPHEN 975 MG: 325 TABLET, FILM COATED ORAL at 12:13

## 2023-01-01 RX ADMIN — DIPHENHYDRAMINE HYDROCHLORIDE 25 MG: 25 CAPSULE ORAL at 16:30

## 2023-01-01 RX ADMIN — IPRATROPIUM BROMIDE 0.5 MG: 0.5 SOLUTION RESPIRATORY (INHALATION) at 20:24

## 2023-01-01 RX ADMIN — DIPHENHYDRAMINE HYDROCHLORIDE 25 MG: 25 CAPSULE ORAL at 00:22

## 2023-01-01 RX ADMIN — IPRATROPIUM BROMIDE AND ALBUTEROL SULFATE 3 ML: .5; 3 SOLUTION RESPIRATORY (INHALATION) at 20:43

## 2023-01-01 RX ADMIN — ACETAMINOPHEN 650 MG: 325 TABLET, FILM COATED ORAL at 22:11

## 2023-01-01 RX ADMIN — DEXMEDETOMIDINE HYDROCHLORIDE 1.2 MCG/KG/HR: 400 INJECTION INTRAVENOUS at 05:06

## 2023-01-01 RX ADMIN — ALBUTEROL SULFATE 2.5 MG: 2.5 SOLUTION RESPIRATORY (INHALATION) at 08:53

## 2023-01-01 RX ADMIN — ALBUTEROL SULFATE 2 PUFF: 90 AEROSOL, METERED RESPIRATORY (INHALATION) at 19:59

## 2023-01-01 RX ADMIN — SALINE NASAL SPRAY 1 SPRAY: 1.5 SOLUTION NASAL at 16:28

## 2023-01-01 RX ADMIN — INSULIN ASPART 1 UNITS: 100 INJECTION, SOLUTION INTRAVENOUS; SUBCUTANEOUS at 00:33

## 2023-01-01 RX ADMIN — ALBUTEROL SULFATE 2 PUFF: 90 AEROSOL, METERED RESPIRATORY (INHALATION) at 09:38

## 2023-01-01 RX ADMIN — SALINE NASAL SPRAY 1 SPRAY: 1.5 SOLUTION NASAL at 19:15

## 2023-01-01 RX ADMIN — SIMETHICONE 80 MG: 80 TABLET, CHEWABLE ORAL at 23:54

## 2023-01-01 RX ADMIN — SALINE NASAL SPRAY 1 SPRAY: 1.5 SOLUTION NASAL at 14:15

## 2023-01-01 RX ADMIN — DEXTROSE MONOHYDRATE: 50 INJECTION, SOLUTION INTRAVENOUS at 10:04

## 2023-01-01 RX ADMIN — Medication 1 TABLET: at 08:28

## 2023-01-01 RX ADMIN — DIPHENHYDRAMINE HYDROCHLORIDE 25 MG: 25 CAPSULE ORAL at 08:59

## 2023-01-01 RX ADMIN — IPRATROPIUM BROMIDE AND ALBUTEROL SULFATE 3 ML: .5; 3 SOLUTION RESPIRATORY (INHALATION) at 20:42

## 2023-01-01 RX ADMIN — ACETAMINOPHEN 650 MG: 325 TABLET, FILM COATED ORAL at 01:33

## 2023-01-01 RX ADMIN — SALINE NASAL SPRAY 1 SPRAY: 1.5 SOLUTION NASAL at 21:50

## 2023-01-01 RX ADMIN — SALINE NASAL SPRAY 1 SPRAY: 1.5 SOLUTION NASAL at 20:54

## 2023-01-01 RX ADMIN — FAMOTIDINE 20 MG: 20 TABLET ORAL at 08:11

## 2023-01-01 RX ADMIN — Medication 20 MG: at 10:43

## 2023-01-01 RX ADMIN — PIPERACILLIN SODIUM AND TAZOBACTAM SODIUM 4.5 G: 4; .5 INJECTION, POWDER, LYOPHILIZED, FOR SOLUTION INTRAVENOUS at 06:11

## 2023-01-01 RX ADMIN — POTASSIUM & SODIUM PHOSPHATES POWDER PACK 280-160-250 MG 1 PACKET: 280-160-250 PACK at 16:19

## 2023-01-01 RX ADMIN — PIPERACILLIN SODIUM AND TAZOBACTAM SODIUM 4.5 G: 4; .5 INJECTION, POWDER, LYOPHILIZED, FOR SOLUTION INTRAVENOUS at 18:35

## 2023-01-01 RX ADMIN — LABETALOL HYDROCHLORIDE 20 MG: 5 INJECTION, SOLUTION INTRAVENOUS at 03:19

## 2023-01-01 RX ADMIN — DIPHENHYDRAMINE HYDROCHLORIDE 25 MG: 25 CAPSULE ORAL at 20:09

## 2023-01-01 RX ADMIN — CEFAZOLIN SODIUM 2 G: 2 INJECTION, SOLUTION INTRAVENOUS at 05:31

## 2023-01-01 RX ADMIN — LEVOTHYROXINE SODIUM 100 MCG: 100 TABLET ORAL at 08:28

## 2023-01-01 RX ADMIN — FAMOTIDINE 20 MG: 20 TABLET ORAL at 08:52

## 2023-01-01 RX ADMIN — POTASSIUM & SODIUM PHOSPHATES POWDER PACK 280-160-250 MG 1 PACKET: 280-160-250 PACK at 08:35

## 2023-01-01 RX ADMIN — FLUTICASONE PROPIONATE 1 SPRAY: 50 SPRAY, METERED NASAL at 20:41

## 2023-01-01 RX ADMIN — POLYETHYLENE GLYCOL 3350 17 G: 17 POWDER, FOR SOLUTION ORAL at 08:06

## 2023-01-01 RX ADMIN — FLUTICASONE PROPIONATE AND SALMETEROL XINAFOATE 1 PUFF: 115; 21 AEROSOL, METERED RESPIRATORY (INHALATION) at 20:56

## 2023-01-01 RX ADMIN — FENTANYL CITRATE 100 MCG: 50 INJECTION INTRAMUSCULAR; INTRAVENOUS at 08:24

## 2023-01-01 RX ADMIN — HYDROCORTISONE 5 MG: 5 TABLET ORAL at 14:08

## 2023-01-01 RX ADMIN — HEPARIN SODIUM 5000 UNITS: 5000 INJECTION, SOLUTION INTRAVENOUS; SUBCUTANEOUS at 15:46

## 2023-01-01 RX ADMIN — DEXTROSE AND SODIUM CHLORIDE: 5; 450 INJECTION, SOLUTION INTRAVENOUS at 17:10

## 2023-01-01 RX ADMIN — ACETAMINOPHEN 650 MG: 325 TABLET, FILM COATED ORAL at 20:40

## 2023-01-01 RX ADMIN — CEFAZOLIN 500 MG: 225 INJECTION, POWDER, FOR SOLUTION INTRAMUSCULAR; INTRAVENOUS at 10:00

## 2023-01-01 RX ADMIN — ACETAMINOPHEN 650 MG: 325 TABLET, FILM COATED ORAL at 21:13

## 2023-01-01 RX ADMIN — ACETAMINOPHEN 650 MG: 325 TABLET, FILM COATED ORAL at 21:05

## 2023-01-01 RX ADMIN — IPRATROPIUM BROMIDE 0.5 MG: 0.5 SOLUTION RESPIRATORY (INHALATION) at 08:34

## 2023-01-01 RX ADMIN — DEXAMETHASONE SODIUM PHOSPHATE 2 MG: 4 INJECTION, SOLUTION INTRA-ARTICULAR; INTRALESIONAL; INTRAMUSCULAR; INTRAVENOUS; SOFT TISSUE at 19:36

## 2023-01-01 RX ADMIN — FENTANYL CITRATE 100 MCG: 50 INJECTION INTRAMUSCULAR; INTRAVENOUS at 11:21

## 2023-01-01 RX ADMIN — FLUTICASONE PROPIONATE 1 SPRAY: 50 SPRAY, METERED NASAL at 08:18

## 2023-01-01 RX ADMIN — Medication 0.15 MG: at 08:44

## 2023-01-01 RX ADMIN — FLUTICASONE PROPIONATE AND SALMETEROL XINAFOATE 1 PUFF: 115; 21 AEROSOL, METERED RESPIRATORY (INHALATION) at 20:54

## 2023-01-01 RX ADMIN — PHENYLEPHRINE HYDROCHLORIDE 100 MCG: 10 INJECTION INTRAVENOUS at 20:05

## 2023-01-01 RX ADMIN — IPRATROPIUM BROMIDE AND ALBUTEROL SULFATE 3 ML: .5; 3 SOLUTION RESPIRATORY (INHALATION) at 16:48

## 2023-01-01 RX ADMIN — IPRATROPIUM BROMIDE AND ALBUTEROL SULFATE 3 ML: .5; 3 SOLUTION RESPIRATORY (INHALATION) at 01:10

## 2023-01-01 RX ADMIN — ACETAMINOPHEN 650 MG: 325 TABLET, FILM COATED ORAL at 08:13

## 2023-01-01 RX ADMIN — INSULIN ASPART 2 UNITS: 100 INJECTION, SOLUTION INTRAVENOUS; SUBCUTANEOUS at 04:26

## 2023-01-01 RX ADMIN — ACETAMINOPHEN 650 MG: 325 TABLET, FILM COATED ORAL at 22:14

## 2023-01-01 RX ADMIN — IPRATROPIUM BROMIDE AND ALBUTEROL SULFATE 3 ML: .5; 3 SOLUTION RESPIRATORY (INHALATION) at 08:29

## 2023-01-01 RX ADMIN — ACETAMINOPHEN 975 MG: 325 TABLET, FILM COATED ORAL at 20:29

## 2023-01-01 RX ADMIN — ALBUTEROL SULFATE 2 PUFF: 90 AEROSOL, METERED RESPIRATORY (INHALATION) at 19:55

## 2023-01-01 RX ADMIN — Medication 1 TABLET: at 11:59

## 2023-01-01 RX ADMIN — HYDROCORTISONE 5 MG: 5 TABLET ORAL at 13:55

## 2023-01-01 RX ADMIN — IPRATROPIUM BROMIDE 0.5 MG: 0.5 SOLUTION RESPIRATORY (INHALATION) at 20:00

## 2023-01-01 RX ADMIN — IPRATROPIUM BROMIDE AND ALBUTEROL SULFATE 3 ML: .5; 3 SOLUTION RESPIRATORY (INHALATION) at 07:44

## 2023-01-01 RX ADMIN — SALINE NASAL SPRAY 1 SPRAY: 1.5 SOLUTION NASAL at 20:38

## 2023-01-01 RX ADMIN — FENTANYL CITRATE 100 MCG: 50 INJECTION INTRAMUSCULAR; INTRAVENOUS at 16:34

## 2023-01-01 RX ADMIN — HUMAN ALBUMIN MICROSPHERES AND PERFLUTREN 6 ML: 10; .22 INJECTION, SOLUTION INTRAVENOUS at 09:50

## 2023-01-01 RX ADMIN — DIPHENHYDRAMINE HYDROCHLORIDE 25 MG: 25 CAPSULE ORAL at 08:41

## 2023-01-01 RX ADMIN — LEVETIRACETAM 500 MG: 500 TABLET, FILM COATED ORAL at 22:00

## 2023-01-01 RX ADMIN — LEVOTHYROXINE SODIUM 100 MCG: 100 TABLET ORAL at 08:11

## 2023-01-01 RX ADMIN — ACETAMINOPHEN 650 MG: 325 TABLET, FILM COATED ORAL at 20:03

## 2023-01-01 RX ADMIN — INSULIN ASPART 4 UNITS: 100 INJECTION, SOLUTION INTRAVENOUS; SUBCUTANEOUS at 04:32

## 2023-01-01 RX ADMIN — IPRATROPIUM BROMIDE 0.5 MG: 0.5 SOLUTION RESPIRATORY (INHALATION) at 08:35

## 2023-01-01 RX ADMIN — FLUTICASONE PROPIONATE 1 SPRAY: 50 SPRAY, METERED NASAL at 19:59

## 2023-01-01 RX ADMIN — Medication 5 ML: at 13:36

## 2023-01-01 RX ADMIN — LEVETIRACETAM 500 MG: 5 INJECTION INTRAVENOUS at 07:47

## 2023-01-01 RX ADMIN — FLUTICASONE PROPIONATE 1 SPRAY: 50 SPRAY, METERED NASAL at 19:48

## 2023-01-01 RX ADMIN — LEVETIRACETAM 500 MG: 500 TABLET, FILM COATED ORAL at 20:29

## 2023-01-01 RX ADMIN — IPRATROPIUM BROMIDE 0.5 MG: 0.5 SOLUTION RESPIRATORY (INHALATION) at 20:21

## 2023-01-01 RX ADMIN — SALINE NASAL SPRAY 1 SPRAY: 1.5 SOLUTION NASAL at 12:48

## 2023-01-01 RX ADMIN — IPRATROPIUM BROMIDE AND ALBUTEROL SULFATE 3 ML: .5; 3 SOLUTION RESPIRATORY (INHALATION) at 07:49

## 2023-01-01 RX ADMIN — ACETAMINOPHEN 650 MG: 325 TABLET, FILM COATED ORAL at 22:04

## 2023-01-01 RX ADMIN — ALBUTEROL SULFATE 2 PUFF: 90 AEROSOL, METERED RESPIRATORY (INHALATION) at 08:59

## 2023-01-01 RX ADMIN — INSULIN ASPART 2 UNITS: 100 INJECTION, SOLUTION INTRAVENOUS; SUBCUTANEOUS at 23:36

## 2023-01-01 RX ADMIN — Medication 30 MG: at 09:43

## 2023-01-01 RX ADMIN — Medication 1 TABLET: at 10:19

## 2023-01-01 RX ADMIN — SALINE NASAL SPRAY 1 SPRAY: 1.5 SOLUTION NASAL at 07:59

## 2023-01-01 RX ADMIN — SALINE NASAL SPRAY 1 SPRAY: 1.5 SOLUTION NASAL at 07:51

## 2023-01-01 RX ADMIN — IPRATROPIUM BROMIDE AND ALBUTEROL SULFATE 3 ML: .5; 3 SOLUTION RESPIRATORY (INHALATION) at 16:19

## 2023-01-01 RX ADMIN — IPRATROPIUM BROMIDE AND ALBUTEROL SULFATE 3 ML: .5; 3 SOLUTION RESPIRATORY (INHALATION) at 06:53

## 2023-01-01 RX ADMIN — HYDROCORTISONE 15 MG: 10 TABLET ORAL at 08:52

## 2023-01-01 RX ADMIN — PIPERACILLIN SODIUM AND TAZOBACTAM SODIUM 4.5 G: 4; .5 INJECTION, POWDER, LYOPHILIZED, FOR SOLUTION INTRAVENOUS at 08:27

## 2023-01-01 RX ADMIN — PHENYLEPHRINE HYDROCHLORIDE 50 MCG: 10 INJECTION INTRAVENOUS at 03:48

## 2023-01-01 RX ADMIN — MIDAZOLAM 1 MG: 1 INJECTION INTRAMUSCULAR; INTRAVENOUS at 08:01

## 2023-01-01 RX ADMIN — DESMOPRESSIN ACETATE 0.2 MG: 0.2 TABLET ORAL at 21:58

## 2023-01-01 RX ADMIN — ACETAMINOPHEN 975 MG: 325 TABLET, FILM COATED ORAL at 20:17

## 2023-01-01 RX ADMIN — Medication 20 MG: at 08:17

## 2023-01-01 RX ADMIN — LIDOCAINE PATCH 4% 2 PATCH: 40 PATCH TOPICAL at 00:25

## 2023-01-01 RX ADMIN — LEVETIRACETAM 500 MG: 5 INJECTION INTRAVENOUS at 11:10

## 2023-01-01 RX ADMIN — ACETAMINOPHEN 650 MG: 325 TABLET, FILM COATED ORAL at 15:34

## 2023-01-01 RX ADMIN — ALBUTEROL SULFATE 2 PUFF: 90 AEROSOL, METERED RESPIRATORY (INHALATION) at 19:15

## 2023-01-01 RX ADMIN — PHENYLEPHRINE HYDROCHLORIDE 100 MCG: 10 INJECTION INTRAVENOUS at 16:25

## 2023-01-01 RX ADMIN — FAMOTIDINE 20 MG: 20 TABLET ORAL at 21:10

## 2023-01-01 RX ADMIN — IPRATROPIUM BROMIDE AND ALBUTEROL SULFATE 3 ML: .5; 3 SOLUTION RESPIRATORY (INHALATION) at 09:14

## 2023-01-01 RX ADMIN — HYDRALAZINE HYDROCHLORIDE 20 MG: 20 INJECTION INTRAMUSCULAR; INTRAVENOUS at 21:17

## 2023-01-01 RX ADMIN — FLUTICASONE PROPIONATE AND SALMETEROL XINAFOATE 1 PUFF: 115; 21 AEROSOL, METERED RESPIRATORY (INHALATION) at 08:44

## 2023-01-01 RX ADMIN — DEXMEDETOMIDINE HYDROCHLORIDE 1.2 MCG/KG/HR: 400 INJECTION INTRAVENOUS at 10:43

## 2023-01-01 RX ADMIN — SODIUM CHLORIDE: 0.9 INJECTION, SOLUTION INTRAVENOUS at 13:20

## 2023-01-01 RX ADMIN — Medication 20 MG: at 05:44

## 2023-01-01 RX ADMIN — INSULIN ASPART 1 UNITS: 100 INJECTION, SOLUTION INTRAVENOUS; SUBCUTANEOUS at 03:35

## 2023-01-01 RX ADMIN — PIPERACILLIN SODIUM AND TAZOBACTAM SODIUM 4.5 G: 4; .5 INJECTION, POWDER, LYOPHILIZED, FOR SOLUTION INTRAVENOUS at 01:48

## 2023-01-01 RX ADMIN — DESMOPRESSIN ACETATE 1 MCG: 4 INJECTION, SOLUTION INTRAVENOUS; SUBCUTANEOUS at 12:44

## 2023-01-01 RX ADMIN — SALINE NASAL SPRAY 1 SPRAY: 1.5 SOLUTION NASAL at 16:32

## 2023-01-01 RX ADMIN — LORAZEPAM 1 MG: 2 INJECTION INTRAMUSCULAR; INTRAVENOUS at 11:45

## 2023-01-01 RX ADMIN — HYDROCORTISONE 5 MG: 5 TABLET ORAL at 14:10

## 2023-01-01 RX ADMIN — INSULIN ASPART 1 UNITS: 100 INJECTION, SOLUTION INTRAVENOUS; SUBCUTANEOUS at 00:13

## 2023-01-01 RX ADMIN — DIPHENHYDRAMINE HYDROCHLORIDE 25 MG: 25 CAPSULE ORAL at 08:35

## 2023-01-01 RX ADMIN — Medication 20 MG: at 14:55

## 2023-01-01 RX ADMIN — IPRATROPIUM BROMIDE AND ALBUTEROL SULFATE 3 ML: .5; 3 SOLUTION RESPIRATORY (INHALATION) at 07:54

## 2023-01-01 RX ADMIN — DESMOPRESSIN ACETATE 0.2 MG: 0.2 TABLET ORAL at 19:29

## 2023-01-01 ASSESSMENT — ACTIVITIES OF DAILY LIVING (ADL)
ADLS_ACUITY_SCORE: 40
ADLS_ACUITY_SCORE: 32
ADLS_ACUITY_SCORE: 29
ADLS_ACUITY_SCORE: 29
ADLS_ACUITY_SCORE: 37
ADLS_ACUITY_SCORE: 43
ADLS_ACUITY_SCORE: 42
ADLS_ACUITY_SCORE: 29
ADLS_ACUITY_SCORE: 42
ADLS_ACUITY_SCORE: 29
ADLS_ACUITY_SCORE: 29
ADLS_ACUITY_SCORE: 30
ADLS_ACUITY_SCORE: 29
ADLS_ACUITY_SCORE: 29
ADLS_ACUITY_SCORE: 30
ADLS_ACUITY_SCORE: 30
ADLS_ACUITY_SCORE: 29
ADLS_ACUITY_SCORE: 30
ADLS_ACUITY_SCORE: 41
ADLS_ACUITY_SCORE: 42
ADLS_ACUITY_SCORE: 30
ADLS_ACUITY_SCORE: 44
ADLS_ACUITY_SCORE: 37
ADLS_ACUITY_SCORE: 29
ADLS_ACUITY_SCORE: 39
ADLS_ACUITY_SCORE: 32
ADLS_ACUITY_SCORE: 29
ADLS_ACUITY_SCORE: 29
ADLS_ACUITY_SCORE: 30
ADLS_ACUITY_SCORE: 29
ADLS_ACUITY_SCORE: 29
ADLS_ACUITY_SCORE: 39
ADLS_ACUITY_SCORE: 30
ADLS_ACUITY_SCORE: 40
ADLS_ACUITY_SCORE: 43
ADLS_ACUITY_SCORE: 37
ADLS_ACUITY_SCORE: 29
ADLS_ACUITY_SCORE: 29
ADLS_ACUITY_SCORE: 43
ADLS_ACUITY_SCORE: 29
ADLS_ACUITY_SCORE: 43
ADLS_ACUITY_SCORE: 32
PREVIOUS_RESPONSIBILITIES: MEAL PREP;HOUSEKEEPING;LAUNDRY;SHOPPING;MEDICATION MANAGEMENT;FINANCES
ADLS_ACUITY_SCORE: 30
ADLS_ACUITY_SCORE: 29
ADLS_ACUITY_SCORE: 39
ADLS_ACUITY_SCORE: 29
ADLS_ACUITY_SCORE: 43
ADLS_ACUITY_SCORE: 29
ADLS_ACUITY_SCORE: 30
ADLS_ACUITY_SCORE: 29
ADLS_ACUITY_SCORE: 36
ADLS_ACUITY_SCORE: 30
ADLS_ACUITY_SCORE: 29
ADLS_ACUITY_SCORE: 30
ADLS_ACUITY_SCORE: 41
ADLS_ACUITY_SCORE: 29
ADLS_ACUITY_SCORE: 29
ADLS_ACUITY_SCORE: 40
ADLS_ACUITY_SCORE: 29
ADLS_ACUITY_SCORE: 30
ADLS_ACUITY_SCORE: 29
ADLS_ACUITY_SCORE: 40
ADLS_ACUITY_SCORE: 29
ADLS_ACUITY_SCORE: 30
ADLS_ACUITY_SCORE: 29
ADLS_ACUITY_SCORE: 30
ADLS_ACUITY_SCORE: 30
ADLS_ACUITY_SCORE: 29
ADLS_ACUITY_SCORE: 34
ADLS_ACUITY_SCORE: 30
ADLS_ACUITY_SCORE: 29
ADLS_ACUITY_SCORE: 29
ADLS_ACUITY_SCORE: 30
ADLS_ACUITY_SCORE: 29
ADLS_ACUITY_SCORE: 30
ADLS_ACUITY_SCORE: 39
ADLS_ACUITY_SCORE: 30
ADLS_ACUITY_SCORE: 43
ADLS_ACUITY_SCORE: 43
ADLS_ACUITY_SCORE: 30
ADLS_ACUITY_SCORE: 29
ADLS_ACUITY_SCORE: 38
ADLS_ACUITY_SCORE: 30
ADLS_ACUITY_SCORE: 41
ADLS_ACUITY_SCORE: 30
ADLS_ACUITY_SCORE: 29
ADLS_ACUITY_SCORE: 29
ADLS_ACUITY_SCORE: 37
ADLS_ACUITY_SCORE: 30
ADLS_ACUITY_SCORE: 29
ADLS_ACUITY_SCORE: 29
ADLS_ACUITY_SCORE: 40
ADLS_ACUITY_SCORE: 43
ADLS_ACUITY_SCORE: 29
ADLS_ACUITY_SCORE: 30
ADLS_ACUITY_SCORE: 29
ADLS_ACUITY_SCORE: 38
ADLS_ACUITY_SCORE: 36
ADLS_ACUITY_SCORE: 39
ADLS_ACUITY_SCORE: 29
ADLS_ACUITY_SCORE: 30
ADLS_ACUITY_SCORE: 29
ADLS_ACUITY_SCORE: 30
ADLS_ACUITY_SCORE: 30
ADLS_ACUITY_SCORE: 29
ADLS_ACUITY_SCORE: 34
ADLS_ACUITY_SCORE: 29
ADLS_ACUITY_SCORE: 38
ADLS_ACUITY_SCORE: 30
ADLS_ACUITY_SCORE: 29
ADLS_ACUITY_SCORE: 33
ADLS_ACUITY_SCORE: 29
ADLS_ACUITY_SCORE: 29
ADLS_ACUITY_SCORE: 30
ADLS_ACUITY_SCORE: 29
ADLS_ACUITY_SCORE: 29
ADLS_ACUITY_SCORE: 43
ADLS_ACUITY_SCORE: 30
ADLS_ACUITY_SCORE: 29
ADLS_ACUITY_SCORE: 29
ADLS_ACUITY_SCORE: 30
ADLS_ACUITY_SCORE: 29
ADLS_ACUITY_SCORE: 42
ADLS_ACUITY_SCORE: 44
ADLS_ACUITY_SCORE: 38
ADLS_ACUITY_SCORE: 37
ADLS_ACUITY_SCORE: 30
ADLS_ACUITY_SCORE: 30
ADLS_ACUITY_SCORE: 29
ADLS_ACUITY_SCORE: 37
ADLS_ACUITY_SCORE: 29
ADLS_ACUITY_SCORE: 29
ADLS_ACUITY_SCORE: 39
ADLS_ACUITY_SCORE: 29
ADLS_ACUITY_SCORE: 38
ADLS_ACUITY_SCORE: 29
ADLS_ACUITY_SCORE: 30
ADLS_ACUITY_SCORE: 43
ADLS_ACUITY_SCORE: 29
ADLS_ACUITY_SCORE: 37
ADLS_ACUITY_SCORE: 29
ADLS_ACUITY_SCORE: 32
ADLS_ACUITY_SCORE: 30
ADLS_ACUITY_SCORE: 40
ADLS_ACUITY_SCORE: 29
ADLS_ACUITY_SCORE: 29
ADLS_ACUITY_SCORE: 30
ADLS_ACUITY_SCORE: 29
ADLS_ACUITY_SCORE: 29
ADLS_ACUITY_SCORE: 39
ADLS_ACUITY_SCORE: 33
ADLS_ACUITY_SCORE: 29
ADLS_ACUITY_SCORE: 29
ADLS_ACUITY_SCORE: 30
ADLS_ACUITY_SCORE: 29
ADLS_ACUITY_SCORE: 38
ADLS_ACUITY_SCORE: 30
ADLS_ACUITY_SCORE: 29
ADLS_ACUITY_SCORE: 38
ADLS_ACUITY_SCORE: 38
ADLS_ACUITY_SCORE: 29
ADLS_ACUITY_SCORE: 30
ADLS_ACUITY_SCORE: 29
ADLS_ACUITY_SCORE: 30
ADLS_ACUITY_SCORE: 29
ADLS_ACUITY_SCORE: 40
ADLS_ACUITY_SCORE: 30
ADLS_ACUITY_SCORE: 29
ADLS_ACUITY_SCORE: 40
ADLS_ACUITY_SCORE: 29
ADLS_ACUITY_SCORE: 30
ADLS_ACUITY_SCORE: 30
ADLS_ACUITY_SCORE: 29
ADLS_ACUITY_SCORE: 30
ADLS_ACUITY_SCORE: 29
ADLS_ACUITY_SCORE: 30
ADLS_ACUITY_SCORE: 35
ADLS_ACUITY_SCORE: 37
ADLS_ACUITY_SCORE: 41
ADLS_ACUITY_SCORE: 42
ADLS_ACUITY_SCORE: 29
DEPENDENT_IADLS:: INDEPENDENT
ADLS_ACUITY_SCORE: 30
ADLS_ACUITY_SCORE: 29
ADLS_ACUITY_SCORE: 30
ADLS_ACUITY_SCORE: 29
ADLS_ACUITY_SCORE: 40
ADLS_ACUITY_SCORE: 36
ADLS_ACUITY_SCORE: 30
ADLS_ACUITY_SCORE: 32
ADLS_ACUITY_SCORE: 37
ADLS_ACUITY_SCORE: 30
ADLS_ACUITY_SCORE: 29
ADLS_ACUITY_SCORE: 43
ADLS_ACUITY_SCORE: 39
ADLS_ACUITY_SCORE: 30
ADLS_ACUITY_SCORE: 30
ADLS_ACUITY_SCORE: 40
ADLS_ACUITY_SCORE: 30
ADLS_ACUITY_SCORE: 30
ADLS_ACUITY_SCORE: 34
ADLS_ACUITY_SCORE: 29
ADLS_ACUITY_SCORE: 29
ADLS_ACUITY_SCORE: 43
ADLS_ACUITY_SCORE: 30
ADLS_ACUITY_SCORE: 30
ADLS_ACUITY_SCORE: 29
ADLS_ACUITY_SCORE: 30
ADLS_ACUITY_SCORE: 40
ADLS_ACUITY_SCORE: 29
ADLS_ACUITY_SCORE: 30
ADLS_ACUITY_SCORE: 39
ADLS_ACUITY_SCORE: 37
ADLS_ACUITY_SCORE: 39
ADLS_ACUITY_SCORE: 30
ADLS_ACUITY_SCORE: 30
ADLS_ACUITY_SCORE: 29
ADLS_ACUITY_SCORE: 37
ADLS_ACUITY_SCORE: 29
ADLS_ACUITY_SCORE: 43
ADLS_ACUITY_SCORE: 29
ADLS_ACUITY_SCORE: 29
ADLS_ACUITY_SCORE: 30
ADLS_ACUITY_SCORE: 38
ADLS_ACUITY_SCORE: 29
ADLS_ACUITY_SCORE: 29
ADLS_ACUITY_SCORE: 30
ADLS_ACUITY_SCORE: 30
ADLS_ACUITY_SCORE: 43
ADLS_ACUITY_SCORE: 38
ADLS_ACUITY_SCORE: 30
ADLS_ACUITY_SCORE: 29
ADLS_ACUITY_SCORE: 34
ADLS_ACUITY_SCORE: 29
ADLS_ACUITY_SCORE: 38
ADLS_ACUITY_SCORE: 29
ADLS_ACUITY_SCORE: 30
ADLS_ACUITY_SCORE: 29
ADLS_ACUITY_SCORE: 38
ADLS_ACUITY_SCORE: 29
ADLS_ACUITY_SCORE: 29
ADLS_ACUITY_SCORE: 41
ADLS_ACUITY_SCORE: 30
ADLS_ACUITY_SCORE: 30
ADLS_ACUITY_SCORE: 29
ADLS_ACUITY_SCORE: 41
ADLS_ACUITY_SCORE: 41
ADLS_ACUITY_SCORE: 43
ADLS_ACUITY_SCORE: 29
ADLS_ACUITY_SCORE: 29
ADLS_ACUITY_SCORE: 42
ADLS_ACUITY_SCORE: 29
ADLS_ACUITY_SCORE: 41
ADLS_ACUITY_SCORE: 29
ADLS_ACUITY_SCORE: 29
ADLS_ACUITY_SCORE: 32
ADLS_ACUITY_SCORE: 43
ADLS_ACUITY_SCORE: 30
ADLS_ACUITY_SCORE: 30
ADLS_ACUITY_SCORE: 29
ADLS_ACUITY_SCORE: 29
ADLS_ACUITY_SCORE: 40
ADLS_ACUITY_SCORE: 38
ADLS_ACUITY_SCORE: 29
ADLS_ACUITY_SCORE: 29
ADLS_ACUITY_SCORE: 40
ADLS_ACUITY_SCORE: 37
ADLS_ACUITY_SCORE: 43
ADLS_ACUITY_SCORE: 43
ADLS_ACUITY_SCORE: 30
ADLS_ACUITY_SCORE: 30
ADLS_ACUITY_SCORE: 43
ADLS_ACUITY_SCORE: 43
ADLS_ACUITY_SCORE: 39
ADLS_ACUITY_SCORE: 29
ADLS_ACUITY_SCORE: 29
ADLS_ACUITY_SCORE: 30
ADLS_ACUITY_SCORE: 29
ADLS_ACUITY_SCORE: 30
ADLS_ACUITY_SCORE: 37
ADLS_ACUITY_SCORE: 29
ADLS_ACUITY_SCORE: 43
ADLS_ACUITY_SCORE: 29
ADLS_ACUITY_SCORE: 38
ADLS_ACUITY_SCORE: 29
ADLS_ACUITY_SCORE: 37
ADLS_ACUITY_SCORE: 29
ADLS_ACUITY_SCORE: 30
ADLS_ACUITY_SCORE: 29
ADLS_ACUITY_SCORE: 30
ADLS_ACUITY_SCORE: 30
ADLS_ACUITY_SCORE: 29
ADLS_ACUITY_SCORE: 42
ADLS_ACUITY_SCORE: 29
ADLS_ACUITY_SCORE: 43
ADLS_ACUITY_SCORE: 29
ADLS_ACUITY_SCORE: 30
ADLS_ACUITY_SCORE: 38
ADLS_ACUITY_SCORE: 30
ADLS_ACUITY_SCORE: 29
ADLS_ACUITY_SCORE: 30
ADLS_ACUITY_SCORE: 29
ADLS_ACUITY_SCORE: 33
ADLS_ACUITY_SCORE: 29
ADLS_ACUITY_SCORE: 29
ADLS_ACUITY_SCORE: 40
ADLS_ACUITY_SCORE: 30
ADLS_ACUITY_SCORE: 43
ADLS_ACUITY_SCORE: 37
ADLS_ACUITY_SCORE: 43
ADLS_ACUITY_SCORE: 29
ADLS_ACUITY_SCORE: 43
ADLS_ACUITY_SCORE: 29
ADLS_ACUITY_SCORE: 38
ADLS_ACUITY_SCORE: 43
ADLS_ACUITY_SCORE: 29
ADLS_ACUITY_SCORE: 32
ADLS_ACUITY_SCORE: 40
ADLS_ACUITY_SCORE: 38
ADLS_ACUITY_SCORE: 29
ADLS_ACUITY_SCORE: 29
ADLS_ACUITY_SCORE: 30
ADLS_ACUITY_SCORE: 39
ADLS_ACUITY_SCORE: 29
ADLS_ACUITY_SCORE: 29
ADLS_ACUITY_SCORE: 47
ADLS_ACUITY_SCORE: 29
ADLS_ACUITY_SCORE: 30
ADLS_ACUITY_SCORE: 37
ADLS_ACUITY_SCORE: 29
ADLS_ACUITY_SCORE: 30
ADLS_ACUITY_SCORE: 37
ADLS_ACUITY_SCORE: 29
ADLS_ACUITY_SCORE: 29
ADLS_ACUITY_SCORE: 30
ADLS_ACUITY_SCORE: 30
ADLS_ACUITY_SCORE: 39
ADLS_ACUITY_SCORE: 30
ADLS_ACUITY_SCORE: 29
ADLS_ACUITY_SCORE: 30
ADLS_ACUITY_SCORE: 30
ADLS_ACUITY_SCORE: 29
ADLS_ACUITY_SCORE: 30
ADLS_ACUITY_SCORE: 29
ADLS_ACUITY_SCORE: 29
ADLS_ACUITY_SCORE: 37
ADLS_ACUITY_SCORE: 30
ADLS_ACUITY_SCORE: 30
ADLS_ACUITY_SCORE: 29
ADLS_ACUITY_SCORE: 38
ADLS_ACUITY_SCORE: 29
ADLS_ACUITY_SCORE: 38
ADLS_ACUITY_SCORE: 29
ADLS_ACUITY_SCORE: 40
ADLS_ACUITY_SCORE: 37
ADLS_ACUITY_SCORE: 29
ADLS_ACUITY_SCORE: 30
ADLS_ACUITY_SCORE: 30
ADLS_ACUITY_SCORE: 29
ADLS_ACUITY_SCORE: 47
ADLS_ACUITY_SCORE: 29
ADLS_ACUITY_SCORE: 40
ADLS_ACUITY_SCORE: 29
ADLS_ACUITY_SCORE: 29
ADLS_ACUITY_SCORE: 30
ADLS_ACUITY_SCORE: 29
ADLS_ACUITY_SCORE: 30
ADLS_ACUITY_SCORE: 30
ADLS_ACUITY_SCORE: 29
ADLS_ACUITY_SCORE: 30
ADLS_ACUITY_SCORE: 29
ADLS_ACUITY_SCORE: 30
ADLS_ACUITY_SCORE: 30
ADLS_ACUITY_SCORE: 29
ADLS_ACUITY_SCORE: 33
ADLS_ACUITY_SCORE: 33
ADLS_ACUITY_SCORE: 30
ADLS_ACUITY_SCORE: 32
ADLS_ACUITY_SCORE: 43
ADLS_ACUITY_SCORE: 29
ADLS_ACUITY_SCORE: 29
ADLS_ACUITY_SCORE: 40
ADLS_ACUITY_SCORE: 29
ADLS_ACUITY_SCORE: 40
ADLS_ACUITY_SCORE: 29
ADLS_ACUITY_SCORE: 38
ADLS_ACUITY_SCORE: 29
ADLS_ACUITY_SCORE: 30
ADLS_ACUITY_SCORE: 30
ADLS_ACUITY_SCORE: 29
ADLS_ACUITY_SCORE: 37
ADLS_ACUITY_SCORE: 43
ADLS_ACUITY_SCORE: 29
ADLS_ACUITY_SCORE: 29
ADLS_ACUITY_SCORE: 30
ADLS_ACUITY_SCORE: 30
ADLS_ACUITY_SCORE: 29
ADLS_ACUITY_SCORE: 35
ADLS_ACUITY_SCORE: 30
ADLS_ACUITY_SCORE: 42
ADLS_ACUITY_SCORE: 30
ADLS_ACUITY_SCORE: 38
ADLS_ACUITY_SCORE: 39
ADLS_ACUITY_SCORE: 42
ADLS_ACUITY_SCORE: 29
ADLS_ACUITY_SCORE: 30
ADLS_ACUITY_SCORE: 30
ADLS_ACUITY_SCORE: 29
ADLS_ACUITY_SCORE: 30
ADLS_ACUITY_SCORE: 29
ADLS_ACUITY_SCORE: 47
ADLS_ACUITY_SCORE: 30
ADLS_ACUITY_SCORE: 43
ADLS_ACUITY_SCORE: 30
ADLS_ACUITY_SCORE: 29
ADLS_ACUITY_SCORE: 30
ADLS_ACUITY_SCORE: 29
ADLS_ACUITY_SCORE: 38
ADLS_ACUITY_SCORE: 29
ADLS_ACUITY_SCORE: 30
ADLS_ACUITY_SCORE: 29
ADLS_ACUITY_SCORE: 29
ADLS_ACUITY_SCORE: 40
ADLS_ACUITY_SCORE: 29
ADLS_ACUITY_SCORE: 40
ADLS_ACUITY_SCORE: 30
ADLS_ACUITY_SCORE: 37
ADLS_ACUITY_SCORE: 30
ADLS_ACUITY_SCORE: 37
ADLS_ACUITY_SCORE: 37
ADLS_ACUITY_SCORE: 29
ADLS_ACUITY_SCORE: 43
ADLS_ACUITY_SCORE: 37
ADLS_ACUITY_SCORE: 29
ADLS_ACUITY_SCORE: 35
ADLS_ACUITY_SCORE: 29
ADLS_ACUITY_SCORE: 30
ADLS_ACUITY_SCORE: 29
ADLS_ACUITY_SCORE: 43
ADLS_ACUITY_SCORE: 39
ADLS_ACUITY_SCORE: 36
ADLS_ACUITY_SCORE: 39
ADLS_ACUITY_SCORE: 37
ADLS_ACUITY_SCORE: 29
ADLS_ACUITY_SCORE: 39
ADLS_ACUITY_SCORE: 29
ADLS_ACUITY_SCORE: 30
ADLS_ACUITY_SCORE: 36
ADLS_ACUITY_SCORE: 30
ADLS_ACUITY_SCORE: 29
ADLS_ACUITY_SCORE: 29
ADLS_ACUITY_SCORE: 40
ADLS_ACUITY_SCORE: 29
ADLS_ACUITY_SCORE: 36
ADLS_ACUITY_SCORE: 29
ADLS_ACUITY_SCORE: 40
ADLS_ACUITY_SCORE: 42
ADLS_ACUITY_SCORE: 29
ADLS_ACUITY_SCORE: 30
ADLS_ACUITY_SCORE: 38
ADLS_ACUITY_SCORE: 37
ADLS_ACUITY_SCORE: 29
ADLS_ACUITY_SCORE: 29
ADLS_ACUITY_SCORE: 30
ADLS_ACUITY_SCORE: 41
ADLS_ACUITY_SCORE: 43
ADLS_ACUITY_SCORE: 29
ADLS_ACUITY_SCORE: 30
ADLS_ACUITY_SCORE: 30
ADLS_ACUITY_SCORE: 29
ADLS_ACUITY_SCORE: 29
ADLS_ACUITY_SCORE: 36
ADLS_ACUITY_SCORE: 29
ADLS_ACUITY_SCORE: 29
ADLS_ACUITY_SCORE: 35
ADLS_ACUITY_SCORE: 30
ADLS_ACUITY_SCORE: 39
ADLS_ACUITY_SCORE: 29
ADLS_ACUITY_SCORE: 45
ADLS_ACUITY_SCORE: 29
ADLS_ACUITY_SCORE: 29
ADLS_ACUITY_SCORE: 30
ADLS_ACUITY_SCORE: 42
ADLS_ACUITY_SCORE: 40
ADLS_ACUITY_SCORE: 38
ADLS_ACUITY_SCORE: 29
ADLS_ACUITY_SCORE: 30
ADLS_ACUITY_SCORE: 33
ADLS_ACUITY_SCORE: 29
ADLS_ACUITY_SCORE: 30
ADLS_ACUITY_SCORE: 29
ADLS_ACUITY_SCORE: 29
ADLS_ACUITY_SCORE: 39
ADLS_ACUITY_SCORE: 30
ADLS_ACUITY_SCORE: 29
ADLS_ACUITY_SCORE: 29
ADLS_ACUITY_SCORE: 42
ADLS_ACUITY_SCORE: 30
ADLS_ACUITY_SCORE: 45
ADLS_ACUITY_SCORE: 29
ADLS_ACUITY_SCORE: 29
ADLS_ACUITY_SCORE: 43
ADLS_ACUITY_SCORE: 30
ADLS_ACUITY_SCORE: 29
ADLS_ACUITY_SCORE: 29
ADLS_ACUITY_SCORE: 30
ADLS_ACUITY_SCORE: 30
ADLS_ACUITY_SCORE: 40
ADLS_ACUITY_SCORE: 37
ADLS_ACUITY_SCORE: 37
ADLS_ACUITY_SCORE: 29
ADLS_ACUITY_SCORE: 37
ADLS_ACUITY_SCORE: 29
ADLS_ACUITY_SCORE: 37
ADLS_ACUITY_SCORE: 29
ADLS_ACUITY_SCORE: 29
ADLS_ACUITY_SCORE: 43
ADLS_ACUITY_SCORE: 45
ADLS_ACUITY_SCORE: 29
ADLS_ACUITY_SCORE: 30
ADLS_ACUITY_SCORE: 29
ADLS_ACUITY_SCORE: 42
ADLS_ACUITY_SCORE: 43
ADLS_ACUITY_SCORE: 29
ADLS_ACUITY_SCORE: 29
ADLS_ACUITY_SCORE: 30
ADLS_ACUITY_SCORE: 45
ADLS_ACUITY_SCORE: 43
ADLS_ACUITY_SCORE: 29
ADLS_ACUITY_SCORE: 29
ADLS_ACUITY_SCORE: 41
ADLS_ACUITY_SCORE: 29
ADLS_ACUITY_SCORE: 43
ADLS_ACUITY_SCORE: 34
ADLS_ACUITY_SCORE: 29
ADLS_ACUITY_SCORE: 39
ADLS_ACUITY_SCORE: 29
ADLS_ACUITY_SCORE: 30
ADLS_ACUITY_SCORE: 29
ADLS_ACUITY_SCORE: 30
ADLS_ACUITY_SCORE: 42
ADLS_ACUITY_SCORE: 40
ADLS_ACUITY_SCORE: 30
ADLS_ACUITY_SCORE: 30
ADLS_ACUITY_SCORE: 41
ADLS_ACUITY_SCORE: 32
ADLS_ACUITY_SCORE: 29
ADLS_ACUITY_SCORE: 38
ADLS_ACUITY_SCORE: 47
ADLS_ACUITY_SCORE: 42
ADLS_ACUITY_SCORE: 29
ADLS_ACUITY_SCORE: 30
ADLS_ACUITY_SCORE: 29
ADLS_ACUITY_SCORE: 43
ADLS_ACUITY_SCORE: 39
ADLS_ACUITY_SCORE: 29
ADLS_ACUITY_SCORE: 29
ADLS_ACUITY_SCORE: 41
ADLS_ACUITY_SCORE: 30
ADLS_ACUITY_SCORE: 38
ADLS_ACUITY_SCORE: 29
ADLS_ACUITY_SCORE: 30
ADLS_ACUITY_SCORE: 43
ADLS_ACUITY_SCORE: 39
ADLS_ACUITY_SCORE: 29
ADLS_ACUITY_SCORE: 32
ADLS_ACUITY_SCORE: 29
ADLS_ACUITY_SCORE: 36
ADLS_ACUITY_SCORE: 30
ADLS_ACUITY_SCORE: 29
ADLS_ACUITY_SCORE: 43
ADLS_ACUITY_SCORE: 37
ADLS_ACUITY_SCORE: 43
ADLS_ACUITY_SCORE: 37
ADLS_ACUITY_SCORE: 35
ADLS_ACUITY_SCORE: 29
ADLS_ACUITY_SCORE: 43
ADLS_ACUITY_SCORE: 30
ADLS_ACUITY_SCORE: 30
ADLS_ACUITY_SCORE: 37
ADLS_ACUITY_SCORE: 29
ADLS_ACUITY_SCORE: 30
ADLS_ACUITY_SCORE: 38
ADLS_ACUITY_SCORE: 37
ADLS_ACUITY_SCORE: 40
ADLS_ACUITY_SCORE: 30
ADLS_ACUITY_SCORE: 29
ADLS_ACUITY_SCORE: 29
ADLS_ACUITY_SCORE: 38
ADLS_ACUITY_SCORE: 29
ADLS_ACUITY_SCORE: 30
ADLS_ACUITY_SCORE: 36
ADLS_ACUITY_SCORE: 37
ADLS_ACUITY_SCORE: 40
ADLS_ACUITY_SCORE: 29
ADLS_ACUITY_SCORE: 29
ADLS_ACUITY_SCORE: 40
ADLS_ACUITY_SCORE: 38
ADLS_ACUITY_SCORE: 30
ADLS_ACUITY_SCORE: 30
ADLS_ACUITY_SCORE: 40
ADLS_ACUITY_SCORE: 29
ADLS_ACUITY_SCORE: 40
ADLS_ACUITY_SCORE: 29
ADLS_ACUITY_SCORE: 32
ADLS_ACUITY_SCORE: 29
ADLS_ACUITY_SCORE: 30
ADLS_ACUITY_SCORE: 37
ADLS_ACUITY_SCORE: 29
ADLS_ACUITY_SCORE: 29

## 2023-01-01 ASSESSMENT — VISUAL ACUITY
OU: OTHER (SEE COMMENT)
OU: OTHER (SEE COMMENT)
OU: BLURRED VISION
OU: OTHER (SEE COMMENT)
OU: BLURRED VISION;OTHER (SEE COMMENT)
OU: OTHER (SEE COMMENT)
METHOD: SNELLEN - LINEAR
OU: OTHER (SEE COMMENT)
OU: BLURRED VISION;OTHER (SEE COMMENT)
OU: OTHER (SEE COMMENT)
OU: BLURRED VISION
OU: OTHER (SEE COMMENT)
OS_SC: 20/20
OS_SC+: -3
OU: OTHER (SEE COMMENT)
OU: BLURRED VISION;OTHER (SEE COMMENT)
OU: OTHER (SEE COMMENT)
OU: OTHER (SEE COMMENT)
OU: DOUBLE VISION/DIPLOPIA
OU: OTHER (SEE COMMENT)
OU: DOUBLE VISION/DIPLOPIA
OU: OTHER (SEE COMMENT)
OD_SC: NLP
OU: OTHER (SEE COMMENT)

## 2023-01-01 ASSESSMENT — CUP TO DISC RATIO
OS_RATIO: DIFFICULT TO JUDGE
OD_RATIO: DIFFICULT TO JUDGE

## 2023-01-01 ASSESSMENT — TONOMETRY
OS_IOP_MMHG: 20
IOP_METHOD: ICARE
OD_IOP_MMHG: 21

## 2023-01-01 ASSESSMENT — CONF VISUAL FIELD
OD_SUPERIOR_TEMPORAL_RESTRICTION: 1
OS_INFERIOR_NASAL_RESTRICTION: 3
OS_SUPERIOR_NASAL_RESTRICTION: 3
OS_INFERIOR_TEMPORAL_RESTRICTION: 3
OD_INFERIOR_NASAL_RESTRICTION: 1
METHOD: COUNTING FINGERS
OS_SUPERIOR_TEMPORAL_RESTRICTION: 3
OD_SUPERIOR_NASAL_RESTRICTION: 1
OD_INFERIOR_TEMPORAL_RESTRICTION: 1

## 2023-01-01 ASSESSMENT — SLIT LAMP EXAM - LIDS
COMMENTS: NORMAL
COMMENTS: NORMAL

## 2023-01-01 ASSESSMENT — EXTERNAL EXAM - LEFT EYE: OS_EXAM: NORMAL

## 2023-01-01 ASSESSMENT — EXTERNAL EXAM - RIGHT EYE: OD_EXAM: NORMAL

## 2023-10-21 PROBLEM — D49.7 PITUITARY TUMOR: Status: ACTIVE | Noted: 2023-01-01

## 2023-10-21 NOTE — CONSULTS
Initial Psychiatric Consult   Consult date: October 21, 2023         Reason for Consult, requesting source:    Psychiatric consultation received. Chart reviewed. Seen via telehealth by NICOLE Warner CNP on 10/21/2023.     The patient is a 43 year old fe/male who is being evaluated via a video billable telemedicine visit. The patient/guardian has consented to being seen via telemedicine. The provider was in front of a computer at [facility]. The patient was on 6A unit at [facility].      Start time: 1000 Stop time: 1045    The patient/guardian has been notified of the following:     This telemedicine visit is conducted live between you and your clinician. We have found that certain health care needs can be provided without the need for a physical exam. This service lets us provide the care you need with a telemedicine conversation.      Requesting source: Mikel Agrawal        HPI:   Per Dr. Goel's H&P dated 10/21/23: Mirian Cunningham is a 43 year old female w PMHx schizophrenia HTN asthma and known pituitary mass since at least 2018 who was transferred to [facility] for surgical evaluation for her pituitary lesion. She presented to [facility] on 10/6/2023 with multiple complaints including stress, housing insecurity, dizziness, head pressure, and worsening vision in the left eye. 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 Management of her pituitary mass has been complicated by her concomitant mental health and social concerns that have resulted in an inability to provide informed consent for surgery and question as to who an appropriate guardian would be. She does not have an active guardian at this time and there does not appear to be a next of kin available as a willing decision maker. For example, see social work note from Ying  "Peterson 10/10/2023 and 10/16/2023 and Mirian Avery from 10/19/2023. Per psychiatry note from Patel Kebede MD 10/20/23 from [facility] \"Patient does not demonstrate capacity to consent to nor decline treatment of pituitary mass, nor does she demonstrate capacity surrounding treatment of vasogenic edema and therefore should not be allowed to leave the hospital against medical advice without consent of next of kin.\"     Per chart review, she was seen by Neurosurgery in 2018 who recommended surgical resection of the lesion if visual symptoms worsened, but she did not cooperate in vision assessment at that time and did not consent for MRI. Guardianship was obtained sometime later and in 2023 she underwent MRI under sedation after guardian gave consent for forcible sedation. This MRI showed a large sellar/suprasellar mass w effacement of the inferior frontal lobes, hypothalamus, optic chiasm, third ventricle, and midbrain, as well as encasement of the basilar artery and bilateral carotid arteries. [facility] recommended a staged subtotal resection w subsequent chemotherapy and/or radiation. The patient did not agree to the surgery at that time, and during her current admission at [facility] she has stated that she is unsure that she is willing to undergo the surgery. She does not have active guardianship anymore. [facility] was pursued for transfer by [facility] as she had previously been following there, but the patient was declined due to 1) her not providing assent for the offered surgery and 2) lack of safe post-operative discharge plan given her complex psychosocial situation. She was accepted for transfer to [facility].     On arrival to [facility] the patient reports that she has had significant vision loss in both eyes but she is unsure of the time course. She notes that she is concerned about agreeing to surgery due to the risks, but is also very concerned that this mass could kill her. States that she would prefer " non-surgical options for treatment if available. States she would like to know all medications being given to her and why they are being given.    Per Dr. Goel's assessment dated 10/21/23: Mirian Cunningham is a 43 year old female with complex psychosocial situation including paranoid schizophrenia and lack of active guardianship presenting with known large sellar/suprasellar mass who has not yet assented to surgical intervention on this lesion and has no clear medical decision maker for complex decisions. We will involve social work, ethics, and psychiatry teams in operative planning.     10/21/23: Pt currently hospitalized at [facility] on 6A following transfer from [facility].  Patient being evaluated for surgical intervention on large sellar/suprasellar mass.  Patient's bio psychosocial presentation presents more complicated situation secondary to patient's mental health diagnosis as well as lack of guardianship.  Patient deemed to lack capacity for medical decision making. Chart review reflects long history of primarily unmedicated mood/thought disorder.  Patient does have history of civil commitments as well as ECT.  It appears patient has had intermittent guardianship through the [facility] however at the current moment ethics committee has identified a potential next of kin/medical decision-maker as a godmother named Adri currently living in [place]. Thus far psychotropic medication trials found by this writer have included reports all and haloperidol.  Consultation received for psychiatric medication management.         Past Psychiatric History:   Per chart review dated 11/06/2018 per psychiatry:   Chart review indicates a history of schizoaffective disorder. Chart review also indicates a history of self injurious behavior (cutting), multiple overdoses, hospitalizations in [place] and previous history of ECT. Patient reports a history of depression only, reports that her last hospitalization was > 20 years  "ago, believes she has taken fluoxetine in the past (allergy to paroxetine listed in chart) and reports that she has not taken psychotropic medications in years.     Per chart review dated 10/09/2023 per psychiatry: Psychiatry consultation service saw this writer in 2018 on multiple occasions for similar circumstances (tumor with unwillingness/inability to consent to treatment), at that point it was less emergent. A civil commitment was pursued in hopes that aggressive txt of her psychotic symptoms might restore her capacity, she eloped after petition; Apparently a stay of commitment (through [facility]) was granted as the patient returned half a month later due to \"failure to comply with terms of stay\".          Physical ROS:   The 10 point Review of Systems is negative other than noted in the HPI or here.         Family and Social History:   Per chart review dated 11/06/2018: Patient reports that she is originally from [place], [place]. She reports that she completed school through the 10th grade and then went to vocational school. She did not graduate or receive her GED but reports that she was \"gifted\" in school. She has never been  and has no children. Has a sister in [place]. 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 She lives alone in an apartment in [place]. Is on SSDI.     Per chart review dated 10/09/23: Homelessness per chart review, No family listed in chart though notes from 2018 indicate our team was able to speak with family.      Patient states she has effectively been homeless since moving from [place] to [place] (and later to [place]) in 2017. Sister and some nephews in [place], feels wronged by them.     Per ethics committee documentation dated 10/21/23:   Background: (Medical)     43 year old female with history of schizophrenia and active paranoia  known large sellar/suprasellar mass requiring intervention, though not " "emergent at this time      Social:     Per 10/20 Family Medicine inpatient team in [place]: \"Social work received update from Adult Protection & guardianship supervisor at [facility] who have found patient's godmother and contact information was provided. [facility] SW reached out to godmother Adri who reports that she has known the patient since she was 11 years old and previously told patient's mother before she passed that she would assist patient as needed. She states that she is willing to be a decision-maker and assist in any way that she can, however, she is not able to travel and is based in [place]. \"         Medications:      fluticasone-salmeterol  1 puff Inhalation Q12H    [START ON 10/22/2023] influenza quadrivalent (PF) vacc  0.5 mL Intramuscular Prior to discharge    levETIRAcetam  500 mg Oral BID    lidocaine  2 patch Transdermal Q24h    multivitamin w/minerals  1 tablet Oral Daily    omeprazole  20 mg Oral QAM AC    predniSONE  20 mg Oral Daily     Chart review details neuroleptic history: Haldol, Abilify (unknown duration/response)         Physical and Psychiatric Examination:     BP (!) 143/96 (BP Location: Right arm)   Pulse 64   Temp 97.8  F (36.6  C) (Oral)   Resp 14   SpO2 100%   Weight is 0 lbs 0 oz  There is no height or weight on file to calculate BMI.    Physical Exam:  I have reviewed the physical exam as documented by by the medical team and agree with findings and assessment and have no additional findings to add at this time.    Mental Status Exam:  Appearance: awake, alert and appeared as age stated  Attitude:  cooperative  Eye Contact:  good  Mood:  anxious  Affect:  restricted range  Speech:  clear, coherent  Psychomotor Behavior:  no evidence of tardive dyskinesia, dystonia, or tics  Muscle strength and tone: Limited observation 2/2 telehealth   Thought Process: linear however guided by underlying delusions   Associations:  no loose associations  Thought Content:  no evidence of " "suicidal ideation or homicidal ideation and positive for delusions   Insight:   lack of insight  Judgement:  poor  Oriented to:  time, person, and place  Attention Span and Concentration:  intact  Recent and Remote Memory:  recent memory intact   Language: able to name/identify objects without impairment, fluent in English   Fund of Knowledge: Average              DSM-5 Diagnosis:   Psychosis, unspecified   Paranoid Schizophrenia noted to be most recent working diagnosis dated 10/13/23  Schizoaffective disorder, unspecified by history (2004)    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  Patient observed resting in bed with face partially covered with blanket during part of assessment.  Patient presents as anxious and paranoid.  Patient was overall cooperative with assessment and can superficially appear reality based however upon further inquiry delusions elicited.  Patient communicated feeling that while she was living in her apartment there were many unknown intruders/persons that were continually tampering with/poisoning her food and or living in her apartment both while she was present and not present.  Patient also described waking up and seeing \"people looking down at her\".  Patient denies psychiatric diagnoses other than depression/anxiety.  When psychotropic medications were discussed/offered patient stated she \"does not want to complicate things going on in her brain\".  Patient however stated she would be willing to consider medications following surgery.  Patient denied suicidal/homicidal ideation/intent/plan.  Patient did not present as agitated/aggressive or to be acutely responding to internal stimuli.  Patient denies command hallucinations.    Writer feels patient's presentation lacks justification for declaration of psychiatric emergency.  Writer agrees with patient's lack of " "medical decision making capacity and that treatment planning should be deferred to next of kin and/or county appointed representative.  There is no doubt that patient would benefit from consistent administration of neuroleptic medication.  It is writer's opinion that patient's biopsychosocial situation including delusional thought process affecting her ability to address medical situations of significant consequence (blindness), medication, homelessness, and overall lack of current resources secondary to chronic and progressive mental illness justify a petition for civil commitment and [facility].           Summary of Recommendations:   - Would recommend offering Haldol 5 BID for underlying mood/thought disorder. Assess for tolerability. Haldol is available in KEYES formulation and would be beneficial in long term due to chronic medication non-compliance.     - Would also recommend Haldol 5-10 mg PRN for psychosis/anxiety.     - Haldol 5 mg/Benadryl 50 mg/Ativan 2 mg could be used for acute psychiatric emergencies.     - Agree with lack of capacity for medical decision making as documented by ethics team.     - Will further assess in regards to her ability to assist in the naming of a surrogate medical decision maker.     - Agree that pt will benefit from Social work / Care management consult for assistance in determining guardianship and with ultimate discharge planning given her housing insecurity.    - Recommend a petition for civil commitment with [facility] to facilitate mental health support and treatment. Will benefit from case management.     - Will have psychiatry follow up on Monday 10/23/23.     \"This dictation was performed with voice recognition software and may contain errors,  omissions and inadvertent word substitution.\"                    "

## 2023-10-21 NOTE — CONSULTS
"  Ethics Consultation      Note: The purpose of this note, including any accompanying recommendation, is advisory only concerning ethical principles and considerations related to this case. Determination of appropriate patient care decisions is the responsibility of the clinical team in consultation with patients and their decision makers and relevant institutional policy. Legal and policy questions should be addressed with Risk Management.    Date: 10/21/2023     Time:  0110     Attending physician: Mikel Agrawal     Consult requested by: Renetta Goel     Reason for consult: Can surgery be pursued without patient assent if pt does not have decision making capacity     Participants in Consult:      Renetta Goel- Neurosurgery  Bashir Hoffmann- Ethics     Decisional capacity: non decisional per clinical team     Advance directive:  none on file     Health Care Agent: none appointed     Code status: No Order     Background: (Medical)     43 year old female with history of schizophrenia and active paranoia  known large sellar/suprasellar mass requiring intervention, though not emergent at this time      Social:     Per 10/20 Family Medicine inpatient team in [place]: \"Social work received update from Adult Protection & guardianship supervisor at [facility] who have found patient's godmother and contact information was provided. [facility] SW reached out to godmother Adri who reports that she has known the patient since she was 11 years old and previously told patient's mother before she passed that she would assist patient as needed. She states that she is willing to be a decision-maker and assist in any way that she can, however, she is not able to travel and is based in [place]. \"  Experiencing homelessness     Ethical Issue:      Patient without capacity, no present guardian, needing surgical intervention      Ethics Analysis:      Generally, patients have a right to informed consent to treatment. This means that " patients have a right to be given information that is necessary to make a decision about whether to accept or refuse treatment that is unconstrained by a lack of information or knowledge. The purpose of informed consent is to prevent patients from being treated in ways that are inconsistent with their goals and values. The degree of informed consent that is ethically relevant may change depending on circumstances. In emergencies, consent can generally be presumed, (see policy on informed consent.) For routine, high certainty/benefit and low risk/burden interventions, simple consent or implied consent may be sufficient. Full informed consent and shared decision making is required for low certainty and/or high risk/burden treatments.     When patient s cannot decide for themselves we turn to surrogate decision makers to decide on their behalf. The criteria for an appropriate surrogate decision-maker are as follows:      The surrogate should know the patient s deeply held values and beliefs   The surrogate should be familiar with the patient s medical preferences   The surrogate should be willing and able to act as surogate       Because of these criteria, often a family member is in the best position to serve as a surrogate.      Surrogates must be able to use substituted judgment - to set aside their own wishes and make medical decisions they believe the patient would have made if the patient had capacity. If the wishes of the patient are relatively unclear, it is appropriate for surrogates to use the best interest standard - or to act in the medical best interest of the patient. The care team should ensure that all medical decisions that are presented to the surrogate are medically appropriate.      In this case, the patient does not have family present and there is no official guardian, however there may be a well positioned surrogate to act in the patient's best interest.          Recommendations:      - Clearly  "document capacity, and levels in which the patient has capacity to make decisions. The patient may have capacity for simple decision making even if she is unable to make complex decisions.     - The care team must exhaust identifying and contacting potential surrogates, given the level of intervention, and since the intervention is not yet Emergent and would allow for Emergency Exception   ----- Work with interdisciplinary team to contact potential surrogates (kin and loved ones who hold the patient's interest in mind) as there may be a well positioned person (Adri, documented above.)     - Refer to [employer] policy for Surrogate Decision Making, and discuss with Risk Management. Note that [place] is not a state that relies on \"kinship hierarchy.\" The most appropriate surrogate is the person that can represent the patient the best and is willing to make decisions.        Please contact the service if we can be of any further assistance: Joseph: \"Clinical Ethics Consultation Service\" or [facility] \"Ethics\" consult pager [phone].    Bashir Hoffmann DO, MA, EVITA-C  Pronouns: he/him/his  Clinical Ethics  - [facility]     -  Department of Family Medicine and Community Health     "

## 2023-10-21 NOTE — PROGRESS NOTES
SPIRITUAL HEALTH SERVICES  [facility] 6A  ON-CALL VISIT     REFERRAL SOURCE: Follow-up later Saturday   Have given the pt the phone number to the [place] office of the [facility] so pt is empowered to  connect with directly for a visit. They are open Monday - Friday 10am-5pm daily.    PLAN: Unit  follow-up later on Monday with pt about the phone call.     Rev. Xiomara Saeed MDiv, [facility]  Staff    Pager [phone]  * [facility] remains available 24/7 for emergent requests/referrals, either by having the switchboard page the on-call  or by entering an ASAP/STAT consult in Epic (this will also page the on-call ).*

## 2023-10-21 NOTE — CONSULTS
Endocrinology Consult     Mirian Cunningham MRN:[id number] YOB: 1980  Date of Admission:10/21/2023   Primary care provider: No primary care provider on file.     Reason for visit: Brain tumor   Reason for Endocrine consult:     HPI:  Mirian Cunningham is a 43 year old female with PMHx of  schizophrenia HTN asthma and known pituitary mass since at least 2016 who was transferred to [facility] for surgical evaluation for her pituitary lesion. She presented to [facility] on 10/6/2023 with multiple complaints including stress, housing insecurity, dizziness, head pressure, and worsening vision in the left eye. 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     Per the history provided by the patient, seems the pituitary lesion found on CT head without contrast in 2016.  Patient reports that during that time the patient had a fall and subsequently was hearing voices which prompted her primary care provider to obtain a CT head it was remarkable for soft tissue nodule in the sella turcica measuring 1.4 x 1.3 x 1.2 cm the soft tissue projected into the suprasellar cistern and contacting with the optic chiasm.  Since then patient was being followed by yearly CT scans which revealed slight interval increase of the tumor every year.  Due to lack of decision-making capacity and lack of guardianship, surgical evaluation could not be completed all these years.  Based on chart review, labs from January 2023 indicate tumor was likely nonfunctional.     Patient endorses bifrontal headaches which have been present since hospitalization at [facility] in [place].  She also notices having increased chin hair and facial hair growth recently. denies any nausea vomiting, changes in weight, milky discharge from the nipples, acne, excess hand enlargement, change in ring size, shoes size.  She reports having last menstrual period years ago.  She reports  receiving Depo-Provera injection and reports that has not had any periods.  She does not have any IUDs and is not on any oral contraceptives.      Note: Patient was not on any steroids before being hospitalized.  She was started on prednisone 20 mg at [place] for concerns of cerebral edema.     Family history: Denies any family history of endocrine disorders such as thyroid diabetes or pituitary issues.  Denies any cancers in the family    Social history: Denies smoking, alcohol, IV drugs          Relevant labs        Prior Endocrine labs :   IGF 1 (01/2023)158 ( ng/mL)  IGF 1 Z score(01/06/2023)  0.84   LH (01/2023)11.3   Estradiol (01/2023) 24  ACTH (01/2023)37  Cortisol(01/2023) 11.4  ( 10.4 in 2018)   Prolacin (01/2023)12.1  Prolactin 20.2 ( 11/2011)  TSH 1.400 (11/2011)      Relevant studies:    CT head wo 10/2023  Slight interval increased size of the large sellar and suprasellar mass measuring 5.7 x 5.0 x 4.9 cm (AP x TR x CC). The lesion displaces the frontal horn of the right lateral ventricle laterally. New mild edema in the right frontal lobe. Worsened effacement of the suprasellar and interpeduncular cisterns with mild worsened mass effect. No hydrocephalus. No acute hemorrhage or convincing infarct.     MRI brain wo 10/2023:There is a large lobular mass which appears to arise from the sella as noted on the CT scan of the head performed earlier today. This mass measures up to 5.1 x 5.5 x 4.7 cm. This is compared to approximately 2.3 x 2.3 x 1.9 cm when measured similarly on a prior outside MRI of 01/17/2019. There is extension beneath the falx with mass effect on the bilateral frontal lobes, right greater than left. Mass effect effaces the frontal horns of the lateral ventricle. There is vasogenic edema at the margin of the mass in the frontal lobes, also right greater than left. There is mass effect on the roof of the sphenoid sinus, though the bone otherwise appears intact when compared to the  "earlier CT. There is some ill-defined blooming artifact within the substance of the mass suggesting a small amount of hemorrhage. There is posterior mass effect with splaying of the cerebral peduncles. No signal abnormality within the brainstem. There is mass effect on the [place], though flow   voids remain present and flow is demonstrated with T1 hyperintensity artifact on the noncontrast VIBE sequence. No restricted diffusion outside the mass to suggest recent infarct.       Old CT scans:      CT head wo 2014: negative   CT head wo 2016 : interval development of a soft tissue nodule in the sella turcica that now measures 1.4 x 1.3 x 1.2 cm in transverse dimension. This soft tissue nodule is mildly hyperdense. This soft tissue nodule projects into the suprasellar   cistern and contacts the inferior aspect of the optic chiasm.     There is no evidence of acute territorial infarction. There is no evidence of midline shift. The ventricular system and cerebral sulci appear symmetric and unremarkable.       CT head wo 2017 : Read as normal \"No acute intracranial abnormality\".   MRI sella 2018 was attempted but not completed     CT head wo contrast Novemeber 2018 : The ventricles and the sulci are within normal limits for age. No midline shift. At the sella, there is a nodular mass measuring approximately 2.3 cm. No calcifications or defined cystic component. Gray-white matter differentiation is maintained. No acute intracranial hemorrhage. No intra-axial or extra-axial fluid collection. Paranasal sinuses and mastoid air cells are clear.     Ct Head wo contrast 10/2019:Visualized paranasal sinuses: Clear   Skull and orbits: No abnormality noted.   Extra-axial spaces: No abnormal fluid collection, masses, or hemorrhage detected.   Brain: 22 x 27 x 21 mm mildly increased attenuation mass is redemonstrated in the sella turcica. This is concerning for primary pituitary tumor. Lesion shows no significant interval " enlargement as compared to 06/20/2019. No acute intraparenchymal hemorrhage is detected. No focal cortical edema is seen.   Ventricles: Stable configuration       CT head wo contrast 10/2020: 26 x 32 x 33 mm lobulated soft tissue mass arises in the sella turcica and extends into the anterior cranial fossa and dorsally into the interpeduncular cistern region. Lesion shows slight interval growth as compared to 2019 and 2016. No hemorrhagic changes appreciated. No abnormal extra-axial hemorrhage is appreciated.   Brain: No acute intraparenchymal hemorrhage has developed. No focal cortical edema appreciated.     CT head wo contrast 11/2021No acute intracranial process or traumatic injury identified. No discrete intracranial hemorrhage, extra-axial collection or pneumocephalus identified.     Interval increase in size of known pituitary mass, now measuring 46 x 34 mm in AP by transverse dimension compared to 32 x 26 mm on prior. There is prominent associated remodeling of the sella as well as mass effect on the adjacent parenchymal structures, preferentially along the medial aspect of the right temporal lobe and the right cerebral peduncle, as well as on the optic chiasm.               ROS:  All 12 systems were reviewed and negative except as mentioned in HPI          Past Medical/Surgical History:       No past medical history on file.  No past surgical history on file.          Allergies:     Allergies   Allergen Reactions    Powder Difficulty breathing     Per pt, allergy to MILK PROTEIN POWDER but not milk or dairy products    Hydrocodone Fatigue    Mushroom Hives and Difficulty breathing    Other Food Allergy      Food coloring, artificial preservatives, high fructose syrup    Seeds      Poppy seeds, sesame seeds              PTA Meds:     Prior to Admission medications    Not on File              Current Medications:     Current Facility-Administered Medications   Medication    acetaminophen (TYLENOL) tablet 650 mg     albuterol (PROVENTIL HFA/VENTOLIN HFA) inhaler    albuterol (PROVENTIL) neb solution 2.5 mg    fluticasone-salmeterol (ADVAIR) 100-50 MCG/ACT diskus inhaler 1 puff    [START ON 10/22/2023] influenza quadrivalent (PF) vacc (FLUZONE) injection 0.5 mL    levETIRAcetam (KEPPRA) tablet 500 mg    Lidocaine (LIDOCARE) 4 % Patch 2 patch    moisturizing lotion (LUBRIDERM)    multivitamin w/minerals (THERA-VIT-M) tablet 1 tablet    ondansetron (ZOFRAN ODT) ODT tab 4 mg    [START ON 10/22/2023] pantoprazole (PROTONIX) EC tablet 20 mg    predniSONE (DELTASONE) tablet 20 mg    sennosides (SENOKOT) tablet 8.6 mg             Family History:     No family history on file.          Social History:     Social History     Tobacco Use    Smoking status: Not on file    Smokeless tobacco: Not on file   Substance Use Topics    Alcohol use: Not on file               Physical Exam:     /83 (BP Location: Right arm)   Pulse 73   Temp 98.1  F (36.7  C) (Oral)   Resp 14   SpO2 96%     General: NAD  HEENT: EOMI, nonicteric, no cushingoid sppearnce or hand, feet enlargement  Chest: Clear to auscultation bilaterally  Cardio: S1-S2 heard, no M/R/G  Abdomen: Soft, nontender, BS +, no purplish striae present  MSK: No pedal edema  Skin: No rash noted  Neuro: AAOx3, neuro exam grossly nonfocal  Psych: Calm        Labs:     Recent Results (from the past 24 hour(s))   CBC with platelets    Collection Time: 10/21/23  6:00 AM   Result Value Ref Range    WBC Count 9.9 4.0 - 11.0 10e3/uL    RBC Count 4.64 3.80 - 5.20 10e6/uL    Hemoglobin 13.3 11.7 - 15.7 g/dL    Hematocrit 43.2 35.0 - 47.0 %    MCV 93 78 - 100 fL    MCH 28.7 26.5 - 33.0 pg    MCHC 30.8 (L) 31.5 - 36.5 g/dL    RDW 13.9 10.0 - 15.0 %    Platelet Count 332 150 - 450 10e3/uL   Basic metabolic panel    Collection Time: 10/21/23  6:00 AM   Result Value Ref Range    Sodium 139 135 - 145 mmol/L    Potassium 4.4 3.4 - 5.3 mmol/L    Chloride 101 98 - 107 mmol/L    Carbon Dioxide (CO2) 27  22 - 29 mmol/L    Anion Gap 11 7 - 15 mmol/L    Urea Nitrogen 21.6 (H) 6.0 - 20.0 mg/dL    Creatinine 0.78 0.51 - 0.95 mg/dL    GFR Estimate >90 >60 mL/min/1.73m2    Calcium 9.9 8.6 - 10.0 mg/dL    Glucose 123 (H) 70 - 99 mg/dL   Magnesium    Collection Time: 10/21/23  6:00 AM   Result Value Ref Range    Magnesium 2.1 1.7 - 2.3 mg/dL   Phosphorus    Collection Time: 10/21/23  6:00 AM   Result Value Ref Range    Phosphorus 3.8 2.5 - 4.5 mg/dL   Thyroxine total    Collection Time: 10/21/23  6:00 AM   Result Value Ref Range    T4 Total 6.3 4.5 - 11.7 ug/dL   TSH with free T4 reflex    Collection Time: 10/21/23  6:00 AM   Result Value Ref Range    TSH 1.00 0.30 - 4.20 uIU/mL   Prolactin    Collection Time: 10/21/23  6:00 AM   Result Value Ref Range    Prolactin 14 5 - 23 ng/mL   Follicle stimulating hormone    Collection Time: 10/21/23  6:00 AM   Result Value Ref Range    FSH 56.5 mIU/mL   Luteinizing Hormone    Collection Time: 10/21/23  6:00 AM   Result Value Ref Range    Luteinizing Hormone 24.6 mIU/mL   HCG qualitative urine    Collection Time: 10/21/23  9:30 AM   Result Value Ref Range    hCG Urine Qualitative Negative Negative                Assessment and Plan:   Mirian Cunningham is a 43 year old female with PMHx of  Schizophrenia and known sellar mass since 2016 presents worsening vision changes in the left eye, headaches and is being transferred for further evaluation of a pituitary lesion.    # Sellar and Suprasellar mass     Appears to  have this mass since 2016 and has been progressively increasing in size.  Patient has had right-sided vision loss and now progressively worsening left-sided vision.  Surgical work-up could not be completed in the past because of social issues.     Hormonal work-up in January likely suggest nonfunctional tumor.   Prolactin 14  FSH 56.5  LH 24.6  TSH 1  Total T4 6.3      CT head wo : Slight interval increased size of the large sellar and suprasellar mass measuring 5.7 x 5.0 x 4.9  cm (AP x TR x CC)    Patient has been on prednisone 20 mg since being hospitalized in [place] approximately 1 weeks ago.  Serum cortisol in the past has been 11.4 in 2023 January and 10.4 in 2018.     Recommendations:   Would recommend holding tomorrow dose of prednisone 20 mg and obtaining 8 AM cortisol.     Will decide on continuing or holding off on the prednisone based on the cortisol levels.     Can add free T4 and estradiol levels.      Endocrine to continue to follow the patient.          Patient labs, chart, and imaging reviewed.   Discussed with oncall attending.     Rodriguez Lee MD  Endocrine Fellow  [phone]     I, Kimberley Stoll, personally evaluated this patient. I discussed the patient with the fellow and agree with the assessment and plan of care as documented in the fellow's note. I  reviewed vital signs, medications, labs and imaging.    Key Findings: pituitary mass present since 2016 with gradual increase in size and concern for optical compression. So far no indication of hormone secretion. Will eval tomorrow in am for central adrenal insufficiency with morning cortisol prior to prednisone dose    Kimberley Stoll MD  Endocrinology and Diabetes  Telephone contact:  [facility] [phone]  [facility] [phone]

## 2023-10-21 NOTE — H&P
"[facility]       H&P NOTE    HPI: Mirian Cunningham is a 43 year old female w PMHx schizophrenia HTN asthma and known pituitary mass since at least 2018 who was transferred to [facility] for surgical evaluation for her pituitary lesion. She presented to [facility] in [place] on 10/6/2023 with multiple complaints including stress, housing insecurity, dizziness, head pressure, and worsening vision in the left eye. 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 Management of her pituitary mass has been complicated by her concomitant mental health and social concerns that have resulted in an inability to provide informed consent for surgery and question as to who an appropriate guardian would be. She does not have an active guardian at this time and there does not appear to be a next of kin available as a willing decision maker. For example, see social work note from Ying Winkler 10/10/2023 and 10/16/2023 and Mirian Bennie from 10/19/2023. Per psychiatry note from Patel Kebede MD 10/20/23 from [facility] \"Patient does not demonstrate capacity to consent to nor decline treatment of pituitary mass, nor does she demonstrate capacity surrounding treatment of vasogenic edema and therefore should not be allowed to leave the hospital against medical advice without consent of next of kin.\"    Per chart review, she was seen by Neurosurgery in 2018 who recommended surgical resection of the lesion if visual symptoms worsened, but she did not cooperate in vision assessment at that time and did not consent for MRI. Guardianship was obtained sometime later and in 2023 she underwent MRI under sedation after guardian gave consent for forcible sedation. This MRI showed a large sellar/suprasellar mass w effacement of the inferior frontal lobes, hypothalamus, " "optic chiasm, third ventricle, and midbrain, as well as encasement of the basilar artery and bilateral carotid arteries. [facility] recommended a staged subtotal resection w subsequent chemotherapy and/or radiation. The patient did not agree to the surgery at that time, and during her current admission at [facility] she has stated that she is unsure that she is willing to undergo the surgery. She does not have active guardianship anymore. [facility] was pursued for transfer by [facility] as she had previously been following there, but the patient was declined due to 1) her not providing assent for the offered surgery and 2) lack of safe post-operative discharge plan given her complex psychosocial situation. She was accepted for transfer to [facility].    On arrival to [facility] the patient reports that she has had significant vision loss in both eyes but she is unsure of the time course. She notes that she is concerned about agreeing to surgery due to the risks, but is also very concerned that this mass could kill her. States that she would prefer non-surgical options for treatment if available. States she would like to know all medications being given to her and why they are being given.    PAST MEDICAL HISTORY: No past medical history on file.    PAST SURGICAL HISTORY: No past surgical history on file.    FAMILY HISTORY: No family history on file.    SOCIAL HISTORY:   Social History     Tobacco Use    Smoking status: Not on file    Smokeless tobacco: Not on file   Substance Use Topics    Alcohol use: Not on file       MEDICATIONS:    Most recent medication list from [facility] per chart review:  \"Current Facility-Administered Medications   Medication    acetaminophen (Tylenol) tablet 1,000 mg    chlorhexidine (Hibiclens) 4 % liquid    omeprazole (PriLOSEC) capsule 40 mg    fluticasone-salmeterol (Advair) inhaler 100-50 mcg/act    albuterol (Proventil, Ventolin) (2.5 MG/3ML) 0.083% nebulizer solution 2.5 mg    multivitamin " "with minerals tablet 1 Tablet    predniSONE (Deltasone) tablet 20 mg    diclofenac (Voltaren) 1 % topical gel 2 g    psyllium (KONSYL, METAMUCIL) 28 % packet 1 Packet    simethicone (Gas-X) chewable tablet 80 mg    docusate sodium (Colace) capsule 100 mg    benzocaine-menthol (Chloraseptic) 6-10 MG lozenge 1 Lozenge    metoclopramide (Reglan) tablet 10 mg    Or    metoclopramide (Reglan) injection 10 mg    lidocaine (Lidoderm) 5 % patch 1 Patch    LORazepam (Ativan) injection 1 mg    sodium chloride 0.9% IV FLUSH (NS) SYRINGE 3 mL    sodium chloride 0.9% IV LINE FLUSH (NS) BAG 30 mL    polyethylene glycol 3350 (Glycolax, Miralax) packet 1 Packet    aluminum & magnesium hydroxide-simethicone (Maalox, Mylanta) 200-200-20 MG/5ML suspension 30 mL    levETIRAcetam (Keppra) tablet 500 mg    \"    Allergies:  Not on File    Physical exam:   There were no vitals taken for this visit.  CV: mild peripheral edema, extremities well-perfused  PULM: breathing comfortably on room air  ABD: soft, non-distended  NEUROLOGIC:  -- Awake; Alert; oriented x 3  -- Follows commands briskly  -- +repetition, calculation, and naming  -- Speech fluent, spontaneous. No aphasia or dysarthria.  -- no gaze preference. No apparent hemineglect.  Cranial Nerves:  -- R eye blindness; L eye temporal field cut. R RAPD.  -- gaze is intermittently dysconjugate w R exotropia   -- face symmetrical, tongue midline  -- hearing grossly intact bilat  -- Trapezii 5/5 strength bilat symmetric    Motor:  Normal bulk / tone; no tremor, rigidity, or bradykinesia.  No muscle wasting or fasciculations     Delt Bi Tri Hand Flexion/  Extension Iliopsoas Quadriceps Hamstrings Tibialis Anterior Gastroc    C5 C6 C7 C8/T1 L2 L3 L4-S1 L4 S1   R 5 5 5 5 5 5 5 5 5   L 5 5 5 5 5 5 5 5 5     Sensory:  intact to LT x 4 extremities     Reflexes: no hyperreflexia, no Hoffmans, mute Babinski bilaterally      LABS:  No results found for: \"NA\", \"POTASSIUM\", \"CHLORIDE\", \"CO2\", " "\"ANIONGAP\", \"GLC\", \"BUN\", \"CR\", \"GFRESTIMATED\", \"FADUMO\"  Will obtain new labs this am.    IMAGING:  No results found for this or any previous visit (from the past 24 hour(s)).  MRI WO contrast from 10/7/2023 personally reviewed in PACS. Large sellar and suprasellar mass with significant mass effect on bilateral frontal lobes (R>L), hypothalamus, optic chiasm, third ventricle, and midbrain. No ventriculomegaly.     ASSESSMENT:  Mirian Cunningham is a 43 year old female with complex psychosocial situation including paranoid schizophrenia and lack of active guardianship presenting with known large sellar/suprasellar mass who has not yet assented to surgical intervention on this lesion and has no clear medical decision maker for complex decisions. We will involve social work, ethics, and psychiatry teams in operative planning.    Clinically Significant Risk Factors Present on Admission                               # Compression of brain and # Cerebral edema       PLAN:    Obtain outside imaging, or repeat MRI brain wwo (patient refused contrast at [facility])  Pituitary function panel; no cortisol given patient has been on steroids for cerebral edema  q4h neuro exams   Pain control  Keppra 500mg BID for seizure ppx  Will continue prednisone 20mg qd for now  Maintain SBP < 160  Regular diet  Normonatremia   Continue to monitor intake/output  Continue to monitor for fevers and/or signs of infection  Continue glucose checks  Platelets > 100,000  INR < 1.5  Hemoglobin > 8  DVT: SCDs while in bed  Consults  - Ophthalmology consult for baseline vision assessment  - Psychiatry consult for management of schizophrenia   - Ethics consult- patient has been determined by [facility] to not have decision making capacity with regards to her medical treatment, but it is unclear as to whether surgery can be pursued without her assent  - Social work / Care management consult for assistance in determining guardianship and with ultimate " discharge planning given her housing insecurity    The patient was discussed with Dr. Wilburn, neurosurgery staff.    Renetta Goel MD, PhD  PGY-2 Neurosurgery    Please contact neurosurgery resident on call with questions.    Dial * * [phone], enter [phone] when prompted.

## 2023-10-21 NOTE — PLAN OF CARE
Status: Pt transferred to [facility] for surgical eval for her pituitary lesion.  PMHx: schizophrenia, HTN, asthma, pituitary mass  Vitals: VSS on RA  Neuros: A&O x4. Forgetful. R eye blind, L eye with blurry vision. Bilateral nystagmus. R ear with tinnitus. Strength 5/5 throughout. Numbness & tingling to BUE & BLE. Dizziness at times per pt  IV: PIV SL   Labs/Electrolytes:  and 151 this shift. Urine HCG negative  Resp/trach: LS clear  Diet: Regular  Bowel status: Last BM 10/20  : VDSP   Skin: No overt deficits noted  Pain: denied pain  Activity: Up SBA, GB. Denies dizziness but reports she gets. Bed alarm on.  Social: No visitors or phone calls  Plan: Psych, Endocrine, and SW following. Ophthalmology consult in place. Cont with current POC
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
## 2023-10-21 NOTE — PROVIDER NOTIFICATION
"Margarita Barrett CNP was paged w/ regards to DEC assessment consult order on pt.    \"DEC team called with questions & wants clarification on the consult order from the provider. Please reach out to them at [phone].\"  "

## 2023-10-21 NOTE — PROVIDER NOTIFICATION
"NSG was paged @ 1009    \"Pt wants Omeprazole change to pill from suspension\"          NSG Team was paged @ 3179    \"Pt states she is allergic to CT contrast and won't do CT with contrast. Added to her allergy list.\"  "
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
## 2023-10-21 NOTE — PHARMACY-ADMISSION MEDICATION HISTORY
Pharmacy Intern Admission Medication History    Admission medication history is complete. The information provided in this note is only as accurate as the sources available at the time of the update.    Information Source(s): Patient and CareEverywhere/SureScripts via in-person    Pertinent Information: Review medication list from [facility] with patient. Keppra and prednisone both started at OSH in [place] per patient. Had a Rx for prednisone >mo ago but never filled    Changes made to PTA medication list:  Added:   All on list below  Deleted: None  Changed: None    Allergies reviewed with patient and updates made in EHR: no    Medication History Completed By: Dev Nj 10/21/2023 2:53 PM    Prior to Admission medications    Medication Sig Last Dose Taking? Auth Provider Long Term End Date   albuterol (ACCUNEB) 1.25 MG/3ML neb solution Take 1.25 mg by nebulization every 8 hours as needed for shortness of breath, wheezing or cough Past Week Yes Unknown, Entered By History     albuterol (PROAIR HFA/PROVENTIL HFA/VENTOLIN HFA) 108 (90 Base) MCG/ACT inhaler Inhale 2 puffs into the lungs every 6 hours as needed for shortness of breath, wheezing or cough Past Week Yes Unknown, Entered By History     fluticasone (FLONASE) 50 MCG/ACT nasal spray Spray 1 spray into both nostrils 2 times daily Past Week Yes Unknown, Entered By History     fluticasone-salmeterol (ADVAIR) 100-50 MCG/ACT inhaler Inhale 1 puff into the lungs 2 times daily Past Week Yes Unknown, Entered By History     levETIRAcetam (KEPPRA) 500 MG tablet Take 500 mg by mouth 2 times daily 10/21/2023 Yes Unknown, Entered By History     Lidocaine (LIDOCARE) 4 % Patch Place 1 patch onto the skin every 24 hours To prevent lidocaine toxicity, patient should be patch free for 12 hrs daily. Past Week Yes Unknown, Entered By History     predniSONE (DELTASONE) 20 MG tablet Take 20 mg by mouth daily 10/21/2023 Yes Unknown, Entered By History     PRENATAL VIT-FE  FUMARATE-FA PO Take 1 tablet by mouth daily Past Week Yes Unknown, Entered By History     tretinoin (RETIN-A) 0.025 % external cream Apply topically at bedtime Unknown Yes Unknown, Entered By History

## 2023-10-21 NOTE — PROGRESS NOTES
"SPIRITUAL HEALTH SERVICES  [facility] 6C  ON-CALL VISIT     REFERRAL SOURCE: Request with admission     Pt states \"I'm exhausted,\" and continues to speak from her bed with covers up to her mouth.  Pt is pleasant and shares \"I have a brain tumor\" and asks for a prayer of healing ,which I provided.     Pt also shares \"I use to meet with a group of [employer] for Bible study up in [place]. They were no nice. Years ago I studied with a prayer group down here. They were nice too. I'd like to study again and pray with them. I'd like to pray on a daily basis.\"      PLAN: Will contact an Elder from the [employer] who has volunteered to support pt's in the hospital for people of this particular rocio.     Rev. Xiomara Saeed MDiv, [facility]  Staff    Pager [phone]  * [facility] remains available 24/7 for emergent requests/referrals, either by having the switchboard page the on-call  or by entering an ASAP/STAT consult in Epic (this will also page the on-call ).*      "

## 2023-10-22 NOTE — PLAN OF CARE
Status: Pt transferred to [facility] on 10/21 for surgical eval for her pituitary lesion.  PMHx of schizophrenia, HTN, asthma and pituitary mass.   Vitals: VSS on RA. Continue pluse ox in place on ear.   Neuros: AOX4. Forgetful. R eye blind and L eye blurry vision and spots missing from virison. L field cut.  Bilateral nystagmus horizontally and vertically. Auditory hallucination. 5/5 strengthen t/o with GW. Intermittent N/T to bilateral head and bilateral feet.  IV: PIV SL.   Labs/Electrolytes: BG checks X2 and bedtime. 117 at 5 PM. Lab redraw tmr am.   Resp/trach: Denies SOB. LSC.   Diet: Regular and good intake.   Bowel status: Had large BM this shift, per Pt. BS+.   : VDSP to [facility].   Skin: WNL.   Pain: Pain from back, arm and neck and HA. Med not needed.   Activity: Up with SBA and steady on feet. Need BA at all times.   Plan: MRI w/o contrast added today. Pt doesn't want med for MRI d/t claustrophobia and doesn't want to be in MRI more than 15-20 minutes. Continue monitor and follow POC.

## 2023-10-22 NOTE — CONSULTS
Initial Psychiatric Consult   Consult date: October 21, 2023         Reason for Consult, requesting source:    Psychiatric consultation received. Chart reviewed. Seen via telehealth by NICOLE Warner CNP on 10/21/2023.     The patient is a 43 year old fe/male who is being evaluated via a video billable telemedicine visit. The patient/guardian has consented to being seen via telemedicine. The provider was in front of a computer at [facility]. The patient was on 6A unit at [facility].      10/21/23: Start time: 1000 Stop time: 1045  10/22/23: Start time: 1145  Stop time: 1220    The patient/guardian has been notified of the following:     This telemedicine visit is conducted live between you and your clinician. We have found that certain health care needs can be provided without the need for a physical exam. This service lets us provide the care you need with a telemedicine conversation.      Requesting source: Mikel Agrawal        HPI:   Per Dr. Goel's H&P dated 10/21/23: Mirian Cunningham is a 43 year old female w PMHx schizophrenia HTN asthma and known pituitary mass since at least 2018 who was transferred to [facility] for surgical evaluation for her pituitary lesion. She presented to [facility] in [place] on 10/6/2023 with multiple complaints including stress, housing insecurity, dizziness, head pressure, and worsening vision in the left eye. 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 Management of her pituitary mass has been complicated by her concomitant mental health and social concerns that have resulted in an inability to provide informed consent for surgery and question as to who an appropriate guardian would be. She does not have an active guardian at this time and there does not appear to be a next of kin available as a willing decision  "maker. For example, see social work note from Ying Winkler 10/10/2023 and 10/16/2023 and Mirian Avery from 10/19/2023. Per psychiatry note from Patel Kebede MD 10/20/23 from [facility] \"Patient does not demonstrate capacity to consent to nor decline treatment of pituitary mass, nor does she demonstrate capacity surrounding treatment of vasogenic edema and therefore should not be allowed to leave the hospital against medical advice without consent of next of kin.\"     Per chart review, she was seen by Neurosurgery in 2018 who recommended surgical resection of the lesion if visual symptoms worsened, but she did not cooperate in vision assessment at that time and did not consent for MRI. Guardianship was obtained sometime later and in 2023 she underwent MRI under sedation after guardian gave consent for forcible sedation. This MRI showed a large sellar/suprasellar mass w effacement of the inferior frontal lobes, hypothalamus, optic chiasm, third ventricle, and midbrain, as well as encasement of the basilar artery and bilateral carotid arteries. [facility] recommended a staged subtotal resection w subsequent chemotherapy and/or radiation. The patient did not agree to the surgery at that time, and during her current admission at [facility] she has stated that she is unsure that she is willing to undergo the surgery. She does not have active guardianship anymore. [facility] was pursued for transfer by [facility] as she had previously been following there, but the patient was declined due to 1) her not providing assent for the offered surgery and 2) lack of safe post-operative discharge plan given her complex psychosocial situation. She was accepted for transfer to [facility].     On arrival to [facility] the patient reports that she has had significant vision loss in both eyes but she is unsure of the time course. She notes that she is concerned about agreeing to surgery due to the risks, but is also very concerned that " this mass could kill her. States that she would prefer non-surgical options for treatment if available. States she would like to know all medications being given to her and why they are being given.    Per Dr. Goel's assessment dated 10/21/23: Mirian Cunningham is a 43 year old female with complex psychosocial situation including paranoid schizophrenia and lack of active guardianship presenting with known large sellar/suprasellar mass who has not yet assented to surgical intervention on this lesion and has no clear medical decision maker for complex decisions. We will involve social work, ethics, and psychiatry teams in operative planning.     10/21/23: Pt currently hospitalized at [facility] on 6A following transfer from [facility].  Patient being evaluated for surgical intervention on large sellar/suprasellar mass.  Patient's bio psychosocial presentation presents more complicated situation secondary to patient's mental health diagnosis as well as lack of guardianship.  Patient deemed to lack capacity for medical decision making. Chart review reflects long history of primarily unmedicated mood/thought disorder.  Patient does have history of civil commitments as well as ECT.  It appears patient has had intermittent guardianship through the [facility] however at the current moment ethics committee has identified a potential next of kin/medical decision-maker as a godmother named Adri currently living in [place]. Thus far psychotropic medication trials found by this writer have included reports all and haloperidol.  Consultation received for psychiatric medication management.         Past Psychiatric History:   Per chart review dated 11/06/2018 per psychiatry:   Chart review indicates a history of schizoaffective disorder. Chart review also indicates a history of self injurious behavior (cutting), multiple overdoses, hospitalizations in [place] and previous history of ECT. Patient reports a history of depression  "only, reports that her last hospitalization was > 20 years ago, believes she has taken fluoxetine in the past (allergy to paroxetine listed in chart) and reports that she has not taken psychotropic medications in years.     Per chart review dated 10/09/2023 per psychiatry: Psychiatry consultation service saw this writer in 2018 on multiple occasions for similar circumstances (tumor with unwillingness/inability to consent to treatment), at that point it was less emergent. A civil commitment was pursued in hopes that aggressive txt of her psychotic symptoms might restore her capacity, she eloped after petition; Apparently a stay of commitment (through [facility]) was granted as the patient returned half a month later due to \"failure to comply with terms of stay\".          Physical ROS:   The 10 point Review of Systems is negative other than noted in the HPI or here.         Family and Social History:   Per chart review dated 11/06/2018: Patient reports that she is originally from [place], [place]. She reports that she completed school through the 10th grade and then went to vocational school. She did not graduate or receive her GED but reports that she was \"gifted\" in school. She has never been  and has no children. Has a sister in [place]. 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 She lives alone in an apartment in [place]. Is on SSDI.     Per chart review dated 10/09/23: Homelessness per chart review, No family listed in chart though notes from 2018 indicate our team was able to speak with family.      Patient states she has effectively been homeless since moving from [place] to [place] (and later to [place]) in 2017. Sister and some nephews in [place], feels wronged by them.     Per ethics committee documentation dated 10/21/23:   Background: (Medical)     43 year old female with history of schizophrenia and active paranoia  known large " "sellar/suprasellar mass requiring intervention, though not emergent at this time      Social:     Per 10/20 Family Medicine inpatient team in [place]: \"Social work received update from Adult Protection & guardianship supervisor at [facility] who have found patient's godmother and contact information was provided. [facility] SW reached out to godmother Adri who reports that she has known the patient since she was 11 years old and previously told patient's mother before she passed that she would assist patient as needed. She states that she is willing to be a decision-maker and assist in any way that she can, however, she is not able to travel and is based in [place]. \"         Medications:      fluticasone-salmeterol  1 puff Inhalation Q12H    influenza quadrivalent (PF) vacc  0.5 mL Intramuscular Prior to discharge    levETIRAcetam  500 mg Oral BID    lidocaine  2 patch Transdermal Q24h    multivitamin w/minerals  1 tablet Oral Daily    pantoprazole  20 mg Oral QAM AC     Chart review details neuroleptic history: Haldol, Abilify (unknown duration/response)         Physical and Psychiatric Examination:     /73 (BP Location: Left arm)   Pulse 71   Temp 98  F (36.7  C) (Oral)   Resp 14   SpO2 99%   Weight is 0 lbs 0 oz  There is no height or weight on file to calculate BMI.    Physical Exam:  I have reviewed the physical exam as documented by by the medical team and agree with findings and assessment and have no additional findings to add at this time.    Mental Status Exam:  Appearance: awake, alert and appeared as age stated  Attitude:  cooperative  Eye Contact:  good  Mood:  anxious  Affect:  restricted range  Speech:  clear, coherent  Psychomotor Behavior:  no evidence of tardive dyskinesia, dystonia, or tics  Muscle strength and tone: Limited observation 2/2 telehealth   Thought Process: linear however guided by underlying delusions   Associations:  no loose associations  Thought Content:  no evidence of " "suicidal ideation or homicidal ideation and positive for delusions   Insight:   lack of insight  Judgement:  poor  Oriented to:  time, person, and place  Attention Span and Concentration:  intact  Recent and Remote Memory:  recent memory intact   Language: able to name/identify objects without impairment, fluent in English   Fund of Knowledge: Average              DSM-5 Diagnosis:   Psychosis, unspecified   Paranoid Schizophrenia noted to be most recent working diagnosis dated 10/13/23  Schizoaffective disorder, unspecified by history (2004)    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  Patient observed resting in bed with face partially covered with blanket during part of assessment.  Patient presents as anxious and paranoid.  Patient was overall cooperative with assessment and can superficially appear reality based however upon further inquiry delusions elicited.  Patient communicated feeling that while she was living in her apartment there were many unknown intruders/persons that were continually tampering with/poisoning her food and or living in her apartment both while she was present and not present.  Patient also described waking up and seeing \"people looking down at her\".  Patient denies psychiatric diagnoses other than depression/anxiety.  When psychotropic medications were discussed/offered patient stated she \"does not want to complicate things going on in her brain\".  Patient however stated she would be willing to consider medications following surgery.  Patient denied suicidal/homicidal ideation/intent/plan.  Patient did not present as agitated/aggressive or to be acutely responding to internal stimuli.  Patient denies command hallucinations.    Writer feels patient's presentation lacks justification for declaration of psychiatric emergency.  Writer agrees with patient's " "lack of medical decision making capacity and that treatment planning should be deferred to next of kin and/or county appointed representative.  There is no doubt that patient would benefit from consistent administration of neuroleptic medication.  It is writer's opinion that patient's biopsychosocial situation including delusional thought process affecting her ability to address medical situations of significant consequence (blindness), medication, homelessness, and overall lack of current resources secondary to chronic and progressive mental illness justify a petition for civil commitment and [place].     10/22/23: Pt retains the capacity to name her godmother, Adri, as an emergency healthcare proxy if she is PHYSICALLY unable to do so, for instance if she were unconscious. However, upon assessment today, pt continues to demonstrate that she is making medically related decisions based on delusions. For instance, pt stated that she refused a PIV with contrast dye, stating that she suffered significant physical consequences upon prior administration (date/year unknown to pt). Pt reported that she became \"paralyzed from the waist down for 6 months, or at least a few months or weeks\". Pt continued that she also was unable to breath and fell into a coma\". \"I had to completely relearn how to walk\". The report appears unsubstantiated in her medical record.  Pt adamantly states that she will be making ALL medical decisions unassisted unless she is completely incapacitated (unconscious).           Summary of Recommendations:   10/21/23:     - Would recommend offering Haldol 5 BID for underlying mood/thought disorder. Assess for tolerability. Haldol is available in KEYES formulation and would be beneficial in long term due to chronic medication non-compliance. (Pt currently refusing all offered psychotropics).     - Would also recommend Haldol 5-10 mg PRN for psychosis/anxiety.     - Haldol 5 mg/Benadryl 50 mg/Ativan 2 mg could " "be used for acute psychiatric emergencies.     - Agree with lack of capacity for medical decision making as documented by ethics team.     - Will further assess in regards to her ability to assist in the naming of a surrogate medical decision maker.     - Agree that pt will benefit from Social work / Care management consult for assistance in determining guardianship and with ultimate discharge planning given her housing insecurity.    - Recommend a petition for civil commitment with [facility] to facilitate mental health support and treatment. Will benefit from case management.     10/22/23:     - Pt lacks the decision making capacity to complete full Advance Directive, however retains the ability name her next of kin/surrogate medical decision maker when   deemed unconscious.     - Writer has been in discussion with social work staff.    - Please follow [place]'s \"next of kin\" policy.     \"This dictation was performed with voice recognition software and may contain errors,  omissions and inadvertent word substitution.\"                    "

## 2023-10-22 NOTE — PROGRESS NOTES
"Care Management Follow Up    Length of Stay (days): 1    Expected Discharge Date: 10/23/2023     Concerns to be Addressed:  Advanced Care Planning  Patient plan of care discussed at interdisciplinary rounds: Yes    Anticipated Discharge Disposition:  TBD     Anticipated Discharge Services:  TBD  Anticipated Discharge DME:  TBD    Patient/family educated on Medicare website which has current facility and service quality ratings:  N/A  Education Provided on the Discharge Plan:  N/A  Patient/Family in Agreement with the Plan:      Referrals Placed by CM/SW:    Private pay costs discussed: Not applicable    Additional Information:  DELORIS spoke with Psych NP Margarita Barrett to get clarification if patient has capacity to name a surrogate decision maker and complete HCD per Risk Management. Margarita will reassess and update note.     Margarita called SW back and updated on conversation with patient. Per phone call with Margarita and her notes:     \"Pt lacks the decision making capacity to complete full Advance Directive, however retains the ability name her next of kin/surrogate medical decision maker when   deemed unconscious.\"      \"Pt retains the capacity to name her godmother, Adri, as an emergency healthcare proxy if she is PHYSICALLY unable to do so, for instance if she were unconscious. However, upon assessment today, pt continues to demonstrate that she is making medically related decisions based on delusions. For instance, pt stated that she refused a PIV with contrast dye, stating that she suffered significant physical consequences upon prior administration (date/year unknown to pt). Pt reported that she became \"paralyzed from the waist down for 6 months, or at least a few months or weeks\". Pt continued that she also was unable to breath and fell into a coma\". \"I had to completely relearn how to walk\". The report appears unsubstantiated in her medical record.  Pt adamantly states that she will be making ALL medical decisions " "unassisted unless she is completely incapacitated (unconscious).\"     SW spoke with ANS to get in contact with on call . ANS advised to email Kenzie Good with update and guidance on how to move forward with next steps. SW sent email with update.     SW will continue to follow.     Pura Garzon, LUIS ALBERTO, LICSW   10/22/2023       Social Work and Care Management Department     SEARCHABLE in [employer] - search SOCIAL WORK     [place] (0800 - 1630) Saturday and Sunday   Units: 4A, 4C, & 4E Pager: [phone]  Units: 5A & 5C Pager: [phone]  Units: 6A & 6B   Pager: [phone]  Units: 6C Pager: [phone]  Units: 7A & 7B  Pager: [phone]  Units: 7C Pager: [phone]  Unit: [place] ED Pager: [phone]       [facility] ([id number]) Saturday and Sunday    Units: 5 Ortho, 5 Med/Surg & WB ED  Pager: [phone]   Units: 6 Med/Surg, 8A, & 10A ICU  Pager: [phone]     After hours (1630 - 0000) Saturday & Sunday; (4781-0018) Mon-Fri; ([id number]) [facility] Recognized Holidays     Units: ALL  Pager: [phone]                     "

## 2023-10-22 NOTE — PROGRESS NOTES
Endocrine Sign off  Progress Note  Patient: Mirian Cunningham   MRN: [id number]  Date of Service: 10/22/2023         Chief complaint/HPI:   Mirian Cunningham is a 43 year old female with PMHx of  schizophrenia HTN asthma and known pituitary mass since at least 2016 who was transferred to [facility] for surgical evaluation for her pituitary lesion.  Endocrine consulted for assessment of hormonal functions in the setting of pituitary lesion.     Of note, patient has been noticing headaches and worsening vision in the left eye for the past few months.  Patient has been known to have a pituitary mass since 2016 which has been progressively increasing in size.  Due to complex social issues, patient has not been able to get surgical management until now.  Patient also has a history of right eye blindness.  Of note patient has had in the past(January 2023) and seems to be nonfunctional.                Interval  hormonal work-upHistory:     No new interval issues.  Patient had 8 AM cortisol checked which was   Patient had a last13.7.  Patient had her last dose of prednisone 20 yesterday morning.  Patient denies any new complaints today.            Relevant Endocrine  labs        Relevant Imaging   Relevant studies:     CT head wo 10/2023  Slight interval increased size of the large sellar and suprasellar mass measuring 5.7 x 5.0 x 4.9 cm (AP x TR x CC). The lesion displaces the frontal horn of the right lateral ventricle laterally. New mild edema in the right frontal lobe. Worsened effacement of the suprasellar and interpeduncular cisterns with mild worsened mass effect. No hydrocephalus. No acute hemorrhage or convincing infarct.      MRI brain wo 10/2023:There is a large lobular mass which appears to arise from the sella as noted on the CT scan of the head performed earlier today. This mass measures up to 5.1 x 5.5 x 4.7 cm. This is compared to approximately 2.3 x 2.3 x 1.9 cm when measured similarly on a prior outside MRI of  01/17/2019. There is extension beneath the falx with mass effect on the bilateral frontal lobes, right greater than left. Mass effect effaces the frontal horns of the lateral ventricle. There is vasogenic edema at the margin of the mass in the frontal lobes, also right greater than left. There is mass effect on the roof of the sphenoid sinus, though the bone otherwise appears intact when compared to the earlier CT. There is some ill-defined blooming artifact within the substance of the mass suggesting a small amount of hemorrhage. There is posterior mass effect with splaying of the cerebral peduncles. No signal abnormality within the brainstem. There is mass effect on the [place], though flow   voids remain present and flow is demonstrated with T1 hyperintensity artifact on the noncontrast VIBE sequence. No restricted diffusion outside the mass to suggest recent infarct.                     Physical Exam:     /73 (BP Location: Left arm)   Pulse 71   Temp 98  F (36.7  C) (Oral)   Resp 14   SpO2 99%     GENERAL: lying in bed with slight HOB up pillow no distress  SKIN: Visible skin clear.  EYES: Eyes grossly normal to inspection.  RESP: No audible wheeze, cough, No visible retractions or increased work of breathing.    NEURO.  Awake, alert, responds appropriately to questions.  Mentation and speech fluent.  PSYCH: affect normal, and appearance well-groomed.             Data:     Recent Results (from the past 24 hour(s))   Glucose by meter    Collection Time: 10/21/23  4:04 PM   Result Value Ref Range    GLUCOSE BY METER POCT 151 (H) 70 - 99 mg/dL   Glucose by meter    Collection Time: 10/21/23 10:44 PM   Result Value Ref Range    GLUCOSE BY METER POCT 135 (H) 70 - 99 mg/dL   CBC with platelets    Collection Time: 10/22/23  6:18 AM   Result Value Ref Range    WBC Count 9.7 4.0 - 11.0 10e3/uL    RBC Count 4.70 3.80 - 5.20 10e6/uL    Hemoglobin 13.6 11.7 - 15.7 g/dL    Hematocrit 44.9 35.0 - 47.0 %     MCV 96 78 - 100 fL    MCH 28.9 26.5 - 33.0 pg    MCHC 30.3 (L) 31.5 - 36.5 g/dL    RDW 13.8 10.0 - 15.0 %    Platelet Count 311 150 - 450 10e3/uL   Basic metabolic panel    Collection Time: 10/22/23  6:18 AM   Result Value Ref Range    Sodium 140 135 - 145 mmol/L    Potassium 4.6 3.4 - 5.3 mmol/L    Chloride 100 98 - 107 mmol/L    Carbon Dioxide (CO2) 28 22 - 29 mmol/L    Anion Gap 12 7 - 15 mmol/L    Urea Nitrogen 21.1 (H) 6.0 - 20.0 mg/dL    Creatinine 0.82 0.51 - 0.95 mg/dL    GFR Estimate 90 >60 mL/min/1.73m2    Calcium 9.9 8.6 - 10.0 mg/dL    Glucose 87 70 - 99 mg/dL   Magnesium    Collection Time: 10/22/23  6:18 AM   Result Value Ref Range    Magnesium 2.0 1.7 - 2.3 mg/dL   Phosphorus    Collection Time: 10/22/23  6:18 AM   Result Value Ref Range    Phosphorus 3.5 2.5 - 4.5 mg/dL   Cortisol    Collection Time: 10/22/23  6:18 AM   Result Value Ref Range    Cortisol 8.8   ug/dL   Cortisol    Collection Time: 10/22/23  8:10 AM   Result Value Ref Range    Cortisol 13.7   ug/dL              Assessment/Plan:     # Sellar and Suprasellar mass   #Likely Non Functional      Appears to  have this mass since 2016 and has been progressively increasing in size.  Patient has had right-sided vision loss and now progressively worsening left-sided vision.  Surgical work-up could not be completed in the past because of social issues.      1.Hormonal work-up in January likely suggest nonfunctional tumor.   Prolactin 14  FSH 56.5  LH 24.6  TSH 1  Total T4 6.3        Current Hormonal Workup : Estradiol less than 5 pg/mL  FSH 56.5 mIU/mL  Prolactin 14 ng/mL  Free T4 1.31 ng/dL  TSH 1.00 uL     Patient has been on prednisone 20 mg since being hospitalized in [place] approximately 1 weeks ago.   8AM cortisol today is 13.7(10/22/2023)  On other instances patient has had Serum cortisol in the past h>11.4 in 2023 January and 10.4 in 2018.       Recommendations:   Okay to discontinue the prednisone 20 mg for now from endocrine  standpoint.   No further work-up required at the moment.        2.Secondary Ammenorhea:      Last menstrual period years ago, patient denies having any intrauterine device or taking any oral contraceptive pills.   Estradiol less than 5 pg/mL  FSH 56.5 mIU/mL  Prolactin 14     TVUS 2018 : The uterus is enlarged measuring 11.3 x 9.5 x 7.6 cm and contains multiple fibroids. The two largest fibroids measure up to 7.3 and 6.2 cm. The fibroids obscure the endometrium which is not well seen. The ovaries are not identified. No adnexal mass. No pelvic free fluid or fluid collection.     The FSH levels are inappropriately normal for low estradiol levels.       Plan :   Outpatient workup and management for secondary Ammenorhea  Would hold off on Hormonal replacement therapy as of now considering patient might undergo pituitary surgery.       Endocrine to sign off. Please feel free to reach out to us for any additional questions.   Thank you for letting us participate in the care of the patient.      Patient seen and management discussed with the attending.     Rodriguez Lee MD  Endocrine Fellow  Pager # [phone]      I, Kimberley Stoll, evaluated and discussed the patient with the fellow and agree with the assessment and plan of care as documented in the fellow's note. I  reviewed vital signs, medications, labs and imaging and chart notes.    Kimberley Stoll MD  Endocrinology and Diabetes  Telephone contact:  [facility] [place] [phone]  [facility] [phone]

## 2023-10-22 NOTE — PLAN OF CARE
Status: Pt transferred to [facility] on 10/21 for surgical eval for her pituitary lesion.  PMHx: schizophrenia, HTN, asthma, pituitary mass  Vitals: VSS on RA  Neuros: A&O x4. Forgetful. R eye blind, L eye with blurry vision. Bilateral nystagmus. R ear with tinnitus. Strength 5/5 throughout. Intermittent numbness & tingling to BUE & BLE. Dizziness at times per pt  IV: PIV SL   Labs/Electrolytes: BG 87. Other labs WDL  Resp/trach: LS clear  Diet: Regular  Bowel status: Last BM 10/20, passing gas  : VDSP   Skin: WNL  Pain: PRN tylenol for mild headache  Activity: Up SBA, GB. Bed alarm on.  Social: No visitors or phone calls  Plan: MRI w/o contrast added today. Psych, Endocrine, and SW following. Ophthalmology consult in place. Cont with current POC

## 2023-10-22 NOTE — PLAN OF CARE
Status: Pt admitted for surgical evaluation for pituitary lesion. Hx schizophrenia, Pituitary mass  Vitals: VSS on RA  Neuros: A&O x4. Forgetful, R eye blind, L eye with blurry vision. Bilateral nystagmus. R ear tinnitus strength 5/5 throughout. N/T to BUE & BLE.   IV: PIV SL  Labs/Electrolytes: Redraws this AM. Cortisol Draws @0800  Resp/trach: 2L of NC   Diet: Regular diet  Bowel status: Last BM 10/20  : Voiding spontaneously  Skin: intact  Pain: Denies pain  Activity: SBA GB. Bed alarm on  Plan: Psych, Endocrine, and SW following. Ophthalmology consult in place. Continue with current POC

## 2023-10-22 NOTE — PROVIDER NOTIFICATION
"Neurosurgery team was paged     \"helping pt to fill MRI checklist. states she had \"microchips used to track her\". Pt oriented but is delusional at times. Unable to fill out MRI checklist. Please advise.\"  "

## 2023-10-23 NOTE — PROGRESS NOTES
"     Mirian Cunningham is a 43 year old female with the following diagnoses:   1. Optic atrophy    2. 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    She reports that she \"completely lost vision\" in her right eye about 1 year ago.  Prior to that, she reports that the vision in her right eye was not as good as her left, but she had some vision.  Within the last few months, she feels she can only partially see out of her left eye; \"when I look at someone, I can only see a part of them.\"  The vision in her left eye feels clear where she can see, but notes there are parts that seem like they are missing.    Patient is to be evaluated for guardianship given untreated delusional disorder and paranoid schizophrenia that is affecting her medical decision making.     Review of outside testing:  MRI WO Contrast 10/8/23:  IMPRESSION: Interval enlargement of a lobular mass arising from the sella compared to MRI imaging dating back to 01/17/2019. There is vasogenic edema at the margin of the mass with regional mass effect as described above.     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 She presented to the ER on 10/6 requesting a psych evaluation/looking to be admitted due to housing insecurity. Head CT showed enlargement of the known adenoma and possible surrounding edema vs effacement of the adjacent third ventricle. She was admitted with hopes/plans she would be transferred to [facility] for urgent surgery. Also treated with steroids and seizure prophylaxis.     ...Late evening of 10/20 we reached attending neurosurgeon Dr. Wilburn at [facility] " [facility]. Given enlarging brain mass, severe deficits, new brain edema, possible small amount of hemorrhage within the adenoma she will be transferred urgently by BLS.     A high level overview of her brain mass: in November of 2018 patient went to the ER for a fall and a head CT showed a pituitary mass...    Over the past five years the mass has grown extensively. The mass is suprasellar and infrasellar and encapsulating the cavernous sinus. She will need an extensive surgery with a prolonged hospital course. She is also now 3/4 blind with disconjugate gaze and blown pupil on one side (completely blind in right eye, temporal defect in left eye). As above new to this admission is vasogenic brain edema around the mass and \"some ill-defined blooming artifact within the substance of the mass suggesting a small amount of hemorrhage...\"    ...Acquired blindness of one eye (right)  Known and due to brain mass. Patient additionally reports worsening left eye vision as well.    Visit with Dr. 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 RTC: Next available consult with Dr. Collado  2. Symptoms most likely due to intermittent dryness, discussed with patient, recommend OTC artificial tears 1 gtt QID both eyes, in addition may use hot compress for increased meibum secretion both eyes  3. Possible talc retinopathy per Dr. Cota several years ago, was able to obtain some OCT images today which do show deposits like drusen each eye, retina layers do appear intact, foveal contour is normal each eye. Discussed with patient, she is concerned about being dilated today for vision loss and does not wish to have this done today, did educate her about finding a reason for the poor vision right eye in order to appropriately protect the vision " left eye, she agrees, will get her in as previously planned with Dr. Collado for retina eval, consider fluorescein angiography, may benefit from ONH work up with MD after that as well  4. Pt reports [facility] informed her she does not have a pituitary mass, in the imaging tab there is imaging from this in 2019, results unavailable.     Visit with Dr. 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 (patient unable to tolerate recent MRI due to claustrophobia)  Visual field loss does not show complete bitemporal hemianopsia and in fact, it is the nasal field that is more lost on the left. Nonetheless, her profound field losses may well be related to compressive etiologies.  Maculopathy OU (both eyes) is similar to stargardt's but could also represent a medication toxicity. The patient has recently been admitted to psychiatry inpatient unit and is unsure what medications she may have been on.    Plan  Urgent neurosurgical follow up regarding pituitary lesion. Will cc this note to Fay Winn in Neurosurgery.    I spoke with Dr. Collado our retina specialist who agreed to see her in one week with a FA.     MRI studies would be helpful in evaluating the pituitary mass but the patient has been unable to tolerate them.    Past medical history:    Patient Active Problem List   Diagnosis    Pituitary tumor         Medications:   acetaminophen  albuterol  fluticasone  fluticasone-salmeterol  influenza quadrivalent (PF) vacc  levETIRAcetam  Lidocaine  moisturizing lotion  multivitamin w/minerals  ondansetron  pantoprazole  predniSONE  PRENATAL VIT-FE FUMARATE-FA PO  sennosides  tretinoin    Exam:  Uncorrected distance visual acuity was NLP in the right eye and 20/20 -3 in the left eye.  Intraocular pressure was 21 in the right eye and 20 in the left eye using ICare.  Color vision UTT right eye and 11/11 left eye.  Pupils with right APD.     See complete chart note for anterior segment, fundus, and strabismus exam.    Tests ordered and interpreted today:  OCT RNFL:  Right eye: Average RNFL 37 microns.  Significant, diffuse RNFL loss.  Left eye: Average RNFL 49 microns.  Significant, diffuse RNFL loss with some sparing of inferotemporal quadrant.    GTop:  Right eye: UTT NLP eye.  Left eye: Reliable.  Mean deviation -26.1 dB.  Dense, generalized depression partially sparing superonasal quadrant.    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  Mirian has profound visual field loss in both eyes and poor visual acuity in the right eye dating back to 2018.  In addition to her optic neuropathy, she has drusen-like deposits and vascular attenuation throughout each posterior pole (previous question of talc retinopathy vs. Inherited retinal disorder).    Mirian has previously declined surgical resection of her mass.  I urged her to discuss her options with her neurosurgery team.  Her situation is complicated in that she has been determined by [place] to not have decision making capacity with regards to her medical treatment.  She does not have a guardian at this time.  She should follow-up with me in 6-8 weeks pending surgical scheduling.    Counseled extensively that dilation eye drop side effects will wear off within a few hours.    Complete documentation of historical and exam elements from today's encounter can be found in the full encounter summary report (not reduplicated in this progress note). I personally obtained the chief complaint(s) and history of present illness. I confirmed and edited as necessary the review of systems, past medical/surgical history, family history, social history, and examination  findings as document by others; and I examined the patient myself. I personally reviewed the relevant tests, images, and reports as documented above. I formulated and edited as necessary the assessment and plan and discussed the findings and management plan with the patient and family.    Fay Capps, ELIZABETH

## 2023-10-23 NOTE — PLAN OF CARE
"Status:  Pt transferred to [facility] on 10/21 for surgical eval for her pituitary lesion.  PMHx of schizophrenia, HTN, asthma and pituitary mass.   Vitals: VSS on 1 NC continuous pulse ox overnight.  Neuros: A&OX4. Forgetful. R eye blind and L eye blurry vision and spots missing from viison. L field cut.  Bilateral nystagmus. Hx of auditory hallucination. Intermittent N/T to bilateral hands and bilateral feet.   IV: PIV SL  Labs/Electrolytes: Pt refused lab draws and BG this AM and in the afternoon.  Resp/trach: Denies SOB C  Diet: Regular diet  Bowel status: LBM 10/22  : Voiding spontaneously   Skin: WNL  Pain: Rates pain 8/10. PRN tylenol given  Activity: SBA/GB, Bed alarm on.  Plan: MRI w/o contrast scheduled at 5pm today. Pt refused MRI. Pt States \" I will do it tomorrow\". Md aware.Pt did go to Opthalmology clinic this AM.Continue monitor and follow POC. 8AM cortisol lab draw.                                     "

## 2023-10-23 NOTE — PROGRESS NOTES
Care Management Follow Up    Length of Stay (days): 2    Expected Discharge Date: 10/27/2023     Concerns to be Addressed:     Medical Decision making  Patient plan of care discussed at interdisciplinary rounds: Yes    Anticipated Discharge Disposition:  Pt has a return transfer agreement in place with [facility] in [place]     Anticipated Discharge Services:   Return to [facility] when pt's work up is complete at [facility]  Anticipated Discharge DME:   Not applicable at this time    Patient/family educated on Medicare website which has current facility and service quality ratings:  Not applicable  Education Provided on the Discharge Plan:  Not applicable at this time  Patient/Family in Agreement with the Plan:  SW did not discuss return transfer agreement in place with [facility] in [place] as pt will be followed by the 6A RN CC for discharge planning    Referrals Placed by CM/SW:  None  Private pay costs discussed: Not applicable    Additional Information:  Pt has a return transfer agreement on file with [facility] in [place].   Neuro Surgery (Mariaelena Bonilla NP), requested that SW clarify who can make medical decision on this pt's behalf.    Pt does not have a health care directive.  On 10/22/2023, Psychiatry (Margarita Bonilla) consulted.  DELORIS phoned Margarita today to confirm her assessment.  Per discussion, Margarita states that:  -  pt does not have the capacity to complete a health care directive  -  pt does not have the capacity to make medical decisions  -  pt does not have the capacity to name whom she would want to name as her health care decisions maker/agent.  As this is the case, Margarita states that hospital policy should be followed in regards to naming surrogate decision maker.   Policy states the following:  The  list of persons who may be contacted for health care decisions when the person lacks capacity for health care decisions and does not have a  "court appointed guardian, or an Advance Directive or other validated document naming a health care agent, includes:  Spouse, This SW met with pt who states that she has never been   adult children, This SW met with pt who states that she has never had children  Parents, This SW met with pt who states that her parents are   adult siblings, Per 10/21/2023 SW entry written by Adilia Lamb, pt indicated that she had challenging relationships with her siblings who have not treated her well,  pt had stated that siblings blocked her from their phone and want money from pt and pt had indicated that she did not want siblings involved in decision making   adult grandchildren, pt does not have adult grandchildren   Grandparents, This SW met with pt who states that her grandparents are   adult nieces and nephews, Pt tells this SW that she keeps in contact with some of her niece's and nephews and some are \"good and some are bad.\"  Pt states that she does not want her neices/nephews making medical decision for her   an adult who exhibits special care and concern for the person, pt has a \"God Mother\" (Adri) whom pt has identified as someone whom she would want to make medical decisions for her   aunts and uncles  cousins  other relatives in the next degree of kinship.  DELORIS phoned [employer] to consult in regards to if pt's \"God Mother\" (Adri) can be utilized vs contacting nieces/nephews.  DELORIS paged and left message on [employer] cell at 2:27pm and 3:47pm.   DELORIS will await a return call.  DELORIS  Updated Dr. Welch.    DELORIS will continue to follow.    BETH Gutierrez  Social Work, 6A  Phone:  [phone]  Pager:  [phone]  10/23/2023       SANTIAGO Rhodes     "

## 2023-10-23 NOTE — PLAN OF CARE
Status: Pt transferred to [facility] on 10/21 for surgical eval for her pituitary lesion.  PMHx of schizophrenia, HTN, asthma and pituitary mass.   Vitals: VSS on 1L NC. Continuous pluse ox.  Neuros: A&OX4. Forgetful. R eye blind and L eye blurry vision and spots missing from viison. L field cut.  Bilateral nystagmus Auditory hallucination. Intermittent N/T to bilateral hands and bilateral feet.  IV: PIV SL.   Labs/Electrolytes: BG checks X2 and bedtime. Lab redraw tmw am.   Resp/trach: c/o of intermittent SOB, would like nebs ordered BID.  Diet: Regular, good intake.   Bowel status: BS+. LBM 10/22.  : Voiding spontaneously.  Skin: WNL.   Pain: Managed with PRN tylenol x1.  Activity: SBA/GB. Need BA at all times.   Plan: MRI w/o contrast. Continue monitor and follow POC. 8AM cortisol lab draw.

## 2023-10-23 NOTE — PLAN OF CARE
Status:  Pt transferred to [facility] on 10/21 for surgical eval for her pituitary lesion.  PMHx of schizophrenia, HTN, asthma and pituitary mass.   Vitals: VSS on 1 NC continuous pulse ox.  Neuros: A&OX4. Forgetful. R eye blind and L eye blurry vision and spots missing from viison. L field cut.  Bilateral nystagmus Auditory hallucination. Intermittent N/T to bilateral hands and bilateral feet.   IV: PIV SL  Labs/Electrolytes: Redraw at AM  Resp/trach: Denies SOB C  Diet: Regular diet  Bowel status: LBM 10/22  : Voiding spontaneously   Skin: WNL  Pain: Rates pain 8/10. PRN tylenol given  Activity: SBA/GB, Bed alarm on.  Plan: MRI w/o contrast. Continue monitor and follow POC. 8AM cortisol lab draw.

## 2023-10-23 NOTE — PROGRESS NOTES
"CLINICAL NUTRITION SERVICES - ASSESSMENT NOTE     Nutrition Prescription    RECOMMENDATIONS FOR MDs/PROVIDERS TO ORDER:   None at this time.     Malnutrition Status:   Unable to determine due to Lack of NFPE    Recommendations already ordered by Registered Dietitian (RD):   No changes at this time.   Chart reviewed.     Future/Additional Recommendations:   Monitor weight/intake trends.      REASON FOR ASSESSMENT   Mirian Cunningham is a/an 43 year old female assessed by the dietitian for positive Admission Nutrition Risk Screen for:  - \"Reported weight loss of 14-23 lbs and reported decreased appetite.\"    PMHx   Per H&P:   - \"PMHx schizophrenia HTN asthma and known pituitary mass since at least 2018 who was transferred to [facility] for surgical evaluation for her pituitary lesion.\"    NUTRITION HISTORY   Attempted to visit with patient x 1 but unable, chart reviewed. Weight stable, intake adequate if pt consuming 75% of meals ordered.     Skin: Alex score 19 with nutrition marked as adequate per flowsheets.     CURRENT NUTRITION ORDERS   Diet: Regular    Intake/Tolerance: Per flowsheets, 1 meal documented since admission at 75% intake. Per 2 day average, pt is ordering 3500 kcal and 120 g protein per [email or web address].    LABS   Labs Reviewed   10/21/23 06:00 10/22/23 06:18   Sodium 139 140   Potassium 4.4 4.6   Creatinine 0.78 0.82   GFR Estimate >90 90   Magnesium 2.1 2.0   Phosphorus 3.8 3.5   Hemoglobin 13.3 13.6   Platelet Count 332 311      10/21/23 06:00 10/21/23 16:04 10/21/23 22:44 10/22/23 06:18 10/22/23 17:44   Glucose 123 (H)   87    Glucose by meter POCT  151 (H) 135 (H)  117 (H)     MEDICATIONS   Medications reviewed  - Thera-vit-m  - Protonix    ANTHROPOMETRICS  Height: 154.9 cm (5' 1\") (per most recent documentation in Care Everywhere)   Most Recent Weight: 92.9 kg (204 lb 12.9 oz)    IBW: 47.7 kg  BMI: 38.7 - Obesity Grade II BMI 35-39.9  Weight History: Minimal weight loss (2% x 4 months).   Wt Readings from " Last Encounters:   10/22/23 92.9 kg (204 lb 12.9 oz) - standing scale    10/16/23 - CE 93.1 kg (205 lb 4 oz)    10/11/23 - CE 92.3 kg (203 lb 7.8 oz)    10/08/23 - CE 91.8 kg (202 lb 6.1 oz)    10/07/23 - CE 92.9 kg (204 lb 12.9 oz)    09/29/23 - CE 93 kg (205 lb)    09/13/23 - CE 93.4 kg (206 lb)    07/17/23 - CE 94.3 kg (208 lb)    06/14/23 - CE 94.9 kg (209 lb 3.2 oz)    06/05/23 - CE 93 kg (205 lb)    03/13/23 - CE 95.9 kg (211 lb 6.4 oz)    03/04/23 - CE 93.3 kg (205 lb 11 oz)    02/24/23 - CE 97.5 kg (215 lb)    01/05/23 - CE 96.4 kg (212 lb 8.4 oz)     Dosing Weight: 59 kg - Adjusted    ASSESSED NUTRITION NEEDS   Estimated Energy Needs: 0029-5485 kcals/day (25 - 30 kcals/kg)  Justification: Maintenance  Estimated Protein Needs: 60-70 grams protein/day (1 - 1.2 grams of pro/kg)  Justification: Maintenance  Estimated Fluid Needs: 6836-9197 mL/day (25 - 30 mL/kg)   Justification: Maintenance    PHYSICAL FINDINGS   See malnutrition section below.    MALNUTRITION   % Intake: No decreased intake noted  % Weight Loss: None noted  Subcutaneous Fat Loss: Unable to assess  Muscle Loss: Unable to assess  Fluid Accumulation/Edema: None noted  Malnutrition Diagnosis: Unable to determine due to Lack of NFPE    NUTRITION DIAGNOSIS   Predicted inadequate nutrient intake related to potential for decreased appetite if prolonged admission.      INTERVENTIONS   Implementation   No changes at this time.   Chart reviewed.     Goals   Patient to consume % of nutritionally adequate meal trays TID, or the equivalent with supplements/snacks.     Monitoring/Evaluation   Progress toward goals will be monitored and evaluated per protocol.  Summer Lagos RD, LD   Available on Vocera and Pager  5A ([phone]) + 6A pager [phone]  Weekend pager [phone]

## 2023-10-23 NOTE — PROVIDER NOTIFICATION
Pt refused lab draw this AM. Lab returned this afternoon at 1:15 PM. Pt refused lab draw again for today. MD notified.

## 2023-10-23 NOTE — PROVIDER NOTIFICATION
"Pt reused to go to MRI at 5pm, MD txt paged. Pt Stated ,\" I will do it tomorrow. Between 10 am and 3 pm\".  "

## 2023-10-23 NOTE — PROGRESS NOTES
"[facility], [place]   10/23/2023  Neurosurgery Progress Note:    Assessment:  Mirian Cunningham is a 43 year old female with complex psychosocial situation including paranoid schizophrenia and lack of active guardianship presenting with known large sellar/suprasellar mass who has not yet assented to surgical intervention on this lesion and has no clear medical decision maker for complex decisions. Social work, ethics, and psychiatry teams involved in operative planning. Neuro exam has been stable.     Per psychiatry assessment, \"Pt lacks the decision making capacity to complete full Advance Directive, however retains the ability name her next of kin/surrogate medical decision maker when   deemed unconscious.  \"    Plan:  q4h neuro exams   Pain control  Keppra 500mg BID for seizure ppx  MRI without contrast with thin cuts in all planes & FIESTA sequence   Ophthalmology evaluation in clinic today  Maintain SBP < 160  Regular diet  Normonatremia   Continue to monitor intake/output  Continue to monitor for fevers and/or signs of infection  Continue glucose checks  Platelets > 100,000  INR < 1.5  Hemoglobin > 8  DVT: SCDs while in bed  -----------------------------------  Fernando Welch MD, PGY-1  Department of Neurosurgery  Pager: [phone]    Please contact neurosurgery resident on call with questions.    Dial * * *[phone], enter [phone] when prompted.   -----------------------------------    Interval History: No acute events overnight. Refusing contrasted studies as well as non-contrast MRI and AM cortisol today    Objective:   Temp:  [97.6  F (36.4  C)-98.4  F (36.9  C)] 98.4  F (36.9  C)  Pulse:  [67-71] 69  Resp:  [14-17] 17  BP: (112-122)/(71-75) 121/71  SpO2:  [98 %-100 %] 98 %  I/O last 3 completed shifts:  In: 360 [P.O.:360]  Out: -     NEUROLOGIC:  Patient refused examination this morning    LABS:  Recent Labs   Lab 10/22/23  1744 10/22/23  0618 10/21/23  2244 10/21/23  1604 " 10/21/23  0600   NA  --  140  --   --  139   POTASSIUM  --  4.6  --   --  4.4   CHLORIDE  --  100  --   --  101   CO2  --  28  --   --  27   ANIONGAP  --  12  --   --  11   * 87 135*   < > 123*   BUN  --  21.1*  --   --  21.6*   CR  --  0.82  --   --  0.78   FADUMO  --  9.9  --   --  9.9    < > = values in this interval not displayed.       Recent Labs   Lab 10/22/23  0618   WBC 9.7   RBC 4.70   HGB 13.6   HCT 44.9   MCV 96   MCH 28.9   MCHC 30.3*   RDW 13.8          IMAGING:  No results found for this or any previous visit (from the past 24 hour(s)).

## 2023-10-24 NOTE — PLAN OF CARE
Status:  Pt transferred to [facility] on 10/21 for surgical eval for her pituitary lesion.  PMHx of schizophrenia, HTN, asthma and pituitary mass.   Vitals: VSS   Neuros: Forgetful. R eye blind and L eye blurry vision and spots missing from viison. L field cut.  Bilateral nystagmus. Hx of auditory hallucination. Intermittent N/T to bilateral hands and bilateral feet.   IV: PIV SL  Labs/Electrolytes: Pt refused lab draws and BG this AM and in the afternoon. All labs re-timed for this morning.   Resp/trach: Recived PRN albuterol neb for SOB prior to bed. On 2 L NC NOC for occasional MAXX, on cont SAT monitor.   Diet: Regular diet  Bowel status: LBM 10/22. Simethicone ordered and given for gas.  : Voiding spontaneously   Skin: WNL  Pain: Rates pain 8/10. PRN tylenol given.  Activity: SBA/GB, Bed alarm on.  Plan: Pt still need MRI and labs. Pending work up for pituitary tumor.

## 2023-10-24 NOTE — PROGRESS NOTES
[facility], [place]   10/24/2023  Neurosurgery Progress Note:    Assessment:  Mirian Cunningham is a 43 year old female with complex psychosocial situation including paranoid schizophrenia and lack of active guardianship presenting with known large sellar/suprasellar mass who has not yet assented to surgical intervention on this lesion and has no clear medical decision maker for complex decisions. Social work, ethics, and psychiatry teams involved in operative planning. Neuro exam has been stable.    Godmother Adri is appointed as the medical decision maker. No other family members available or willing to participate in her care per Adri. Need for likely anesthesia for obtaining imaging discussed and consent obtained from Adri. She is willing to discuss further and inclined towards moving with surgery after the imaging is obtained.     Plan:  q4h neuro exams   Pain control  Keppra 500mg BID for seizure ppx  Maintain SBP < 160  Regular diet  Normonatremia   Continue to monitor intake/output  Continue to monitor for fevers and/or signs of infection  Continue glucose checks  Platelets > 100,000  INR < 1.5  Hemoglobin > 8  DVT: SCDs while in bed  MRI with and without contrast, CTA head/neck with contrast. Further surgical discussion with Adri (Godmother) pending imaging.   -----------------------------------  Fernando Welch MD, PGY-1  Department of Neurosurgery  Pager: [phone]    Please contact neurosurgery resident on call with questions.    Dial * * *[phone], enter [phone] when prompted.   -----------------------------------    Interval History: No acute events overnight. Refusing contrasted studies as well as non-contrast MRI and AM cortisol today    Objective:   Temp:  [97.4  F (36.3  C)-98.4  F (36.9  C)] 97.4  F (36.3  C)  Pulse:  [65-79] 67  Resp:  [16] 16  BP: (111-123)/(71-90) 115/90  SpO2:  [88 %-100 %] 100 %  No intake/output data recorded.    NEUROLOGIC:  Patient refused examination  this morning    LABS:  Recent Labs   Lab 10/24/23  0715 10/22/23  1744 10/22/23  0618 10/21/23  1604 10/21/23  0600     --  140  --  139   POTASSIUM 4.3  --  4.6  --  4.4   CHLORIDE 103  --  100  --  101   CO2 26  --  28  --  27   ANIONGAP 10  --  12  --  11   GLC 87 117* 87   < > 123*   BUN 16.4  --  21.1*  --  21.6*   CR 0.71  --  0.82  --  0.78   FADUMO 9.3  --  9.9  --  9.9    < > = values in this interval not displayed.       Recent Labs   Lab 10/24/23  0715   WBC 6.8   RBC 4.29   HGB 12.5   HCT 40.8   MCV 95   MCH 29.1   MCHC 30.6*   RDW 13.6          IMAGING:  No results found for this or any previous visit (from the past 24 hour(s)).

## 2023-10-24 NOTE — PLAN OF CARE
Status:  Pt transferred to [facility] on 10/21 for surgical eval for her pituitary lesion.  PMHx of schizophrenia, HTN, asthma and pituitary mass.   Vitals: VSS on 1 NC continuous pulse ox overnight.Hx of sleep apnea.  Neuros: A&OX4. Forgetful. R eye blind and L eye blurry vision and spots missing from viison. L field cut.  Bilateral nystagmus. Hx of auditory hallucination. Intermittent N/T to bilateral hands and bilateral feet.   IV: PIV SL  Labs/Electrolytes: Pt allowed AM lab draws but refused BG this AM .  Resp/trach: c/o  SOB PRN nebs given , LSC  Diet: Regular diet  Bowel status: LBM 10/23  : Voiding spontaneously   Skin: WNL  Pain: Rates pain 8/10. PRN tylenol given  Activity: SBA/GB, Bed alarm on.  Plan: MRI w/o contrast scheduled at 5pm today. Still awaiting scan.Continue monitor and follow POC.

## 2023-10-24 NOTE — PROGRESS NOTES
"     Care Management Follow Up     Length of Stay (days): 2     Expected Discharge Date: 10/27/2023     Concerns to be Addressed:     Medical Decision making  Patient plan of care discussed at interdisciplinary rounds: Yes     Anticipated Discharge Disposition:  Pt has a return transfer agreement in place with [facility] in [place]     Anticipated Discharge Services:   Return to [facility]note 3] when pt's work up is complete at [facility]  Anticipated Discharge DME:   Not applicable at this time     Patient/family educated on Medicare website which has current facility and service quality ratings:  Not applicable  Education Provided on the Discharge Plan:  Not applicable at this time  Patient/Family in Agreement with the Plan:  SW did not discuss return transfer agreement in place with [facility] in [place] as pt will be followed by the 6A RN CC for discharge planning     Referrals Placed by CM/SW:  None  Private pay costs discussed: Not applicable     Additional Information:  Pt has a return transfer agreement on file with [facility] in [place].   DELORIS phoned [employer] this am (Patel) and updated in regards to this SW's progress note entry made on 10/23/2023.  Per [employer], pt's \"God Mother,\", Adri, can be used as a substitute decision maker for medical decisions, on pt's behalf.  DELORIS updated Dr. Welch.          BETH Gutierrez  Social Work, 6A  Phone:  [phone]  Pager:  [phone]  10/24/2023                "

## 2023-10-25 NOTE — ETHICS CONSULT
"Received call from Dr. Renetta Goel regarding patient refusal to undergo MRI today.  Mirian has been declared to lack capacity to consent to or refuse any medical interventions or procedure- her godmother Adri is the appropriate surrogate decision-maker.  Adri has consented to the MRI.    Weighing risks and benefits of procedures helps us determine whether or not \"force\" should be used to ensure that patients lacking capacity receive appropriate healthcare.  GIven that the situation at hand is the placement of an IV, sedation/anesthesia, and an MRI, and is not overly burdensome and is necessary for surgical planning, it is ethically permissible to proceed with MRI against patients expressed wishes with consent from her surrogate decision-maker.    Miya Cerda MD, MPH, EVITA-C  Co-chair, [facility] Ethics Committee      "

## 2023-10-25 NOTE — PROGRESS NOTES
"Care Management Follow Up     Length of Stay (days):       Expected Discharge Date: 10/27/2023     Concerns to be Addressed:     Medical Decision making  Patient plan of care discussed at interdisciplinary rounds: Yes     Anticipated Discharge Disposition:  Pt has a return transfer agreement in place with [facility] in [place]     Anticipated Discharge Services:   Return to [facility] when pt's work up is complete at [facility]  Anticipated Discharge DME:   Not applicable at this time     Patient/family educated on Medicare website which has current facility and service quality ratings:  Not applicable  Education Provided on the Discharge Plan:  Not applicable at this time  Patient/Family in Agreement with the Plan:  SW did not discuss return transfer agreement in place with [facility] in [place] as pt will be followed by the 6A RN CC for discharge planning     Referrals Placed by CM/SW:  None  Private pay costs discussed: Not applicable     Additional Information:  Pt has a return transfer agreement on file with [facility] in [place].     Pt does not have a health care directive.   Pt's \"God Mother,\" Adri, is pt's substitute decision maker for medical decisions.  Per rounds report, Warner Martin NP states that pt's \"God Mother \" Adri gave consent for imaging and OR.  Pt is scheduled to have a CT and MRI today.  Pt is reportedly still refusing \"everything.\"  Nursing staff voiced concerns as to if it is ethical for pt to have imaging and potential OR when pt is refusing and may physically resist (and require restraint or sedation).  Per review of pt's chart, it appears (from notes written by medical  professionals in [place]) that pt previously had a guardian.  DELORIS phoned the [facility] office ([phone]) and left a message (10:18am) for Lyndon Jo to call to inquire about this.   DELORIS received a return call (10:56am) from Lyndon on voice mail " "and Lyndon stated, \"Mirian was appointed (in the past) emergency guardianship.  Basically we have a relationship here with the hospitals file and can name the county when there is no family members or persons indigent doesn't have resources to pay for a guardian.  Mirian is obviously unique.  I think she has capacity for lots of things. We felt she did have capacity, [place] felt like they didn't so they filed.  We were under the impression that there was basically an immediate surgery that was going to occur so they appointed us for that consent and we later realized that wasn't the case.   It was actually, I believe at the time 5 or 6 outpatient appointments to [facility].  The guardian did what they could to get Miss Vela to those appointments, She's been actively homeless here in our community.  We did try to get some stabilized housing going, get her to those appointments.  Some of the appointments were outside the emergency guardianship so we actually asked for an extension from the courts.    We're allowed up to 60 days of that, we did that, followed up with the appointments.  I don't think Miss Vela wanted to have the surgery at that time.  We did not have a legal case to extend guardianship to general guardianship we just didn't have  that in out  's office opinion.  So we did let that .  She's been re-hospitalized as you probably know.  Miss Vela has a tremendous amount of problem solving skills and is able to remedy lots of stuff on her own.  It sounds like currently [place] is feeling like they don't have comfort with her consenting to this surgery whether it's a yes or a no and that's kind of where they're at.  She is served by [facility]  which is probably the more appropriate service and that person's been working with her on kind of basic stuff, housing ect so no, she does not currrently have guardianship nor is [facility] looking to " "petition for guardianship of Miss Vela however, if she does return to the hospital.\"  Lyndon stated that he would like to know what the [facility] is doing for pt.  DELORIS left another message for Lyndon to call.   DELORIS updated Warner Martin NP in regards to the above call.      At 10:53am, DELORIS emailed Ethics to call.  DELORIS received a telephone response from Dr. Bashir Hoffmann who states that pt's surrogate can be relied upon to make the medical decisions.    Dr. Hoffmann states that the surrogate is making the decision based on what the patient would have wanted if the pt wasn't impaired and the pt is impaired right now.  Per Dr. Hoffmann, restraining or chemically restraining pt is ethical due to the risks of no intervention.  The benefits out way the risks per Neuro Surgery.        BETH Gutierrez  Social Work, 6A  Phone:  [phone]  Pager:  [phone]  10/25/2023        DELORIS will continue to follow.     "

## 2023-10-25 NOTE — PROGRESS NOTES
[facility], [place]   10/25/2023  Neurosurgery Progress Note:    Assessment:  Mirian Cunningham is a 43 year old female with complex psychosocial situation including paranoid schizophrenia and lack of active guardianship presenting with known large sellar/suprasellar mass who has not yet assented to surgical intervention on this lesion and has no clear medical decision maker for complex decisions. Social work, ethics, and psychiatry teams involved in operative planning. Neuro exam has been stable.    Godmother Adri is appointed as the medical decision maker. No other family members available or willing to participate in her care. Need for likely anesthesia for obtaining imaging discussed and consent obtained from Adri. She is willing to discuss further and inclined towards moving with surgery after the imaging is obtained.     Plan:  q4h neuro exams   Pain control  Keppra 500mg BID for seizure ppx  Maintain SBP < 160  Regular diet  Normonatremia   Continue to monitor intake/output  Continue to monitor for fevers and/or signs of infection  Continue glucose checks  Platelets > 100,000  INR < 1.5  Hemoglobin > 8  DVT: SCDs while in bed  MRI with and without contrast, CTA head/neck with contrast. Further surgical discussion with Adri (Godmother) pending imaging.   -----------------------------------  Renetta Goel MD, PhD  PGY-2 Neurosurgery    Please contact neurosurgery resident on call with questions.    Dial * * *[phone], enter [phone] when prompted.   -----------------------------------    Interval History: No acute events overnight. Cortisol obtained WNL. Awaiting MRI under anesthesia.    Objective:   Temp:  [97.6  F (36.4  C)-97.8  F (36.6  C)] 97.8  F (36.6  C)  Pulse:  [63-79] 67  Resp:  [16-18] 16  BP: (108-132)/(65-86) 108/65  SpO2:  [83 %-100 %] 100 %  No intake/output data recorded.    NEUROLOGIC:  Patient refused formal examination this morning.    Gen: Appears comfortable, NAD  Resp:  breathing comfortably on room air  Neurologic:  Somnolent, awakens to touch  Conversant  No gaze preference. No apparent neglect.  Face symmetric activation.  Moving all extremities equally.      LABS:  Recent Labs   Lab 10/25/23  0809 10/24/23  1704 10/24/23  1317 10/24/23  0715 10/22/23  1744 10/22/23  0618 10/21/23  1604 10/21/23  0600   NA  --   --   --  139  --  140  --  139   POTASSIUM  --   --   --  4.3  --  4.6  --  4.4   CHLORIDE  --   --   --  103  --  100  --  101   CO2  --   --   --  26  --  28  --  27   ANIONGAP  --   --   --  10  --  12  --  11   GLC 98 118* 91 87   < > 87   < > 123*   BUN  --   --   --  16.4  --  21.1*  --  21.6*   CR  --   --   --  0.71  --  0.82  --  0.78   FADUMO  --   --   --  9.3  --  9.9  --  9.9    < > = values in this interval not displayed.       Recent Labs   Lab 10/24/23  0715   WBC 6.8   RBC 4.29   HGB 12.5   HCT 40.8   MCV 95   MCH 29.1   MCHC 30.6*   RDW 13.6          IMAGING:  No results found for this or any previous visit (from the past 24 hour(s)).

## 2023-10-25 NOTE — PLAN OF CARE
Goal Outcome Evaluation:      Plan of Care Reviewed With: patient    Overall Patient Progress: no changeOverall Patient Progress: no change    Outcome Evaluation: MRI planned for this afternoon    Status:  Pt transferred to [facility] on 10/21 for surgical eval for her pituitary lesion. PMHx of schizophrenia, HTN, asthma and pituitary mass.   Vitals: VSS   Neuros: OX4. Forgetful. R eye blind and L eye blurry vision. L field cut. Bilateral nystagmus. Hx of auditory hallucination. Denied N/T this shift   IV: PIV SL  Labs/Electrolytes: BG checksWNL  Resp/trach: on 1 NC continuous pulse ox overnight.Hx of sleep apnea  Diet: Regular diet  Bowel status: LBM 10/23  : Voiding spontaneously   Skin: WNL  Pain: Rates pain 8/10. Declined tylenol. Will give before MRI.  Activity: SBA/GB, Bed alarm on.  Plan: MRI to be done this afternoon. Continue monitor and follow POC.

## 2023-10-25 NOTE — PROVIDER NOTIFICATION
Dr Amando lynch @ [phone]    Mirian nixon - NICHOLAS report, day shift RN just informed pt that MRI would happen this evening, pt refusing. Only agreeable for MRI to happen 10a-3p. ashley Jackson [phone]

## 2023-10-25 NOTE — PLAN OF CARE
Status:  Pt transferred to [facility] on 10/21 for surgical eval for her pituitary lesion.  PMHx of schizophrenia, HTN, asthma and pituitary mass.   Vitals: VSS on 1 NC continuous pulse ox.  Neuros: A&OX4. Forgetful. R eye blind and L eye blurry vision and spots missing from viison. L field cut.  Bilateral nystagmus Auditory hallucination. Intermittent N/T to bilateral hands and bilateral feet.   IV: PIV SL  Labs/Electrolytes: Redraw at AM ; pt refused pm BG check  Resp/trach: PRN neb given for SOB per pt.  Diet: Regular diet  Bowel status: LBM 10/23 Simethicone given for gas.   : Voiding spontaneously   Skin: WNL  Pain: Rates pain 8/10. PRN tylenol given  Activity: SBA/GB, Bed alarm on.  Plan: MRI w/o contrast today. Continue monitor and follow POC.

## 2023-10-26 NOTE — ANESTHESIA PROCEDURE NOTES
Airway       Patient location during procedure: OR       Procedure Start/Stop Times: 10/26/2023 12:50 PM  Staff -        CRNA: Yousuf Graves APRN CRNA       Performed By: CRNA  Consent for Airway        Urgency: elective  Indications and Patient Condition       Indications for airway management: anam-procedural       Induction type:intravenous       Mask difficulty assessment: 2 - vent by mask + OA or adjuvant +/- NMBA    Final Airway Details       Final airway type: endotracheal airway       Successful airway: ETT - single  Endotracheal Airway Details        ETT size (mm): 7.0       Cuffed: yes       Successful intubation technique: video laryngoscopy       VL Blade Size: Glidescope 3       Grade View of Cords: 2       Adjucts: stylet       Position: Right       Measured from: gums/teeth       Secured at (cm): 22       Bite block used: None    Post intubation assessment        Placement verified by: capnometry, equal breath sounds and chest rise        Number of attempts at approach: 1       Number of other approaches attempted: 0       Secured with: pink tape       Ease of procedure: easy       Dentition: Intact and Unchanged    Medication(s) Administered   Medication Administration Time: 10/26/2023 12:50 PM

## 2023-10-26 NOTE — PROGRESS NOTES
[facility], [place]   10/26/2023  Neurosurgery Progress Note:    Assessment:  Mirian Cunningham is a 43 year old female with complex psychosocial situation including paranoid schizophrenia and lack of active guardianship transferred to [facility] with known large sellar/suprasellar mass. Godmother Adri is appointed as the medical decision maker. No other family members available or willing to participate in her care. Need for likely anesthesia for obtaining imaging discussed and consent obtained from Adri. She is willing to discuss further and inclined towards moving with surgery after the imaging is obtained.     Plan:  q4h neuro exams   Pain control  Keppra 500mg BID for seizure ppx  Maintain SBP < 160  Regular diet  Normonatremia   Continue to monitor intake/output  Continue to monitor for fevers and/or signs of infection  Continue glucose checks  Platelets > 100,000  INR < 1.5  Hemoglobin > 8  DVT: SCDs and SQH   MRI w/o contrast and CT with thin cuts for pre-operative planning  -----------------------------------  Fernando Welch MD, PGY-1  Department of Neurosurgery  Pager: [phone]    Please contact neurosurgery resident on call with questions.    Dial * * *[phone], enter [phone] when prompted.   -----------------------------------  Interval History: No acute events overnight. Awaiting MRI.    Objective:   Temp:  [97.6  F (36.4  C)-98.2  F (36.8  C)] 97.7  F (36.5  C)  Pulse:  [61-73] 61  Resp:  [12-16] 16  BP: (100-146)/(62-98) 134/98  SpO2:  [95 %-100 %] 95 %  No intake/output data recorded.    NEUROLOGIC:  Gen: Appears comfortable, NAD  Resp: breathing comfortably on room air  Neurologic:  Somnolent, awakens to touch  Conversant  No gaze preference. No apparent neglect.  Face symmetric activation.  Moving all extremities equally.    LABS:  Recent Labs   Lab 10/25/23  1204 10/25/23  0809 10/24/23  1704 10/24/23  1317 10/24/23  0715 10/22/23  1744 10/22/23  0618 10/21/23  1604 10/21/23  0600    NA  --   --   --   --  139  --  140  --  139   POTASSIUM  --   --   --   --  4.3  --  4.6  --  4.4   CHLORIDE  --   --   --   --  103  --  100  --  101   CO2  --   --   --   --  26  --  28  --  27   ANIONGAP  --   --   --   --  10  --  12  --  11   GLC 89 98 118*   < > 87   < > 87   < > 123*   BUN  --   --   --   --  16.4  --  21.1*  --  21.6*   CR  --   --   --   --  0.71  --  0.82  --  0.78   FADUMO  --   --   --   --  9.3  --  9.9  --  9.9    < > = values in this interval not displayed.     Recent Labs   Lab 10/24/23  0715   WBC 6.8   RBC 4.29   HGB 12.5   HCT 40.8   MCV 95   MCH 29.1   MCHC 30.6*   RDW 13.6        IMAGING:  No results found for this or any previous visit (from the past 24 hour(s)).

## 2023-10-26 NOTE — ANESTHESIA CARE TRANSFER NOTE
Patient: Mirian Cunningham    Procedure: Procedure(s):  Anesthesia out of OR MRI CTA, MRI Under Anesthesia       Diagnosis: Pituitary tumor [D49.7]  Diagnosis Additional Information: No value filed.    Anesthesia Type:   General     Note:    Oropharynx: nasal airway in place  Level of Consciousness: drowsy  Patient oxygen source: bipap.  Level of Supplemental Oxygen (L/min / FiO2): 90%  Independent Airway: airway patency satisfactory and stable  Dentition: dentition unchanged  Vital Signs Stable: post-procedure vital signs reviewed and stable  Report to RN Given: handoff report given  Patient transferred to: PACU  Comments: Prolonged post-extubation course as patient continually desaturated without airway support, oral airway, and CPAP through vent circuit. Started on BiPAP 10/5 in PACU and report given to bedside RN.   Handoff Report: Identifed the Patient, Identified the Reponsible Provider, Reviewed the pertinent medical history, Discussed the surgical course, Reviewed Intra-OP anesthesia mangement and issues during anesthesia, Set expectations for post-procedure period and Allowed opportunity for questions and acknowledgement of understanding      Vitals:  Vitals Value Taken Time   /118 10/26/23 1539   Temp     Pulse 115 10/26/23 1541   Resp 29 10/26/23 1541   SpO2 92 % 10/26/23 1541   Vitals shown include unfiled device data.    Electronically Signed By: NICOLE Palacio CRNA  October 26, 2023  3:42 PM

## 2023-10-26 NOTE — PROGRESS NOTES
Ethics was consulted and the following was done to obtain an MRI per recommendations:    Vascular access entered room to ask pt if they could place a PIV, pt said no. Code team arrived in advance of further intervention in case of escalation, plan was made for restraints if needed. RN entered room with Liban RN to ask pt if medication could be given to help her feel more tired and calm for MRI scan, as the scan needed to be completed today and she could not put it off any longer. The pt said she did not need the medication. Team entered the room and proceeded as planned. Liban RN gave IM Haldol while other staff members restrained each limb. Pt remained calm the entire time and did not fight back. Pt continued to question staff members about what was going on, stating that she did not need the medications and that she did not understand why she was getting them. Staff narrated what was happening to pt and reassured her through the whole process. PIV was successfully placed and IV Ativan was given. O2 remained stable and pt was brought down to pre-op for anesthesia prior to MRI.

## 2023-10-26 NOTE — PLAN OF CARE
Status: Pt. With PMHx schizophrenia HTN asthma and known pituitary mass since at least 2018 who was transferred to [facility] for surgical evaluation for her pituitary lesion.   Vitals: VSS on RA while awake 2L NC while sleeping, HTN [id number] celine  Neuros: A&Ox4. R. Eye blind/dysconjugate at times. L. Eye with intermittent double vision and spots missing from vision/L. Field cut. Bilateral nystagmus horizontally and vertically. 5/5 throughout with generalized weakness. Occasional dizziness with position changes. Intermittent N/T to BUE entire arm and BLE entire leg-denied.   IV: Ok for no IV  Labs/Electrolytes: WNL   Resp/trach: Nasal congestion and frequent sneezing overnight. Pt. requesting PTA flonase nasal spray, MD paged. PRN albuterol neb completed this shift  Diet: NPO ex/ meds   Bowel status: No BM this shift. LBM 10/25 per pt. C/o abdominal discomfort/cramping, PRN senna and simethicone offered but declined   : Voids spontaneously in bathroom  Skin: WNL  Pain: Generalized muscle aches managed with PRN tylenol.  Activity: SBA/GB, occasional dizziness with position changes. Refusing GB at times  Plan: MRI with and without contrast, CTA head/neck with contrast. MRI checklist sent, pt. preference is MRI today 10/26 between 2233-0401/1500 so refused to have MRI yesterday evening. Further surgical discussion with Adri (Godmother) pending imaging. Anticipated d/c pending work up.       Goal Outcome Evaluation:      Plan of Care Reviewed With: patient    Overall Patient Progress: no change    Outcome Evaluation: MRI and CTA of head/neck to be completed today. Results to determine potential surgical plan.

## 2023-10-26 NOTE — CONSULTS
Neurocritical Care Consultation    Reason for critical care admission: Large Pituitary Adenoma with neuro deficits, possible resection  Admitting Team: Neurosurgery  Date of Service:  10/26/2023  Date of Admission:  10/21/2023  Hospital Day: 6    Assessment/Plan  Mirian Cunningham is a 43 year old female with complex psychosocial situation including paranoid schizophrenia and known large sellar/suprasellar mass. Godmother Adri is appointed as the medical decision maker.  MRI brain was obtained under anesthesia 10/26/23 with admission to the critical care unit afterward on BiPAP due to flash pulmonary edema.  Neurosurgery discussing possible tumor resection with ethics committee.      24 hour events: MRI brain, admitted to critical care unit on BiPAP, CXR with bilateral pulmonary, diuresis, labs, EKG - sinus tachy, ECHO ordered, Galeas placed, precedex and haldol ordered    Neuro  #Pituitary adenoma  #R eye complete blindness  #L visual field cut  -MRI brain performed  -Neurochecks every 4 hrs  -SBP goal < Normotension  -Neurosurgery discussing possible resection    #Schizophrenia  Does not take psych meds.  She states she does not need them.    - Precedex  - Haldol PRN - Qtc is ok  - Seroquel if needed    #Analgesics & sedation  RASS goal: 0 - -1  -Precedex gtt    CV  #Sinus tachycardia  -Cardiac monitoring  -PRN Labetalol and Hydralazine for SBP>180 in setting of pulmonary edema  -EKG - sinus tachy - Qtc 428 on 10/26/23  -Lactate  -ECHO      Resp  #Acute hypoxic respiratory failure  #Flash pulmonary edema  #Diffuse pulmonary infiltrates  -CXR shows diffuse pulmonary infiltrates  -ABG  -Continuous BiPAP at 100%FiO2, PEEP of 8, rate of 12  -Duonebs  -Continuous pulse ox  -One time dose Lasix 40 mg IV  -Will give additional 40 tonight pending creatinine  -Nasal trumpet in place for suctioning     GI  #KAELYN  Diet: NPO  GI prophylaxis: Not indicated  -Bowel regimen: None     Renal/  #KAELYN  -IV fluids: None  -BMP  "pending  -Diuresis as above  -Give additional 40 lasix if creatinine at baseline  -Galeas    Endo  #KAELYN  -Blood glucose goal less than 180     Heme  #KAELYN  -CBC pending     ID  #KALEYN  -Daily CBC  -Follow temperature curve  -If lactate elevated, or leukocytosis, low threshold to send blood cultures and start Ceftriaxone/vancomycin     ICU Checklist  Lines/tubes/drains: PIV x1, Galeas  FEN: NPO, no IVF in setting of pulmonary edema  PPX: DVT - SCDs, heparin subq; GI - Not indicated  Code: Full Code  Dispo: ICU - NCC     The patient was seen and discussed with the Stroke/NCC attending, Dr. Montero.     RED Soto D.O.  Resident Physician of Neurology  [facility]         Clinically Significant Risk Factors                         # Obesity: Estimated body mass index is 38.7 kg/m  as calculated from the following:    Height as of this encounter: 1.549 m (5' 1\").    Weight as of this encounter: 92.9 kg (204 lb 12.9 oz).      # Financial/Environmental Concerns: insurance, none;unemployed;unable to afford rent/mortgage;unable to afford medication(s);unable to afford food            The patient was seen and discussed with the NCC attending, Dr. Castillo.    RED Soto D.O.  Resident Physician of Neurology  [facility]      History of Present Illness:  Mirian Cunningham is a 43 year old female with complex psychosocial situation including paranoid schizophrenia and lack of active guardianship transferred to [facility] with known large sellar/suprasellar mass. Godmother Adri is appointed as the medical decision maker. No other family members available or willing to participate in her care. Ethics committee and psych consulted and determined patient to not have decisional capacity for imaging.  MRI brain was obtained under anesthesia and 10/26/23 with admission to the critical care unit afterward.  Arrived on Bipap in flash pulmonary edema.      Allergies   Allergen Reactions    " "Powder Difficulty breathing     Per pt, allergy to MILK PROTEIN POWDER but not milk or dairy products    Hydrocodone Fatigue    Iodinated Contrast Media Other (See Comments)     \"Paralysis of legs\", \"was unable to walk for 6 months\"    Mushroom Hives and Difficulty breathing    Other Food Allergy      Food coloring, artificial preservatives, high fructose syrup    Seeds      Poppy seeds, sesame seeds        Current Medications:   [Auto Hold] fluticasone  1 spray Both Nostrils BID    [Auto Hold] fluticasone-salmeterol  1 puff Inhalation Q12H    [Auto Hold] heparin ANTICOAGULANT  5,000 Units Subcutaneous Q8H    influenza quadrivalent (PF) vacc  0.5 mL Intramuscular Prior to discharge    [Auto Hold] lidocaine  2 patch Transdermal Q24h    [Auto Hold] LORazepam  1 mg Intravenous Once    [Auto Hold] multivitamin w/minerals  1 tablet Oral Daily       PRN Medications:  [Auto Hold] acetaminophen, [Auto Hold] albuterol, [Auto Hold] albuterol, fentaNYL, fentaNYL, HYDROmorphone, HYDROmorphone, [Auto Hold] moisturizing lotion, ondansetron **OR** ondansetron, [Auto Hold] ondansetron, prochlorperazine, [Auto Hold] sennosides, [Auto Hold] simethicone    Infusions:   lactated ringers         Physical Examination:  Vitals: BP (!) 136/104   Pulse 113   Temp 98.2  F (36.8  C) (Axillary)   Resp (!) 35   Ht 1.549 m (5' 1\")   Wt 92.9 kg (204 lb 12.9 oz)   SpO2 93%   BMI 38.70 kg/m    General: Adult female patient, lying in bed, critically-ill  HEENT: Normocephalic, atraumatic, no icterus, nasal trumpet in place  Cardiac: RRR, s1/s2 auscultated without murmur  Pulm: Course lung sounds throughout, worse on R, diminished in bases, labored, on BiPAP, tachypnic   Abdomen: Soft, non-tender, non-distended  Extremities: generalized anasarca, warm extremities and perfused, normal pulses  Skin: No rash or lesion  Psych: Anxious, non-cooperative  Neuro:  Mental status: Agitated, anxious, thrashing, responds to simple commands, Language is " fluent, yelling, oriented x1 - knows name, but not location  Cranial nerves: PERRL, conjugate gaze, EOMI, face symmetric, shoulder shrug strong, tongue midline, no dysarthria.   Motor: Normal bulk and tone. No abnormal movements. Moving all limbs.  All limbs antigravity, 5/5 strength  Sensory: Intact to light touch x 4 extremities  Coordination: Could not assess, patient not cooperative to complex exam  Gait: KELLY, deferred.    Labs and Imaging:    Results for orders placed or performed during the hospital encounter of 10/21/23 (from the past 24 hour(s))   EKG 12-lead, complete   Result Value Ref Range    Systolic Blood Pressure  mmHg    Diastolic Blood Pressure  mmHg    Ventricular Rate 64 BPM    Atrial Rate 64 BPM    DC Interval 160 ms    QRS Duration 82 ms     ms    QTc 375 ms    P Axis 28 degrees    R AXIS 45 degrees    T Axis 8 degrees    Interpretation ECG       Sinus rhythm  Normal ECG  No previous ECGs available     MR Brain w/o & w Contrast    Narrative    Brain/ Pituitary MRI without and with contrast  Head MRA without contrast.    History: Pituitary mass.  ICD-10: Pituitary mass.    Comparison:  Outside brain MRI 10/7/2023. Outside CT head 10/7/2023,  1/5/2023.     Technique:     Brain MRI: Axial diffusion and FLAIR images of the whole brain  obtained without intravenous contrast. Sagittal T1 and T2-weighted,  coronal T2-weighted, coronal T1-weighted images with focus on the  sella were obtained without intravenous contrast. Post intravenous  contrast using gadolinium coronal and sagittal T1-weighted images were  obtained focused on the sella. Dynamic postcontrast coronal  T1-weighted images were also obtained.    MRA Head:  Using a 3D time-of-flight image acquisition technique, MRA  of the major arteries at the base of the brain was obtained without  intravenous contrast. Three-dimensional reconstructions of the head  MRA were created, which were reviewed by the radiologist.    Contrast: 9 cc  gadavist.    Findings:      Brain MRI:  Large 5.9 x 5.7 x 4.8 cm multilobulated, heterogeneously enhancing T2  hyperintense, T1 hypointense, predominantly solid mass centered in and  expanding the sella with superior convex displacement of the  diaphragmatic leaflets. Associated diffusion restriction. The optic  chiasm is not well visualized. The pituitary stalk is not well  visualized, possibly encased. Associated intraparenchymal  susceptibility/hemorrhage. There is mass effect and effacement of the  bifrontal lobes with a surrounding CSF cleft and T2/FLAIR hyperintense  signal in the right frontal lobe. Mass effect on the ventral midbrain  and tee. Compression and effacement of the frontal horns of the  bilateral lateral and third.    No abnormal bone marrow signal. Paranasal sinuses and mastoid air  cells are clear. Orbits are unremarkable.    Head MRA:  No aneurysm or significant stenosis of the major intracranial  arteries. There is complete encasement of the cavernous internal  carotid arteries, proximal M1 segments of the right middle cerebral  artery, and A1 and proximal A2 segments of the right greater than left  anterior cerebral arteries with left lateral displacement of the  distal anterior cerebral arteries. Approximately 180 degrees of  abutment with the left cavernous internal carotid artery.      Impression    Impression:  1.  Large multilobulated pituitary mass/adenoma with mass effect and  encasement of the cavernous structures including the cavernous  internal carotid arteries, M1 segment of the right MCA, and A1 and  proximal A2 segments of both anterior cerebral arteries. The optic  chiasm is not well visualized, also possibly encased.  2.  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    I have personally reviewed the examination and initial interpretation  and I agree with the findings.    HUSSEIN DESIR MD         SYSTEM ID:  [id number]   XR Chest Port 1 View     Narrative    Portable chest    INDICATION: Possible negative pressure pulmonary edema    COMPARISON: None    FINDINGS: Heart size normal. Diffuse opacification throughout the  right lung with patchy opacification throughout the left lung. This  may represent edema. However, recommend follow-up to clearing to  exclude superimposed infectious consolidation.      Impression    IMPRESSION: Likely edema rather than infection within the lungs.  Recommend follow-up to clearing.    JOSE RAUL MCNULTY MD         SYSTEM ID:  [id number]   Glucose by meter   Result Value Ref Range    GLUCOSE BY METER POCT 133 (H) 70 - 99 mg/dL   EKG 12-lead, complete   Result Value Ref Range    Systolic Blood Pressure  mmHg    Diastolic Blood Pressure  mmHg    Ventricular Rate 126 BPM    Atrial Rate 126 BPM    CT Interval 156 ms    QRS Duration 80 ms     ms    QTc 428 ms    P Axis 54 degrees    R AXIS 60 degrees    T Axis -9 degrees    Interpretation ECG       Sinus tachycardia  T wave abnormality, consider inferior ischemia  Abnormal ECG  When compared with ECG of 26-OCT-2023 09:37, (unconfirmed)  Vent. rate has increased BY  62 BPM     Blood gas arterial   Result Value Ref Range    pH Arterial 7.38 7.35 - 7.45    pCO2 Arterial 49 (H) 35 - 45 mm Hg    pO2 Arterial 55 (L) 80 - 105 mm Hg    FIO2 100     Bicarbonate Arterial 29 (H) 21 - 28 mmol/L    Base Excess/Deficit 2.7 (H) -9.0 - 1.8 mmol/L    Maximiliano's Test Yes    CBC with Platelets & Differential    Narrative    The following orders were created for panel order CBC with Platelets & Differential.  Procedure                               Abnormality         Status                     ---------                               -----------         ------                     CBC with platelets and d...[[id number]]                      In process                   Please view results for these tests on the individual orders.   ABO/Rh type and screen    Narrative    The following orders were  created for panel order ABO/Rh type and screen.  Procedure                               Abnormality         Status                     ---------                               -----------         ------                     Adult Type and Screen[[id number]]                            In process                   Please view results for these tests on the individual orders.       All relevant imaging and laboratory values personally reviewed.

## 2023-10-26 NOTE — PLAN OF CARE
Status: Pt has a past medical history of schizophrenia, HTN, asthma, and known pituitary mass since 2018 who was transferred to [facility] for surgical evaluation.   Vitals: VSS on RA while awake and 2L NC while asleep. HTN within goal SBP <160.   Neuros: A&O x4, forgetful. R eye blind, pupil sluggish, and intermittently dysconjugate. L eye with intermittent double vision. L field cut. Bilateral nystagmus. Strengths 5/5, generalized weakness. Intermittent N/T, denied this shift. Nonsensical speech at times.  IV: PIV inserted before MRI.  Labs/Electrolytes: WDL.   Resp/trach: LS clear/dim. PRN albuterol neb given x1 for SOB, effective. Scheduled Flonase added.  Diet: NPO for MRI with sedation.   Bowel status: BS+. LBM 10/25. Denied nausea this shift. Reported mild gas pain but declined simethicone.   : Voids spontaneously without difficulty.   Skin: WDL.   Pain: Denies.   Activity: SBA, refuses GB.   Social: No visitors this shift.  Plan: Surgical plan pending based on MRI results.   Updates this shift: MRI with sedation completed this afternoon, see previous note.

## 2023-10-26 NOTE — OR NURSING
Informed Dr. Amado of /120, , RR 30's and pink/red frothy sputum.On bipap fio2 80% 12/5 SpO2 95%. Chest xray ordered.

## 2023-10-26 NOTE — PLAN OF CARE
Status: Pt transferred from [facility] on 10/21 for surgical eval for pituitary lesion. Pmhx shizophrenia, HTN, asthma.   Vitals: HTN SBP in 130s, within parameters.   Neuros: Aox4. At beginning of shift pt was lethargic, unsure if behavioral or physical, pt became alert throughout shift. R eye blindness, sluggish reaction to light. L eye blurry vision with blind spots, brisk reaction to light. Bilateral nystagmus. N/T to bilat fingers and hands, not new. Hx of auditory hallucination, denied during shift but pt seems to verbalize comments that are not appropriate during conversation making it seem she does have hallucinations. Pt denies wanting to harm herself or others.   IV: No IV. Order for no PIV.   Labs/Electrolytes: WNL.   Resp/trach: On RA satting well. Pt had Albuterol neb PRN earlier this shift for SOB.   Diet: Reg diet. Fair intake.   Bowel status: LBM 10/23. BS x4 quads. Passing gas.   : Voids spontaneously.   Skin: WNL.   Pain: 8/10 cramping pain on abdo bilat. Pt offered Tylenol and Mylicon and refused, plans to take it later tonight.   Activity: SBA. Bed alarm on.   Plan: CT non contrast ordered.   Updates this shift: Pt refused MRI today.         Goal Outcome Evaluation:      Plan of Care Reviewed With: patient    Overall Patient Progress: no changeOverall Patient Progress: no change    Outcome Evaluation: Pt refused MRI today.

## 2023-10-26 NOTE — ANESTHESIA PREPROCEDURE EVALUATION
"Anesthesia Pre-Procedure Evaluation    Patient: Mirian Cunningham   MRN: [id number] : 1980        Procedure : Procedure(s):  Anesthesia out of OR MRI CTA, MRI Under Anesthesia          No past medical history on file.   No past surgical history on file.   Allergies   Allergen Reactions    Powder Difficulty breathing     Per pt, allergy to MILK PROTEIN POWDER but not milk or dairy products    Hydrocodone Fatigue    Iodinated Contrast Media Other (See Comments)     \"Paralysis of legs\", \"was unable to walk for 6 months\"    Mushroom Hives and Difficulty breathing    Other Food Allergy      Food coloring, artificial preservatives, high fructose syrup    Seeds      Poppy seeds, sesame seeds       Social History     Tobacco Use    Smoking status: Not on file    Smokeless tobacco: Not on file   Substance Use Topics    Alcohol use: Not on file      Wt Readings from Last 1 Encounters:   10/22/23 92.9 kg (204 lb 12.9 oz)        Anesthesia Evaluation            ROS/MED HX  ENT/Pulmonary:     (+)                    Intermittent, asthma                  Neurologic: Comment: Large sellar/suprasellar mass      Cardiovascular:       METS/Exercise Tolerance:     Hematologic:       Musculoskeletal:       GI/Hepatic:       Renal/Genitourinary:       Endo:     (+)               Obesity,       Psychiatric/Substance Use:     (+) psychiatric history schizophrenia       Infectious Disease:       Malignancy:       Other:            Physical Exam    Airway   unable to assess          Respiratory Devices and Support         Dental    unable to assess        Cardiovascular   cardiovascular exam normal          Pulmonary   pulmonary exam normal                OUTSIDE LABS:  CBC:   Lab Results   Component Value Date    WBC 6.8 10/24/2023    WBC 9.7 10/22/2023    HGB 12.5 10/24/2023    HGB 13.6 10/22/2023    HCT 40.8 10/24/2023    HCT 44.9 10/22/2023     10/24/2023     10/22/2023     BMP:   Lab Results   Component Value Date    " " 10/24/2023     10/22/2023    POTASSIUM 4.3 10/24/2023    POTASSIUM 4.6 10/22/2023    CHLORIDE 103 10/24/2023    CHLORIDE 100 10/22/2023    CO2 26 10/24/2023    CO2 28 10/22/2023    BUN 16.4 10/24/2023    BUN 21.1 (H) 10/22/2023    CR 0.71 10/24/2023    CR 0.82 10/22/2023    GLC 89 10/25/2023    GLC 98 10/25/2023     COAGS: No results found for: \"PTT\", \"INR\", \"FIBR\"  POC:   Lab Results   Component Value Date    HCG Negative 10/21/2023     HEPATIC: No results found for: \"ALBUMIN\", \"PROTTOTAL\", \"ALT\", \"AST\", \"GGT\", \"ALKPHOS\", \"BILITOTAL\", \"BILIDIRECT\", \"MATEO\"  OTHER:   Lab Results   Component Value Date    FADUMO 9.3 10/24/2023    PHOS 3.3 10/24/2023    MAG 2.1 10/24/2023    TSH 1.00 10/21/2023    T4 6.3 10/21/2023    T4 1.31 10/21/2023       Anesthesia Plan    ASA Status:  3       Anesthesia Type: General.     - Airway: ETT   Induction: Intravenous.   Maintenance: Balanced.        Consents    Anesthesia Plan(s) and associated risks, benefits, and realistic alternatives discussed. Questions answered and patient/representative(s) expressed understanding.     - Discussed: Risks, Benefits and Alternatives for BOTH SEDATION and the PROCEDURE were discussed     - Discussed with:  Patient, Legal Guardian      - Extended Intubation/Ventilatory Support Discussed: No.      - Patient is DNR/DNI Status: No     Use of blood products discussed: No .     Postoperative Care       PONV prophylaxis: Ondansetron (or other 5HT-3), Dexamethasone or Solumedrol     Comments:    Other Comments: On 10/22/2023, Psychiatry (Margarita Bonilla) declared patient this patient to not have the capacity to complete a health care directive, not have the capacity to make medical decisions, and not have the capacity to name whom she would want to name as her health care decisions maker/agent.      Her Godmother, Adri Ramos ([phone]) was made medical decision-maker with approval from Risk Management, and documented in a social work note.  " Adri consented to MRI, even if it requires restraints / chemical sedation against the patient's stated wishes.    An ethics consult was obtained, with Dr. Bashir Hoffmann from that service stating that the Adri, the surrogate, is making the decision based on what the patient would have wanted if the pt wasn't impaired and the pt is impaired right now.  Per Dr. Hoffmann / ethics consult, restraining or chemically restraining pt is ethical due to the risks of no intervention.     MD DELPHINE Lal ROLAIN, MD

## 2023-10-26 NOTE — OR NURSING
Pt transferred to  w/ ICU Flyer RN and RT, leaves on BiPAP 100% FiO2 12/5, RR 30-35, increasingly lethargic, unable to assess orientation, PAR doc at bedside and aware, PAR wanting ABG drawn in ICU, relaying message to RT and Flyer RN at bedside

## 2023-10-27 NOTE — PHARMACY-VANCOMYCIN DOSING SERVICE
"Pharmacy Vancomycin Initial Note  Date of Service 2023  Patient's  1980  43 year old, female    Indication: Sepsis    Current estimated CrCl = Estimated Creatinine Clearance: 86.4 mL/min (based on SCr of 0.87 mg/dL).    Creatinine for last 3 days  10/26/2023:  6:07 PM Creatinine 0.85 mg/dL  10/27/2023:  3:40 AM Creatinine 0.87 mg/dL    Recent Vancomycin Level(s) for last 3 days  No results found for requested labs within last 3 days.      Vancomycin IV Administrations (past 72 hours)        No vancomycin orders with administrations in past 72 hours.                    Nephrotoxins and other renal medications (From now, onward)      Start     Dose/Rate Route Frequency Ordered Stop    10/27/23 2200  vancomycin (VANCOCIN) 1,000 mg in sodium chloride 0.9 % 250 mL intermittent infusion         1,000 mg  over 90 Minutes Intravenous EVERY 12 HOURS 10/27/23 0947      10/27/23 1000  vancomycin (VANCOCIN) 2,250 mg in sodium chloride 0.9 % 500 mL intermittent infusion         2,250 mg  over 120 Minutes Intravenous ONCE 10/27/23 0946      10/27/23 0730  piperacillin-tazobactam (ZOSYN) 4.5 g vial to attach to  mL bag        Note to Pharmacy: For [facility], [facility] and [facility]: For Zosyn-naive patients, use the \"Zosyn initial dose + extended infusion\" order panel.    4.5 g  over 30 Minutes Intravenous EVERY 6 HOURS 10/27/23 0725              Contrast Orders - past 72 hours (72h ago, onward)      Start     Dose/Rate Route Frequency Stop    10/26/23 1400  gadobutrol (GADAVIST) injection 10 mL         10 mL Intravenous ONCE 10/26/23 1359          [employer] Prediction of Planned Initial Vancomycin Regimen  Loading dose: 2250 mg at 10:00 10/27/2023.  Regimen: 1000 mg IV every 12 hours.  Start time: 09:49 on 10/27/2023  Exposure target: AUC24 (range)400-600 mg/L.hr   AUC24,ss: 437 mg/L.hr  Probability of AUC24 > 400: 59 %  Ctrough,ss: 13.7 mg/L  Probability of Ctrough,ss > 20: 21 %  Probability of nephrotoxicity (Lodise NEPTALI " 2009): 9 %        Plan:  Start vancomycin 2250 mg IV once, followed by 1000 mg q12h.   Vancomycin monitoring method: AUC  Vancomycin therapeutic monitoring goal: 400-600 mg*h/L  Pharmacy will check vancomycin levels as appropriate in 1-3 Days.    Serum creatinine levels will be ordered daily for the first week of therapy and at least twice weekly for subsequent weeks.      Terell Bacon RPH

## 2023-10-27 NOTE — PLAN OF CARE
Major Shift Events: Q4hr neuros. Lethargic. Garbled/Hoarse speech. Per report, R pupil blind.  Following most commands. BUE 4/5, BLE 3/5. Titrating precedex for comfort. Bilateral soft wrist restraints in place until stopped at 0400. SR/ST 80s-100s. Tmax 99.9. MAPs >65 + SBP <160 without intervention. Upon start of shift on 100% BiPAP, 2200 ABG improved and patient weaned to 40%. Switched to high flow NC per NSG request; patient did not tolerate due to nasal discomfort. Attempted oxymask, sats >92% on 6L. 0330 ABG showed decreased PaO2 (67), so ultimately switched back to 40% BiPAP. CXR completed showing infection vs. pulmonary edema. NPO; plan for swallow eval today. LBM 10/25. Galeas in place for strict I&Os. Decreased UOP - NSG + Neuro crit notifiied. PIV x2. R radial art line placed. Lactic recheck 1.8, WBC increased (25.0).    Plan: Wean respiratory support as tolerated. Plan for OR in future.   For vital signs and complete assessments, please see documentation flowsheets.

## 2023-10-27 NOTE — PROGRESS NOTES
"Neurocritical Care Progress Note    Reason for critical care admission: Large pituitary adenoma with neuro deficits, possible resection   Admitting Team: Neurosurgery  Date of Service:  10/27/2023  Date of Admission:  10/21/2023  Hospital Day: 7    Assessment/Plan  Mirian Cunningham is a 43 year old female with complex psychosocial situation including paranoid schizophrenia, HTN, Asthma with known large sellar/suprasellar mass, admitted to the ICU for hypoxic respiratory failure in the setting of likely flash pulmonary edema and aspiration pna.     Patient present to [facility] on 10/6/2023 with multiple complaints including stress, housing insecurity, dizziness, head pressure, and worsening vision in the left eye. 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 Management of her pituitary mass has been complicated by her concomitant mental health and social concerns. Patient was seen by psychiatry lacks the decision making capacity to complete full Advance Directive, however retains the ability name her next of kin/surrogate medical decision maker when deemed unconscious.  \" Godmother Adri is appointed as the medical decision maker.        24 hr events: MRI brain was obtained under anesthesia 10/26/23 to facilitate further diagnostic imaging. Upon extubation per report yesterday evening patient was hypertensive , and it was reported patient had \"Pink frothy sputum\". Upon arrival to the ICU she was in sinus tach 130's, HTN 's on 100% Bipap Lactic 2-->3.6 now resolved 1.8. Chest xray was concerning for bilateral opacities c/w pulm edema vs infection. She received 40mg of lasix. Improved overnight BIPAP to 40% this am, WBC to 25 started zosyn for Abx coverage this morning in concern aspiration pna. This morning was hypotensive received total of 1L of IVF and " further diuresis was held.        Neuro  #Pituitary adenoma  #R eye complete blindness  #L visual field cut  MRI Large multilobulated pituitary mass/adenoma with mass effect and  encasement of the cavernous structures including the cavernous  internal carotid arteries, M1 segment of the right MCA, and A1 and  proximal A2 segments of both anterior cerebral arteries. The optic  chiasm is not well visualized, also possibly encased.  -Of note patient is blind in the right eye 2/2 mass and partially in the left   Plan  -Neurochecks every 4 hrs  -SBP goal < normotension  -Neurosurgery discussing possible resection, however would wait until her acute illness has improved.   -HOB > 30   -PT/OT/SLP    #Schizophrenia  Does not take psych meds.  She states she does not need them.    -Quetiapine 25 PRN     #Analgesics & sedation  RASS goal: 0 - -1  -Stop dexmedetomidine    CV  #Shock 2/2 likely to sepsis 2/2 Aspiration pna   Lactic 3.6 now 1.8  Resuscitation: 1L IVF   Pressors: NE  BNP 36  ECHO:  EF of 60-65%. Right ventricular function, chamber size, wall motion, and thickness are normal. Mild (pulmonary artery systolic pressure<50mmHg) pulmonary hypertension, RV normal.   EKG NSR Sinus Tach   Plan  -MAP goal >65   -Can give additional IVF if needed   -NE if needed for MAP <65  -Holding diuresis   -Trend lactic     Resp  #Acute hypoxic respiratory failure 2/2 Aspiration pna vs pulmonary edema   #Asthma   #Suspected MAXX  Oxygen/vent: BIPAP 14/10, 40%   -CXR shows diffuse bilateral pulmonary opacities   -s/p total of 80mg lasix   Plan   -Holding diuresis in the setting of her hypotension  -Wean Bipap, attempt HFNC, followed by Oxy mask   -BIPAP at night   -Abx as below   -PRN albuterol, Duo nebs  -Trial BIPAP wean this afternoon  -Maintain O2 saturations greater than 92%    GI  Diet: Clear liquids   Last BM: 10/25  GI prophylaxis: Not indicated  -Bowel regimen: sennoside + simethicone PRN    Renal/  -Scr 0.84  -Holding  diuresis s/p 1L nacl bolus   Plan   Monitor urine output   Holing diuresis   Discontinue Galeas    Endo  -TSH, LH, FSH, prolactin normal   -Blood glucose goal less than 180   -Monitor glucose levels  -appreciate endocrine     Heme  -Daily CBC  -Hgb goal >7, plt goal >50k  -subcutaneous heparin (NSG ok)   -Transfuse to meet Hgb and plt goals    ID  #Septic Shock likely 2/2 aspiration   #Leukocytosis  -Tmax 99, worsening leukocytosis 25 (13)   -Procal pending   Micro   BC 10/26 pending   UA negative   MRSA nares Negative   SA Target DNA positive   Abx  Zosyn (10/27)  Vanc x1 (10/27)   Plan   -Piperacillin/tazobactam 4.5 g  -STOP Vanc   -Following Blood cultures   -Trend leukocytosis   -Follow temperature curve    ICU Checklist  Lines/tubes/drains: PIV x1, Galeas  FEN: Clear liquids  PPX: DVT - SCDs, subcutaneous heparin; GI - not indicated.  Code: Full code  Dispo: ICU - NCC      TIME SPENT ON THIS ENCOUNTER   I spent 60 minutes of critical care time on the unit/floor managing the care of Mirian Cunningham excluding time performing procedures. Upon evaluation, this patient had a high probability of imminent or life-threatening deterioration due to Hypoxic respiratory failure and aspiration pna, which required my direct attention, intervention, and personal management. Greater than 50% of my time was spent at the bedside counseling the patient and/or coordinating care including chart review, history, exam, documentation, and further activities per this note. I have personally reviewed the following data/imaging over the past 24 hours.     The patient was seen and discussed with the NCC attending, Dr. Castillo.    Roxana Vigil     24 Hour Vital Signs Summary:  Temp: 99.9  F (37.7  C) Temp  Min: 97.5  F (36.4  C)  Max: 99.9  F (37.7  C)  Resp: 24 Resp  Min: 16  Max: 43  SpO2: 96 % SpO2  Min: 90 %  Max: 98 %  Pulse: 86 Pulse  Min: 59  Max: 134    No data recorded  BP: 107/47 Systolic (24hrs), Av , Min:79 , Max:168  "  Diastolic (24hrs), Av, Min:47, Max:148      Respiratory monitoring:   FiO2 (%): 40 %  Resp: 24      I/O last 3 completed shifts:  In: 849.99 [I.V.:849.99]  Out: 1420 [Urine:1420]    Current Medications:   fluticasone  1 spray Both Nostrils BID    fluticasone-salmeterol  1 puff Inhalation Q12H    furosemide  40 mg Intravenous BID    heparin ANTICOAGULANT  5,000 Units Subcutaneous Q8H    influenza quadrivalent (PF) vacc  0.5 mL Intramuscular Prior to discharge    ipratropium - albuterol 0.5 mg/2.5 mg/3 mL  3 mL Nebulization 4x daily    lidocaine  2 patch Transdermal Q24h    multivitamin w/minerals  1 tablet Oral Daily    piperacillin-tazobactam  4.5 g Intravenous Q6H       PRN Medications:  acetaminophen, albuterol, albuterol, labetalol, moisturizing lotion, ondansetron, - MEDICATION INSTRUCTIONS -, sennosides, simethicone    Infusions:   dexmedeTOMIDine 0.2 mcg/kg/hr (10/27/23 0700)    - MEDICATION INSTRUCTIONS -         Allergies   Allergen Reactions    Powder Difficulty breathing     Per pt, allergy to MILK PROTEIN POWDER but not milk or dairy products    Hydrocodone Fatigue    Iodinated Contrast Media Other (See Comments)     \"Paralysis of legs\", \"was unable to walk for 6 months\"    Mushroom Hives and Difficulty breathing    Other Food Allergy      Food coloring, artificial preservatives, high fructose syrup    Seeds      Poppy seeds, sesame seeds        Physical Examination:  Vitals: /47   Pulse 86   Temp 99.9  F (37.7  C) (Axillary)   Resp 24   Ht 1.549 m (5' 1\")   Wt 94.1 kg (207 lb 7.3 oz)   SpO2 96%   BMI 39.20 kg/m    General: Adult female patient, lying in bed, critically-ill  HEENT: Normocephalic, atraumatic, no icterus, oral cavity/oropharynx pink and moist  Cardiac: Well-perfused  Pulm: Course lung sounds throughout, worse on R, improved wheezes diminished in bases, BIPAP in place   Abdomen: Soft, non-distended  Extremities: generalized anasarca, warm extremities and perfused, normal " pulses   Skin: No rash or lesion  Psych: Calm and cooperative  Neuro:  Mental status: Awake, alert, attentive, oriented to self, time, place, and circumstance. Language is fluent and coherent with intact comprehension of complex commands, naming and repetition.  Cranial nerves: PERRL, conjugate gaze, EOMI, facial sensation intact, face symmetric, shoulder shrug strong, tongue midline, no dysarthria. Decreased visual field on periphery of left eye.  Motor: Normal bulk and tone. No abnormal movements. 5/5 strength in 4/4 extremities.   Sensory: Intact to light touch in 4 extremities  Coordination: FNF and HS without ataxia or dysmetria.  Gait: KELLY, deferred.      Labs and Imaging:    Results for orders placed or performed during the hospital encounter of 10/21/23 (from the past 24 hour(s))   MRA Brain ([place]) wo Contrast    Narrative    Head MRA without contrast.    History: Pituitary mass.  ICD-10: Pituitary mass.    Comparison:  Outside brain MRI 10/7/2023. Outside CT head 10/7/2023,  1/5/2023.     Technique:     MRA Head:  Using a 3D time-of-flight image acquisition technique, MRA  of the major arteries at the base of the brain was obtained without  intravenous contrast. Three-dimensional reconstructions of the head  MRA were created, which were reviewed by the radiologist.    Contrast: 9 cc gadavist.    Findings:  No aneurysm of the major intracranial arteries. Apparent  loss of intraluminal signal in both V3 and V4 segments of both  vertebral arteries and petrous segments of both internal carotid  arteries are favored to be artifactual. There is complete encasement  of the cavernous internal carotid arteries, proximal M1 segments of  the right middle cerebral artery, and A1 and proximal A2 segments of  the right greater than left anterior cerebral arteries with left  lateral displacement of the distal anterior cerebral arteries.  Approximately 180 degrees of abutment with the left cavernous  internal  carotid artery.      Impression    Impression: Symmetric and likely artifactual loss of intraluminal  signal in both V3 and V4 segments of both vertebral arteries and  petrous segments of both internal carotid arteries. Otherwise, the  major intracranial arteries are patent.    I have personally reviewed the examination and initial interpretation  and I agree with the findings.    MERVIN ALMENDAREZ MD         SYSTEM ID:  [id number]   MR Brain w/o & w Contrast    Narrative    Brain/ Pituitary MRI without and with contrast  Head MRA without contrast.    History: Pituitary mass.  ICD-10: Pituitary mass.    Comparison:  Outside brain MRI 10/7/2023. Outside CT head 10/7/2023,  1/5/2023.     Technique:     Brain MRI: Axial diffusion and FLAIR images of the whole brain  obtained without intravenous contrast. Sagittal T1 and T2-weighted,  coronal T2-weighted, coronal T1-weighted images with focus on the  sella were obtained without intravenous contrast. Post intravenous  contrast using gadolinium coronal and sagittal T1-weighted images were  obtained focused on the sella. Dynamic postcontrast coronal  T1-weighted images were also obtained.    MRA Head:  Using a 3D time-of-flight image acquisition technique, MRA  of the major arteries at the base of the brain was obtained without  intravenous contrast. Three-dimensional reconstructions of the head  MRA were created, which were reviewed by the radiologist.    Contrast: 9 cc gadavist.    Findings:      Brain MRI:  Large 5.9 x 5.7 x 4.8 cm multilobulated, heterogeneously enhancing T2  hyperintense, T1 hypointense, predominantly solid mass centered in and  expanding the sella with superior convex displacement of the  diaphragmatic leaflets. Associated diffusion restriction. The optic  chiasm is not well visualized. The pituitary stalk is not well  visualized, possibly encased. Associated intraparenchymal  susceptibility/hemorrhage. There is mass effect and effacement of  the  bifrontal lobes with a surrounding CSF cleft and T2/FLAIR hyperintense  signal in the right frontal lobe. Mass effect on the ventral midbrain  and tee. Compression and effacement of the frontal horns of the  bilateral lateral and third.    No abnormal bone marrow signal. Paranasal sinuses and mastoid air  cells are clear. Orbits are unremarkable.    Head MRA:  No aneurysm or significant stenosis of the major intracranial  arteries. There is complete encasement of the cavernous internal  carotid arteries, proximal M1 segments of the right middle cerebral  artery, and A1 and proximal A2 segments of the right greater than left  anterior cerebral arteries with left lateral displacement of the  distal anterior cerebral arteries. Approximately 180 degrees of  abutment with the left cavernous internal carotid artery.      Impression    Impression:  1.  Large multilobulated pituitary mass/adenoma with mass effect and  encasement of the cavernous structures including the cavernous  internal carotid arteries, M1 segment of the right MCA, and A1 and  proximal A2 segments of both anterior cerebral arteries. The optic  chiasm is not well visualized, also possibly encased.  2.  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    I have personally reviewed the examination and initial interpretation  and I agree with the findings.    HUSSEIN DESIR MD         SYSTEM ID:  [id number]   XR Chest Port 1 View    Narrative    Portable chest    INDICATION: Possible negative pressure pulmonary edema    COMPARISON: None    FINDINGS: Heart size normal. Diffuse opacification throughout the  right lung with patchy opacification throughout the left lung. This  may represent edema. However, recommend follow-up to clearing to  exclude superimposed infectious consolidation.      Impression    IMPRESSION: Likely edema rather than infection within the lungs.  Recommend follow-up to clearing.    JOSE RAUL MCNULTY,  MD         SYSTEM ID:  [id number]   Glucose by meter   Result Value Ref Range    GLUCOSE BY METER POCT 133 (H) 70 - 99 mg/dL   EKG 12-lead, complete   Result Value Ref Range    Systolic Blood Pressure  mmHg    Diastolic Blood Pressure  mmHg    Ventricular Rate 126 BPM    Atrial Rate 126 BPM    ND Interval 156 ms    QRS Duration 80 ms     ms    QTc 428 ms    P Axis 54 degrees    R AXIS 60 degrees    T Axis -9 degrees    Interpretation ECG       Sinus tachycardia  T wave abnormality, consider inferior ischemia  Abnormal ECG  When compared with ECG of 26-OCT-2023 09:37, (unconfirmed)  Vent. rate has increased BY  62 BPM     Blood gas arterial   Result Value Ref Range    pH Arterial 7.38 7.35 - 7.45    pCO2 Arterial 49 (H) 35 - 45 mm Hg    pO2 Arterial 55 (L) 80 - 105 mm Hg    FIO2 100     Bicarbonate Arterial 29 (H) 21 - 28 mmol/L    Base Excess/Deficit 2.7 (H) -9.0 - 1.8 mmol/L    Maximiliano's Test Yes    CBC with Platelets & Differential    Narrative    The following orders were created for panel order CBC with Platelets & Differential.  Procedure                               Abnormality         Status                     ---------                               -----------         ------                     CBC with platelets and d...[[id number]]  Abnormal            Final result                 Please view results for these tests on the individual orders.   Comprehensive metabolic panel   Result Value Ref Range    Sodium 140 135 - 145 mmol/L    Potassium 4.5 3.4 - 5.3 mmol/L    Carbon Dioxide (CO2) 26 22 - 29 mmol/L    Anion Gap 12 7 - 15 mmol/L    Urea Nitrogen 17.6 6.0 - 20.0 mg/dL    Creatinine 0.85 0.51 - 0.95 mg/dL    GFR Estimate 86 >60 mL/min/1.73m2    Calcium 9.2 8.6 - 10.0 mg/dL    Chloride 102 98 - 107 mmol/L    Glucose 158 (H) 70 - 99 mg/dL    Alkaline Phosphatase 76 35 - 104 U/L    AST 24 0 - 45 U/L    ALT 16 0 - 50 U/L    Protein Total 7.6 6.4 - 8.3 g/dL    Albumin 3.9 3.5 - 5.2 g/dL    Bilirubin Total  0.6 <=1.2 mg/dL   ABO/Rh type and screen    Narrative    The following orders were created for panel order ABO/Rh type and screen.  Procedure                               Abnormality         Status                     ---------                               -----------         ------                     Adult Type and Screen[[id number]]                            Final result                 Please view results for these tests on the individual orders.   Lactic acid whole blood   Result Value Ref Range    Lactic Acid 2.1 (H) 0.7 - 2.0 mmol/L   Ionized Calcium   Result Value Ref Range    Calcium Ionized Whole Blood 4.4 4.4 - 5.2 mg/dL   CRP inflammation   Result Value Ref Range    CRP Inflammation 18.80 (H) <5.00 mg/L   Erythrocyte sedimentation rate auto   Result Value Ref Range    Erythrocyte Sedimentation Rate 18 0 - 20 mm/hr   CBC with platelets and differential   Result Value Ref Range    WBC Count 13.8 (H) 4.0 - 11.0 10e3/uL    RBC Count 5.04 3.80 - 5.20 10e6/uL    Hemoglobin 14.7 11.7 - 15.7 g/dL    Hematocrit 47.9 (H) 35.0 - 47.0 %    MCV 95 78 - 100 fL    MCH 29.2 26.5 - 33.0 pg    MCHC 30.7 (L) 31.5 - 36.5 g/dL    RDW 13.5 10.0 - 15.0 %    Platelet Count 313 150 - 450 10e3/uL    % Neutrophils 93 %    % Lymphocytes 5 %    % Monocytes 2 %    % Eosinophils 0 %    % Basophils 0 %    % Immature Granulocytes 0 %    NRBCs per 100 WBC 0 <1 /100    Absolute Neutrophils 12.9 (H) 1.6 - 8.3 10e3/uL    Absolute Lymphocytes 0.6 (L) 0.8 - 5.3 10e3/uL    Absolute Monocytes 0.2 0.0 - 1.3 10e3/uL    Absolute Eosinophils 0.0 0.0 - 0.7 10e3/uL    Absolute Basophils 0.0 0.0 - 0.2 10e3/uL    Absolute Immature Granulocytes 0.1 <=0.4 10e3/uL    Absolute NRBCs 0.0 10e3/uL   Adult Type and Screen   Result Value Ref Range    ABO/RH(D) A POS     Antibody Screen Negative Negative    SPECIMEN EXPIRATION DATE [id number]    Nt probnp inpatient   Result Value Ref Range    N terminal Pro BNP Inpatient <36 0 - 450 pg/mL   UA Macroscopic  with reflex to Microscopic and Culture    Specimen: Urine, Galeas Catheter   Result Value Ref Range    Color Urine Straw Colorless, Straw, Light Yellow, Yellow    Appearance Urine Clear Clear    Glucose Urine Negative Negative mg/dL    Bilirubin Urine Negative Negative    Ketones Urine Negative Negative mg/dL    Specific Gravity Urine 1.010 1.003 - 1.035    Blood Urine Negative Negative    pH Urine 5.0 5.0 - 7.0    Protein Albumin Urine Negative Negative mg/dL    Urobilinogen Urine Normal Normal, 2.0 mg/dL    Nitrite Urine Negative Negative    Leukocyte Esterase Urine Negative Negative    Narrative    Microscopic not indicated   Lactic acid whole blood   Result Value Ref Range    Lactic Acid 3.6 (H) 0.7 - 2.0 mmol/L   Blood Culture Hand, Left    Specimen: Hand, Left; Blood   Result Value Ref Range    Culture No growth after 12 hours     Narrative    Only an Aerobic Blood Culture Bottle was collected, interpret results with caution.       Blood Culture Hand, Left    Specimen: Hand, Left; Blood   Result Value Ref Range    Culture No growth after 12 hours     Narrative    Only an Aerobic Blood Culture Bottle was collected, interpret results with caution.       Blood gas arterial   Result Value Ref Range    pH Arterial 7.39 7.35 - 7.45    pCO2 Arterial 48 (H) 35 - 45 mm Hg    pO2 Arterial 101 80 - 105 mm Hg    FIO2 100     Bicarbonate Arterial 29 (H) 21 - 28 mmol/L    Base Excess/Deficit 3.2 (H) -9.0 - 1.8 mmol/L    Maximiliano's Test Artline    XR Chest Port 1 View    Narrative    Exam: XR CHEST PORT 1 VIEW, 10/27/2023 3:33 AM    Indication: Eval status of pulmonary edema    Comparison: Chest x-ray 10/26/2023    Findings:   Single portable AP semiupright view of the chest was obtained. Midline  trachea. Stable prominent cardiomediastinal silhouette. No appreciable  pneumothorax. Trace bilateral pleural effusions. Patchy airspace  opacities throughout the bilateral mid and lower lung field, slightly  increased within the left  lung fields compared to prior.      Impression    Impression:   1. Bilateral patchy airspace opacities, slightly increased within the  left lung fields compared to prior. This may represent pulmonary  edema; however, infection can't be excluded.  2. Likely trace pleural effusions.    I have personally reviewed the examination and initial interpretation  and I agree with the findings.    BIPIN PRESTON MD         SYSTEM ID:  [id number]   CBC with platelets   Result Value Ref Range    WBC Count 25.0 (H) 4.0 - 11.0 10e3/uL    RBC Count 4.64 3.80 - 5.20 10e6/uL    Hemoglobin 13.6 11.7 - 15.7 g/dL    Hematocrit 42.2 35.0 - 47.0 %    MCV 91 78 - 100 fL    MCH 29.3 26.5 - 33.0 pg    MCHC 32.2 31.5 - 36.5 g/dL    RDW 13.4 10.0 - 15.0 %    Platelet Count 316 150 - 450 10e3/uL   Basic metabolic panel   Result Value Ref Range    Sodium 139 135 - 145 mmol/L    Potassium 4.9 3.4 - 5.3 mmol/L    Chloride 103 98 - 107 mmol/L    Carbon Dioxide (CO2) 27 22 - 29 mmol/L    Anion Gap 9 7 - 15 mmol/L    Urea Nitrogen 20.9 (H) 6.0 - 20.0 mg/dL    Creatinine 0.87 0.51 - 0.95 mg/dL    GFR Estimate 84 >60 mL/min/1.73m2    Calcium 9.2 8.6 - 10.0 mg/dL    Glucose 174 (H) 70 - 99 mg/dL   Magnesium   Result Value Ref Range    Magnesium 1.8 1.7 - 2.3 mg/dL   Phosphorus   Result Value Ref Range    Phosphorus 4.3 2.5 - 4.5 mg/dL   Lactic acid whole blood   Result Value Ref Range    Lactic Acid 1.8 0.7 - 2.0 mmol/L   Blood gas arterial with oxyhemoglobin   Result Value Ref Range    pH Arterial 7.40 7.35 - 7.45    pCO2 Arterial 47 (H) 35 - 45 mm Hg    pO2 Arterial 67 (L) 80 - 105 mm Hg    Bicarbonate Arterial 29 (H) 21 - 28 mmol/L    Oxyhemoglobin Arterial 92 92 - 100 %    Base Excess/Deficit 3.5 (H) -9.0 - 1.8 mmol/L    FIO2 6     Maximiliano's Test Artline    Echo Complete   Result Value Ref Range    LVEF  60-65%     Narrative    [id number]  [id number]  [id number]  [id number]^GALLITO^PAOLA^CHELSI     [facility]  Laboratory  [address] [place] [address]     Name: VALERIANO SPAULDING  MRN: [id number]  : 1980  Study Date: 10/27/2023 08:03 AM  Age: 43 yrs  Gender: Female  Patient Location: [facility]  Reason For Study: Heart Failure  Ordering Physician: PAOLA CONTI  Performed By: Christian Cherry LISA     BSA: 1.9 m2  Height: 61 in  Weight: 204 lb  HR: 86  BP: 132/91 mmHg  ______________________________________________________________________________  Procedure  Complete Portable Echo Adult. Contrast Optison. Technically difficult  study.Extremely poor acoustic windows. Optison (NDC #2859-3096-15) given  intravenously. Patient was given 6 ml mixture of 3 ml Optison and 6 ml saline.  3 ml wasted.  ______________________________________________________________________________  Interpretation Summary  Technically difficult study.Extremely poor acoustic windows.  Global and regional left ventricular function is normal with an EF of 60-65%.  Right ventricular function, chamber size, wall motion, and thickness are  normal.  Mild (pulmonary artery systolic pressure<50mmHg) pulmonary hypertension is  present.  The inferior vena cava is normal.  No pericardial effusion is present.  There is no prior study for direct comparison.  ______________________________________________________________________________  Left Ventricle  Global and regional left ventricular function is normal with an EF of 60-65%.  Left ventricular size is normal. Mild concentric wall thickening consistent  with left ventricular hypertrophy is present. Basal septum measured at 15mm.  No TYRONE of anterior mitral leaflet or LVOT gradient. Left ventricular diastolic  function is normal. No regional wall motion abnormalities are seen.     Right Ventricle  Right ventricular function, chamber size, wall motion, and thickness are  normal.     Atria  Both atria appear normal. The atrial septum is intact as assessed by color  Doppler .     Mitral Valve  The  mitral valve is normal.     Aortic Valve  The valve leaflets are not well visualized. On Doppler interrogation, there is  no significant stenosis or regurgitation.     Tricuspid Valve  The tricuspid valve is normal. Trace tricuspid insufficiency is present. The  right ventricular systolic pressure is approximated at 40.8 mmHg plus the  right atrial pressure. Mild (pulmonary artery systolic pressure<50mmHg)  pulmonary hypertension is present.     Pulmonic Valve  The pulmonic valve is normal. Trace pulmonic insufficiency is present.     Vessels  The thoracic aorta is normal. The pulmonary artery and bifurcation cannot be  assessed. The inferior vena cava is normal.     Pericardium  No pericardial effusion is present.     Compared to Previous Study  There is no prior study for direct comparison.  ______________________________________________________________________________  MMode/2D Measurements & Calculations     IVSd: 1.5 cm  LVIDd: 3.2 cm  LVIDs: 2.3 cm  LVPWd: 1.1 cm  FS: 28.4 %  LV mass(C)d: 129.9 grams  LV mass(C)dI: 68.2 grams/m2  asc Aorta Diam: 2.7 cm  LVOT diam: 2.0 cm  LVOT area: 3.1 cm2  Asc Ao diam index BSA (cm/m2): 1.4  Asc Ao diam index Ht(cm/m): 1.8  LA Volume Index (BP): 28.6 ml/m2  RWT: 0.70     TAPSE: 2.0 cm     Doppler Measurements & Calculations  MV E max laura: 52.8 cm/sec  MV A max laura: 57.9 cm/sec  MV E/A: 0.91  MV dec slope: 257.7 cm/sec2  MV dec time: 0.21 sec  PA acc time: 0.09 sec  TR max laura: 319.5 cm/sec  TR max P.8 mmHg  E/E' av.2  Lateral E/e': 6.1  Medial E/e': 10.2     ______________________________________________________________________________  Report approved by: Robbie Meyer 10/27/2023 10:21 AM         Glucose by meter   Result Value Ref Range    GLUCOSE BY METER POCT 166 (H) 70 - 99 mg/dL   Lactic acid whole blood   Result Value Ref Range    Lactic Acid 1.6 0.7 - 2.0 mmol/L   Blood gas arterial   Result Value Ref Range    pH Arterial 7.38 7.35 - 7.45    pCO2 Arterial  51 (H) 35 - 45 mm Hg    pO2 Arterial 75 (L) 80 - 105 mm Hg    FIO2 30     Bicarbonate Arterial 30 (H) 21 - 28 mmol/L    Base Excess/Deficit 3.8 (H) -9.0 - 1.8 mmol/L    Maximiliano's Test Artline        All relevant imaging and laboratory values personally reviewed.

## 2023-10-27 NOTE — PROGRESS NOTES
[facility], [place]   10/27/2023  Neurosurgery Progress Note:    Interval History: MRI complete under intubation. MRI with large multilobulated pituitary mass/adenoma with mass effect and  encasement of the cavernous structures including the cavernous internal carotid arteries, M1 segment of the right MCA, and A1 and  proximal A2 segments of both anterior cerebral arteries. The optic chiasm is not well visualized, also possibly encased. And associated hemorrhage within the pituitary mass. Post MRI, she developed flash pulmonary edema and is currently on Bipap, A line placed for frequent ABGs, Pa02 improving, Weaning Fio2.     Assessment:  Mirian Cunningham is a 43 year old female with complex psychosocial situation including paranoid schizophrenia and lack of active guardianship transferred to [facility] with known large sellar/suprasellar mass. Godmother Adri is appointed as the medical decision maker. No other family members available or willing to participate in her care.  MRI with large multilobulated pituitary mass/adenoma with mass effect and  encasement of the cavernous structures including the cavernous internal carotid arteries, M1 segment of the right MCA, and A1 and  proximal A2 segments of both anterior cerebral arteries.     Plan:  q4h neuro exams   Pain control  Precedex for agitation  Keppra 500mg BID for seizure ppx  MRI brain complete  Final OR plan pending  Maintain SBP < 160  Echo in the morning  Nasal trumpet in place for suctioning  On BiPAP, wean as tolerated  Serial ABGs for PaO2 monitoring  Diuresis for pulmonary edema  NPO for now, will do swallow eval in the AM  Normonatremia   Continue to monitor intake/output  Galeas for strict ins and outs  Continue to monitor for fevers and/or signs of infection  Pan cultures sent  Monitor lactate  Continue glucose checks  Platelets > 100,000  INR < 1.5  Hemoglobin > 8  DVT: SCDs and SQH      -----------------------------------  Can Garcia MD  Neurosurgery Resident  Pager: [phone]    Please contact neurosurgery resident on call with questions.    Dial * * *[phone], enter [phone] when prompted.   -----------------------------------    Objective:   Temp:  [97.5  F (36.4  C)-99.2  F (37.3  C)] 99.2  F (37.3  C)  Pulse:  [] 89  Resp:  [12-43] 26  BP: ()/() 87/60  MAP:  [69 mmHg-73 mmHg] 69 mmHg  Arterial Line BP: (75-93)/(57-73) 93/57  FiO2 (%):  [50 %-100 %] 50 %  SpO2:  [90 %-99 %] 97 %  I/O last 3 completed shifts:  In: 817.79 [I.V.:817.79]  Out: 1235 [Urine:1235]    NEUROLOGIC:  Gen: Appears comfortable, NAD  Resp: breathing comfortably on room air  Neurologic:  Somnolent, awakens to touch  Conversant  No gaze preference. No apparent neglect.  Face symmetric activation.  Moving all extremities equally.    LABS:  Recent Labs   Lab 10/26/23  1807 10/26/23  1730 10/25/23  1204 10/24/23  1317 10/24/23  0715 10/22/23  1744 10/22/23  0618     --   --   --  139  --  140   POTASSIUM 4.5  --   --   --  4.3  --  4.6   CHLORIDE 102  --   --   --  103  --  100   CO2 26  --   --   --  26  --  28   ANIONGAP 12  --   --   --  10  --  12   * 133* 89   < > 87   < > 87   BUN 17.6  --   --   --  16.4  --  21.1*   CR 0.85  --   --   --  0.71  --  0.82   FADUMO 9.2  --   --   --  9.3  --  9.9    < > = values in this interval not displayed.     Recent Labs   Lab 10/26/23  1807   WBC 13.8*   RBC 5.04   HGB 14.7   HCT 47.9*   MCV 95   MCH 29.2   MCHC 30.7*   RDW 13.5        IMAGING:  Recent Results (from the past 24 hour(s))   MR Brain w/o & w Contrast    Narrative    Brain/ Pituitary MRI without and with contrast  Head MRA without contrast.    History: Pituitary mass.  ICD-10: Pituitary mass.    Comparison:  Outside brain MRI 10/7/2023. Outside CT head 10/7/2023,  1/5/2023.     Technique:     Brain MRI: Axial diffusion and FLAIR images of the whole brain  obtained without intravenous contrast.  Sagittal T1 and T2-weighted,  coronal T2-weighted, coronal T1-weighted images with focus on the  sella were obtained without intravenous contrast. Post intravenous  contrast using gadolinium coronal and sagittal T1-weighted images were  obtained focused on the sella. Dynamic postcontrast coronal  T1-weighted images were also obtained.    MRA Head:  Using a 3D time-of-flight image acquisition technique, MRA  of the major arteries at the base of the brain was obtained without  intravenous contrast. Three-dimensional reconstructions of the head  MRA were created, which were reviewed by the radiologist.    Contrast: 9 cc gadavist.    Findings:      Brain MRI:  Large 5.9 x 5.7 x 4.8 cm multilobulated, heterogeneously enhancing T2  hyperintense, T1 hypointense, predominantly solid mass centered in and  expanding the sella with superior convex displacement of the  diaphragmatic leaflets. Associated diffusion restriction. The optic  chiasm is not well visualized. The pituitary stalk is not well  visualized, possibly encased. Associated intraparenchymal  susceptibility/hemorrhage. There is mass effect and effacement of the  bifrontal lobes with a surrounding CSF cleft and T2/FLAIR hyperintense  signal in the right frontal lobe. Mass effect on the ventral midbrain  and tee. Compression and effacement of the frontal horns of the  bilateral lateral and third.    No abnormal bone marrow signal. Paranasal sinuses and mastoid air  cells are clear. Orbits are unremarkable.    Head MRA:  No aneurysm or significant stenosis of the major intracranial  arteries. There is complete encasement of the cavernous internal  carotid arteries, proximal M1 segments of the right middle cerebral  artery, and A1 and proximal A2 segments of the right greater than left  anterior cerebral arteries with left lateral displacement of the  distal anterior cerebral arteries. Approximately 180 degrees of  abutment with the left cavernous internal carotid  artery.      Impression    Impression:  1.  Large multilobulated pituitary mass/adenoma with mass effect and  encasement of the cavernous structures including the cavernous  internal carotid arteries, M1 segment of the right MCA, and A1 and  proximal A2 segments of both anterior cerebral arteries. The optic  chiasm is not well visualized, also possibly encased.  2.  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    I have personally reviewed the examination and initial interpretation  and I agree with the findings.    HUSSEIN DESIR MD         SYSTEM ID:  [id number]   XR Chest Port 1 View    Narrative    Portable chest    INDICATION: Possible negative pressure pulmonary edema    COMPARISON: None    FINDINGS: Heart size normal. Diffuse opacification throughout the  right lung with patchy opacification throughout the left lung. This  may represent edema. However, recommend follow-up to clearing to  exclude superimposed infectious consolidation.      Impression    IMPRESSION: Likely edema rather than infection within the lungs.  Recommend follow-up to clearing.    JOSE RAUL MCNULTY MD         SYSTEM ID:  [id number]

## 2023-10-27 NOTE — ANESTHESIA POSTPROCEDURE EVALUATION
Patient: Mirian Cunningham    Procedure: Procedure(s):  Anesthesia out of OR MRI CTA, MRI Under Anesthesia       Anesthesia Type:  General    Note:  Disposition: Inpatient   Postop Pain Control: Uneventful            Sign Out: Well controlled pain   PONV: No   Neuro/Psych: Uneventful            Sign Out: Acceptable/Baseline neuro status   Airway/Respiratory:             Sign Out: O2 supplementation               Oxygen: Face mask   CV/Hemodynamics:    Other NRE: NONE   DID A NON-ROUTINE EVENT OCCUR? No           Last vitals:  Vitals Value Taken Time   /104 10/26/23 1700   Temp 36.8  C (98.2  F) 10/26/23 1630   Pulse 116 10/26/23 1707   Resp 21 10/26/23 1707   SpO2 92 % 10/26/23 1707   Vitals shown include unfiled device data.    Electronically Signed By: SIN AKERS MD  October 27, 2023  1:24 PM

## 2023-10-27 NOTE — PLAN OF CARE
Major Shift Events: Head CT. A1 to the commode. DC'd precedex  N: more alert throughout the day. AO x2-3. Follows commands. Throughout the day refusing more tasks.   CV: ST-ST, 80s-100s. Hypotensive in the morning, 2 500mL boluses given. Afeb.   R: weaned off Bipap to oxymask 5L. Coarse LS.   GI/: Clear liquid diet. No BM. Removed carrillo. Up to commode to void.   Plan: Continue per plan of care.  For vital signs and complete assessments, please see documentation flowsheets.

## 2023-10-27 NOTE — PROCEDURES
[facility]  Arterial line Procedure Note    Name of Procedure: Right-Sided Arterial Line Placement    Date of Procedure: October 27, 2023    Description of Procedure  Consent for the procedure was obtained from the Adri, patients God mother after its risks and benefits were thoroughly explained. The patient was placed in the supine position and her right wrist was hyperextended. She was already on precedex. The puncture site was then prepped and draped in the usual sterile fashion. A 20-gauge needle was then advanced through the patient's skin and subcutaneous tissue and entered into the patient's radial artery. Strong pulsatile blood flow was immediately observed coming from the needle. A guidewire was then advanced through the needle. The needle was subsequently removed over the guidewire, after which an arterial catheter was advanced over the guidewire. The guidewire was then removed and the catheter was attached to a transducer. It was subsequently sutured into place. After cleaning the site of entry, the patient's wrist was relaxed and a sterile dressing was applied. There were not any immediate complications and the patient tolerated the procedure well.    Findings: A good waveform was observed on the monitor and the arterial line blood pressure readings matched those obtained via the blood pressure cuff.    Dr. Mccoy, was available for the procedure.    Can Garcia MD  Neurosurgery Resident

## 2023-10-27 NOTE — PLAN OF CARE
Admitted/transferred from: PACU  Reason for admission/transfer: Requiring 100% BiPAP - close respiratory & hemodynamic monitoring.   2 RN skin assessment: completed by Caitlin YEBOAH & Sho SCHRADER.  Result of skin assessment and interventions/actions: Blanchable redness to coccyx - mepilex. No other concerns.   Height, weight, drug calc weight: Done  Patient belongings (see Flowsheet)  MDRO education added to care planN/A    Lethargic. Opens eyes to voice. 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 Ox1-2. [place]. Intermittently follows commands. Afebrile. -140s - Labetalol given x1 per order. MAP goal > 65. Came up on 100% BiPAP, satting 90-93% - NCC/NSG aware. ABG ordered for 2000. RR high 30-40s. LS w/ crackles/coarse. 20 mg lasix given. NPO. No BM. Galeas placed. Lactic 2.1, recheck ordered for 2200. Continue to closely monitor respiratory status.   ?

## 2023-10-28 NOTE — PROGRESS NOTES
[facility], [place]   10/28/2023  Neurosurgery Progress Note:    Interval History: Off Bipap, on oxymask. Exam stable. Echo with 60-65% with mild pulmonary hypertension.     Assessment:  Mirian Cunningham is a 43 year old female with complex psychosocial situation including paranoid schizophrenia and lack of active guardianship transferred to [facility] with known large sellar/suprasellar mass. Godmother Adri is appointed as the medical decision maker. No other family members available or willing to participate in her care.  MRI with large multilobulated pituitary mass/adenoma with mass effect and  encasement of the cavernous structures including the cavernous internal carotid arteries, M1 segment of the right MCA, and A1 and  proximal A2 segments of both anterior cerebral arteries.     Plan:  q4h neuro exams   Pain control  Keppra 500mg BID for seizure ppx  Seroquel PRN  MRI brain complete  Final OR plan pending  Maintain SBP < 160  Echo with 60-65% with mild pulmonary hypertension.   Oxymask, wean as tolerated  AM CXR  No diuresis   Clear liquid diet  Normonatremia   Continue to monitor intake/output  Galeas for strict ins and outs  On Vanc and zosyn  Continue to monitor for fevers and/or signs of infection  Follow cultures  Continue glucose checks  Platelets > 100,000  INR < 1.5  Hemoglobin > 8  DVT: SCDs and SQH     -----------------------------------  Can Garcia MD  Neurosurgery Resident  Pager: [phone]    Please contact neurosurgery resident on call with questions.    Dial * * *[phone], enter [phone] when prompted.   -----------------------------------    Objective:   Temp:  [97.7  F (36.5  C)-99.9  F (37.7  C)] 98.4  F (36.9  C)  Pulse:  [] 112  Resp:  [16-29] 26  BP: ()/() 90/65  MAP:  [67 mmHg-274 mmHg] 82 mmHg  Arterial Line BP: ()/() 89/76  FiO2 (%):  [30 %-50 %] 30 %  SpO2:  [83 %-100 %] 100 %  I/O last 3 completed shifts:  In: 2210.68 [P.O.:960; I.V.:750.68; IV  Piggyback:500]  Out: 1560 [Urine:1560]    NEUROLOGIC:  Gen: Appears comfortable, NAD  Resp: breathing comfortably on room air  Neurologic:  Somnolent, awakens to touch  Conversant  No gaze preference. No apparent neglect.  Face symmetric activation.  Moving all extremities equally.    LABS:  Recent Labs   Lab 10/27/23  2050 10/27/23  1619 10/27/23  0955 10/27/23  0340 10/26/23  1807 10/24/23  1317 10/24/23  0715   NA  --   --   --  139 140  --  139   POTASSIUM  --   --   --  4.9 4.5  --  4.3   CHLORIDE  --   --   --  103 102  --  103   CO2  --   --   --  27 26  --  26   ANIONGAP  --   --   --  9 12  --  10   * 164* 166* 174* 158*   < > 87   BUN  --   --   --  20.9* 17.6  --  16.4   CR  --   --   --  0.87 0.85  --  0.71   FADUMO  --   --   --  9.2 9.2  --  9.3    < > = values in this interval not displayed.     Recent Labs   Lab 10/27/23  0340   WBC 25.0*   RBC 4.64   HGB 13.6   HCT 42.2   MCV 91   MCH 29.3   MCHC 32.2   RDW 13.4        IMAGING:  Recent Results (from the past 24 hour(s))   XR Chest Port 1 View    Narrative    Exam: XR CHEST PORT 1 VIEW, 10/27/2023 3:33 AM    Indication: Eval status of pulmonary edema    Comparison: Chest x-ray 10/26/2023    Findings:   Single portable AP semiupright view of the chest was obtained. Midline  trachea. Stable prominent cardiomediastinal silhouette. No appreciable  pneumothorax. Trace bilateral pleural effusions. Patchy airspace  opacities throughout the bilateral mid and lower lung field, slightly  increased within the left lung fields compared to prior.      Impression    Impression:   1. Bilateral patchy airspace opacities, slightly increased within the  left lung fields compared to prior. This may represent pulmonary  edema; however, infection can't be excluded.  2. Likely trace pleural effusions.    I have personally reviewed the examination and initial interpretation  and I agree with the findings.    BIPIN PRESTON MD         SYSTEM ID:  [id number]   Echo  Complete   Result Value    LVEF  60-65%    [facility]    [id number]  [id number]  [id number]  [id number]^GALLITO^PAOLA^CHELSI     [facility],[place]  Echocardiography Laboratory  [address] [place] [address]     Name: VALERIANO SPAULDING  MRN: [id number]  : 1980  Study Date: 10/27/2023 08:03 AM  Age: 43 yrs  Gender: Female  Patient Location: [facility]  Reason For Study: Heart Failure  Ordering Physician: PAOLA CONTI  Performed By: Christian Cherry LISA     BSA: 1.9 m2  Height: 61 in  Weight: 204 lb  HR: 86  BP: 132/91 mmHg  ______________________________________________________________________________  Procedure  Complete Portable Echo Adult. Contrast Optison. Technically difficult  study.Extremely poor acoustic windows. Optison (NDC #9317-2265-43) given  intravenously. Patient was given 6 ml mixture of 3 ml Optison and 6 ml saline.  3 ml wasted.  ______________________________________________________________________________  Interpretation Summary  Technically difficult study.Extremely poor acoustic windows.  Global and regional left ventricular function is normal with an EF of 60-65%.  Right ventricular function, chamber size, wall motion, and thickness are  normal.  Mild (pulmonary artery systolic pressure<50mmHg) pulmonary hypertension is  present.  The inferior vena cava is normal.  No pericardial effusion is present.  There is no prior study for direct comparison.  ______________________________________________________________________________  Left Ventricle  Global and regional left ventricular function is normal with an EF of 60-65%.  Left ventricular size is normal. Mild concentric wall thickening consistent  with left ventricular hypertrophy is present. Basal septum measured at 15mm.  No TYRONE of anterior mitral leaflet or LVOT gradient. Left ventricular diastolic  function is normal. No regional wall motion abnormalities are seen.     Right Ventricle  Right ventricular  function, chamber size, wall motion, and thickness are  normal.     Atria  Both atria appear normal. The atrial septum is intact as assessed by color  Doppler .     Mitral Valve  The mitral valve is normal.     Aortic Valve  The valve leaflets are not well visualized. On Doppler interrogation, there is  no significant stenosis or regurgitation.     Tricuspid Valve  The tricuspid valve is normal. Trace tricuspid insufficiency is present. The  right ventricular systolic pressure is approximated at 40.8 mmHg plus the  right atrial pressure. Mild (pulmonary artery systolic pressure<50mmHg)  pulmonary hypertension is present.     Pulmonic Valve  The pulmonic valve is normal. Trace pulmonic insufficiency is present.     Vessels  The thoracic aorta is normal. The pulmonary artery and bifurcation cannot be  assessed. The inferior vena cava is normal.     Pericardium  No pericardial effusion is present.     Compared to Previous Study  There is no prior study for direct comparison.  ______________________________________________________________________________  MMode/2D Measurements & Calculations     IVSd: 1.5 cm  LVIDd: 3.2 cm  LVIDs: 2.3 cm  LVPWd: 1.1 cm  FS: 28.4 %  LV mass(C)d: 129.9 grams  LV mass(C)dI: 68.2 grams/m2  asc Aorta Diam: 2.7 cm  LVOT diam: 2.0 cm  LVOT area: 3.1 cm2  Asc Ao diam index BSA (cm/m2): 1.4  Asc Ao diam index Ht(cm/m): 1.8  LA Volume Index (BP): 28.6 ml/m2  RWT: 0.70     TAPSE: 2.0 cm     Doppler Measurements & Calculations  MV E max laura: 52.8 cm/sec  MV A max laura: 57.9 cm/sec  MV E/A: 0.91  MV dec slope: 257.7 cm/sec2  MV dec time: 0.21 sec  PA acc time: 0.09 sec  TR max laura: 319.5 cm/sec  TR max P.8 mmHg  E/E' av.2  Lateral E/e': 6.1  Medial E/e': 10.2     ______________________________________________________________________________  Report approved by: Robbie Meyer 10/27/2023 10:21 AM         CT Head w/o Contrast    Narrative    CT HEAD W/O CONTRAST 10/27/2023 4:35  PM    Provided History: Pituitary mass, pre-operative imaging    Comparison: MR pituitary 10/26/2023.    Technique: CT imaging performed with axial, sagittal, and coronal  reconstructed images obtained without intravenous contrast.    Findings:    Multilobulated, hypodense mass with irregular borders extends from the  pituitary measuring approximately 5.0 x 5.8 x 6.1 cm (series 2 image  3, series 6 image 34). This is causing mass effect on adjacent sulci,  ventricle and midbrain, tee, left lateral ventricle, and third  ventricle. Right-to-left midline shift of 1.0 cm at the level of the  third ventricle (series 7 image 34).    No intracranial hemorrhage. No extra-axial fluid collection. The gray  to white matter differentiation of the cerebral hemispheres is  preserved. Ventricles are proportionate to the sulci. No sulcal  effacement. The basal cisterns are narrowed, secondary to adjacent  mass effect.    Polypoid mucosal thickening in the sphenoid sinuses.The visualized  paranasal sinuses are otherwise clear. The mastoid air cells are  clear. Orbits appear unremarkable. No acute fracture.      Impression    Impression:   1. Large multilobulated pituitary mass measuring up to 6.1 cm, and  causing mass effect on multiple adjacent structures, right to left  midline shift of approximately 1 cm.   2. Limited imaging performed primarily for the purposes of  stereotactic localization.    I have personally reviewed the examination and initial interpretation  and I agree with the findings.    JED CALDERON MD         SYSTEM ID:  [id number]

## 2023-10-28 NOTE — PROGRESS NOTES
10/28/23 1100   Appointment Info   Signing Clinician's Name / Credentials (SLP) Bev Perales MA CCC-SLP   General Information   Onset of Illness/Injury or Date of Surgery 10/21/23   Referring Physician Renetta Goel MD   Patient/Family Therapy Goal Statement (SLP) Pt would like to eat lunch   Pertinent History of Current Problem Pt is a 43 year old female with complex psychosocial situation including paranoid schizophrenia, HTN, Asthma with known large sellar/suprasellar mass, admitted to the ICU for hypoxic respiratory failure in the setting of likely flash pulmonary edema and aspiration pna.      Patient present to [facility] on 10/6/2023 with multiple complaints including stress, housing insecurity, dizziness, head pressure, and worsening vision in the left eye. 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 Management of her pituitary mass has been complicated by her concomitant mental health and social concerns. Patient was seen by psychiatry lacks the decision making capacity to complete full Advance Directive, however retains the ability name her next of kin/surrogate medical decision maker when deemed unconscious. Clincial swallow eval completed per MD order.   General Observations Upon SLP arrival, pt positioned upright in bed, awake, and willing to participate.   Type of Evaluation   Type of Evaluation Swallow Evaluation   Oral Motor   Oral Musculature generally intact   Structural Abnormalities none present   Mucosal Quality good   Dentition (Oral Motor)   Dentition (Oral Motor) natural dentition;adequate dentition   Facial Symmetry (Oral Motor)   Facial Symmetry (Oral Motor) WNL   Lip Function (Oral Motor)   Lip Range of Motion (Oral Motor) WNL   Tongue Function (Oral Motor)   Tongue ROM (Oral Motor)   (Protrudes L)   Jaw Function (Oral Motor)   Jaw  Function (Oral Motor) WNL   Facial Sensation   Facial Sensation WNL   Cough/Swallow/Gag Reflex (Oral Motor)   Volitional Throat Clear/Cough (Oral Motor) WNL   Volitional Swallow (Oral Motor) WNL   Vocal Quality/Secretion Management (Oral Motor)   Vocal Quality (Oral Motor) WFL   General Swallowing Observations   Past History of Dysphagia No hx of dysphagia; however, pt did work w/ ST in 2018 for cognition.   Respiratory Support oxygen mask  (2L)   Current Diet/Method of Nutritional Intake (General Swallowing Observations, NIS) clear liquid diet   Swallowing Evaluation Clinical swallow evaluation   Clinical Swallow Evaluation   Feeding Assistance no assistance needed   Clinical Swallow Evaluation Textures Trialed thin liquids;pureed;solid foods   Clinical Swallow Eval: Thin Liquid Texture Trial   Mode of Presentation, Thin Liquids cup;straw;self-fed   Volume of Liquid or Food Presented 4 oz   Oral Phase of Swallow WFL   Pharyngeal Phase of Swallow intact   Diagnostic Statement No overt s/sx of aspiration   Clinical Swallow Evaluation: Puree Solid Texture Trial   Mode of Presentation, Puree spoon;self-fed   Volume of Puree Presented 2 tbsp   Oral Phase, Puree WFL   Pharyngeal Phase, Puree intact   Diagnostic Statement No overt s/sx of aspiration   Clinical Swallow Evaluation: Solid Food Texture Trial   Mode of Presentation self-fed   Volume Presented 2 crackers   Oral Phase WFL   Pharyngeal Phase intact   Diagnostic Statement No overt s/sx of aspiration   Esophageal Phase of Swallow   Patient reports or presents with symptoms of esophageal dysphagia No   Swallowing Recommendations   Diet Consistency Recommendations regular diet;thin liquids (level 0)   Supervision Level for Intake distant supervision needed   Mode of Delivery Recommendations bolus size, small;slow rate of intake   Swallowing Maneuver Recommendations alternate food and liquid intake   Monitoring/Assistance Required (Eating/Swallowing) cue for  finger/lingual sweep if oral pocketing present;stop eating activities when fatigue is present;monitor for cough or change in vocal quality with intake   Recommended Feeding/Eating Techniques (Swallow Eval) maintain upright sitting position for eating;set-up and prepare tray;minimize distractions during oral intake   Medication Administration Recommendations, Swallowing (SLP) As tolerated   Instrumental Assessment Recommendations instrumental evaluation not recommended at this time   General Therapy Interventions   Planned Therapy Interventions Dysphagia Treatment   Dysphagia treatment Instruction of safe swallow strategies   Clinical Impression   Criteria for Skilled Therapeutic Interventions Met (SLP Eval) Yes, treatment indicated   SLP Diagnosis WFL swallow mechanism   Risks & Benefits of therapy have been explained evaluation/treatment results reviewed;care plan/treatment goals reviewed;risks/benefits reviewed;current/potential barriers reviewed;participants voiced agreement with care plan;participants included;patient   Clinical Impression Comments Clinical swallow eval completed per MD order. Pt's swallow function deemed WFL. Oral mech exam remarkable for L tongue protrusion. Pt assessed w/ thin liquids, puree, and solids. Oral phase WFL. Pharyngeal phase unremarkable, no overt s/sx of aspiration evident across trials. Recommend regular diet and thin liquids. Meds OK as tolerated. Ensure pt is fully upright and alert, taking small sips/bites at a slow rate. SLP to follow to ensure diet tolerance.   SLP Total Evaluation Time   Eval: oral/pharyngeal swallow function, clinical swallow Minutes (53919) 15   SLP Discharge Planning   SLP Plan Diet tolerance, ?neurosurg plan for OR   SLP Discharge Recommendation home with assist   SLP Rationale for DC Rec WFL swallow mechanism   SLP Brief overview of current status  Recommend regular diet and thin liquids. Meds OK as tolerated. Ensure pt is fully upright and alert,  taking small sips/bites at a slow rate. SLP to follow to ensure diet tolerance.   Total Session Time   Total Session Time (sum of timed and untimed services) 20

## 2023-10-28 NOTE — PROGRESS NOTES
Major Shift Events:  No acute events overnight. Alert and oriented to self, place and situation. Moves all extremities, follows commands, pupils are equal and reactive. NSR to NST, occasional PVCs, systolic goal <160, tmax 100.1. LS clear and diminished on bases, oxymask @ 5 L. Remains on clear liquid diet. Voided and one BM via bathroom toilet. 2 PIVs. Team notified of lactate of 2.4     Plan: continue to monitor pain and with POC. Notify providers of any changes.     For vital signs and complete assessments, please see documentation flowsheets.     Ying Alvarado RN on 10/28/2023 at 5:28 AM

## 2023-10-28 NOTE — PROGRESS NOTES
"[facility]    ICU Progress Note       Date of Admission:  10/21/2023    Assessment: Critical Care   Mirian Cunningham is a 43 year old female with complex psychosocial situation including paranoid schizophrenia, HTN, Asthma with known large sellar/suprasellar mass, admitted to the ICU for hypoxic respiratory failure in the setting of likely flash pulmonary edema and aspiration pna.      Patient present to [facility] on 10/6/2023 with multiple complaints including stress, housing insecurity, dizziness, head pressure, and worsening vision in the left eye. 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 Management of her pituitary mass has been complicated by her concomitant mental health and social concerns. Patient was seen by psychiatry lacks the decision making capacity to complete full Advance Directive, however retains the ability name her next of kin/surrogate medical decision maker when deemed unconscious.  \" Godmother Adri is appointed as the medical decision maker.     24 hour events:  CT head yesterday for preoperative imaging. Stopped Precedex yesterday, started Seroquel PRN, however patient has not yet taken; agitated yesterday afternoon but did not require sedation. Uneventful overnight, off Bipap with grossly unchanged VBG compared to prior. This morning she is alert, oriented x4, conversing appropriately. She reports she is still thinking about if she wants surgery with regards to her pituitary tumor; she states she wants to talk to the surgeon and learn more about them and their skillset before she agrees.    Neuro  #Pituitary adenoma  #R eye complete blindness  #L eye visual right hemanopia  #R eye vision loss  #Schizophrenia  - Alert today, conversant, markedly improved mentation from yesterday   - No sedation " currently   - Has not needed or received PRN seroquel  - MRI brain completed    - Large multilobulated pituitary mass/adenoma with mass effect and encasement of the cavernous structures including the cavernous  internal carotid arteries, M1 segment of the right MCA, and A1 and  proximal A2 segments of both anterior cerebral arteries. The optic  chiasm is not well visualized, also possibly encased.  - CT head yesterday for preoperative evaluation completed  - Neurochecks every 4 hrs  - SBP goal < 160 mmHg  - HOB > 30   - PT/OT/SLP    Cardiovascular  #Shock likely 2/2 sepsis, improving  - Hypotension improved with 2 boluses 500 mL each   - Levo never started, did well without  - Sinus rhythm with frequent PACs, unchanged from yesterday  - Echo 10/26   - EF 60-65%, RV function, chamber size, wall motion, thickness normal. Mild pulmonary hypertension.  - MAP goal >65  - Holding further diuresis  - Lactic 2.4 in AM, recheck at noon pending    Pulm  #AHRF 2/2 aspiration pna vs pulmonary edema  #H/o mild intermittent asthma  #MAXX, patient affirms, no CPAP use at home, unable to verify in records  - XR chest greatly improved compared to yesterday's  - Refused Bipap yesterday evening   - CO2 somewhat higher   - SpO2 remains good, mentation improved   - ABG recheck at noon pending  - Zosyn for pna  - Swallow eval pending for this AM - r/o dysphagia as cause of aspiration    GI  #Questionable aspiration  #2-3 day h/o mild abd pain  - Swallow eval pending for this AM, appreciate SLP recs   - Regular diet appropriate per SLP  - Last BM today   - Bowel regimen: senna PRN  - Generalized mild abd tenderness on exam   - Will add on LFTs   - Has not been on GI prophylaxis, adding pantoprazole  - Diet: Regular adult    Renal  No acute issues  - Daily creat given sepsis  - Urine output good    Endo  No acute issues  - Monitor glucose levels, goal <180  - TSH, LH, FSH, prolactin normal    ID  #Sepsis 2/2 likely aspiration pna  - Greatly  "improved leukocytosis  - Lactic 2.4 this AM, recheck pending  - Blood cultures pending, no growth at 1 day    Recent Abx History:  Vanc: 10/27 x1 dose  Zosyn: 10/27 -     Musc / Skin  No acute issues  - Appreciate diligent nursing cares    Lines/ tubes/ drains:  Galeas Catheter: Not present  Lines: PIV x2    Prophylaxis:  - DVT Prophylaxis: Heparin SQ  - PUD Prophylaxis: Pantoprazole    Code Status: Full Code      Disposition:  - ICU, ready for transfer to med/surg pending repeat lactic and ABG this afternoon    The patient's care was discussed with the Attending Physician, Dr. Castillo .    TIME SPENT ON THIS ENCOUNTER  I spent 45 minutes of critical care time on the unit/floor managing the care of this patient excluding time performing procedures. Upon evaluation, this patient had a high probability of imminent or life-threatening deterioration due to acute hypoxemic respiratory failure, which required my direct attention, intervention, and personal management. Greater than 50% of my time was spent at the bedside counseling the patient and/or coordinating care including chart review, history, exam, documentation, and further activities per this note. I have personally reviewed the following data/imaging over the past 24 hours.     NICOLE Kate [facility]  Securely message with [employer] (more info)  Text page via [facility] Paging/Directory     Clinically Significant Risk Factors                         # Obesity: Estimated body mass index is 39.03 kg/m  as calculated from the following:    Height as of this encounter: 1.549 m (5' 1\").    Weight as of this encounter: 93.7 kg (206 lb 9.1 oz).      # Financial/Environmental Concerns: insurance, none;unemployed;unable to afford rent/mortgage;unable to afford medication(s);unable to afford food              ______________________________________________________________________    Interval History   As " above    Physical Exam   Vital Signs: Temp: 99.1  F (37.3  C) Temp src: Axillary BP: 108/69 Pulse: 111   Resp: 30 SpO2: 97 % O2 Device: Oxymask Oxygen Delivery: 3 LPM  Weight: 206 lbs 9.14 oz  Physical Exam  Vitals and nursing note reviewed.   Constitutional:       General: She is not in acute distress.     Appearance: She is not toxic-appearing.   HENT:      Head: Normocephalic.      Mouth/Throat:      Mouth: Mucous membranes are moist.   Cardiovascular:      Rate and Rhythm: Normal rate. Rhythm irregular.   Pulmonary:      Effort: Pulmonary effort is normal. No respiratory distress.   Abdominal:      Palpations: Abdomen is soft.      Tenderness: There is abdominal tenderness (mild, generalized). There is no guarding.   Musculoskeletal:      Right lower leg: No edema.      Left lower leg: No edema.   Skin:     General: Skin is warm and dry.      Coloration: Skin is pale (mild).   Neurological:      General: No focal deficit present.      Mental Status: She is alert and oriented to person, place, and time.      GCS: GCS eye subscore is 4. GCS verbal subscore is 5. GCS motor subscore is 6.      Cranial Nerves: Cranial nerves 2-12 are intact.      Motor: Motor function is intact. No pronator drift.   Psychiatric:         Mood and Affect: Mood normal.         Behavior: Behavior normal.         Thought Content: Thought content normal.         Judgment: Judgment normal.          Data   I reviewed all medications, new labs and imaging results over the last 24 hours.  Arterial Blood Gases   Recent Labs   Lab 10/27/23  0956 10/27/23  0344 10/26/23  2237 10/26/23  1754   PH 7.38 7.40 7.39 7.38   PCO2 51* 47* 48* 49*   PO2 75* 67* 101 55*   HCO3 30* 29* 29* 29*       Complete Blood Count   Recent Labs   Lab 10/28/23  0448 10/27/23  0340 10/26/23  1807 10/24/23  0715   WBC 14.1* 25.0* 13.8* 6.8   HGB 11.5* 13.6 14.7 12.5    316 313 279       Basic Metabolic Panel  Recent Labs   Lab 10/28/23  0448 10/27/23  2050  10/27/23  1619 10/27/23  0955 10/27/23  0340 10/26/23  1807 10/24/23  1317 10/24/23  0715     --   --   --  139 140  --  139   POTASSIUM 3.6  --   --   --  4.9 4.5  --  4.3   CHLORIDE 104  --   --   --  103 102  --  103   CO2 28  --   --   --  27 26  --  26   BUN 15.2  --   --   --  20.9* 17.6  --  16.4   CR 0.97*  --   --   --  0.87 0.85  --  0.71   * 125* 164* 166* 174* 158*   < > 87    < > = values in this interval not displayed.       Liver Function Tests  Recent Labs   Lab 10/26/23  1807   AST 24   ALT 16   ALKPHOS 76   BILITOTAL 0.6   ALBUMIN 3.9       Pancreatic Enzymes  No lab results found in last 7 days.    Coagulation Profile  No lab results found in last 7 days.    IMAGING:  Recent Results (from the past 24 hour(s))   CT Head w/o Contrast    Narrative    CT HEAD W/O CONTRAST 10/27/2023 4:35 PM    Provided History: Pituitary mass, pre-operative imaging    Comparison: MR pituitary 10/26/2023.    Technique: CT imaging performed with axial, sagittal, and coronal  reconstructed images obtained without intravenous contrast.    Findings:    Multilobulated, hypodense mass with irregular borders extends from the  pituitary measuring approximately 5.0 x 5.8 x 6.1 cm (series 2 image  3, series 6 image 34). This is causing mass effect on adjacent sulci,  ventricle and midbrain, tee, left lateral ventricle, and third  ventricle. Right-to-left midline shift of 1.0 cm at the level of the  third ventricle (series 7 image 34).    No intracranial hemorrhage. No extra-axial fluid collection. The gray  to white matter differentiation of the cerebral hemispheres is  preserved. Ventricles are proportionate to the sulci. No sulcal  effacement. The basal cisterns are narrowed, secondary to adjacent  mass effect.    Polypoid mucosal thickening in the sphenoid sinuses.The visualized  paranasal sinuses are otherwise clear. The mastoid air cells are  clear. Orbits appear unremarkable. No acute fracture.       Impression    Impression:   1. Large multilobulated pituitary mass measuring up to 6.1 cm, and  causing mass effect on multiple adjacent structures, right to left  midline shift of approximately 1 cm.   2. Limited imaging performed primarily for the purposes of  stereotactic localization.    I have personally reviewed the examination and initial interpretation  and I agree with the findings.    JED CALDERON MD         SYSTEM ID:  [id number]   XR Chest Port 1 View    Narrative    Exam: XR CHEST PORT 1 VIEW, 10/28/2023 6:28 AM    Indication: Pulm edema    Comparison: Chest x-ray 10/27/2023    Findings:   Single portable AP semiupright view of the chest was obtained. Midline  trachea. Stable cardiomediastinal silhouette. Clear costophrenic  angles. No appreciable pneumothorax. Decreased bilateral  perihilar/basilar predominant air space opacities compared to prior.  No acute osseous abnormality.      Impression    Impression: Decreased bilateral perihilar/basilar predominant air  space opacities, likely representing improving pulmonary edema.    I have personally reviewed the examination and initial interpretation  and I agree with the findings.    DARLIN PEDRAZA MD         SYSTEM ID:  [id number]

## 2023-10-28 NOTE — PLAN OF CARE
N: Alert, disoriented to time. Neuro exam remains unchanged. Right eye blind, blurry left eye with field cut. Moves all extremities with strength. Intermittent generalized aches - PRN tylenol X1, heat packs on neck.   C: NSR/ST with activity. PACs. MAP > 65. Afebrile. Lactate trended down   R: 5L NC, desats with removal to 70%. Tachypnea but denies shortness of breath. Encouraged pulmonary hygiene. Refused IS. Spontaneous cough, swallowing sputum. Unable to obtain sample. VBG with improved CO2.   GI: SLP mohit, advanced to regular diet. Tolerating well. Ongoing diarrhea.   : Voiding in toilet   SKIN: Intact   ACCESS: PIV X2  GTTS: TKO   ACTIVITY: SBA to toilet, up to chair, independently repositions   SOCIAL: no visitors present     PLAN: Transfer to  when bed available. Continue to encourage pulmonary hygiene.

## 2023-10-29 NOTE — PLAN OF CARE
N: Alert, disorientated to time. Neuro exam remains unchanged. Right eye blind, blurry left eye with field cut. Moves all extremities with strength. Intermittent generalized aches - PRN tylenol X2, heat packs on neck, bengay also ordered per pt request.   C: NSR/ST with activity. PACs. MAP > 65. Afebrile.   R: 3L Oxy mask, refuses to wear with ambulation to toilet or wear NC with eating - desats to 70's, asymptomatic other than increase in HR. Breathing overall slightly tachypnic RR 20's. Denies shortness of breath. Encouraged pulmonary hygiene. Refused IS. Spontaneous cough, swallowing sputum. Unable to obtain sample.   GI: Regular diet, tolerated well. Ongoing diarrhea.   : Voiding in toilet, unmeasured.   SKIN: Intact, generalized bruising.   ACCESS: PIV X2  GTTS: TKO   ACTIVITY: Independently repositions, ambulating in room - bed bath completed.   SOCIAL: no visitors present     PLAN: Transfer to  when bed available. Continue to encourage pulmonary hygiene.

## 2023-10-29 NOTE — PROGRESS NOTES
Neurocritical Care Multi-Disciplinary Note    Reason for critical care admission: Large pituitary adenoma; AHRF 2/2 flash pulmonary edema vs aspiration pna post-intubation for MRI  Primary Team: Neurosurgery  Date of Service:  10/29/2023  Date of Admission:  10/21/2023  Hospital Day: 9    Patient condition reviewed and discussed while on multidisciplinary rounds today. Please note these minor interventions that were initiated:  None    The Neurocritical Care service will continue to follow peripherally while the patient is within the ICU. We are readily available should issues arise. Please feel free to contact us for critical care issues with which we may be of assistance. For all other concerns, please contact primary service first.     Please contact NCC team phone at [phone]    NICOLE Kate CNP

## 2023-10-29 NOTE — PROGRESS NOTES
[facility], [place]   10/29/2023  Neurosurgery Progress Note:    Interval History: Respiratory status stable to improving, CXR improved. Transferred to [facility].    Assessment:  Mirian Cunningham is a 43 year old female with complex psychosocial situation including paranoid schizophrenia and lack of active guardianship transferred to [facility] with known large sellar/suprasellar mass. Godmother Adri is appointed as the medical decision maker. No other family members available or willing to participate in her care.  MRI with large multilobulated pituitary mass/adenoma with mass effect and  encasement of the cavernous structures including the cavernous internal carotid arteries, M1 segment of the right MCA, and A1 and  proximal A2 segments of both anterior cerebral arteries.     Plan:  q4h neuro exams   Medicine consult for co-management and pre-operative assessment  Pain control  Seroquel PRN  MRI brain complete  Final OR plan pending  Maintain SBP < 160  Echo with 60-65% with mild pulmonary hypertension.   Oxymask, wean as tolerated  ADAT  Normonatremia   On zosyn for aspiration pneumonia  Continue to monitor for fevers and/or signs of infection  Follow cultures  Continue glucose checks  Platelets > 100,000  INR < 1.5  Hemoglobin > 8  DVT: SCDs and SQH     -----------------------------------  Renetta Goel MD, PhD  PGY-2 Neurosurgery    Please contact neurosurgery resident on call with questions.    Dial * * [phone], enter [phone] when prompted.   -----------------------------------    Objective:   Temp:  [97  F (36.1  C)-100.1  F (37.8  C)] 97  F (36.1  C)  Pulse:  [] 101  Resp:  [19-34] 25  BP: ()/(61-85) 111/71  SpO2:  [90 %-100 %] 97 %  I/O last 3 completed shifts:  In: 780 [P.O.:480; I.V.:300]  Out: -     NEUROLOGIC:  Gen: Appears comfortable, NAD  Resp: breathing comfortably on room air  Neurologic:  -- Awake; Alert; oriented x 3  -- Follows commands intermittently  -- Speech fluent,  spontaneous. No aphasia or dysarthria.  -- no gaze preference. No apparent hemineglect.  Cranial Nerves:  -- R eye blindness; L eye temporal field cut. R RAPD.  -- gaze is intermittently dysconjugate w R exotropia   -- face symmetrical, tongue midline  -- hearing grossly intact bilat  -- Trapezii 5/5 strength bilat symmetric     Motor:  Normal bulk / tone; no tremor, rigidity, or bradykinesia.  No muscle wasting or fasciculations       Delt Bi Tri Hand Flexion/  Extension Iliopsoas Quadriceps Hamstrings Tibialis Anterior Gastroc     C5 C6 C7 C8/T1 L2 L3 L4-S1 L4 S1   R 5 5 5 5 5 5 5 5 5   L 5 5 5 5 5 5 5 5 5      Sensory:  intact to LT x 4 extremities      Reflexes: no hyperreflexia, no Hoffmans, mute Babinski bilaterally      LABS:  Recent Labs   Lab 10/28/23  0448 10/27/23  2050 10/27/23  1619 10/27/23  0955 10/27/23  0340 10/26/23  1807     --   --   --  139 140   POTASSIUM 3.6  --   --   --  4.9 4.5   CHLORIDE 104  --   --   --  103 102   CO2 28  --   --   --  27 26   ANIONGAP 11  --   --   --  9 12   * 125* 164*   < > 174* 158*   BUN 15.2  --   --   --  20.9* 17.6   CR 0.97*  --   --   --  0.87 0.85   FADUMO 9.2  --   --   --  9.2 9.2    < > = values in this interval not displayed.     Recent Labs   Lab 10/28/23  0448   WBC 14.1*   RBC 3.98   HGB 11.5*   HCT 37.4   MCV 94   MCH 28.9   MCHC 30.7*   RDW 14.1        IMAGING:  Recent Results (from the past 24 hour(s))   XR Chest Port 1 View    Narrative    Exam: XR CHEST PORT 1 VIEW, 10/28/2023 6:28 AM    Indication: Pulm edema    Comparison: Chest x-ray 10/27/2023    Findings:   Single portable AP semiupright view of the chest was obtained. Midline  trachea. Stable cardiomediastinal silhouette. Clear costophrenic  angles. No appreciable pneumothorax. Decreased bilateral  perihilar/basilar predominant air space opacities compared to prior.  No acute osseous abnormality.      Impression    Impression: Decreased bilateral perihilar/basilar  predominant air  space opacities, likely representing improving pulmonary edema.    I have personally reviewed the examination and initial interpretation  and I agree with the findings.    DARLIN PEDRAZA MD         SYSTEM ID:  [id number]

## 2023-10-29 NOTE — PLAN OF CARE
Major Shift Events:    Neuro: A&Ox4, neuros remain unchanged, R eye blind, L eye blurry  CV: SR-ST, frequent PACs  Resp: 2-4L NC/owymask, needs continued re-enforcement to keep mask/cannula on face.   GI: Regular diet, multiple loose BMs  : Voiding adequately   Skin: No new defects noted.   Labs: refusing labs until later in the AM.     Plan: Transfer for floor when bed is available   For vital signs and complete assessments, please see documentation flowsheets.

## 2023-10-29 NOTE — CONSULTS
[facility]  Internal Medicine Consult     Mirian Cunningham MRN# [id number]   Age: 43 year old YOB: 1980     Date of Admission: 10/21/2023  Date of Consult: 10/29/2023    Primary Care Provider: No Ref-Primary, Physician    Requesting Service: Behavioral Health - Mikel Wilburn MD  Reason for Consult: General Medical Evaluation      SUBJECTIVE   CC:   Preoperative Risk Assessment    Assessment and Plan/Recommendations:     iMrian Cunningham is a 43 year old female with PMHx significant for paranoid schizophrenia, housing insecurity, asthma, HTN, and known large sellar/suprasellar mass causing mass effect. Currently admitted to to the ICU (with plans to transition to [facility]) under neurosurgery care for hypoxic respiratory failure in the setting of flash pulmonary edema and aspiration pneumonia. Internal Medicine was consulted for pre-operative risk assessment for pituitary adenoma resection.      # Preoperative Risk Assessment   Cardiac Risk:   -Has history of hypertension.  No history of CHF, known coronary artery disease, CVA, or diabetes.  Renal function currently normal, GFR > 70.  - Echocardiogram 10/26 showed EF 60-65%, RV function, chamber size, wall motion, thickness normal. Mild pulmonary hypertension.   - Revised Cardiac Index Score is 0 based upon patient history. This indicates an approximate  3.9% 30 day risk of post-operative cardiac death including MI or cardiac arrest.  - DASI prior to hospitalization: Full functional mets     Pulmonary Risk:   - Currently admitted with flash pulmonary edema and concern for aspiration pneumonia.  Increases risk of postoperative hypoxia, potential for prolonged intubation.  -Currently on antibiotic treatment with Zosyn  -CXR on 1028 shows persistent but improving pulmonary edema.  - Currently requiring 3 to 4 L via oxy mask.  - ARISCAT score is approximately 57 based upon preoperative hypoxia with pulmonary edema, active respiratory  "infection, and anticipated duration of surgery. This indicates high risk of an approximate 42.1% risk of in-hospital pulmonary complications (including respiratory failure, infection, pleural effusions, atelectasis, pneumothorax, bronchospasm, aspiration or pneumonitis).     Recommendations:  - Patient is at increased risk of pulmonary complications postoperatively given current hypoxia, pulmonary edema, and aspiration pneumonia.  - Would recommend close postoperative observation in the ICU for least 6 to 12 hours  -Can continue all current medications day of procedure.  - Cardiac and pulmonary risks as above.   - Final decision on whether or not to proceed with surgery is deferred to anesthesia and surgeon.      TREVIN FischerC  Internal Medicine [facility]  Pager [phone]   October 29, 2023     HPI:     On my assessment, patient is actively receiving breathing treatments respiratory therapy.  She reports breathing treatments are helping and that her respiratory status has improved over the past couple of days.  She continues to cough up thick, yellowish sputum.  She denies chest pain or palpitations.  Denies any history of swelling in her legs.  She denies abdominal pain, nausea or vomiting.  She does report some ringing in her right ear.       Past Medical History:   No past medical history on file.     Reviewed and updated in [facility].     Past Surgical History:    No past surgical history on file.      Social History:         Family History:   No family history on file.      Allergies:     Allergies   Allergen Reactions    Powder Difficulty breathing     Per pt, allergy to MILK PROTEIN POWDER but not milk or dairy products    Hydrocodone Fatigue    Iodinated Contrast Media Other (See Comments)     \"Paralysis of legs\", \"was unable to walk for 6 months\"    Mushroom Hives and Difficulty breathing    Other Food Allergy      Food coloring, artificial preservatives, high fructose syrup    " "Seeds      Poppy seeds, sesame seeds          Medications:   Reviewed. Please see MAR     Review of Systems:   10 point ROS of systems including Constitutional, Eyes, Respiratory, Cardiovascular, Gastroenterology, Genitourinary, Integumentary, Muscularskeletal, Psychiatric were all negative except for pertinent positives noted in my HPI.    OBJECTIVE   Physical Exam:   Vitals were reviewed  Blood pressure 119/85, pulse 92, temperature 98.1  F (36.7  C), temperature source Oral, resp. rate 24, height 1.549 m (5' 1\"), weight 93.7 kg (206 lb 9.1 oz), SpO2 90%.  Constitutional: awake, alert, cooperative, in no acute distress. A&Ox3   Eyes: EOM, PERRLA. Sclera clear, conjunctiva normal. Anicteric   HENT: Normocephalic, without obvious abnormality, atraumatic.   Respiratory: Actively receiving breathing treatment on my exam.  No appreciable wheezing, has mild basilar congestion just following breathing treatment.  Cards: RRR, no murmur appreciated.  No lower extremity edema  GI: Soft, non-tender without rebound or guarding.   Musculoskeletal:  Full range of motion noted.    Neurologic: Cranial nerves II-XII are grossly intact.   Neuropsychiatric: General: normal, calm and normal eye contact. Normal affect       Data:        Lab Results   Component Value Date     10/28/2023    Lab Results   Component Value Date    CHLORIDE 104 10/28/2023    Lab Results   Component Value Date    BUN 15.2 10/28/2023      Lab Results   Component Value Date    POTASSIUM 3.6 10/28/2023    Lab Results   Component Value Date    CO2 28 10/28/2023    Lab Results   Component Value Date    CR 0.97 10/28/2023        Lab Results   Component Value Date    WBC 14.1 (H) 10/28/2023    HGB 11.5 (L) 10/28/2023    HCT 37.4 10/28/2023    MCV 94 10/28/2023     10/28/2023     Lab Results   Component Value Date    WBC 14.1 (H) 10/28/2023             "

## 2023-10-30 NOTE — PLAN OF CARE
Goal Outcome Evaluation:      Plan of Care Reviewed With: patient    Overall Patient Progress: improvingOverall Patient Progress: improving    Outcome Evaluation: Ordered room service assist to help patient navigate her multiple food allergies. Sending sample of Carmen Nicholas for pt. See Nutrition Progress Note 10/30.

## 2023-10-30 NOTE — PROGRESS NOTES
"[facility]  Internal Medicine Consult     Date of Admission:  10/21/2023    Assessment & Plan   Mirian Cunningham is a 43 year old female with PMHx significant for paranoid schizophrenia, housing insecurity, asthma, HTN, and known large sellar/suprasellar mass causing mass effect. Currently admitted to to the ICU (with plans to transition to [facility]) under neurosurgery care for hypoxic respiratory failure in the setting of flash pulmonary edema and aspiration pneumonia. Internal Medicine was consulted for pre-operative risk assessment for pituitary adenoma resection and medical co-management on transfer to [facility].      #Pituitary adenoma  #R eye complete blindness  #L eye visual right hemanopia  #R eye vision loss  Mass diagnosed on ED visit in 11/2018. Per neurocritical care documentation: \"management of her pituitary mass has been complicated by her concomitant mental health and social concerns. Patient was seen by psychiatry lacks the decision making capacity to complete full Advance Directive, however retains the ability name her next of kin/surrogate medical decision maker when deemed unconscious. Godmother Adri is appointed as the medical decision maker.\"  - Neurochecks and hemodynamic parameters per neurosurgery   - Regarding operative risk, please see medicine consult note dated 10/29  - Revised cardiac index risk score of 0  - ARISCAT score for pulmonary complications remains high in the setting of ongoing oxygen requirements (apparently put back on 3L today), concern for aspiration and treatment for respiratory infection and duration of procedure. Would recommend close postoperative observation in the ICU for least 6 to 12 hours if proceed with surgery.     #AHRF 2/2 aspiration pna vs pulmonary edema, improved   #H/o mild intermittent asthma  #MAXX  On 3-4L overnight, maintaining sats >93% on room air on 10/30.   -Remains on Zosyn, " recommend completion of 5 day course and trial off with close monitoring  -SLP  -Recommend pulmonary toilet and RT input regarding whether mucolytic and cough assist may be helpful   -Incentive spirometry    #Septic shock, likely secondary to aspiration pneumonia, resolved   Likely distributive in setting of above. TTE with EF of 60-65% making cardiogenic unlikely. Cortisol stable     #Schizophrenia  #Delusional disorder   #Homelessness   No PTA medications. Psychiatry consulted on admission who did not feel presentation justified declaration of psychiatric emergency but agreed she lacks decision making capacity and treatment plan should be referred to designated decision maker. Ethics consulted and the patient's godmother, Adri, has been identified.   - Mirian lacks capacity to consent to or refuse medical interventions; defer decision making to Adri.   - Continue Seroquel 25mg BID prn   - Consider prn haldol per psychiatry recommendations if ongoing issues with agitation   - Low threshold to ask psychiatry to reassess if concern for worsening symptoms or delusions     #History of HTN: no PTA medications. Noted uptrending BP on 10/30. Neurosurgery goal of SBP <160.   - Prns per neurosurgery to maintain goal  - If need for frequent IV prns, could consider additional of PO hydralazine (short acting) in the immediate perioperative setting.   - Avoid initiation of ACEi/ARB in perioperative setting. If looking for longer acting oral antihypertensive, could consider Amlodipine.      Azucena Hopkins DO  Hospitalist Service, GOLD TEAM [address] [place] [address]  Securely message with [facility] (more info)  Text page via [facility] Paging/Directory   See signed in provider for up to date coverage information  ______________________________________________________________________    Interval History   Mirian feels well today. She requests several topical medications and nasal saline solution at  the bedside. She denies headache, nausea, vomiting, dyspnea. She has been coughing.       Physical Exam   Vital Signs: Temp: 97.1  F (36.2  C) Temp src: Axillary BP: (!) 139/93 Pulse: 90   Resp: 29 SpO2: 100 % O2 Device: Oxymask Oxygen Delivery: 3 LPM  Weight: 208 lbs 15.94 oz    General: well developed, awake, alert, no acute distress  CV: RRR, no m/r/g. Extremities are warm and well perfused.   LUNGS: CTAB, no w/r/c.  ABD: Soft, NT/ND, NBS, no masses or organomegaly.  SKIN: Warm, well perfused. No skin rashes or abnormal lesions.  MSK: No clubbing, cyanosis, or edema.  NEURO: Alert and appropriate. No focal deficits.  Psych: appropriate affect, no overt delusions      Medical Decision Making           Data     I have personally reviewed the following data over the past 24 hrs:    7.0  \   10.5 (L)   / 227     144 106 8.1 /  141 (H)   3.6 27 0.80 \       Imaging results reviewed over the past 24 hrs:   Recent Results (from the past 24 hour(s))   XR Chest Port 1 View    Narrative    Examination:  XR CHEST PORT 1 VIEW    Date:  10/30/2023 6:35 AM     Clinical Information: eval resp status     Additional Information: none    Comparison: Chest x-ray 10/28/2023    Findings:     Portable AP 30 degree view of the chest. Normal heart size. No pleural  effusion or pneumothorax. Bilateral mixed airway, decreased from  prior. No suspicious osseous lesions.      Impression    Impression:    1. Decrease in mixed airway opacities likely representing improving  pulmonary edema.    I have personally reviewed the examination and initial interpretation  and I agree with the findings.    JOSE RAUL MCNULTY MD         SYSTEM ID:  [id number]

## 2023-10-30 NOTE — PLAN OF CARE
A&Ox4. R eye blind, L eye w/ blurry vision & L field cut - unchanged. LONDONO & FC. PRN Tylenol given for generalized pain. Afebrile. SR/ST 80-110s. SBP goal < 160 w/ no issue. Room air - 3L Oxymask. Tolerating regular diet. BM x1. Voiding spontaneously. Skin unchanged. SBA. Awaiting bed on 6A. Continue to monitor and follow POC.

## 2023-10-30 NOTE — PROGRESS NOTES
[facility], [place]   10/30/2023  Neurosurgery Progress Note:    Interval History: on Oxymask 3L, CXR improved. Transferred to [facility].    Assessment:  Mirian Cunningham is a 43 year old female with complex psychosocial situation including paranoid schizophrenia and lack of active guardianship transferred to [facility] with known large sellar/suprasellar mass. Godmother Adri is appointed as the medical decision maker. No other family members available or willing to participate in her care.  MRI with large multilobulated pituitary mass/adenoma with mass effect and  encasement of the cavernous structures including the cavernous internal carotid arteries, M1 segment of the right MCA, and A1 and  proximal A2 segments of both anterior cerebral arteries.     Plan:  q4h neuro exams   Medicine consult for co-management and pre-operative assessment, when on [facility]  Pain control  Seroquel PRN  MRI brain complete  Final OR plan pending  Maintain SBP < 160  Echo with 60-65% with mild pulmonary hypertension.   Oxymask, wean as tolerated  ADAT  Normonatremia   On zosyn for aspiration pneumonia, will discuss timing with medicine team  Continue to monitor for fevers and/or signs of infection  Follow cultures  Continue glucose checks  Platelets > 100,000  INR < 1.5  Hemoglobin > 8  DVT: SCDs and SQH     -----------------------------------  Can Garcia MD  Neurosurgery Resident  Pager: [phone]    Please contact neurosurgery resident on call with questions.    Dial * * [phone], enter [phone] when prompted.   -----------------------------------    Objective:   Temp:  [97.8  F (36.6  C)-98.9  F (37.2  C)] 97.8  F (36.6  C)  Pulse:  [] 93  Resp:  [19-32] 23  BP: (119-144)/(85-91) 144/91  SpO2:  [84 %-100 %] 100 %  I/O last 3 completed shifts:  In: 2220 [P.O.:1920; I.V.:300]  Out: -     NEUROLOGIC:  Gen: Appears comfortable, NAD  Resp: breathing comfortably on 3L Oxymask  Neurologic:  -- Awake; Alert; oriented x 3  -- Follows  commands intermittently  -- Speech fluent, spontaneous. No aphasia or dysarthria.  -- no gaze preference. No apparent hemineglect.  Cranial Nerves:  -- R eye blindness; L eye temporal field cut. Left nasal superio qudrant 20/30. R RAPD.  -- gaze is intermittently dysconjugate w R exotropia   -- face symmetrical, tongue midline  -- hearing grossly intact bilat  -- Trapezii 5/5 strength bilat symmetric     Motor:  Normal bulk / tone; no tremor, rigidity, or bradykinesia.  No muscle wasting or fasciculations       Delt Bi Tri Hand Flexion/  Extension Iliopsoas Quadriceps Hamstrings Tibialis Anterior Gastroc     C5 C6 C7 C8/T1 L2 L3 L4-S1 L4 S1   R 5 5 5 5 5 5 5 5 5   L 5 5 5 5 5 5 5 5 5      Sensory:  intact to LT x 4 extremities      Reflexes: no hyperreflexia, no Hoffmans, mute Babinski bilaterally      LABS:  Recent Labs   Lab 10/29/23  1021 10/28/23  0448 10/27/23  2050 10/27/23  0955 10/27/23  0340    143  --   --  139   POTASSIUM 3.6 3.6  --   --  4.9   CHLORIDE 105 104  --   --  103   CO2 27 28  --   --  27   ANIONGAP 10 11  --   --  9   * 123* 125*   < > 174*   BUN 9.3 15.2  --   --  20.9*   CR 0.82 0.97*  --   --  0.87   FADUMO 9.0 9.2  --   --  9.2    < > = values in this interval not displayed.     Recent Labs   Lab 10/29/23  1021   WBC 8.8   RBC 3.66*   HGB 10.7*   HCT 34.1*   MCV 93   MCH 29.2   MCHC 31.4*   RDW 13.6        IMAGING:  No results found for this or any previous visit (from the past 24 hour(s)).

## 2023-10-30 NOTE — PLAN OF CARE
Major Shift Events:   Neuro status remains unchanged. Aox4, anxious. R eye blind, L eye blurry with field cut. LONDONO with good strength. Prn tylenol given x1 for pain. SR/ST, SBP remained <160. 2-3 L oxy mask. Tolerating regular diet. 1 BM this shift. AUO in toilet. 2 PIV in place.     Plan: Continue with poc, notift primary team with any changes in pt condition.     For vital signs and complete assessments, please see documentation flowsheets.

## 2023-10-30 NOTE — PROGRESS NOTES
Neurocritical Care Multi-Disciplinary Note    Reason for critical care admission: Pituitary tumor   Primary Team: AYAH  Date of Service:  10/30/2023  Date of Admission:  10/21/2023  Hospital Day: 10    Patient condition reviewed and discussed while on multidisciplinary rounds today. Please note these minor interventions that were initiated:  None.    The Neurocritical Care service will continue to follow peripherally while the patient is within the ICU. We are readily available should issues arise. Please feel free to contact us for critical care issues with which we may be of assistance. For all other concerns, please contact primary service first.     Please contact NCC team phone at [phone]    Rebekah RIVERA CNP  Neurocritical care nurse practitioner  Pager: [phone]  Ascom: *[phone]

## 2023-10-30 NOTE — PROGRESS NOTES
CLINICAL NUTRITION SERVICES - REASSESSMENT NOTE   Nutrition Prescription    Malnutrition Status:    Patient does not meet two of the established criteria necessary for diagnosing malnutrition    Recommendations already ordered by Registered Dietitian (RD):  Room service with assist:  Pt reports a lot of difficulties with ordering meals d/t food allergies and her communication with call center has been challenging. I think she'd benefit from being seen in-person and pt agrees. She's very receptive to explanations of why certain things cannot be done. FYI, brain mass is impairing her vision.     Supplements:  [employer] Standard Oral Supplement:   -On Tuesday (10/31), please send sample of both vanilla & chocolate flavors @ 10 am. RD reviewed ingredients and is safe with pt's allergies.   **Update: Per NSA, currently out of chocolate flavor but will send vanilla for sample.    Future/Additional Recommendations:  Monitor ongoing PO adequacy and wt trends     EVALUATION OF THE PROGRESS TOWARD GOALS   Diet: Regular per SLP on 10/28 (NPO 10/26-10/27 after flash pulmonary edema, on BiPAP)    Oral Intake: Eating usually % meals per RN flowsheets over past week. Pt reports some frustrations with ordering meals d/t multiple food allergies (milk protein powder but tolerates dairy products, mushrooms, artificial food colorings, artificial flavors, high fructose corn syrup, poppy seeds, sesame seeds, and wild game/exotic meats). Pt interested in trying [employer] drinks.     NEW FINDINGS   -Wt trends: No wt loss noted over the past week.  Date/Time Weight Weight Method   10/30/23 0400 94.8 kg (208 lb 15.9 oz) Bed scale   10/28/23 0200 93.7 kg (206 lb 9.1 oz) Bed scale   10/27/23 0600 94.1 kg (207 lb 7.3 oz) Bed scale   10/22/23 1700 92.9 kg (204 lb 12.9 oz) Standing scale     -Labs: Reviewed, notable for:   Phos 1.6 (L, previous WNL this admit) - pt noted to refuse labs intermittently this admit    -GI: Recently having  diarrhea. 0-5 BMs daily over past week per I/Os. Pt reports some stomach upset after new medications.    -Neuro: Paranoid schizophrenia; large multilobulated pituitary mass/adenoma with mass effect and encasement of the cavernous structures including the cavernous internal carotid arteries, M1 segment of the right MCA, and A1 and proximal A2 segments of both anterior cerebral arteries. Possible surgical resection this admit.     -Respiratory: Recent flash pulmonary edema after intubated for MRI on 10/26. Now weaned to Oxymask.    -Skin: WOCN not following.    -Meds & Vitamin/Mineral Supplementation: Reviewed, notable for:   Thera-Vit-M    MALNUTRITION  % Intake: Decreased intake does not meet criteria  % Weight Loss: None noted  Subcutaneous Fat Loss: None observed per visual of upper body  Muscle Loss: None observed per visual of upper body  Fluid Accumulation/Edema: None noted  Malnutrition Diagnosis: Patient does not meet two of the established criteria necessary for diagnosing malnutrition    Previous Goals   Patient to consume % of nutritionally adequate meal trays TID, or the equivalent with supplements/snacks.   Evaluation: Met    Previous Nutrition Diagnosis  Predicted inadequate nutrient intake related to potential for decreased appetite if prolonged admission.     Evaluation: No change    CURRENT NUTRITION DIAGNOSIS  Predicted inadequate nutrient intake (pro/kcal) related to limited food options with multiple food allergies and potential for menu fatigue with prolonged LOS as well as decreased appetite pending medical/surgical course with brain mass.    INTERVENTIONS  Implementation  -Medical food supplement therapy: Carmen Nicholas for pt to trial. Reviewed ingredients and safe with pt's multiple food allergies.  -Nutrition education: Pt with frustrations regarding meal ordering and unclear if allergy-related vs if kitchen unable to complete request for other reason. Writer answered questions that were  within writer's knowledge base about how meal ordering works, but offered room  as option to help with meal ordering and provide more detailed answers as to why something cannot be ordered.    Goals  Patient to consume % of nutritionally adequate meal trays TID, or the equivalent with supplements/snacks.    Monitoring/Evaluation  Progress toward goals will be monitored and evaluated per protocol.     Nessa Plummer RD, LD  Pager: [phone]

## 2023-10-31 NOTE — PLAN OF CARE
Status:  Admitted for pituitary lesion.  PMHx of schizophrenia, HTN, asthma and pituitary mass.   Vitals: VSS   Neuros: Forgetful. R eye blind and L eye blurry vision, L field cut.  Hx of auditory hallucination. Intermittent N/T to bilateral hands and bilateral feet.   IV: PIV SL in between antibx  Labs/Electrolytes: Phos recheck am.  Resp/trach: Recived PRN albuterol neb for SOB prior to bed. On 2 L NC NOC for occasional MAXX, on cont SAT monitor.   Diet: Regular diet  Bowel status: LBM 10/30.   : Voiding spontaneously   Skin: WNL  Pain: Managed with tylenol.  Activity: SBA/GB, Bed alarm on.  Plan: Pending surgical plan

## 2023-10-31 NOTE — PLAN OF CARE
Goal Outcome Evaluation:      Plan of Care Reviewed With: patient    Overall Patient Progress: no changeOverall Patient Progress: no change    Outcome Evaluation: Px alert and oriented x4, able to make needs known. No c/o pain nor nausea. With numbness and tingling on BUE/BLE at [place]. Continue with POC.    Px slept this afternoon and had a couple of episodes of desaturation, O2 went as low as 50s. Placed on face mask at 6 Lpm. CPAP orders obtained.

## 2023-10-31 NOTE — PROGRESS NOTES
[facility], [place]   10/31/2023  Neurosurgery Progress Note:    Interval History: no acute events overnight; off oxygen    Assessment:  Mirian Cunningham is a 43 year old female with complex psychosocial situation including paranoid schizophrenia and lack of active guardianship transferred to [facility] with known large sellar/suprasellar mass. Godmother Adri is appointed as the medical decision maker. No other family members available or willing to participate in her care.  MRI with large multilobulated pituitary mass/adenoma with mass effect and  encasement of the cavernous structures including the cavernous internal carotid arteries, M1 segment of the right MCA, and A1 and  proximal A2 segments of both anterior cerebral arteries.     Plan:  Q4h neuro exams   Pain control  Seroquel PRN  Maintain SBP < 160  Regular diet  Normonatremia   On zosyn - 5 day duration  Continue to monitor for fevers and/or signs of infection  Follow cultures  Continue glucose checks  Platelets > 100,000  INR < 1.5  Hemoglobin > 8  DVT: SCDs and SQH   OR plan pending  Discuss with care coordinator/social work documentation of decision making and considerations regarding proxy/guardianship    -----------------------------------  Kelsey Briceno MD  Neurosurgery PGY-3    Please contact neurosurgery resident on call with questions.    Dial * * [phone], enter [phone] when prompted.   -----------------------------------    Objective:   Temp:  [97.4  F (36.3  C)-98.6  F (37  C)] 97.4  F (36.3  C)  Pulse:  [] 97  Resp:  [16-27] 17  BP: (117-159)/() 130/92  SpO2:  [91 %-100 %] 94 %  I/O last 3 completed shifts:  In: 1430 [P.O.:960; I.V.:470]  Out: -     NEUROLOGIC:  Gen: Appears comfortable, NAD  Resp: breathing comfortably on 3L Oxymask  Neurologic:  -- Awake; Alert; oriented x 3  -- Follows commands intermittently  -- Speech fluent, spontaneous. No aphasia or dysarthria.  -- no gaze preference. No apparent  hemineglect.  Cranial Nerves:  -- R eye blindness; L eye temporal field cut. Left nasal superio qudrant 20/30. R RAPD.  -- gaze is intermittently dysconjugate w R exotropia   -- face symmetrical, tongue midline  -- hearing grossly intact bilat  -- Trapezii 5/5 strength bilat symmetric     Motor:  Normal bulk / tone; no tremor, rigidity, or bradykinesia.  No muscle wasting or fasciculations       Delt Bi Tri Hand Flexion/  Extension Iliopsoas Quadriceps Hamstrings Tibialis Anterior Gastroc     C5 C6 C7 C8/T1 L2 L3 L4-S1 L4 S1   R 5 5 5 5 5 5 5 5 5   L 5 5 5 5 5 5 5 5 5      Sensory:  intact to LT x 4 extremities      Reflexes: no hyperreflexia, no Hoffmans, mute Babinski bilaterally      LABS:  Recent Labs   Lab 10/30/23  0848 10/29/23  1021 10/28/23  0448    142 143   POTASSIUM 3.6 3.6 3.6   CHLORIDE 106 105 104   CO2 27 27 28   ANIONGAP 11 10 11   * 140* 123*   BUN 8.1 9.3 15.2   CR 0.80 0.82 0.97*   FADUMO 9.0 9.0 9.2     Recent Labs   Lab 10/30/23  0848   WBC 7.0   RBC 3.62*   HGB 10.5*   HCT 34.7*   MCV 96   MCH 29.0   MCHC 30.3*   RDW 13.4        IMAGING:  No results found for this or any previous visit (from the past 24 hour(s)).

## 2023-10-31 NOTE — CARE PLAN
Speech Language Therapy Discharge Summary    Reason for therapy discharge:    All goals and outcomes met, no further needs identified.    Progress towards therapy goal(s). See goals on Care Plan in [facility] electronic health record for goal details.  Goals met    Therapy recommendation(s):    No further therapy is recommended. Patient safely tolerating regular textures diet with thin liquids using safe swallow strategies. No need for further SLP services at this time.

## 2023-10-31 NOTE — PLAN OF CARE
Status: Admitted for pituitary lesion. PMHx pf schizophrenia, HTN, asthma and pituitary mass.   Vitals: VSS exc, HTN within MD parameter. CCM. Continue pulse ox.   Neuros: AOX4. Forgetful. Denies N/T. R eye blind. L eye blurry version and field cut. Hx of auditory hallucination.  5/5 strengthen t/o. Generalized weakness.   IV: R PIV SL between abx.   Labs/Electrolytes: GB checks. Redraw tmr am.   Resp/trach: Denies SOB. PRN albuterol given. On 2-3 L oxymask overnight for occasional MAXX.  Pt refused CPAP.   Diet: Regular and good intake.   Bowel status: BM this shift. Scheduled bowel meds given. BS+.  : VDSP.   Skin: WNL.   Pain: Back pain moderately managed with PRN tylenol.   Activity: SBA/GB. Refused walk. Steady on feet. BA/CA on at all time.   Social: No visitor or phone call.   Plan: Pending surgical plan. Continue monitor and follow POC.

## 2023-10-31 NOTE — PLAN OF CARE
Paged Jaspal Quintero at [phone] regarding biPap orders. Present immediately, discussed with at desk.

## 2023-10-31 NOTE — PROGRESS NOTES
"[facility]    Medicine Progress Note - Hospitalist Service, GOLD TEAM 8    Date of Admission:  10/21/2023    Assessment & Plan   Mirian Cunningham is a 43 year old female with a past medical history significant for paranoid schizophrenia, housing insecurity, asthma, HTN, and known large sellar/suprasellar mass causing mass effect. She is currently admitted to the ICU (with plans to transition to IM) under neurosurgery care for acute hypoxic respiratory failure in the setting of flash pulmonary edema and aspiration pneumonia. Internal medicine was consulted for pre-operative risk assessment for pituitary adenoma resection and medical co-management on transfer to [facility].      Pituitary adenoma  Right eye complete blindness  Left eye visual right hemianopia  Pituitary mass was diagnosed on ED visit in 11/2018. Per neurocritical care documentation: She presented to [facility] on 10/6/2023 with multiple complaints including stress, housing insecurity, dizziness, head pressure, and worsening vision in the left eye. She has a history of blindness in the R eye due and now vision in the L eye has been worsening. Management of her pituitary mass has been complicated by her concomitant mental health and social concerns.\" MRI with large multilobulated pituitary mass/adenoma with mass effect and encasement of the cavernous structures including the cavernous internal carotid arteries, M1 segment of the right MCA, and A1 and proximal A2 segments of both anterior cerebral arteries.  -Neurosurgery team is primary  -Plan for neurosurgery pending  -Neurochecks and hemodynamic parameters per neurosurgery   -Regarding operative risk, please see original medicine consult note dated 10/29  -Revised cardiac index risk score = 0  -ARISCAT score for pulmonary complications remains high in the setting of intermittent oxygen requirements, concern for aspiration and treatment for respiratory " infection and duration of procedure. Would recommend close postoperative observation in the ICU for least 6 to 12 hours if proceed with surgery.      Acute hypoxic respiratory failure secondary to aspiration pneumonia vs pulmonary edema, resolved  History of mild intermittent asthma  MAXX  Maintaining oxygen sats on room air while awake. Has desaturations while sleeping, likely due to history of untreated MAXX. STOP-Bang score 5-6.   -Remains on Zosyn for treatment of aspiration pneumonia (10/27-present), recommend 5-day total course with EOT 10/31  -Received vancomycin (10/27), discontinued with negative MRSA nares  -Recommend pulmonary toilet and RT input regarding whether mucolytic and cough assist may be helpful   -Incentive spirometry  -Continue Advair BID, duonebs 4 times daily  -Patient open to trying CPAP, start +5 overnight. No previous home settings documented.     Septic shock, likely secondary to aspiration pneumonia, resolved   Likely distributive in setting of above. TTE with EF of 60-65% making cardiogenic unlikely. Cortisol appropriate.     Schizophrenia  Delusional disorder   Homelessness   No PTA medications. Psychiatry consulted on admission who did not feel presentation justified declaration of psychiatric emergency but agreed she lacks decision making capacity and treatment plan should be referred to designated decision maker. Ethics consulted and the patient's godmother, Adri, has been identified as decision maker.  -Mirian lacks capacity to consent to or refuse medical interventions; defer decision making to Adri.   -Continue Seroquel 25 mg BID PRN  -Consider prn haldol per psychiatry recommendations if ongoing issues with agitation   -Low threshold to ask psychiatry to reassess if concern for worsening symptoms or delusions      History of HTN  No PTA medications. Noted uptrending BP on 10/30. Neurosurgery goal of SBP <160.   -PRN antihypertensives per neurosurgery to maintain goal  -If need  "for frequent IV prns, could consider additional of PO hydralazine (short acting) in the immediate perioperative setting.   -Avoid initiation of ACEi/ARB in perioperative setting. If looking for longer acting oral antihypertensive, could consider amlodipine.             Diet: Regular Diet Adult  Room Service  Snacks/Supplements Adult: Carmen Nicholas Standard Oral Supplement; Between Meals    DVT Prophylaxis: Heparin SQ  Galeas Catheter: Not present  Lines: None     Cardiac Monitoring: ACTIVE order. Indication: ICU  Code Status: Full Code      Clinically Significant Risk Factors                         # Obesity: Estimated body mass index is 39.49 kg/m  as calculated from the following:    Height as of this encounter: 1.549 m (5' 1\").    Weight as of this encounter: 94.8 kg (208 lb 15.9 oz).      # Financial/Environmental Concerns: insurance, none;unemployed;unable to afford rent/mortgage;unable to afford medication(s);unable to afford food         Disposition Plan             Johnathan Barrientos MD  Hospitalist Service, [facility] 8  [facility]  Securely message with [employer] (more info)  Text page via [facility] Paging/Directory   See signed in provider for up to date coverage information  ______________________________________________________________________    Interval History   On room air to 2L nasal cannula overnight. Appears to have desaturations with sleeping. Patient reports she has a history of MAXX, but does not wear CPAP at home. She is open to trying CPAP during hospitalization.    Physical Exam   Vital Signs: Temp: 97.7  F (36.5  C) Temp src: Axillary BP: (!) 143/96 Pulse: 82   Resp: 18 SpO2: 94 % O2 Device: None (Room air) Oxygen Delivery: 2 LPM  Weight: 208 lbs 15.94 oz    General: appears comfortable, in no acute distress  HEENT: anicteric sclera, moist mucous membranes  Cardiovascular: regular rate and rhythm, no murmurs, 2+ distal pulses equal bilaterally  Respiratory: no " increased work of breathing, mild rhonchi at bilateral lung bases  Abdomen: soft, non-distended, non-tender throughout  Extremities: No appreciable edema  Skin: no concerning rashes or lesions  Neuro: A&O x2 (not to time), responds appropriately to exam      Medical Decision Making       45 MINUTES SPENT BY ME on the date of service doing chart review, history, exam, documentation & further activities per the note.      Data     I have personally reviewed the following data over the past 24 hrs:    6.8  \   10.6 (L)   / 260     144 107 8.6 /  142 (H)   3.5 26 0.92 \       Imaging results reviewed over the past 24 hrs:   No results found for this or any previous visit (from the past 24 hour(s)).
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
## 2023-10-31 NOTE — PLAN OF CARE
Transferred to: [address]  at 2020  Belongings:  Yes w/ pt  Central line removed? NA  Chart and medications sent with patient Yes  Family notified: Yes

## 2023-10-31 NOTE — PROGRESS NOTES
Pt. Refused CPAP. Wants to talk to provider before attempting.     Please contact RT with questions.

## 2023-10-31 NOTE — PROGRESS NOTES
10/31/23 1100   Appointment Info   Signing Clinician's Name / Credentials (OT) KERRY Hughes   Student Supervision On-site supervision provided   Living Environment   Current Living Arrangements homeless   Living Environment Comments Pt reports attempting to sleep in shelters and trying other options. In the last month, pt has slept in the shelter twice and has been on the streets majority of the time.   Self-Care   Usual Activity Tolerance good   Current Activity Tolerance good   Equipment Currently Used at Home none   Fall history within last six months yes   Number of times patient has fallen within last six months 2   Instrumental Activities of Daily Living (IADL)   Previous Responsibilities meal prep;housekeeping;laundry;shopping;medication management;finances   IADL Comments Pt enjoys cookingbut has difficulty completing tasks due to homelessness. Pt does not drive due to poor vision.   General Information   Onset of Illness/Injury or Date of Surgery 10/21/23   Referring Physician Fernando Welch MD   Patient/Family Therapy Goal Statement (OT) Maintain IND in I/ADLs   Additional Occupational Profile Info/Pertinent History of Current Problem Mirian Cunningham is a 43 year old female with complex psychosocial situation including paranoid schizophrenia and lack of active guardianship transferred to [facility] with known large sellar/suprasellar mass. Godmother Adri is appointed as the medical decision maker. No other family members available or willing to participate in her care.  MRI with large multilobulated pituitary mass/adenoma with mass effect and  encasement of the cavernous structures including the cavernous internal carotid arteries, M1 segment of the right MCA, and A1 and  proximal A2 segments of both anterior cerebral arteries.   Left Upper Extremity (Weight-bearing Status) full weight-bearing (FWB)   Right Upper Extremity (Weight-bearing Status) full weight-bearing (FWB)   Left Lower Extremity  (Weight-bearing Status) full weight-bearing (FWB)   Right Lower Extremity (Weight-bearing Status) full weight-bearing (FWB)   Cognitive Status Examination   Orientation Status orientation to person, place and time   Attention Deficit focused/sustained attention  (Slow to speak and appears to focus on what she is saying)   Visual Perception   Visual Impairment/Limitations legally blind;blurry vision   Impact of Vision Impairment on Function (Vision) Blind in R eye, L eye blurry vision with field cut   Sensory   Sensory Quick Adds bilateral UE;bilateral LE   Bilateral UE Sensory Assessment impaired   Bilateral LE Sensory Assessment impaired   Sensory Comments Numbness and tingling to BUE and BLE   Pain Assessment   Patient Currently in Pain Yes, see Vital Sign flowsheet   Posture   Posture forward head position   Range of Motion Comprehensive   General Range of Motion no range of motion deficits identified   Strength Comprehensive (MMT)   General Manual Muscle Testing (MMT) Assessment no strength deficits identified   Coordination   Upper Extremity Coordination No deficits were identified   Bed Mobility   Bed Mobility No deficits identified   Transfers   Transfers No deficits identified   Balance   Balance Assessment no deficits identified   Activities of Daily Living   BADL Assessment/Intervention bathing;upper body dressing;lower body dressing;toileting   Bathing Assessment/Intervention   [place] Level (Bathing) modified independence   Comment, (Bathing) Per clinical judgement. Pt finished Mohamud shower prior to eval.   Assistive Devices (Bathing) shower chair   Upper Body Dressing Assessment/Training   Comment, (Upper Body Dressing) Per clinical judgement.   [place] Level (Upper Body Dressing) independent   Lower Body Dressing Assessment/Training   Comment, (Lower Body Dressing) Per clinical judgement   [place] Level (Lower Body Dressing) independent   Toileting   Comment, (Toileting) Per clinical  judgement.   [place] Level (Toileting) independent   Clinical Impression   Criteria for Skilled Therapeutic Interventions Met (OT) Yes, treatment indicated   OT Diagnosis Impaired I/ADL function   OT Problem List-Impairments impacting ADL problems related to;activity tolerance impaired;cognition;sensation;vision   Assessment of Occupational Performance 1-3 Performance Deficits   Identified Performance Deficits Grooming/hygiene, mobility & LBD   Planned Therapy Interventions (OT) ADL retraining;IADL retraining;visual perception;cognition   Clinical Decision Making Complexity (OT) problem focused assessment/low complexity   Risk & Benefits of therapy have been explained evaluation/treatment results reviewed;care plan/treatment goals reviewed;risks/benefits reviewed;current/potential barriers reviewed;participants voiced agreement with care plan;participants included;patient   OT Total Evaluation Time   OT Eval, Low Complexity Minutes (03715) 8   OT Goals   Therapy Frequency (OT) One time eval and treatment   OT Predicted Duration/Target Date for Goal Attainment 11/06/23   OT Goals Hygiene/Grooming;Upper Body Dressing;Lower Body Dressing;Upper Body Bathing;Lower Body Bathing;Transfers;Toilet Transfer/Toileting   OT: Hygiene/Grooming independent;Goal Met   OT: Upper Body Dressing Independent;Goal Met   OT: Lower Body Dressing Independent;Goal Met   OT: Upper Body Bathing Modified independent;Goal Met   OT: Lower Body Bathing Modified independent;Goal Met   OT: Transfer Independent;Goal Met   OT: Toilet Transfer/Toileting Independent;Goal Met   Interventions   Interventions Quick Adds Self-Care/Home Management;Therapeutic Activity;Therapeutic Procedures/Exercise   OT Discharge Planning   OT Plan Discontinue OT services   OT Discharge Recommendation (DC Rec) other (see comments)  (Transfer agreement back to [facility])   OT Rationale for DC Rec Pt has transfer agreement with hospital in [place].   OT Brief  overview of current status Ind   Total Session Time   Total Session Time (sum of timed and untimed services) 8

## 2023-11-01 NOTE — PLAN OF CARE
Status: Admitted for pituitary lesion. PMHx pf schizophrenia, HTN, asthma and pituitary mass   Vitals: HTN within parameters.   Neuros: AOx4. Forgetful. Blind R eye, blurry vision in L eye.   IV: replaced today,saline locked   Labs/Electrolytes: refused electrolyte replacement  Resp/trach: Refusing CPAP, on 5L oxymask when sleeping for periods of apnea and desatting. MDs notified. O2 sats WNL when awake and on room air.   Diet: regular, strict I&O. Minimal intake this shift.  Bowel status: BS+   : Voids without difficulty   Skin: no new deficits   Pain: headache partially managed w/ tylenol   Activity: SBA   Plan: Continue with POC.   Updates this shift: Pt intermittently paranoid/suspicious of staff. Refusing heparin and electrolyte replacements due to being allergic to many things. Pt able to be redirected.

## 2023-11-01 NOTE — PROVIDER NOTIFICATION
"Pt napping, O2 sats dropping to mid 60's about 3 times over 10 minutes. NSG NP Mario notified via phone call. Pt refusing cpap, would like to speak with MD first. Also requested MD to discharge ICU cardiac monitoring orders. NSG to come talk with patient about CPAP, will discharge CCM.     Addendum: Warner called back, told charge RN to contact medicine regarding CPAP as they ordered it.     Addendum: Page sent to Medicine MD: \"[phone]- Octavio- pt refusing CPAP. Would like to speak with MD first. Currently napping, 02 drops to 60's appx 3x/10min, has 4LPM o2 on.\"    "

## 2023-11-01 NOTE — PLAN OF CARE
Goal Outcome Evaluation:      Plan of Care Reviewed With: patient    Overall Patient Progress: improvingOverall Patient Progress: improving    Status: Admitted for pituitary lesion. PMHx pf schizophrenia, HTN, asthma and pituitary mass.   Vitals: VSS exc, HTN within MD parameter. CCM. Continue pulse ox.   Neuros: AOX4. Forgetful. Denies N/T. R eye blind. L eye blurry version and field cut. Hx of auditory hallucination.  5/5 strengthen t/o. Generalized weakness.   IV: R PIV SL between abx.   Labs/Electrolytes: GB checks. Redrawn this morning  Resp/trach: Denies SOB. PRN albuterol given. On 2-3 L Nasal cannular overnight for occasional MAXX.  Pt refused CPAP.   Diet: Regular and good intake.   Bowel status: LBM 11/1  : VDSP to bathroom x1 his shift  Skin: WNL.   Pain: denied.   Activity: SBA/GB.  Social: No visitor or phone call.   Plan: Pending surgical plan. Continue monitor and follow POC.   Shift Updates: Patient c/o of SOB this shift. RN heard wheezing upon auscultation. Managed with neb and SOB resolved.  Had to insert 2 PIV`s this shift. Otherwise no concerns continue to monitor.

## 2023-11-01 NOTE — PLAN OF CARE
Physical Therapy defer - Orders received, chart reviewed, discussed with care team, met with patient. No IP PT needs indicated at this time. Pt reports mobilizing at baseline, denies concerns with mobility. Per chart plan for surgery pending, can reorder PT post op. Will complete consult and defer evaluation, please reconsult as appropriate if patient has decline in functional mobility requiring further skilled inpatient PT needs. Defer Discharge recommendations to medical team.

## 2023-11-01 NOTE — PROVIDER NOTIFICATION
Mirian Cunningham, [id number];  non-urgent FYI, patient refused heparin injection this morning. last given was on 10/27.      Thanks,  [facility] [place] Nurse Call:   [phone]

## 2023-11-01 NOTE — ETHICS CONSULT
Ethics has been contacted by there neurosurgical team regarding decision-making for Ms. Cunningham. Patient lacks capacity for ANY medical decision-making, per multiple evaluations by psychiatry. The godmother, Adri Ramos, has been identified as the only potential surrogate decision-maker for Ms Cunningham and fulfills the necessary surrogate responsibilities: is able to act with substituted judgment for the patient, understands the decisions at hand, and is willing to serve as her surrogate.      Adri Ramos should be considered the medical decision-maker for Mirian Cunningham and can consent or refuse any procedures or interventions    Miya Cerda MD, MPH, EVITA-C  Co-chair, [facility] Ethics Committee

## 2023-11-01 NOTE — PROGRESS NOTES
[facility], [place]   11/01/2023  Neurosurgery Progress Note:    Interval History: no acute events overnight; off oxygen    Assessment:  Mirian Cunningham is a 43 year old female with complex psychosocial situation including paranoid schizophrenia and lack of active guardianship transferred to [facility] with known large sellar/suprasellar mass. Godmother Adri is appointed as the medical decision maker. No other family members available or willing to participate in her care.  MRI with large multilobulated pituitary mass/adenoma with mass effect and  encasement of the cavernous structures including the cavernous internal carotid arteries, M1 segment of the right MCA, and A1 and  proximal A2 segments of both anterior cerebral arteries.     Plan:  Q4h neuro exams   Pain control  Seroquel PRN  Maintain SBP < 160  Regular diet  Normonatremia   Zosyn completed  Continue to monitor for fevers and/or signs of infection  Follow cultures  Continue glucose checks  Platelets > 100,000  INR < 1.5  Hemoglobin > 8  DVT: SCDs and SQH (refusing currently)  OR plan pending  Discuss with care coordinator/social work documentation of decision making and considerations regarding proxy/guardianship    -----------------------------------  Fernando Welch MD, PGY-1  Department of Neurosurgery  Pager: [phone]    Please contact neurosurgery resident on call with questions.    Dial * * *[phone], enter [phone] when prompted.   -----------------------------------    Objective:   Temp:  [98  F (36.7  C)-98.7  F (37.1  C)] 98.4  F (36.9  C)  Pulse:  [] 104  Resp:  [16-29] 25  BP: (143-162)/() 152/92  SpO2:  [79 %-100 %] 100 %  I/O last 3 completed shifts:  In: 1200 [P.O.:1200]  Out: -     NEUROLOGIC:  Gen: Appears comfortable, NAD  Resp: breathing comfortably on 3L Oxymask  Neurologic:  -- Awake; Alert; oriented x 3  -- Follows commands intermittently  -- Speech fluent, spontaneous. No aphasia or dysarthria.  -- no  gaze preference. No apparent hemineglect.  Cranial Nerves:  -- R eye blindness; L eye temporal field cut. Left nasal superio qudrant 20/30. R RAPD.  -- gaze is intermittently dysconjugate w R exotropia   -- face symmetrical, tongue midline  -- hearing grossly intact bilat  -- Trapezii 5/5 strength bilat symmetric     Motor:  Normal bulk / tone; no tremor, rigidity, or bradykinesia.  No muscle wasting or fasciculations       Delt Bi Tri Hand Flexion/  Extension Iliopsoas Quadriceps Hamstrings Tibialis Anterior Gastroc     C5 C6 C7 C8/T1 L2 L3 L4-S1 L4 S1   R 5 5 5 5 5 5 5 5 5   L 5 5 5 5 5 5 5 5 5      Sensory:  intact to LT x 4 extremities      Reflexes: no hyperreflexia, no Hoffmans, mute Babinski bilaterally      LABS:  Recent Labs   Lab 11/01/23  1013 10/31/23  2216 10/31/23  1552 10/31/23  0951 10/30/23  0848 10/29/23  1021   NA  --   --   --  144 144 142   POTASSIUM 3.9  --   --  3.5 3.6 3.6   CHLORIDE  --   --   --  107 106 105   CO2  --   --   --  26 27 27   ANIONGAP  --   --   --  11 11 10   GLC  --  157* 112* 142* 141* 140*   BUN  --   --   --  8.6 8.1 9.3   CR  --   --   --  0.92 0.80 0.82   FADUMO  --   --   --  9.2 9.0 9.0     Recent Labs   Lab 11/01/23  1013   WBC 6.8   RBC 3.81   HGB 11.1*   HCT 36.1   MCV 95   MCH 29.1   MCHC 30.7*   RDW 13.6        IMAGING:  No results found for this or any previous visit (from the past 24 hour(s)).

## 2023-11-01 NOTE — PROGRESS NOTES
"[facility]    Medicine Progress Note - Hospitalist Service, GOLD TEAM 8    Date of Admission:  10/21/2023    Assessment & Plan   Mirian Cunningham is a 43 year old female with a past medical history significant for paranoid schizophrenia, housing insecurity, asthma, HTN, and known large sellar/suprasellar mass causing mass effect. She is currently admitted to the ICU (with plans to transition to IM) under neurosurgery care for acute hypoxic respiratory failure in the setting of flash pulmonary edema and aspiration pneumonia. Internal medicine was consulted for pre-operative risk assessment for pituitary adenoma resection and medical co-management on transfer to [facility].     Today's changes:  -Completed IV Zosyn course  -Ok to decrease duonebs to 4 times daily PRN  -Patient has been refusing use of CPAP, ok to use supplemental oxygen while sleeping via oxymask or nasal cannula instead  -Surgical plan per neurosurgery is pending, possibly next week  -Medicine team will sign off. Feel free to reach out or re-consult if needed.     Pituitary adenoma  Right eye complete blindness  Left eye visual right hemianopia  Pituitary mass was diagnosed on ED visit in 11/2018. Per neurocritical care documentation: She presented to [facility] on 10/6/2023 with multiple complaints including stress, housing insecurity, dizziness, head pressure, and worsening vision in the left eye. She has a history of blindness in the R eye due and now vision in the L eye has been worsening. Management of her pituitary mass has been complicated by her concomitant mental health and social concerns.\" MRI with large multilobulated pituitary mass/adenoma with mass effect and encasement of the cavernous structures including the cavernous internal carotid arteries, M1 segment of the right MCA, and A1 and proximal A2 segments of both anterior cerebral arteries.  -Neurosurgery team is primary  -Plan for " neurosurgery pending, possibly next week 11/6-11/10   -Neurochecks and hemodynamic parameters per neurosurgery   -Regarding operative risk, please see original medicine consult note dated 10/29  -Revised cardiac index risk score = 0  -ARISCAT score for pulmonary complications remains high in the setting of intermittent oxygen requirements, concern for aspiration and treatment for respiratory infection and duration of procedure. Would recommend close postoperative observation in the ICU for least 6 to 12 hours if proceed with surgery.      Acute hypoxic respiratory failure secondary to aspiration pneumonia vs pulmonary edema, resolved  History of mild intermittent asthma  MAXX  Maintaining oxygen sats on room air while awake. Has desaturations while sleeping, likely due to history of untreated MAXX. STOP-Bang score 5-6.   -Completed 5-day course of Zosyn for treatment of aspiration pneumonia (10/27-11/1)  -Received vancomycin (10/27), discontinued with negative MRSA nares  -Recommend pulmonary toilet and RT input regarding whether mucolytic and cough assist may be helpful   -Incentive spirometry  -Continue Advair BID  -Ok to decrease duonebs to 4 times daily PRN  -Trial CPAP, start +5 overnight. No previous home settings documented. Patient has been declining use of CPAP.     Septic shock, likely secondary to aspiration pneumonia, resolved   Likely distributive in setting of above. TTE with EF of 60-65% making cardiogenic unlikely. Cortisol appropriate.     Schizophrenia  Delusional disorder   Homelessness   No PTA medications. Psychiatry consulted on admission who did not feel presentation justified declaration of psychiatric emergency but agreed she lacks decision making capacity and treatment plan should be referred to designated decision maker. Ethics consulted and the patient's godmother, Adri, has been identified as decision maker. See Ethics note from 11/1.  -Mirian lacks capacity to consent to or refuse medical  "interventions; defer decision making to Adri.   -Continue Seroquel 25 mg BID PRN  -Consider prn haldol per psychiatry recommendations if ongoing issues with agitation   -Low threshold to ask psychiatry to reassess if concern for worsening symptoms or delusions      History of HTN  No PTA medications. Noted uptrending BP on 10/30. Neurosurgery goal of SBP <160.   -PRN antihypertensives per neurosurgery to maintain goal  -If need for frequent IV prns, could consider additional of PO hydralazine (short acting) in the immediate perioperative setting.   -Avoid initiation of ACEi/ARB in perioperative setting. If looking for longer acting oral antihypertensive, could consider amlodipine.             Diet: Regular Diet Adult  Room Service  Snacks/Supplements Adult: [employer] Standard Oral Supplement; Between Meals    DVT Prophylaxis: Heparin SQ  Galeas Catheter: Not present  Lines: None     Cardiac Monitoring: ACTIVE order. Indication: ICU  Code Status: Full Code      Clinically Significant Risk Factors                         # Obesity: Estimated body mass index is 39.49 kg/m  as calculated from the following:    Height as of this encounter: 1.549 m (5' 1\").    Weight as of this encounter: 94.8 kg (208 lb 15.9 oz).      # Financial/Environmental Concerns: insurance, none;unemployed;unable to afford rent/mortgage;unable to afford medication(s);unable to afford food         Disposition Plan             Johnathan Barrientos MD  Hospitalist Service, [address]  Securely message with [employer] (more info)  Text page via [facility] Paging/Directory   See signed in provider for up to date coverage information  ______________________________________________________________________    Interval History   Doing well overall, feels breathing and cough symptoms have improved. Continues to have some nasal congestion which is improved with use of Flonase and saline nasal spray.    Surgical " plan and timing is pending per neurosurgery team.    Per Ethics: Patient lacks capacity for ANY medical decision-making, per multiple evaluations by psychiatry. The godmother, Adri Ramos, has been identified as the only potential surrogate decision-maker for Ms Cunningham and fulfills the necessary surrogate responsibilities: is able to act with substituted judgment for the patient, understands the decisions at hand, and is willing to serve as her surrogate.       Physical Exam   Vital Signs: Temp: 98.4  F (36.9  C) Temp src: Axillary BP: (!) 152/92 Pulse: 88   Resp: 20 SpO2: 98 % O2 Device: Nasal cannula with humidification Oxygen Delivery: 2 LPM  Weight: 208 lbs 15.94 oz    General: appears comfortable, in no acute distress  HEENT: anicteric sclera, moist mucous membranes  Cardiovascular: regular rate and rhythm, no murmurs, 2+ distal pulses equal bilaterally  Respiratory: no increased work of breathing, no rhonchi or crackles, mild end-expiratory wheeze at left lung field  Abdomen: soft, non-distended, non-tender throughout  Extremities: No appreciable edema  Skin: no concerning rashes or lesions  Neuro: A&O x3, responds appropriately to exam, flight of thoughts       Medical Decision Making       45 MINUTES SPENT BY ME on the date of service doing chart review, history, exam, documentation & further activities per the note.      Data     I have personally reviewed the following data over the past 24 hrs:    6.8  \   11.1 (L)   / 303     N/A N/A N/A /  157 (H)   3.9 N/A N/A \       Imaging results reviewed over the past 24 hrs:   No results found for this or any previous visit (from the past 24 hour(s)).

## 2023-11-02 NOTE — CODE/RAPID RESPONSE
Rapid Response Team Note    Assessment   In assessment a rapid response was called on Mirian Cunningham due to  transient hypoxia . Patient noted to have O2 sats in the 70s transiently. O2 was increased to 5L via FM with improvement in sats to 100%. Patient has no current complaints.     Plan   -  Wean O2  -  The  [facility]  primary team was paged and currently awaiting a response.  -  Disposition: The patient will remain on the current unit. We will continue to monitor this patient closely.  -  Reassessment and plan follow-up will be performed by the primary team      Luh Serna PA-C  [facility] RRT [facility] Job Code Contact #[phone]  [facility] Paging/Directory    Hospital Course   Brief Summary of events leading to rapid response:   RRT called for transient dip in O2 sats to 70s with improvement in sats with 5L O2. Patient denies fever, chills, cough, CP, sob. O2 was turned down to 3L via FM with sats 100%. Will continue to wean as able.     Admission Diagnosis:   Pituitary tumor [D49.7]    Physical Exam   Temp: 98.5  F (36.9  C) Temp  Min: 98  F (36.7  C)  Max: 98.5  F (36.9  C)  Resp: 18 Resp  Min: 12  Max: 28  SpO2: 100 % SpO2  Min: 72 %  Max: 100 %  Pulse: 91 Pulse  Min: 65  Max: 104    No data recorded  BP: (!) 145/101 Systolic (24hrs), Av , Min:143 , Max:162   Diastolic (24hrs), Av, Min:92, Max:111     I/Os: I/O last 3 completed shifts:  In: 1200 [P.O.:1200]  Out: -      Exam:   General: in no acute distress  Mental Status: AAOx4.  CV: RRR no appreciable M/R/G  Resp: Breathing non-labored on 3L O2 via FM. BS diminished bilaterally d/t body habitus      Significant Results and Procedures   Lactic Acid:   Recent Labs   Lab Test 10/28/23  1533 10/28/23  0448 10/27/23  0956   LACT 1.0 2.4* 1.6     CBC:   Recent Labs   Lab Test 23  1013 10/31/23  0951 10/30/23  0848   WBC 6.8 6.8 7.0   HGB 11.1* 10.6* 10.5*   HCT 36.1 35.0 34.7*    260 227        Sepsis Evaluation   The patient is not known to  have an infection.  NO EVIDENCE OF SEPSIS at this time.  Vital sign, physical exam, and lab findings are due to obesity hypoventilation and MAXX.

## 2023-11-02 NOTE — PROVIDER NOTIFICATION
"   11/01/23 2200   Call Information   Date of Call 11/01/23   Time of Call 2152   Name of person requesting the team Jero Marquez   Title of person requesting team RN   RRT Arrival time 2155   Time RRT ended 2207   Reason for call   Type of RRT Adult   Primary reason for call Respiratory   Respiratory Other (describe)  (O2 sat less than 85% for total 4 min over a short period of time)   SBAR   Situation Desat to 85% x2 consecutive minutes, 77% x one minute followed by 72% for another one minute while on room air.   Background Per provider note \"Mirian Cunningham is a 43 year old female with complex psychosocial situation including paranoid schizophrenia and lack of active guardianship transferred to [facility] with known large sellar/suprasellar mass. Godmother Adri is appointed as the medical decision maker. No other family members available or willing to participate in her care.  MRI with large multilobulated pituitary mass/adenoma with mass effect and  encasement of the cavernous structures including the cavernous internal carotid arteries, M1 segment of the right MCA, and A1 and  proximal A2 segments of both anterior cerebral arteries.   \"   Notable History/Conditions Cancer;Neurological   Assessment Pt resting in bed appearing drowsy. O2 sat now 100% on 5L oxymask, and has since been weaned to 3L and is doing well. Needed much encouragement and some education on needing to wear O2 prior to falling asleep in order to avoid frequent/prolonged desaturations, in lieu of pt's refusal to wear CPAP while asleep. Pt reports feeling short of breath at times, and is thus far in agreement to oxygen while asleep. Other VSS; RR 28-32.   Interventions O2 per N/C or mask   Patient Outcome   Patient Outcome Stabilized on unit   RRT Team   Attending/Primary/Covering Physician Neurosurgery   Date Attending Physician notified 11/01/23   Time Attending Physician notified 2152   Physician(s) Luh PEDRO with RRT   Lead RN Ashanti May "   RN Jero Marquez   Other staff Perri VALVERDE RN   Post RRT Intervention Assessment   Post RRT Assessment Stable/Improved   Date Follow Up Done 11/02/23   Time Follow Up Done 5513

## 2023-11-02 NOTE — PROVIDER NOTIFICATION
Mirian Cunningham, 2218    FYI, patient declined heparin injection.    Thanks,  Maxine,  [phone]

## 2023-11-02 NOTE — PROGRESS NOTES
[facility]    Medicine Progress Note - Hospitalist Service, GOLD TEAM 8    Date of Admission:  10/21/2023    Assessment & Plan   Mirian Cunningham is a 43 year old female with a past medical history significant for paranoid schizophrenia, housing insecurity, asthma, HTN, and known large sellar/suprasellar mass causing mass effect. She is currently admitted to the neurosurgery service for planning for possible pituitary adenoma resection. She was admitted to the ICU due to acute hypoxic respiratory failure in the setting of flash pulmonary edema and aspiration pneumonia. Internal medicine was consulted for pre-operative risk assessment for pituitary adenoma resection and medical co-management to optimize respiratory status.    Acute hypoxic respiratory failure secondary to pulmonary edema  Recent history of aspiration pneumonia  Patient has a history of mild intermittent asthma, historically well controlled on home inhalers. No signs of acute asthma exacerbation on exam. Intermittent oxygen desaturations while sleeping is likely due to untreated obstructive sleep apnea. During waking hours, it is difficult to correlate physiologic disease process to intermittent desaturations. Patient does have signs of pulmonary edema on CXR, recommend trialing diuresis. Recent TTE from 10/26 reviewed with EF 60-65%. Pulmonary edema could be explained by mild HFpEF exacerbation. Obesity hypoventilation syndrome is on the differential. It should be noted that albuterol/duoneb treatments can lead to transient V/Q mismatch and desaturations immediately after treatment. Would continue with pulmonary clearance therapies due to history of asthma and recent ICU stay. No clinical signs of active infection at this time. She recently completed a 5-day course of Zosyn for treatment of aspiration pneumonia (10/27-11/1).  -Recommend pulmonary toilet and RT input regarding whether mucolytic and cough  "assist may be helpful   -Incentive spirometry Q1H while awake  -Continue Advair BID  -Continue duonebs 4 times daily while awake  -Start guaifenesin 600 mg BID PRN for cough symptoms  -Start IV furosemide 20 mg daily (or PO furosemide 40 mg daily). Will monitor response and adjust as needed.  -Check BMP and Mg daily while receiving furosemide  -Ok to switch from continuous pulse ox to spot checks with vitals  -Patient has also been declining subcutaneous heparin for DVT ppx. Consider switch to subcutaneous enoxaparin for daily dosing if better tolerated.    MAXX  Previously diagnosed. She does not use CPAP at home.  -Recommend CPAP, start +5 overnight. No previous home settings documented. Patient has been declining use of CPAP during hospitalization.     Pituitary adenoma  Right eye complete blindness  Left eye visual right hemianopia  Pituitary mass was diagnosed on ED visit in 11/2018. Per neurocritical care documentation: She presented to [facility] with multiple complaints including stress, housing insecurity, dizziness, head pressure, and worsening vision in the left eye. She has a history of blindness in the R eye due and now vision in the L eye has been worsening. Management of her pituitary mass has been complicated by her concomitant mental health and social concerns.\" MRI with large multilobulated pituitary mass/adenoma with mass effect and encasement of the cavernous structures including the cavernous internal carotid arteries, M1 segment of the right MCA, and A1 and proximal A2 segments of both anterior cerebral arteries.  -Neurosurgery team is primary  -Plan for neurosurgery pending, possibly next week 11/6-11/10   -Neurochecks and hemodynamic parameters per neurosurgery   -Regarding operative risk, please see original medicine consult note dated 10/29  -Revised cardiac index risk score = 1 (pulmonary edema)  -ARISCAT score for pulmonary complications remains high in the setting " of intermittent oxygen requirements, concern for aspiration and treatment for respiratory infection and duration of procedure. Would recommend close postoperative observation in the ICU for least 6 to 12 hours if proceed with surgery.      Septic shock, likely secondary to aspiration pneumonia, resolved   Likely distributive in setting of above. TTE with EF of 60-65% making cardiogenic unlikely. Cortisol appropriate.     Schizophrenia  Delusional disorder   Homelessness   No PTA medications. Psychiatry consulted on admission who did not feel presentation justified declaration of psychiatric emergency but agreed she lacks decision making capacity and treatment plan should be referred to designated decision maker. Ethics consulted and the patient's godmother, Adri, has been identified as decision maker. See Ethics note from 11/1.  -Mirian lacks capacity to consent to or refuse medical interventions; defer decision making to Adri.   -Continue Seroquel 25 mg BID PRN  -Consider prn haldol per psychiatry recommendations if ongoing issues with agitation   -Low threshold to ask psychiatry to reassess if concern for worsening symptoms or delusions      History of HTN  No PTA medications. Noted uptrending BP on 10/30. Neurosurgery goal of SBP <160.   -PRN antihypertensives per neurosurgery to maintain goal  -If need for frequent IV prns, could consider additional of PO hydralazine (short acting) in the immediate perioperative setting.   -Avoid initiation of ACEi/ARB in perioperative setting. If looking for longer acting oral antihypertensive, could consider amlodipine.            Diet: Regular Diet Adult  Room Service  Snacks/Supplements Adult: [employer] Standard Oral Supplement; Between Meals    DVT Prophylaxis: Heparin SQ  Galeas Catheter: Not present  Lines: None     Cardiac Monitoring: None  Code Status: Full Code      Clinically Significant Risk Factors                         # Obesity: Estimated body mass index is  "39.49 kg/m  as calculated from the following:    Height as of this encounter: 1.549 m (5' 1\").    Weight as of this encounter: 94.8 kg (208 lb 15.9 oz).      # Financial/Environmental Concerns: insurance, none;unemployed;unable to afford rent/mortgage;unable to afford medication(s);unable to afford food         Disposition Plan           Johnathan Barrientos MD  Hospitalist Service, GOLD TEAM 8  [facility]  Securely message with [facility] (more info)  Text page via [employer] Paging/Directory   See signed in provider for up to date coverage information  ______________________________________________________________________    Interval History   Medicine team had previously signed off yesterday, but patient continues to have intermittent desaturations with sleeping and with activity. Patient has been on continuous pulse ox with intermittent desaturations down to 72% documented overnight. Oxygen requirements 3-5L overnight, weaned to room air this morning.     Patient describes continued productive cough although this is improving. She does have subjective dyspnea on exertion at baseline (walking up 1-2 flights of stairs) which is relieved by rest. No chest pain or discomfort.    Physical Exam   Vital Signs: Temp: 97.6  F (36.4  C) Temp src: Axillary BP: (!) 136/91 Pulse: 65   Resp: 16 SpO2: 95 % O2 Device: None (Room air) Oxygen Delivery: 3 LPM  Weight: 208 lbs 15.94 oz    General: appears comfortable, in no acute distress, sitting up in chair. SpO2 100% while at rest on room air, 94-9% while talking.  HEENT: anicteric sclera, moist mucous membranes  Cardiovascular: regular rate and rhythm, no murmurs, 2+ distal pulses equal bilaterally  Respiratory: no increased work of breathing, no rhonchi or crackles, no wheezing noted on exam today  Abdomen: soft, non-distended, non-tender throughout  Extremities: No appreciable edema  Skin: no concerning rashes or lesions  Neuro: A&O x3, responds " appropriately to exam, flight of thoughts/speech    Medical Decision Making     50 MINUTES SPENT BY ME on the date of service doing chart review, history, exam, documentation & further activities per the note.      Data     I have personally reviewed the following data over the past 24 hrs:    N/A  \   N/A   / N/A     N/A N/A N/A /  134 (H)   N/A N/A N/A \       Imaging results reviewed over the past 24 hrs:   Recent Results (from the past 24 hour(s))   XR Chest Port 1 View    Narrative    EXAM: XR CHEST PORT 1 VIEW 11/1/2023 11:10 PM    DEMOGRAPHICS: Female of 43 years    INDICATION: Eval stability in the setting of desat    COMPARISON: 10/30/2023    TECHNIQUE: Single portable AP view of the chest.    FINDINGS:   Increased bilateral perihilar and bibasilar opacities. Trachea is  midline. Cardiac silhouette within normal limits. Mild left  costophrenic angle blunting. No pneumothorax. Visible portions of the  abdomen are unremarkable.      Impression    IMPRESSION:   Increasing pulmonary edema and atelectasis.    I have personally reviewed the examination and initial interpretation  and I agree with the findings.    ADRIENNE ARGUETA MD         SYSTEM ID:  [id number]

## 2023-11-02 NOTE — PROGRESS NOTES
[facility], [place]   11/02/2023  Neurosurgery Progress Note:    Interval History: Decrease in saturations with ambulation in the evening requiring 3-5 L of oxygen. Currently on room air    Assessment:  Mirian Cunningham is a 43 year old female with complex psychosocial situation including paranoid schizophrenia and lack of active guardianship transferred to [facility] with known large sellar/suprasellar mass. Godmother Adri is appointed as the medical decision maker. No other family members available or willing to participate in her care.  MRI with large multilobulated pituitary mass/adenoma with mass effect and encasement of the cavernous structures including the cavernous internal carotid arteries, M1 segment of the right MCA, and A1 and proximal A2 segments of both anterior cerebral arteries.     Plan:  Q4h neuro exams   Pain control  Seroquel PRN  Maintain SBP < 160  Regular diet  Normonatremia   Zosyn completed  Continue to monitor for fevers and/or signs of infection  Follow cultures  Continue glucose checks  Platelets > 100,000  INR < 1.5  Hemoglobin > 8  DVT: SCDs and SQH (refusing currently), will have her ambulate three times a day. If this cannot be achieved, planning to switch to lovenox for DVT prophylaxis   OR likely 12/1 and 12/2 combined EEA and transcranial approaches for tumor resection   Will re-engage Medicine team given the overnight events   -----------------------------------  Fernando Welch MD, PGY-1  Department of Neurosurgery  Pager: [phone]    Please contact neurosurgery resident on call with questions.    Dial * * *[phone], enter [phone] when prompted.   -----------------------------------    Objective:   Temp:  [97.6  F (36.4  C)-98.5  F (36.9  C)] 97.6  F (36.4  C)  Pulse:  [] 65  Resp:  [12-28] 16  BP: (133-157)/() 136/91  SpO2:  [72 %-100 %] 95 %  I/O last 3 completed shifts:  In: 1240 [P.O.:1240]  Out: 550 [Urine:550]    NEUROLOGIC:  Gen: Appears  comfortable, NAD  Resp: breathing comfortably on 3L Oxymask  Neurologic:  -- Awake; Alert; oriented x 3  -- Follows commands intermittently  -- Speech fluent, spontaneous. No aphasia or dysarthria.  -- no gaze preference. No apparent hemineglect.  Cranial Nerves:  -- R eye blindness; L eye temporal field cut. Left nasal superio qudrant 20/30. R RAPD.  -- gaze is intermittently dysconjugate w R exotropia   -- face symmetrical, tongue midline  -- hearing grossly intact bilat  -- Trapezii 5/5 strength bilat symmetric     Motor:  Normal bulk / tone; no tremor, rigidity, or bradykinesia.  No muscle wasting or fasciculations       Delt Bi Tri Hand Flexion/  Extension Iliopsoas Quadriceps Hamstrings Tibialis Anterior Gastroc     C5 C6 C7 C8/T1 L2 L3 L4-S1 L4 S1   R 5 5 5 5 5 5 5 5 5   L 5 5 5 5 5 5 5 5 5      Sensory:  intact to LT x 4 extremities      Reflexes: no hyperreflexia, no Hoffmans, mute Babinski bilaterally      LABS:  Recent Labs   Lab 11/01/23  2153 11/01/23  1632 11/01/23  1013 10/31/23  2216 10/31/23  1552 10/31/23  0951 10/30/23  0848 10/29/23  1021   NA  --   --   --   --   --  144 144 142   POTASSIUM  --   --  3.9  --   --  3.5 3.6 3.6   CHLORIDE  --   --   --   --   --  107 106 105   CO2  --   --   --   --   --  26 27 27   ANIONGAP  --   --   --   --   --  11 11 10   * 98  --  157*   < > 142* 141* 140*   BUN  --   --   --   --   --  8.6 8.1 9.3   CR  --   --   --   --   --  0.92 0.80 0.82   FADUMO  --   --   --   --   --  9.2 9.0 9.0    < > = values in this interval not displayed.     Recent Labs   Lab 11/01/23  1013   WBC 6.8   RBC 3.81   HGB 11.1*   HCT 36.1   MCV 95   MCH 29.1   MCHC 30.7*   RDW 13.6        IMAGING:  Recent Results (from the past 24 hour(s))   XR Chest Port 1 View    Impression    RESIDENT PRELIMINARY INTERPRETATION  IMPRESSION:   Increasing perihilar and bibasilar lung opacities most likely  representing increased pulmonary edema and atelectasis.

## 2023-11-02 NOTE — PROGRESS NOTES
"Pt showering since 0900 writer attempted to do assessment and morning medications several times, pt continues to say \"I'll be out soon\". Will continue to attempt to assess and administer medications      Addendum 10:26- pt already asked lab to come back once from AM draw this morning, due to still being in the shower lab has to come back a 3rd time to attempt to draw.   "

## 2023-11-02 NOTE — PROVIDER NOTIFICATION
Paged NSG    [phone] Octavio - DARLINE pt refusing lasix, attempted x2 w/ education. also refusing to have IV placed, labs drawn & refusing to participate in assessments that involve touching hands d/t a blister on her L hand. darryl ramirez

## 2023-11-02 NOTE — PLAN OF CARE
Status: Admitted for pituitary lesion. Hx of schizophrenia, HTN, asthma, and pituitary mass.   Vitals: VSS, HTN within parameters   Neuros: Oriented x4, lethargic this shift, blind in R eye and blurry vision in L eye.   IV: PIV SL   Labs/Electrolytes: Refused electrolyte replacement   Resp/trach: Refusing CPAP at night, on 2L oxymask when sleeping, O2 WNL during the day when awake. Pt had one episode of O2 stats dropping to the upper 70's.   Diet: Regular diet, strict I&O's, good intake this shift   Bowel status: LBM 11/1  : Voiding spontaneously   Skin: Bruising to bilateral forearms   Pain: Headache pain managed with PRN tylenol and Benadryl   Activity: SBA   Plan/Updates this shift: Chest x-ray done. Pt refusing heparin injections, team aware. Intermittent paranoia/suspicions towards staff members, is redirectable. Continue to monitor and follow POC.

## 2023-11-02 NOTE — PLAN OF CARE
Goal Outcome Evaluation:      Plan of Care Reviewed With: patient    Overall Patient Progress: improvingOverall Patient Progress: improving       Status: Admitted for pituitary lesion. PMHx pf schizophrenia, HTN, asthma and pituitary mass.   Vitals: VSS exc, HTN within MD parameter.   Neuros: AOX4. Forgetful. Denies N/T. R eye blind. L eye blurry version and field cut. Hx of auditory hallucination.  5/5 strengthen t/o. Generalized weakness.   IV: R PIV SL   Labs/Electrolytes: GB checks.   Resp/trach: HR will drop to low 40`s when deep @sleep but came back right up, Denies SOB. PRN albuterol given. On 2L oxymask overnight for occasional MAXX.  Pt refused CPAP.   Diet: Regular  Bowel status: LBM 11/1  : VDSP   Skin: Bilateral bruising to forearms  Pain: denied.   Activity: SBA/GB.  Social: No visitor or phone call.   Plan: Pending surgical plan. Continue monitor and follow POC.   Shift Updates: Patient slept more this shift. Patient requested for a bath this morning--RN concerned since patient had SOB after using 30mins of shower the night before. Teeth brushed and face washed instead. Patient wants to get a shower in the AM. Patient refused heparin injection--education made and MD aware. Continue to monitor.

## 2023-11-02 NOTE — PLAN OF CARE
"Status: Admitted for pituitary lesion. PMHx pf schizophrenia, HTN, asthma and pituitary mass   Vitals: HTN within parameters.   Neuros: AOx4. Forgetful. Blind R eye, blurry vision in L eye.   IV: refusing to have IV replaced.   Labs/Electrolytes: refused lab draw.   Resp/trach: Refusing CPAP, on 2-4L oxymask when sleeping for periods of apnea and desatting.   Diet: regular, strict I&O.   Bowel status: BS+ BM 11/1   : Voids without difficulty   Skin: blister on L hand.   Pain: headache partially managed w/ tylenol   Activity: SBA   Plan: Continue with POC.     Updates this shift: Pt paranoid/suspicious of staff. Showered for 2 hours today- Writer and other staff asked pt to finish shower to participate in her cares but pt would not, making med administrations and assessments late. Pt sent lab away 3x today, asking to \"come back in an hour or two\" and then refused both lab and new IV. Also refused lasix, MDs notified and continues to refuse heparin and was only willing to ambulate if she could go to the gift shop. Pt reported a new blister on her L hand and believes it is because of staff assessments that require her to touch someone else's hands. Refused to participate in full neuro assessment because of this and requested a dermatology consult. MDs notified throughout the day and provider notifications documented.   "

## 2023-11-03 NOTE — PLAN OF CARE
Status: Admitted for pituitary lesion. PMHx pf schizophrenia, HTN, asthma and pituitary mass.   Vitals: VSS exc, HTN within MD parameter. Continue pulse ox.   Neuros: AOX4. Forgetful. Denies N/T. R eye blind. L eye blurry version and field cut. Hx of auditory hallucination.  5/5 strengthen t/o. Generalized weakness.   IV: Refusing to have IV replaced. MD paged.   Labs/Electrolytes: GB checks. Redraw tmr am.   Resp/trach: Denies SOB. PRN albuterol given. On 2-3 L oxymask overnight for occasional MAXX. Pt refused CPAP.   Diet: Regular. Strict I &O.   Bowel status: BM this shift. Scheduled bowel meds given. BS+.  : VDSP.   Skin: Blister on L hand.   Pain: Denies. Taken tylenol and Benadryl for comfort.   Activity: SBA/GB. Refused walk. Steady on feet. BA/CA on at all time.   Social: No visitor or phone call.   Plan: Pending surgical plan. Continue monitor and follow POC.   Update: Pt refused bedtime BG check.

## 2023-11-03 NOTE — PROGRESS NOTES
[facility]    Medicine Progress Note - Hospitalist Service, GOLD TEAM 8    Date of Admission:  10/21/2023    Assessment & Plan   Mirian Cunningham is a 43 year old female with a past medical history significant for paranoid schizophrenia, housing insecurity, asthma, HTN, and known large sellar/suprasellar mass causing mass effect. She is currently admitted to the neurosurgery service for planning for possible pituitary adenoma resection. She was admitted to the ICU due to acute hypoxic respiratory failure in the setting of flash pulmonary edema and aspiration pneumonia. Internal medicine was consulted for pre-operative risk assessment for pituitary adenoma resection and medical co-management to optimize respiratory status.    Acute hypoxic respiratory failure secondary to pulmonary edema, improving  Recent history of aspiration pneumonia  Intermittent oxygen desaturations while sleeping is likely due to untreated obstructive sleep apnea. During waking hours, high suspicion for obesity hypoventilation syndrome contributing to intermittent desaturations. BMI 39, mild pulmonary HTN on TTE. No signs of acute asthma exacerbation on exam. Patient does have signs of pulmonary edema and atelectasis on most recent CXR, recommend trialing diuresis, although patient is refusing at this time. Would continue with pulmonary clearance therapies due to history of asthma and recent ICU stay. No clinical signs of active infection at this time. She recently completed a 5-day course of Zosyn for treatment of aspiration pneumonia (10/27-11/1).  -Recommend pulmonary toilet and RT input regarding whether mucolytic and cough assist may be helpful   -Incentive spirometry Q1H while awake  -Continue Advair BID  -Continue duonebs 4 times daily while awake  -Start guaifenesin 600 mg BID PRN for cough symptoms  -Still recommend trial of furosemide 40 mg once daily to assess for improvement in oxygen  "needs given recent CXR and ICU stay, although if patient refuses, may be able to continue with pulmonary clearance therapies alone. Discussed refusal of medication with KRYSTAL Rush, who will talk to patient about taking medications.  -Check BMP and Mg daily while receiving furosemide    MAXX  Previously diagnosed. She does not use CPAP at home.  -Recommend CPAP, start +5 overnight. No previous home settings documented. Patient has been declining use of CPAP during hospitalization.     Pituitary adenoma  Right eye complete blindness  Left eye visual right hemianopia  Pituitary mass was diagnosed on ED visit in 11/2018. Per neurocritical care documentation: She presented to [facility] on 10/6/2023 with multiple complaints including stress, housing insecurity, dizziness, head pressure, and worsening vision in the left eye. She has a history of blindness in the R eye due and now vision in the L eye has been worsening. Management of her pituitary mass has been complicated by her concomitant mental health and social concerns.\" MRI with large multilobulated pituitary mass/adenoma with mass effect and encasement of the cavernous structures including the cavernous internal carotid arteries, M1 segment of the right MCA, and A1 and proximal A2 segments of both anterior cerebral arteries.  -Neurosurgery team is primary  -Plan for neurosurgery pending, possibly next week 11/6-11/10   -Neurochecks and hemodynamic parameters per neurosurgery   -Regarding operative risk, please see original medicine consult note dated 10/29  -Revised cardiac index risk score = 1 (pulmonary edema)  -ARISCAT score for pulmonary complications remains high in the setting of intermittent oxygen requirements, concern for aspiration and treatment for respiratory infection and duration of procedure. Would recommend close postoperative observation in the ICU for least 6 to 12 hours if proceed with surgery.      Septic shock, likely secondary to " "aspiration pneumonia, resolved   Likely distributive in setting of above. TTE with EF of 60-65% making cardiogenic unlikely. Cortisol appropriate.     Schizophrenia  Delusional disorder   Homelessness   No PTA medications. Psychiatry consulted on admission who did not feel presentation justified declaration of psychiatric emergency but agreed she lacks decision making capacity and treatment plan should be referred to designated decision maker. Ethics consulted and the patient's godmother, Adri, has been identified as decision maker. See Ethics note from 11/1.  -Mirian lacks capacity to consent to or refuse medical interventions; defer decision making to Adri.   -Continue Seroquel 25 mg BID PRN  -Consider prn haldol per psychiatry recommendations if ongoing issues with agitation   -Low threshold to ask psychiatry to reassess if concern for worsening symptoms or delusions      History of HTN  No PTA medications. Noted uptrending BP on 10/30. Neurosurgery goal of SBP <160.   -PRN antihypertensives per neurosurgery to maintain goal  -If need for frequent IV prns, could consider additional of PO hydralazine (short acting) in the immediate perioperative setting.   -Avoid initiation of ACEi/ARB in perioperative setting. If looking for longer acting oral antihypertensive, could consider amlodipine.          Diet: Regular Diet Adult  Room Service  Snacks/Supplements Adult: [employer] Standard Oral Supplement; Between Meals    DVT Prophylaxis: Enoxaparin (Lovenox) SQ  Galeas Catheter: Not present  Lines: None     Cardiac Monitoring: None  Code Status: Full Code      Clinically Significant Risk Factors                         # Obesity: Estimated body mass index is 39.49 kg/m  as calculated from the following:    Height as of this encounter: 1.549 m (5' 1\").    Weight as of this encounter: 94.8 kg (208 lb 15.9 oz).      # Financial/Environmental Concerns: insurance, none;unemployed;unable to afford rent/mortgage;unable to " afford medication(s);unable to afford food         Disposition Plan           Johnathan Barrientos MD  Hospitalist Service, GOLD TEAM 8  [facility]  Securely message with [employer] [place] (more info)  Text page via [employer] Paging/Directory   See signed in provider for up to date coverage information  ______________________________________________________________________    Interval History   Patient refusing IV or PO lasix yesterday. Required 3L oxymask/NC overnight.    Physical Exam   Vital Signs: Temp: 98.6  F (37  C) Temp src: Axillary BP: (!) 143/103 Pulse: 80   Resp: 16 SpO2: 100 % O2 Device: None (Room air) Oxygen Delivery: 3 LPM  Weight: 208 lbs 15.94 oz    General: appears comfortable, in no acute distress. Snoring while sleeping, awakens easily.   HEENT: anicteric sclera, moist mucous membranes  Cardiovascular: regular rate and rhythm, no murmurs, 2+ distal pulses equal bilaterally  Respiratory: no increased work of breathing, no rhonchi or crackles, no wheezing noted on exam today  Abdomen: soft, non-distended, non-tender throughout  Extremities: No appreciable edema  Skin: no concerning rashes or lesions  Neuro: A&O x3, responds appropriately to exam, flight of thoughts/speech, somewhat anxious    Medical Decision Making       45 MINUTES SPENT BY ME on the date of service doing chart review, history, exam, documentation & further activities per the note.      Data         Imaging results reviewed over the past 24 hrs:   No results found for this or any previous visit (from the past 24 hour(s)).

## 2023-11-03 NOTE — PROGRESS NOTES
[facility], [place]   11/03/2023  Neurosurgery Progress Note:    Interval History: Declining to ambulate and refusing labs     Assessment:  Mirian Cunningham is a 43 year old female with complex psychosocial situation including paranoid schizophrenia and lack of active guardianship transferred to [facility] with known large sellar/suprasellar mass. Godmother Adri is appointed as the medical decision maker. No other family members available or willing to participate in her care.  MRI with large multilobulated pituitary mass/adenoma with mass effect and encasement of the cavernous structures including the cavernous internal carotid arteries, M1 segment of the right MCA, and A1 and proximal A2 segments of both anterior cerebral arteries.     Plan:  Q4h neuro exams   Pain control  Seroquel PRN  Maintain SBP < 160  Regular diet  Normonatremia   Continue to monitor for fevers and/or signs of infection  DVT: SCDs and lovenox   Ongoing OR planning for combined EEA and transcranial approaches for tumor resection   -----------------------------------  Fernando Welch MD, PGY-1  Department of Neurosurgery  Pager: [phone]    Please contact neurosurgery resident on call with questions.    Dial * * *[phone], enter [phone] when prompted.   -----------------------------------  Objective:   Temp:  [97.9  F (36.6  C)-98.6  F (37  C)] 98.6  F (37  C)  Pulse:  [80-91] 80  Resp:  [16-18] 16  BP: (137-151)/() 143/103  SpO2:  [96 %-100 %] 100 %  I/O last 3 completed shifts:  In: 2154 [P.O.:2154]  Out: 1900 [Urine:1900]    NEUROLOGIC:  Gen: Appears comfortable, NAD  Resp: breathing comfortably on 3L Oxymask  Neurologic:  -- Awake; Alert; oriented x 3  -- Follows commands intermittently  -- Speech fluent, spontaneous. No aphasia or dysarthria.  -- no gaze preference. No apparent hemineglect.  Cranial Nerves:  -- R eye blindness; L eye temporal field cut. Left nasal superio qudrant 20/30. R RAPD.  -- gaze is  intermittently dysconjugate w R exotropia   -- face symmetrical, tongue midline  -- hearing grossly intact bilat  -- Trapezii 5/5 strength bilat symmetric     Motor:  Normal bulk / tone; no tremor, rigidity, or bradykinesia.  No muscle wasting or fasciculations       Delt Bi Tri Hand Flexion/  Extension Iliopsoas Quadriceps Hamstrings Tibialis Anterior Gastroc     C5 C6 C7 C8/T1 L2 L3 L4-S1 L4 S1   R 5 5 5 5 5 5 5 5 5   L 5 5 5 5 5 5 5 5 5      Sensory:  intact to LT x 4 extremities      Reflexes: no hyperreflexia, no Hoffmans, mute Babinski bilaterally      LABS:  Recent Labs   Lab 11/02/23  1618 11/01/23  2153 11/01/23  1632 11/01/23  1013 10/31/23  1552 10/31/23  0951 10/30/23  0848 10/29/23  1021   NA  --   --   --   --   --  144 144 142   POTASSIUM  --   --   --  3.9  --  3.5 3.6 3.6   CHLORIDE  --   --   --   --   --  107 106 105   CO2  --   --   --   --   --  26 27 27   ANIONGAP  --   --   --   --   --  11 11 10   * 134* 98  --    < > 142* 141* 140*   BUN  --   --   --   --   --  8.6 8.1 9.3   CR  --   --   --   --   --  0.92 0.80 0.82   FADUMO  --   --   --   --   --  9.2 9.0 9.0    < > = values in this interval not displayed.     Recent Labs   Lab 11/01/23  1013   WBC 6.8   RBC 3.81   HGB 11.1*   HCT 36.1   MCV 95   MCH 29.1   MCHC 30.7*   RDW 13.6        IMAGING:  No results found for this or any previous visit (from the past 24 hour(s)).

## 2023-11-03 NOTE — PLAN OF CARE
Goal Outcome Evaluation:      Plan of Care Reviewed With: patient    Overall Patient Progress: improvingOverall Patient Progress: improving    Status: Admitted for pituitary lesion. PMHx pf schizophrenia, HTN, asthma and pituitary mass.   Vitals: VSS exc HTN within MD parameter SBP <160. On 2L NC   Neuros: AOX4. Forgetful at times. Denies N/T. R eye blind. L eye blurry version and field cut. 5/5 strengths to BLE with generalized weakness. Pt refuses BUE assessments  IV: NO PIV. Per report, team informed  Labs/Electrolytes: pt stated refusal of labs draws due to blister on hands and bruising   Resp/trach: Denies SOB. PRN neb given   Diet: Regular. Strict I &O.   Bowel status: LBM 11/2/2023  : Voids spont with difficulty to BR  Skin: Blister on L hand.   Pain: Denies. Benadryl given to help promote rest   Activity: SBA/GB. Refused GB, steady on feet  Plan: Continue to monitor and follow POC.

## 2023-11-03 NOTE — PLAN OF CARE
"Goal Outcome Evaluation:      Plan of Care Reviewed With: patient          Outcome Evaluation: Patient continues to refuse various medications and assessments.    Status: Admitted for pituitary lesion. PMHx pf schizophrenia, HTN, asthma and pituitary mass.   Vitals: VSS ex HTN within MD parameter SBP <160. RA  Neuros: AOX4. Forgetful at times. Denies N/T. R eye blind. L eye blurry version and field cut per previous report. Refused formal neuro assessment, ambulates without difficulty, moving all extremities against gravity.   IV: NO PIV. Per report, team informed  Labs/Electrolytes: pt stated refusal of labs draws due to blister on hands and bruising   Resp/trach: Denies SOB. PRN neb given   Diet: Regular. Strict I &O.   Bowel status: LBM 11/2/2023  : Voids spont with difficulty to BR  Skin: Blister on L hand.   Pain: Requested PRN Tylenol and Benadryl, but then declined due to wanting to wait until \"later.\"  Activity: SBA/GB. Refused GB, steady on feet  Plan: Patient frequently refusing labs, assessments, vitals, SB, GB, O2, pulse ox, etc. Took a 2 hour shower once more today. Continue to monitor and follow POC.   "

## 2023-11-04 NOTE — PROGRESS NOTES
[facility]    Medicine Progress Note - Hospitalist Service, GOLD TEAM 8    Date of Admission:  10/21/2023    Assessment & Plan   Mirian Cunningham is a 43 year old female with a past medical history significant for paranoid schizophrenia, housing insecurity, asthma, HTN, and known large sellar/suprasellar mass causing mass effect. She is currently admitted to the neurosurgery service for planning for possible pituitary adenoma resection. She was admitted to the ICU due to acute hypoxic respiratory failure in the setting of flash pulmonary edema and aspiration pneumonia. Internal medicine was consulted for pre-operative risk assessment for pituitary adenoma resection and medical co-management to optimize respiratory status.    Today's changes:  -Patient still refusing furosemide. Ok to discontinue  -Decrease duonebs to 4 times daily as needed  -Consider starting amlodipine 5 mg daily if persistently > 130/90  -Medicine team will sign off at this time. Feel free to re-consult if new concerns or closer to surgery.    Acute hypoxic respiratory failure secondary to pulmonary edema, improving  Recent history of aspiration pneumonia  Intermittent oxygen desaturations while sleeping is likely due to untreated obstructive sleep apnea. During waking hours, high suspicion for obesity hypoventilation syndrome contributing to intermittent desaturations. BMI 39, mild pulmonary HTN on TTE. No signs of acute asthma exacerbation on exam. Patient does have signs of pulmonary edema and atelectasis on most recent CXR, recommend trialing diuresis, although patient is refusing at this time. Would continue with pulmonary clearance therapies due to history of asthma and recent ICU stay. No clinical signs of active infection at this time. She recently completed a 5-day course of Zosyn for treatment of aspiration pneumonia (10/27-11/1).  -Recommend pulmonary toilet and RT input regarding whether  "mucolytic and cough assist may be helpful   -Incentive spirometry Q2H while awake  -Continue Advair BID  -Decrease duonebs to 4 times daily as needed  -Consider guaifenesin 600 mg BID PRN if persistent cough  -Patient has been refusing furosemide 40 mg. Respiratory status has continued to improve without it. Ok to discontinue furosemide.    MAXX  Previously diagnosed. She does not use CPAP at home.  -Recommend CPAP, start +5 overnight. No previous home settings documented. Patient has been declining use of CPAP during hospitalization.     Pituitary adenoma  Right eye complete blindness  Left eye visual right hemianopia  Pituitary mass was diagnosed on ED visit in 11/2018. Per neurocritical care documentation: She presented to [facility] on [facility]/2023 with multiple complaints including stress, housing insecurity, dizziness, head pressure, and worsening vision in the left eye. She has a history of blindness in the R eye due and now vision in the L eye has been worsening. Management of her pituitary mass has been complicated by her concomitant mental health and social concerns.\" MRI with large multilobulated pituitary mass/adenoma with mass effect and encasement of the cavernous structures including the cavernous internal carotid arteries, M1 segment of the right MCA, and A1 and proximal A2 segments of both anterior cerebral arteries.  -Neurosurgery team is primary  -Plan for neurosurgery pending, possibly next week 11/6-11/10   -Neurochecks and hemodynamic parameters per neurosurgery   -Regarding operative risk, please see original medicine consult note dated 10/29  -Revised cardiac index risk score = 1 (pulmonary edema)  -ARISCAT score for pulmonary complications remains high in the setting of intermittent oxygen requirements, concern for aspiration and treatment for respiratory infection and duration of procedure. Would recommend close postoperative observation in the ICU for least 6 to 12 hours if proceed " "with surgery.      Septic shock, likely secondary to aspiration pneumonia, resolved   Likely distributive in setting of above. TTE with EF of 60-65% making cardiogenic unlikely. Cortisol appropriate.     Schizophrenia  Delusional disorder   Homelessness   No PTA medications. Psychiatry consulted on admission who did not feel presentation justified declaration of psychiatric emergency but agreed she lacks decision making capacity and treatment plan should be referred to designated decision maker. Ethics consulted and the patient's godmother, Adri, has been identified as decision maker. See Ethics note from 11/1.  -Mirian lacks capacity to consent to or refuse medical interventions; defer decision making to Adri.   -Continue Seroquel 25 mg BID PRN  -Consider prn haldol per psychiatry recommendations if ongoing issues with agitation   -Low threshold to ask psychiatry to reassess if concern for worsening symptoms or delusions      History of HTN  No PTA medications. Intermittently high. Neurosurgery goal of SBP <160.   -Consider starting amlodipine 5 mg daily if persistently > 130/90  -If need for frequent IV prns, could consider additional of PO hydralazine (short acting) in the immediate perioperative setting  -Avoid initiation of ACEi/ARB in perioperative setting. If looking for longer acting oral antihypertensive, could consider amlodipine.          Diet: Regular Diet Adult  Room Service  Snacks/Supplements Adult: [employer] Standard Oral Supplement; Between Meals    DVT Prophylaxis: Enoxaparin (Lovenox) SQ  Galeas Catheter: Not present  Lines: None     Cardiac Monitoring: None  Code Status: Full Code      Clinically Significant Risk Factors                         # Obesity: Estimated body mass index is 39.49 kg/m  as calculated from the following:    Height as of this encounter: 1.549 m (5' 1\").    Weight as of this encounter: 94.8 kg (208 lb 15.9 oz).      # Financial/Environmental Concerns: insurance, " none;unemployed;unable to afford rent/mortgage;unable to afford medication(s);unable to afford food         Disposition Plan           Johnathan Barrientos MD  Hospitalist Service, GOLD TEAM 8  [facility]  Securely message with [employer] (more info)  Text page via [employer] Paging/Directory   See signed in provider for up to date coverage information  ______________________________________________________________________    Interval History   Per nursing staff, patient has been refusing oral medications other than multivitamin. Patient subjectively feels cough and breathing continues to improve. She subjectively feels better following neb treatment. Remained on room air overnight. Surgical plan per neurosurgery pending.    Physical Exam   Vital Signs: Temp: 98.6  F (37  C) Temp src: Axillary BP: 122/68 Pulse: 66   Resp: 16 SpO2: 98 % O2 Device: None (Room air)    Weight: 208 lbs 15.94 oz    General: appears comfortable, in no acute distress  HEENT: anicteric sclera, moist mucous membranes  Cardiovascular: regular rate and rhythm, no murmurs, 2+ distal pulses equal bilaterally  Respiratory: no increased work of breathing, no rhonchi or crackles, no wheezing noted on exam today  Abdomen: soft, non-distended, non-tender throughout  Extremities: No appreciable edema  Skin: small 1 cm blister at left thumb with interval improvement in size  Neuro: A&O x3, responds appropriately to exam, flight of thoughts/speech, somewhat anxious       Medical Decision Making       40 MINUTES SPENT BY ME on the date of service doing chart review, history, exam, documentation & further activities per the note.      Data     I have personally reviewed the following data over the past 24 hrs:    N/A  \   N/A   / N/A     N/A N/A N/A /  84   3.8 N/A N/A \       Imaging results reviewed over the past 24 hrs:   No results found for this or any previous visit (from the past 24 hour(s)).

## 2023-11-04 NOTE — PLAN OF CARE
Goal Outcome Evaluation:      Plan of Care Reviewed With: patient    Overall Patient Progress: no changeOverall Patient Progress: no change    Outcome Evaluation: Refusing to wear gait belt.  Refusing some assessments.    Status: Admitted for pituitary lesion. PMHx pf schizophrenia, HTN, asthma and pituitary mass.  Vitals: VSS ex HTN within MD parameters SBP<160. On RA  Neuros: A&Ox4. Forgetful at times.  Uncooperative at times.  R eye blind.  L eye blurry vision and field cut. Refusing assessments at times.  MOA  IV: OK for no PIV, MD aware  Resp/trach: scheduled nebs given  Diet: regular.  Strict I&O  Bowel status: LBM 11/2/23  : voiding spont, using bathroom  Skin: blister L hand.  Patient wearing medical gloves, d/t having blister  Pain: Tylenol given for headache  Activity: SBA/GB.  Refusing to wear gait belt.  Does not call, or use call button appropriately

## 2023-11-04 NOTE — PROGRESS NOTES
[facility], [place]   11/04/2023  Neurosurgery Progress Note:    Interval History: Refusing lovenox    Assessment:  Mirian Cunningham is a 43 year old female with complex psychosocial situation including paranoid schizophrenia and lack of active guardianship transferred to [facility] with known large sellar/suprasellar mass. Godmother Adri is appointed as the medical decision maker. No other family members available or willing to participate in her care.  MRI with large multilobulated pituitary mass/adenoma with mass effect and encasement of the cavernous structures including the cavernous internal carotid arteries, M1 segment of the right MCA, and A1 and proximal A2 segments of both anterior cerebral arteries.     Plan:  Q4h neuro exams   Pain control  Seroquel PRN  Maintain SBP < 160  Regular diet  Normonatremia   Continue to monitor for fevers and/or signs of infection  DVT: SCDs and lovenox   Ongoing OR planning for combined EEA and transcranial approaches for tumor resection   -----------------------------------  Kelsey Briceno MD  Neurosurgery PGY-3    Please contact neurosurgery resident on call with questions.    Dial * * *[phone], enter [phone] when prompted.   -----------------------------------  Objective:   Temp:  [97.8  F (36.6  C)-99.5  F (37.5  C)] 98.6  F (37  C)  Pulse:  [71-87] 80  Resp:  [16-26] 18  BP: (122-155)/() 122/68  SpO2:  [98 %-100 %] 98 %  I/O last 3 completed shifts:  In: 950 [P.O.:950]  Out: 875 [Urine:875]    NEUROLOGIC:  Gen: Appears comfortable, NAD  Resp: breathing comfortably on 3L Oxymask  Neurologic:  -- Awake; Alert; oriented x 3  -- Follows commands intermittently  -- Speech fluent, spontaneous. No aphasia or dysarthria.  -- no gaze preference. No apparent hemineglect.  Cranial Nerves:  -- R eye blindness; L eye temporal field cut. Left nasal superio qudrant 20/30. R RAPD.  -- gaze is intermittently dysconjugate w R exotropia   -- face symmetrical, tongue  midline  -- hearing grossly intact bilat  -- Trapezii 5/5 strength bilat symmetric     Motor:  Normal bulk / tone; no tremor, rigidity, or bradykinesia.  No muscle wasting or fasciculations       Delt Bi Tri Hand Flexion/  Extension Iliopsoas Quadriceps Hamstrings Tibialis Anterior Gastroc     C5 C6 C7 C8/T1 L2 L3 L4-S1 L4 S1   R 5 5 5 5 5 5 5 5 5   L 5 5 5 5 5 5 5 5 5      Sensory:  intact to LT x 4 extremities      Reflexes: no hyperreflexia, no Hoffmans, mute Babinski bilaterally      LABS:  Recent Labs   Lab 11/04/23  1026 11/04/23  0805 11/02/23  1618 11/01/23  2153 11/01/23  1632 11/01/23  1013 10/31/23  1552 10/31/23  0951 10/30/23  0848 10/29/23  1021   NA  --   --   --   --   --   --   --  144 144 142   POTASSIUM 3.8  --   --   --   --  3.9  --  3.5 3.6 3.6   CHLORIDE  --   --   --   --   --   --   --  107 106 105   CO2  --   --   --   --   --   --   --  26 27 27   ANIONGAP  --   --   --   --   --   --   --  11 11 10   GLC  --  84 100* 134*   < >  --    < > 142* 141* 140*   BUN  --   --   --   --   --   --   --  8.6 8.1 9.3   CR  --   --   --   --   --   --   --  0.92 0.80 0.82   FADUMO  --   --   --   --   --   --   --  9.2 9.0 9.0    < > = values in this interval not displayed.     Recent Labs   Lab 11/01/23  1013   WBC 6.8   RBC 3.81   HGB 11.1*   HCT 36.1   MCV 95   MCH 29.1   MCHC 30.7*   RDW 13.6        IMAGING:  No results found for this or any previous visit (from the past 24 hour(s)).

## 2023-11-04 NOTE — PLAN OF CARE
Status: Admitted for pituitary lesion. PMHx pf schizophrenia, HTN, asthma and pituitary mass.   Vitals: VSS exc, HTN within MD parameter. Continue pulse ox.   Neuros: AOX4. Forgetful. Uncooperative with assessment that require touching patient.  Denies N/T. R eye blind. L eye blurry version and field cut. Hx of auditory hallucination.  5/5 strengthen t/o. Generalized weakness.   IV: Ok for no PIV, team aware.   Labs/Electrolytes: Refused GB check. Lab WNL and ordered to redraw tmr am.   Resp/trach: Denies SOB. PRN albuterol given. On 2-3 L oxymask overnight for occasional MAXX.   Diet: Regular and good intake.  Strict I &O.   Bowel status: BM this shift and passing gas. BS+.  : VDSP, to bathroom.   Skin: Blister on L hand.   Pain: Reported HA. Taken tylenol and Benadryl X1.   Activity: SBA/GB. Refused walk. Steady on feet. BA/CA on at all time. Showed for hour and half. Refused activity/walk.   Social: No visitor or phone call.   Plan: Pending surgical plan. Continue monitor and follow POC.   Update: Refused LOVENOX and Lasix, education provident. DARLINE paged to NSG team.

## 2023-11-04 NOTE — PLAN OF CARE
Goal Outcome Evaluation:      Plan of Care Reviewed With: patient    Overall Patient Progress: no changeOverall Patient Progress: no change    Status: Admitted for pituitary lesion. PMHx pf schizophrenia, HTN, asthma and pituitary mass.  Vitals: VSS ex HTN within MD parameters SBP<160. On RA  Neuros: A&Ox4. Forgetful at times.  Uncooperative at times.  R eye blind.  L eye blurry vision and field cut. Refusing assessments at times.  MOA  IV: OK for no PIV, MD aware  Resp/trach: scheduled nebs given  Diet: regular.  Strict I&O  Bowel status: LBM 11/2/23  : voiding spont, using bathroom  Skin: blister L hand.  Patient wearing medical gloves, d/t having blister  Pain: Tylenol given for headache  Activity: SBA/GB.  Refusing to wear gait belt.  Does not call, or use call button appropriately

## 2023-11-05 NOTE — PROGRESS NOTES
[facility], [place]   11/05/2023  Neurosurgery Progress Note:    Interval History: Refusing lovenox, walked 2-3 times in the hallway today     Assessment:  Mirian Cunningham is a 43 year old female with complex psychosocial situation including paranoid schizophrenia and lack of active guardianship transferred to [facility] with known large sellar/suprasellar mass. Godmother Adri is appointed as the medical decision maker. No other family members available or willing to participate in her care.  MRI with large multilobulated pituitary mass/adenoma with mass effect and encasement of the cavernous structures including the cavernous internal carotid arteries, M1 segment of the right MCA, and A1 and proximal A2 segments of both anterior cerebral arteries.     Plan:  Q4h neuro exams   Pain control  Seroquel PRN  Maintain SBP < 160  Regular diet  Normonatremia   Continue to monitor for fevers and/or signs of infection  DVT: SCDs and lovenox   Ongoing OR planning for combined EEA and transcranial approaches for tumor resection   -----------------------------------  Fernando Welch MD, PGY-1  Department of Neurosurgery  Pager: [phone]    Please contact neurosurgery resident on call with questions.    Dial * * *[phone], enter [phone] when prompted.   -----------------------------------  Objective:   Temp:  [97.6  F (36.4  C)-98.6  F (37  C)] 98.3  F (36.8  C)  Pulse:  [] 108  Resp:  [16] 16  BP: (127-155)/() 127/82  SpO2:  [90 %-100 %] 90 %  I/O last 3 completed shifts:  In: 1054 [P.O.:1054]  Out: 1375 [Urine:1375]    NEUROLOGIC:  Gen: Appears comfortable, NAD  Resp: breathing comfortably on 3L Oxymask  Neurologic:  -- Awake; Alert; oriented x 3  -- Follows commands intermittently  -- Speech fluent, spontaneous. No aphasia or dysarthria.  -- no gaze preference. No apparent hemineglect.  Cranial Nerves:  -- R eye blindness; L eye temporal field cut. Left nasal superio qudrant 20/30. R RAPD.  --  gaze is intermittently dysconjugate w R exotropia   -- face symmetrical, tongue midline  -- hearing grossly intact bilat  -- Trapezii 5/5 strength bilat symmetric     Motor:  Normal bulk / tone; no tremor, rigidity, or bradykinesia.  No muscle wasting or fasciculations       Delt Bi Tri Hand Flexion/  Extension Iliopsoas Quadriceps Hamstrings Tibialis Anterior Gastroc     C5 C6 C7 C8/T1 L2 L3 L4-S1 L4 S1   R 5 5 5 5 5 5 5 5 5   L 5 5 5 5 5 5 5 5 5      Sensory:  intact to LT x 4 extremities      Reflexes: no hyperreflexia, no Hoffmans, mute Babinski bilaterally      LABS:  Recent Labs   Lab 11/05/23  1301 11/04/23  1026 11/04/23  0805 11/02/23  1618 11/01/23  1632 11/01/23  1013 10/31/23  1552 10/31/23  0951 10/30/23  0848   *  --   --   --   --   --   --  144 144   POTASSIUM 4.0 3.8  --   --   --  3.9  --  3.5 3.6   CHLORIDE 110*  --   --   --   --   --   --  107 106   CO2 26  --   --   --   --   --   --  26 27   ANIONGAP 11  --   --   --   --   --   --  11 11   *  --  84 100*   < >  --    < > 142* 141*   BUN 10.7  --   --   --   --   --   --  8.6 8.1   CR 0.84  --   --   --   --   --   --  0.92 0.80   FADUMO 9.2  --   --   --   --   --   --  9.2 9.0    < > = values in this interval not displayed.     Recent Labs   Lab 11/05/23  1301   WBC 5.5   RBC 3.68*   HGB 10.8*   HCT 35.7   MCV 97   MCH 29.3   MCHC 30.3*   RDW 14.5        IMAGING:  No results found for this or any previous visit (from the past 24 hour(s)).

## 2023-11-05 NOTE — PLAN OF CARE
Status: Admitted for pituitary lesion. PMHx pf schizophrenia, HTN, asthma and pituitary mass.   Vitals: VSS ex HTN within parameters.  Neuros: Forgetful. R eye blind. L eye blurry version and field cut. Hx of auditory hallucination.  IV: Ok for no IV.  Labs/Electrolytes: Refused GB check, lab draws.  Resp/trach:  PRN albuterol given. On 3 L oxymask overnight for occasional MAXX, cont. SAT monitoring on.   Diet: Regular and good intake.  Strict I &O.   Bowel status: Last BM 11/4  : VDSP, to bathroom.   Skin: Blister on L hand.   Pain: Reported HA. Taken tylenol and Benadryl X1.   Activity: SBA/GB..   Plan: Pending surgical plan. Continue monitor and follow POC.

## 2023-11-06 NOTE — PLAN OF CARE
Status: Admitted for pituitary lesion. PMHx pf schizophrenia, HTN, asthma and pituitary mass.   Vitals: VSS ex HTN within parameters.  Neuros: Forgetful. R eye blind. L eye blurry version and field cut. Hx of auditory hallucination.  IV: Ok for no IV.  Labs/Electrolytes: Refused GB check, lab draws.  Resp/trach:  PRN albuterol given. On 3 L oxymask overnight for occasional MAXX, cont. SAT monitoring on.   Diet: Regular and good intake. Strict I &O.   Bowel status: Last BM 11/4  : VDSP, to bathroom.   Skin: Generalized bruising.  Pain: Tylenol for generalized pain, benadryl for itching given.   Activity: Up SBA.  Plan: Pending surgical plan. Continue monitor.

## 2023-11-06 NOTE — PLAN OF CARE
Status: Pt admitted 10/21 for known large sellar/suprasellar mass   PMHx: schizophrenia, HTN, asthma and pituitary mass   Vitals: VSS on RA  Neuros: A&O x4. Forgetful. R eye blind. L eye blurry version and field cut. Hx of auditory hallucination.  IV: Ok for no IV  Labs/Electrolytes:   Resp/trach: Has scheduled nebs and PRN inhalers. Denies shortness of breath  Diet: Regular, good appetite. Strict I&Os   Bowel status: Last BM 11/04  : VDSP  Skin: Generalized bruising  Pain: PRN Tylenol for generalized pain and PRN Benadryl for itching given  Activity: SBA, GB. Refuses GB. Up on chair, refuses ambulation in [place] at times. Walker for ambulation in hallway  Plan: Pending surgical plan. Continue monitor

## 2023-11-06 NOTE — PLAN OF CARE
[id number]  Status: Admitted for pituitary lesion. PMHx pf schizophrenia, HTN, asthma and pituitary mass.   Vitals: VSS exc, HTN within MD parameter.   Neuros: AOX4. Forgetful. Uncooperative with assessment that require touching patient.  Denies N/T. R eye blind. L eye blurry version and field cut. Hx of auditory hallucination.  5/5 strengthen t/o. Generalized weakness.   IV: Ok for no PIV, team aware.   Labs/Electrolytes: Refused GB . , FYI paged team. Lab redraw tmr am.   Resp/trach: Denies SOB. PRN albuterol given. On 2-3 L oxymask overnight for occasional MAXX.   Diet: Regular and good intake.  Strict I &O.   Bowel status:LBM 11/4. Passing gas. BS+.  : VDSP, to bathroom.   Skin: Blister on L hand.   Pain: Reported HA. Taken tylenol and Benadryl X12  Activity: SBA/GB. Steady on feet. BA/CA on at all time. Refused activity/walk.   Social: No visitor or phone call.   Plan: Pending surgical plan. Continue monitor and follow POC.   Update: Refused LOVENOX and Lasix, education provident. Per pt, MD will discontinue LOVENOX and Lasix.

## 2023-11-06 NOTE — PROGRESS NOTES
[facility], [place]   11/06/2023  Neurosurgery Progress Note:    Interval History: Continuing to intermittently refuse cares, did consent for lab draws this am.     Assessment:  Mirian Cunningham is a 43 year old female with complex psychosocial situation including paranoid schizophrenia and lack of active guardianship transferred to [facility] with known large sellar/suprasellar mass. Godmother Adri is appointed as the medical decision maker. No other family members available or willing to participate in her care.  MRI with large multilobulated pituitary mass/adenoma with mass effect and encasement of the cavernous structures including the cavernous internal carotid arteries, M1 segment of the right MCA, and A1 and proximal A2 segments of both anterior cerebral arteries.     Plan:  Q4h neuro exams   Pain control  Seroquel PRN  Maintain SBP < 160  Regular diet  Normonatremia   Continue to monitor for fevers and/or signs of infection  DVT: SCDs and lovenox, encouraging ambulation   Ongoing OR planning for combined EEA and transcranial approaches for tumor resection  Dispo: Patient should remain on [employer] to facilitate regular rounding from NSGY team until surgery; ok to transfer to med/surg unit if neuro bed is more appropriately utilized by another patient  -----------------------------------  Renetta Goel MD, PhD  PGY-2 Neurosurgery    Please contact neurosurgery resident on call with questions.    Dial * * [phone], enter [phone] when prompted.   -----------------------------------  Objective:   Temp:  [97.6  F (36.4  C)-98.3  F (36.8  C)] 97.9  F (36.6  C)  Pulse:  [] 83  Resp:  [16-20] 20  BP: (112-144)/(63-99) 112/63  SpO2:  [90 %-99 %] 99 %  I/O last 3 completed shifts:  In: 976 [P.O.:976]  Out: 1625 [Urine:1625]    NEUROLOGIC:  Gen: Appears comfortable, NAD  Resp: breathing comfortably on room air  Neurologic:  -- Awake; Alert; oriented x 3  -- Follows commands intermittently  --  Speech fluent, spontaneous. No aphasia or dysarthria.  -- no gaze preference. No apparent hemineglect.  Cranial Nerves:  -- R eye blindness; L eye temporal field cut. Left nasal superio qudrant 20/30. R RAPD.  -- gaze is intermittently dysconjugate w R exotropia   -- face symmetrical, tongue midline  -- hearing grossly intact bilat  -- Trapezii 5/5 strength bilat symmetric     Motor:  Normal bulk / tone; no tremor, rigidity, or bradykinesia.  No muscle wasting or fasciculations       Delt Bi Tri Hand Flexion/  Extension Iliopsoas Quadriceps Hamstrings Tibialis Anterior Gastroc     C5 C6 C7 C8/T1 L2 L3 L4-S1 L4 S1   R 5 5 5 5 5 5 5 5 5   L 5 5 5 5 5 5 5 5 5      Sensory:  intact to LT x 4 extremities      Reflexes: no hyperreflexia, no Hoffmans, mute Babinski bilaterally      LABS:  Recent Labs   Lab 11/05/23  1301 11/04/23  1026 11/04/23  0805 11/02/23  1618 11/01/23  1632 11/01/23  1013 10/31/23  1552 10/31/23  0951   *  --   --   --   --   --   --  144   POTASSIUM 4.0 3.8  --   --   --  3.9  --  3.5   CHLORIDE 110*  --   --   --   --   --   --  107   CO2 26  --   --   --   --   --   --  26   ANIONGAP 11  --   --   --   --   --   --  11   *  --  84 100*   < >  --    < > 142*   BUN 10.7  --   --   --   --   --   --  8.6   CR 0.84  --   --   --   --   --   --  0.92   FADUMO 9.2  --   --   --   --   --   --  9.2    < > = values in this interval not displayed.     Recent Labs   Lab 11/05/23  1301   WBC 5.5   RBC 3.68*   HGB 10.8*   HCT 35.7   MCV 97   MCH 29.3   MCHC 30.3*   RDW 14.5        IMAGING:  No results found for this or any previous visit (from the past 24 hour(s)).

## 2023-11-07 NOTE — PROGRESS NOTES
"[facility], [place]   11/07/2023  Neurosurgery Progress Note:    Interval History: NAEO. Requesting \"shower stuff\" this am.    Assessment:  Mirian Cunningham is a 43 year old female with complex psychosocial situation including paranoid schizophrenia and lack of active guardianship transferred to [facility] with known large sellar/suprasellar mass. Godmother Adri is appointed as the medical decision maker. No other family members available or willing to participate in her care.  MRI with large multilobulated pituitary mass/adenoma with mass effect and encasement of the cavernous structures including the cavernous internal carotid arteries, M1 segment of the right MCA, and A1 and proximal A2 segments of both anterior cerebral arteries.     Plan:  Q4h neuro exams   Pain control  Seroquel PRN  Maintain SBP < 160  Regular diet  Normonatremia   Continue to monitor for fevers and/or signs of infection  DVT: SCDs and lovenox, encouraging ambulation   Ongoing OR planning for combined EEA and transcranial approaches for tumor resection  Dispo: Patient should remain on [employer] to facilitate regular rounding from NSGY team until surgery; ok to transfer to med/surg unit if neuro bed is more appropriately utilized by another patient  -----------------------------------  Renetta Goel MD, PhD  PGY-2 Neurosurgery    Please contact neurosurgery resident on call with questions.    Dial * * [phone], enter [phone] when prompted.   -----------------------------------  Objective:   Temp:  [97.9  F (36.6  C)-98.2  F (36.8  C)] 98  F (36.7  C)  Pulse:  [80-85] 85  Resp:  [16-20] 20  BP: (122-145)/() 145/99  SpO2:  [96 %-100 %] 96 %  I/O last 3 completed shifts:  In: 920 [P.O.:920]  Out: 2275 [Urine:2275]    NEUROLOGIC:  Gen: Appears comfortable, NAD  Resp: breathing comfortably on room air  Neurologic:  -- Awake; Alert; oriented x 3  -- Follows commands intermittently  -- Speech fluent, spontaneous. No aphasia or " dysarthria.  -- no gaze preference. No apparent hemineglect.  Cranial Nerves:  -- R eye blindness; L eye temporal field cut. Left nasal superio qudrant 20/30. R RAPD.  -- gaze is intermittently dysconjugate w R exotropia   -- face symmetrical, tongue midline  -- hearing grossly intact bilat  -- Trapezii 5/5 strength bilat symmetric     Motor:  Normal bulk / tone; no tremor, rigidity, or bradykinesia.  No muscle wasting or fasciculations       Delt Bi Tri Hand Flexion/  Extension Iliopsoas Quadriceps Hamstrings Tibialis Anterior Gastroc     C5 C6 C7 C8/T1 L2 L3 L4-S1 L4 S1   R 5 5 5 5 5 5 5 5 5   L 5 5 5 5 5 5 5 5 5      Sensory:  intact to LT x 4 extremities      Reflexes: no hyperreflexia, no Hoffmans, mute Babinski bilaterally      LABS:  Recent Labs   Lab 11/06/23  2246 11/05/23  1301 11/04/23  1026 11/04/23  0805 11/01/23  1632 11/01/23  1013 10/31/23  1552 10/31/23  0951   NA  --  147*  --   --   --   --   --  144   POTASSIUM  --  4.0 3.8  --   --  3.9  --  3.5   CHLORIDE  --  110*  --   --   --   --   --  107   CO2  --  26  --   --   --   --   --  26   ANIONGAP  --  11  --   --   --   --   --  11   * 131*  --  84   < >  --    < > 142*   BUN  --  10.7  --   --   --   --   --  8.6   CR  --  0.84  --   --   --   --   --  0.92   FADUMO  --  9.2  --   --   --   --   --  9.2    < > = values in this interval not displayed.     Recent Labs   Lab 11/05/23  1301   WBC 5.5   RBC 3.68*   HGB 10.8*   HCT 35.7   MCV 97   MCH 29.3   MCHC 30.3*   RDW 14.5        IMAGING:  No results found for this or any previous visit (from the past 24 hour(s)).

## 2023-11-07 NOTE — PLAN OF CARE
Status: Admitted for pituitary lesion. PMHx pf schizophrenia, HTN, asthma and pituitary mass.   Vitals: VSS ex HTN within parameters.  Neuros: Forgetful. R eye blind. L eye blurry version and field cut. Hx of auditory hallucination.  IV: Ok for no IV.  Labs/Electrolytes: Refused GB check.  Resp/trach:  PRN albuterol given. On 3 L oxymask overnight for occasional MAXX, cont. SAT monitoring on.   Diet: Regular and good intake. Strict I &O.   Bowel status: Last BM 11/4  : VDSP, to bathroom.   Skin: Generalized bruising.  Pain: Tylenol for generalized pain, benadryl for itching given.   Activity: Up SBA.  Plan: Pending surgical plan. Continue monitor.

## 2023-11-07 NOTE — PROGRESS NOTES
"CLINICAL NUTRITION SERVICES - REASSESSMENT NOTE     Nutrition Prescription    RECOMMENDATIONS FOR MDs/PROVIDERS TO ORDER:   None at this time.     Malnutrition Status:   Patient does not meet two of the established criteria necessary for diagnosing malnutrition    Recommendations already ordered by Registered Dietitian (RD):   No changes at this time.   Educated on meal ordering options.     Future/Additional Recommendations:   Monitor weight/intake trends.      EVALUATION OF THE PROGRESS TOWARD GOALS   Diet: Regular  Snacks/Supplements: One time sample of the Carmen Nicholas 1.4 in vanilla sent on 10/31  Intake: Per flowsheets, intake documentation likely missing some meals/snacks, but on average 75%-100% of meals consumed. Per 7 day average, pt is ordering 4400 kcal and 140 g protein per [employer].     NEW FINDINGS   Visited with patient in room. Pt was snoring, but was able to be awoken for visit. Pt stated having a good appetite/intake though stated concern regarding being unable to order certain items. Explained tray standards to patient and that the kitchen is not allowed to send more food than what can fit on tray, encouraged pt to order snacks if still hungry after meal.     Skin: Alex score 20 with nutrition marked as adequate per flowsheets.     LABS   Labs Reviewed   11/01/23 10:13 11/04/23 10:26 11/05/23 13:01   Sodium   147 (H)   Potassium 3.9 3.8 4.0   Creatinine   0.84   GFR Estimate   87   Magnesium 2.0 1.7 1.7   Phosphorus 2.4 (L) 2.8 3.4   WBC 6.8  5.5   Hemoglobin 11.1 (L)  10.8 (L)   RBC Count 3.81  3.68 (L)   MCV 95  97      11/01/23 16:32 11/01/23 21:53 11/02/23 16:18 11/04/23 08:05 11/05/23 13:01 11/06/23 22:46   Glucose     131 (H)    Glucose by meter POCT 98 134 (H) 100 (H) 84  106 (H)     MEDICATIONS   Medications Reviewed  - Lasix ordered, no doses given since 11/2 with comment \"Patient/family refused\"  - Thera-vit-m    ANTHROPOMETRICS   Weight Trends:   Weight up this " admission.    Date/Time Weight Weight Method   11/06/23 95.5 kg (210 lb 8.6 oz) Standing scale   10/30/23 94.8 kg (208 lb 15.9 oz) Bed scale   10/28/23 93.7 kg (206 lb 9.1 oz) Bed scale   10/27/23 94.1 kg (207 lb 7.3 oz) Bed scale   10/22/23 92.9 kg (204 lb 12.9 oz) Standing scale     Wt Readings from Last Encounters:   10/22/23 92.9 kg (204 lb 12.9 oz) - standing scale    10/16/23 - CE 93.1 kg (205 lb 4 oz)    10/11/23 - CE 92.3 kg (203 lb 7.8 oz)    10/08/23 - CE 91.8 kg (202 lb 6.1 oz)    10/07/23 - CE 92.9 kg (204 lb 12.9 oz)    09/29/23 - CE 93 kg (205 lb)    09/13/23 - CE 93.4 kg (206 lb)    07/17/23 - CE 94.3 kg (208 lb)    06/14/23 - CE 94.9 kg (209 lb 3.2 oz)    06/05/23 - CE 93 kg (205 lb)    03/13/23 - CE 95.9 kg (211 lb 6.4 oz)    03/04/23 - CE 93.3 kg (205 lb 11 oz)    02/24/23 - CE 97.5 kg (215 lb)    01/05/23 - CE 96.4 kg (212 lb 8.4 oz)     MALNUTRITION   % Intake: No decreased intake noted  % Weight Loss: None noted  Subcutaneous Fat Loss: None observed  Muscle Loss: None observed  Fluid Accumulation/Edema: None noted  Malnutrition Diagnosis: Patient does not meet two of the established criteria necessary for diagnosing malnutrition    Previous Goals   Patient to consume % of nutritionally adequate meal trays TID, or the equivalent with supplements/snacks.  Evaluation: Met    Previous Nutrition Diagnosis   Predicted inadequate nutrient intake related to potential for decreased appetite if prolonged admission.    Evaluation: No change    CURRENT NUTRITION DIAGNOSIS   Predicted inadequate nutrient intake related to potential for decreased appetite if prolonged admission.        INTERVENTIONS   Implementation   No changes at this time.   Educated on meal ordering options.     Goals   Patient to consume % of nutritionally adequate meal trays TID, or the equivalent with supplements/snacks.    Monitoring/Evaluation   Progress toward goals will be monitored and evaluated per protocol.    Summer  Demond MILES, LD   Available on Vocera and Pager  5A [phone]) + 6A pager [phone]  Weekend pager [phone]

## 2023-11-07 NOTE — PLAN OF CARE
[id number]  Status: Admitted for pituitary lesion. PMHx pf schizophrenia, HTN, asthma and pituitary mass.   Vitals: VSS exc, HTN.   Neuros: AOX4. Forgetful. Uncooperative with assessment that require touching patient.  Denies N/T. R eye blind. L eye blurry version and field cut. Hx of auditory hallucination.  5/5 strengthen t/o. Generalized weakness.   IV: Ok for no PIV.  Labs/Electrolytes: . Lab redraw tmr am.   Resp/trach: Denies SOB. PRN albuterol given and scheduled nebs. On 2-3 L oxymask overnight for occasional MAXX.   Diet: Regular and good intake.  Strict I &O.   Bowel status:LBM 11/4. Passing gas. BS+.  : VDSP, to bathroom.   Skin: Blister on L hand.   Pain: Reported HA and extremities pain . Taken tylenol and Benadryl.   Activity: SBA/GB. Steady. BA/CA on at all time. Refused activity/walk. Refused BG checks.   Social: No visitor or phone call.   Plan: Pending surgical plan. Continue monitor and follow POC.

## 2023-11-07 NOTE — PLAN OF CARE
Status: Pt admitted 10/21 for known large sellar/suprasellar mass   PMHx: schizophrenia, HTN, asthma and pituitary mass   Vitals: VSS on RA  Neuros: A&O x4. Forgetful. R eye blind. L eye blurry version and field cut. Hx of auditory hallucination.  IV: Ok for no IV  Labs/Electrolytes: No replacements needed  Resp/trach: Has scheduled nebs and PRN inhalers. Denies shortness of breath  Diet: Regular, good appetite. Strict I&Os   Bowel status: Last BM 11/04  : VDSP  Skin: Generalized bruising  Pain: PRN Tylenol for generalized pain and PRN Benadryl for itching given  Activity: SBA, GB. Refuses GB. Up on chair, refuses ambulation in [place] at times. Walker for ambulation in hallway  Plan: Pending surgical plan. Continue with POC

## 2023-11-08 NOTE — PLAN OF CARE
"Goal Outcome Evaluation:       BP (!) 155/92 (BP Location: Right arm)   Pulse 86   Temp 98.2  F (36.8  C) (Axillary)   Resp 20   Ht 1.549 m (5' 1\")   Wt 95.5 kg (210 lb 8.6 oz)   SpO2 97%   BMI 39.78 kg/m      Status: Pt was admitted on 10/21 for a known large sellar/suprasellar   VS:VSS except for high BP  Neuros: A&OX4  but can be forgetful, pt is blind on the R eye.  Cardiac:  WNL  Respiratory: Pt is on 3L oxy mask and has PRN nebs and schedule nebs    GI/: Voiding without difficulty, lats BM was 11/07 per pt   Diet/Nausea: pt denies nausea, reg diet   Skin: WNL   LDA: Pt do not have any drain or PIV in place   Pain: Tylenol was given for pain  Activity: SBA  New changes this shift: No new health concerns this shift.  Plan: waiting on plan for surgery, Continues plan of care .                  "

## 2023-11-08 NOTE — PROGRESS NOTES
[facility], [place]   11/08/2023  Neurosurgery Progress Note:    Interval History: NAEO.     Assessment:  Mirian Cunningham is a 43 year old female with complex psychosocial situation including paranoid schizophrenia and lack of active guardianship transferred to [facility] with known large sellar/suprasellar mass. Godmother Adri is appointed as the medical decision maker. No other family members available or willing to participate in her care.  MRI with large multilobulated pituitary mass/adenoma with mass effect and encasement of the cavernous structures including the cavernous internal carotid arteries, M1 segment of the right MCA, and A1 and proximal A2 segments of both anterior cerebral arteries.     Plan:  Q4h neuro exams   Pain control  Seroquel PRN  Maintain SBP < 160  Regular diet  Normonatremia   Continue to monitor for fevers and/or signs of infection  DVT: SCDs and lovenox, encouraging ambulation   Ongoing OR planning for combined EEA and transcranial approaches for tumor resection  Dispo: Patient should remain on [employer] to facilitate regular rounding from NSGY team until surgery; ok to transfer to med/surg unit if neuro bed is more appropriately utilized by another patient  -----------------------------------  Renetta Goel MD, PhD  PGY-2 Neurosurgery    Please contact neurosurgery resident on call with questions.    Dial * * *[phone], enter [phone] when prompted.   -----------------------------------  Objective:   Temp:  [97.6  F (36.4  C)-98.5  F (36.9  C)] 97.6  F (36.4  C)  Pulse:  [83-93] 83  Resp:  [16-20] 20  BP: (130-155)/() 130/90  SpO2:  [80 %-100 %] 97 %  I/O last 3 completed shifts:  In: 2290 [P.O.:2290]  Out: 1100 [Urine:1100]    NEUROLOGIC:  Gen: Appears comfortable, NAD  Resp: breathing comfortably on room air  Neurologic:  -- Awake; Alert; oriented x 3  -- Follows commands intermittently  -- Speech fluent, spontaneous. No aphasia or dysarthria.  -- no gaze  preference. No apparent hemineglect.  Cranial Nerves:  -- R eye blindness; L eye temporal field cut. Left nasal superio qudrant 20/30. R RAPD.  -- gaze is intermittently dysconjugate w R exotropia   -- face symmetrical, tongue midline  -- hearing grossly intact bilat  -- Trapezii 5/5 strength bilat symmetric     Motor:  Normal bulk / tone; no tremor, rigidity, or bradykinesia.  No muscle wasting or fasciculations       Delt Bi Tri Hand Flexion/  Extension Iliopsoas Quadriceps Hamstrings Tibialis Anterior Gastroc     C5 C6 C7 C8/T1 L2 L3 L4-S1 L4 S1   R 5 5 5 5 5 5 5 5 5   L 5 5 5 5 5 5 5 5 5      Sensory:  intact to LT x 4 extremities      Reflexes: no hyperreflexia, no Hoffmans, mute Babinski bilaterally      LABS:  Recent Labs   Lab 11/07/23  1423 11/06/23  2246 11/05/23  1301 11/04/23  1026 11/04/23  0805   NA  --   --  147*  --   --    POTASSIUM 4.5  --  4.0 3.8  --    CHLORIDE  --   --  110*  --   --    CO2  --   --  26  --   --    ANIONGAP  --   --  11  --   --    GLC  --  106* 131*  --  84   BUN  --   --  10.7  --   --    CR  --   --  0.84  --   --    FADUMO  --   --  9.2  --   --      Recent Labs   Lab 11/05/23  1301   WBC 5.5   RBC 3.68*   HGB 10.8*   HCT 35.7   MCV 97   MCH 29.3   MCHC 30.3*   RDW 14.5        IMAGING:  No results found for this or any previous visit (from the past 24 hour(s)).

## 2023-11-08 NOTE — PLAN OF CARE
Status: Pt admitted 10/21 for known large sellar/suprasellar mass. Hx schizophrenia, HTN, asthma and pituitary mass   Vitals: VSS  Neuros: A&Ox4. Forgetful. L eye blurry vision, field cut. R eye blind.   IV: okay for no PIV  Labs/Electrolytes: WNL  Resp/trach: scheduled/PRN nebs and inhalers. Denies SOB at this time  Diet: regular. Strict I/Os  Bowel status: BM 11/4  : voiding spont  Skin: generalized bruising   Pain: denies at this time.   Activity: SBA. Refusing GB.  Plan: Pending surgical plan.

## 2023-11-08 NOTE — PLAN OF CARE
Status: Pt admitted 10/21 for known large sellar/suprasellar mass. Hx schizophrenia, HTN, asthma and pituitary mass   Vitals: VSS B/P <160  Neuros: A&Ox4. Forgetful. L eye blurry vision, field cut. R eye blind.   IV: okay for no PIV  Labs/Electrolytes: WNL  Resp/trach: scheduled/PRN nebs and inhalers. Denies SOB. Pulse ox sat <93%. Pt intermittently requests 3L NC.   Diet: Regular. Strict I/Os  Bowel status: No BM this shift. BM 11/6 Per charting   : voiding spont  Skin: generalized bruising   Pain: Tylenol for generalized pain. Taking Benadryl for itching.  Activity: SBA. Refusing GB.  Plan: Pending surgical plan.

## 2023-11-09 NOTE — PROVIDER NOTIFICATION
Paged the team (neurosurgery).    Re: pt has been refusing her daily scheduled Lasix 40mg oral and Lovenox q24hrs. Pt stating she is peeing a lot and walks in the halls fine. Pls advice?    Respond: MD (Renetta Goel) called back and said they are aware of the pt's refusal of her meds, but staff can continue encouraging pt to take her meds.

## 2023-11-09 NOTE — PLAN OF CARE
Status: Pt admitted for large sellar/suprasellar mass. Hx of schizophrenia, HTN, asthma, and pituitary mass.   Vitals: VSS  Neuros: A&Ox4, forgetful, strengths 5/5 throughout, R eye blind, L eye blurry vision, Left field cut   IV: Ok for no PIV  Labs/Electrolytes: WNL   Resp/trach: Scheduled and PRN neb treatments and inhalers. On 3L NC at night.   Diet: Regular diet, strict I&O's  Bowel status: LBM 11/6  : Voiding spontaneously   Skin: Generalized bruising   Pain: Headache and extremity pain managed with tylenol and benadryl   Activity: SBA, refusing GB   Plan/Updates this shift: Continue with POC.

## 2023-11-09 NOTE — PLAN OF CARE
Goal Outcome Evaluation:    Status: Patient admitted for large sellar/suprasellar mass. Pt has Hx of schizophrenia, HTN, asthma, and pituitary mass.   Vitals: VSS, On 3L nasal Cannular overnight.  Neuros: A&Ox4, forgetful at times, strength 5/5 throughout, R eye blurry vision, L field cut  IV: Okay for no PIV  Labs/Electrolytes: WNL  Resp/trach: scheduled PRN neb tx and inhalers.   Diet: Regular  Bowel status: LBM 11/6  : voiding spontaneously x2 to the bathroom  Skin: Generalized bruising   Pain: denied  Activity: SBA, does not want GB on--pt is steady  Social: no visitors, watching Tv  Plan/ Updates this shift: Patient kept waking up to use the bathroom without using her call light. This morning, she left the bathroom and then went for a walk in the hallway without calling--RN caught up with her and educated on using her call light--door alarm ordered. continue to monitor.

## 2023-11-09 NOTE — PROGRESS NOTES
Goal Outcome Evaluation:     Status: Patient admitted for large sellar/suprasellar mass. Pt has Hx of schizophrenia, HTN, asthma, and pituitary mass.   Vitals: VSS, RA when awake, using O2 @3Lnc when sleeping per pt request  Neuros: A&Ox4, forgetful at times, strength 5/5 throughout, R eye blurry vision, L field cut--no new deficits   IV: Okay for no PIV  Labs/Electrolytes: WNL  Resp/trach: scheduled PRN neb tx and inhalers.   Diet: Regular--good appetite   Bowel status: LBM 11/8--per pt.   : voiding spontaneously--not saving  Skin: Generalized bruising   Pain: c/o headaches-Tylenol prn given per pt request.   Activity: SBA, does not want GB on--pt is steady. Walked in hallway x1 today.   Social: no visitors today, watches TV  Plan/ Updates this shift: pt refusing Lasix and Lovenox, MD aware. Transferring unit 7C tonight. Continue to monitor.

## 2023-11-09 NOTE — PROGRESS NOTES
[facility], [place]   11/09/2023  Neurosurgery Progress Note:    Interval History: Door alarm ordered due to patient walking in [place] overnight without assistance.    Assessment:  Mirian Cunningham is a 43 year old female with complex psychosocial situation including paranoid schizophrenia and lack of active guardianship transferred to [facility] with known large sellar/suprasellar mass. Godmother Adri is appointed as the medical decision maker. No other family members available or willing to participate in her care.  MRI with large multilobulated pituitary mass/adenoma with mass effect and encasement of the cavernous structures including the cavernous internal carotid arteries, M1 segment of the right MCA, and A1 and proximal A2 segments of both anterior cerebral arteries.     Plan:  Q4h neuro exams   Q3d labs  Pain control  Seroquel PRN  Maintain SBP < 160  Regular diet  Normonatremia   Continue to monitor for fevers and/or signs of infection  DVT: SCDs and lovenox, encouraging ambulation   Ongoing OR planning for combined EEA and transcranial approaches for tumor resection  Dispo: Patient should remain on [employer] to facilitate regular rounding from NSGY team until surgery; ok to transfer to med/surg unit if neuro bed is more appropriately utilized by another patient  -----------------------------------  Renetta Goel MD, PhD  PGY-2 Neurosurgery    Please contact neurosurgery resident on call with questions.    Dial * * *[phone], enter [phone] when prompted.   -----------------------------------  Objective:   Temp:  [97.5  F (36.4  C)-98.3  F (36.8  C)] 97.7  F (36.5  C)  Pulse:  [72-84] 72  Resp:  [16-20] 16  BP: (114-147)/(68-99) 114/68  SpO2:  [95 %-100 %] 100 %  I/O last 3 completed shifts:  In: 960 [P.O.:960]  Out: 400 [Urine:400]    NEUROLOGIC:  Gen: Appears comfortable, NAD  Resp: breathing comfortably on room air  Neurologic:  -- Awake; Alert; oriented x 3  -- Follows commands  intermittently  -- Speech fluent, spontaneous. No aphasia or dysarthria.  -- no gaze preference. No apparent hemineglect.  Cranial Nerves:  -- R eye blindness; L eye temporal field cut. Left nasal superio qudrant 20/30. R RAPD.  -- gaze is intermittently dysconjugate w R exotropia   -- face symmetrical, tongue midline  -- hearing grossly intact bilat  -- Trapezii 5/5 strength bilat symmetric     Motor:  Normal bulk / tone; no tremor, rigidity, or bradykinesia.  No muscle wasting or fasciculations       Delt Bi Tri Hand Flexion/  Extension Iliopsoas Quadriceps Hamstrings Tibialis Anterior Gastroc     C5 C6 C7 C8/T1 L2 L3 L4-S1 L4 S1   R 5 5 5 5 5 5 5 5 5   L 5 5 5 5 5 5 5 5 5      Sensory:  intact to LT x 4 extremities      Reflexes: no hyperreflexia, no Hoffmans, mute Babinski bilaterally      LABS:  Recent Labs   Lab 11/08/23  1050 11/07/23  1423 11/06/23  2246 11/05/23  1301 11/04/23  1026 11/04/23  0805   NA  --   --   --  147*  --   --    POTASSIUM 4.2 4.5  --  4.0   < >  --    CHLORIDE  --   --   --  110*  --   --    CO2  --   --   --  26  --   --    ANIONGAP  --   --   --  11  --   --    GLC  --   --  106* 131*  --  84   BUN  --   --   --  10.7  --   --    CR  --   --   --  0.84  --   --    FADUMO  --   --   --  9.2  --   --     < > = values in this interval not displayed.     Recent Labs   Lab 11/05/23  1301   WBC 5.5   RBC 3.68*   HGB 10.8*   HCT 35.7   MCV 97   MCH 29.3   MCHC 30.3*   RDW 14.5        IMAGING:  No results found for this or any previous visit (from the past 24 hour(s)).

## 2023-11-10 NOTE — PLAN OF CARE
Status: Pt admitted for large sellar/suprasellar mass. Hx of schizophrenia, HTN, asthma, and pituitary mass.   Vitals: VSS  Neuros: A&Ox4, forgetful, strengths 5/5 throughout, R eye blind, L eye blurry vision, Left field cut   IV: Ok for no PIV  Labs/Electrolytes: WNL   Resp/trach: Scheduled and PRN neb treatments and inhalers. On 3L NC at night per pt request   Diet: Regular diet, strict I&O's  Bowel status: LBM 11/8 per pt  : Voiding spontaneously   Skin: Generalized bruising   Pain: Headache managed with tylenol and benadryl   Activity: SBA, refusing GB   Plan/Updates this shift: Continue with POC.

## 2023-11-10 NOTE — PROVIDER NOTIFICATION
"Paged Jean Marie Meza MD \"DARLINE pt is refusing labs today\"  - incorrect provider paged    [phone]: Paged Renteta Goel MD to notify instead  "

## 2023-11-10 NOTE — PLAN OF CARE
Goal Outcome Evaluation:    Status: Pt admitted for large sellar/suprasellar mass. Hx of schizophrenia, HTN, asthma, and pituitary mass.   Vitals: VSS on 2L NC overnight  Neuros: A&Ox4, forgetful at times, blind in R eye, blurry vision in L eye, Left field cut. Pt reports pain and sound effects (shooting, drumming, buzzing, humming) in L ear. Strengths 5/5 throughout with generalized weakness. Pt refuses assessments that involve touching of the hands  IV: Ok for no PIV  Labs/Electrolytes: WNL  Resp/trach: WNL, denies SOB   Diet: Regular, strict I&O's  Bowel status: Pt reports LBM 11/8   : Voiding spontaneously to BR  Skin: Generalized bruising   Pain: Headache managed with tylenol and benadryl   Activity: SBA; pt refuses GB   Plan: Continue to monitor and follow POC.

## 2023-11-10 NOTE — PROGRESS NOTES
Patient transferred to unit 7C at 0955 via bed. Report given to receiving RN in 7C. All belongings sent with pt.

## 2023-11-10 NOTE — SUMMARY OF CARE
Arrived to  with the following belongings:    White backpack full of various paperwork, tablet with , 1 shoe, bag x1 with clothing

## 2023-11-10 NOTE — PROGRESS NOTES
Care Management Follow Up    Length of Stay (days): 20    Expected Discharge Date: 12/09/2023     Concerns to be Addressed:    Discharge planning   Patient plan of care discussed at interdisciplinary rounds: Yes    Anticipated Discharge Disposition:  Transfer back to [facility]     Anticipated Discharge Services:  Transportation  Anticipated Discharge DME:  None    Patient/family educated on Medicare website which has current facility and service quality ratings:  No  Education Provided on the Discharge Plan:  Not at this time  Patient/Family in Agreement with the Plan:  Unable to assess at this time    Referrals Placed by CM/SW:  N/A  Private pay costs discussed: Not applicable    Additional Information:  SW reviewed the patient's chart and spoke with SW who was previously following. Patient's medical decision maker is her Godmother Adri. Patient will remain hospitalized tell her surgery which will take place tentatively 12/1/23. The plan is for patient to return to [facility] after surgery and once medically stable. Transfer agreement is present in the patient's chart.     SW will continue to follow for discharge needs    BETH Sellers  Unit 7C   Office: [phone]  Pager: [phone]  [email or web address]

## 2023-11-11 NOTE — PROGRESS NOTES
[facility], [place]   11/10/23  Neurosurgery Progress Note:    Interval History: BRIGIDA.    Assessment:  Mirian Cunningham is a 43 year old female with complex psychosocial situation including paranoid schizophrenia and lack of active guardianship transferred to [facility] with known large sellar/suprasellar mass. Godmother Adri is appointed as the medical decision maker. No other family members available or willing to participate in her care.  MRI with large multilobulated pituitary mass/adenoma with mass effect and encasement of the cavernous structures including the cavernous internal carotid arteries, M1 segment of the right MCA, and A1 and proximal A2 segments of both anterior cerebral arteries.     Plan:  Q4h neuro exams   Q3d labs  Pain control  Seroquel PRN  Maintain SBP < 160  Regular diet  Normonatremia   Continue to monitor for fevers and/or signs of infection  DVT: SCDs and lovenox, encouraging ambulation   Ongoing OR planning for combined EEA and transcranial approaches for tumor resection  Dispo: Patient should remain on [employer] to facilitate regular rounding from NSGY team until surgery; ok to transfer to med/surg unit if neuro bed is more appropriately utilized by another patient  -----------------------------------  Renetta Goel MD, PhD  PGY-2 Neurosurgery    Please contact neurosurgery resident on call with questions.    Dial * * *[phone], enter [phone] when prompted.   -----------------------------------  Objective:   Temp:  [97.2  F (36.2  C)-97.5  F (36.4  C)] 97.2  F (36.2  C)  Pulse:  [90] 90  Resp:  [18] 18  BP: (131-141)/(61-85) 141/85  SpO2:  [96 %-99 %] 96 %  I/O last 3 completed shifts:  In: 1500 [P.O.:1500]  Out: 200 [Urine:200]    NEUROLOGIC:  Gen: Appears comfortable, NAD  Resp: breathing comfortably on room air  Neurologic:  -- Awake; Alert; oriented x 3  -- Follows commands intermittently  -- Speech fluent, spontaneous. No aphasia or dysarthria.  -- no gaze preference.  No apparent hemineglect.  Cranial Nerves:  -- R eye blindness; L eye temporal field cut. Left nasal superio qudrant 20/30. R RAPD.  -- gaze is intermittently dysconjugate w R exotropia   -- face symmetrical, tongue midline  -- hearing grossly intact bilat  -- Trapezii 5/5 strength bilat symmetric     Motor:  Normal bulk / tone; no tremor, rigidity, or bradykinesia.  No muscle wasting or fasciculations       Delt Bi Tri Hand Flexion/  Extension Iliopsoas Quadriceps Hamstrings Tibialis Anterior Gastroc     C5 C6 C7 C8/T1 L2 L3 L4-S1 L4 S1   R 5 5 5 5 5 5 5 5 5   L 5 5 5 5 5 5 5 5 5      Sensory:  intact to LT x 4 extremities      Reflexes: no hyperreflexia, no Hoffmans, mute Babinski bilaterally      LABS:  Recent Labs   Lab 11/09/23  1446 11/08/23  1050 11/07/23  1423 11/06/23  2246 11/05/23  1301   NA  --   --   --   --  147*   POTASSIUM  --  4.2 4.5  --  4.0   CHLORIDE  --   --   --   --  110*   CO2  --   --   --   --  26   ANIONGAP  --   --   --   --  11   GLC 85  --   --  106* 131*   BUN  --   --   --   --  10.7   CR  --   --   --   --  0.84   FADUMO  --   --   --   --  9.2     Recent Labs   Lab 11/05/23  1301   WBC 5.5   RBC 3.68*   HGB 10.8*   HCT 35.7   MCV 97   MCH 29.3   MCHC 30.3*   RDW 14.5        IMAGING:  No results found for this or any previous visit (from the past 24 hour(s)).

## 2023-11-11 NOTE — PROGRESS NOTES
[facility], [place]   11/11/2023  Neurosurgery Progress Note:    Interval History: BRIGIDA. Transferred to 7th floor.    Assessment:  Mirian Cunningham is a 43 year old female with complex psychosocial situation including paranoid schizophrenia and lack of active guardianship transferred to [facility] with known large sellar/suprasellar mass. Godmother Adri is appointed as the medical decision maker. No other family members available or willing to participate in her care.  MRI with large multilobulated pituitary mass/adenoma with mass effect and encasement of the cavernous structures including the cavernous internal carotid arteries, M1 segment of the right MCA, and A1 and proximal A2 segments of both anterior cerebral arteries.     Plan:  Q4h neuro exams   Q3d labs  Pain control  Seroquel PRN  Maintain SBP < 160  Regular diet  Normonatremia   Continue to monitor for fevers and/or signs of infection  DVT: SCDs and lovenox, encouraging ambulation   Ongoing OR planning for combined EEA and transcranial approaches for tumor resection  Dispo: Floor status until OR 12/1  -----------------------------------  Renetta Goel MD, PhD  PGY-2 Neurosurgery    Please contact neurosurgery resident on call with questions.    Dial * * *[phone], enter [phone] when prompted.   -----------------------------------  Objective:   Temp:  [97.2  F (36.2  C)-97.5  F (36.4  C)] 97.2  F (36.2  C)  Pulse:  [90] 90  Resp:  [18] 18  BP: (131-141)/(61-85) 141/85  SpO2:  [96 %-99 %] 96 %  I/O last 3 completed shifts:  In: 1500 [P.O.:1500]  Out: 200 [Urine:200]    NEUROLOGIC:  Gen: Appears comfortable, NAD  Resp: breathing comfortably on room air  Neurologic:  -- Awake; Alert; oriented x 3  -- Follows commands intermittently  -- Speech fluent, spontaneous. No aphasia or dysarthria.  -- no gaze preference. No apparent hemineglect.  Cranial Nerves:  -- R eye blindness; L eye temporal field cut. Left nasal superio qudrant 20/30. R RAPD.  --  gaze is intermittently dysconjugate w R exotropia   -- face symmetrical, tongue midline  -- hearing grossly intact bilat  -- Trapezii 5/5 strength bilat symmetric     Motor:  Normal bulk / tone; no tremor, rigidity, or bradykinesia.  No muscle wasting or fasciculations       Delt Bi Tri Hand Flexion/  Extension Iliopsoas Quadriceps Hamstrings Tibialis Anterior Gastroc     C5 C6 C7 C8/T1 L2 L3 L4-S1 L4 S1   R 5 5 5 5 5 5 5 5 5   L 5 5 5 5 5 5 5 5 5      Sensory:  intact to LT x 4 extremities      Reflexes: no hyperreflexia, no Hoffmans, mute Babinski bilaterally      LABS:  Recent Labs   Lab 11/09/23  1446 11/08/23  1050 11/07/23  1423 11/06/23  2246 11/05/23  1301   NA  --   --   --   --  147*   POTASSIUM  --  4.2 4.5  --  4.0   CHLORIDE  --   --   --   --  110*   CO2  --   --   --   --  26   ANIONGAP  --   --   --   --  11   GLC 85  --   --  106* 131*   BUN  --   --   --   --  10.7   CR  --   --   --   --  0.84   FADUMO  --   --   --   --  9.2     Recent Labs   Lab 11/05/23  1301   WBC 5.5   RBC 3.68*   HGB 10.8*   HCT 35.7   MCV 97   MCH 29.3   MCHC 30.3*   RDW 14.5        IMAGING:  No results found for this or any previous visit (from the past 24 hour(s)).

## 2023-11-11 NOTE — PLAN OF CARE
Assumed care [id number]     V/S: VSS on RA  Pain: Denies  Neuro: A&Ox4, forgetful. R blindess, L visual field cut.  Respiratory: WDL on RA  Cardiac: WDL; HR 90, -140s/60-80s.   Skin: No new deficits  GI/: LBM 11/9. Voiding adequately. Denies N/V.  Nutrition: Tolerating regular diet with fair appetite  Lines/drains: None  Activity: xSBA    Events this shift: Pt intended to shower this evening but changed her mind. Pt ordered [employer]an[employer] delivery. PRN tylenol given x1, benadryl x1, albuterol inhaler x1 at HS. Pt refused neuro exams overnight. Pt slept well between cares, requested PRN neb tx this AM.     Plan: Pt to remain hospitalized until surgery, tentatively scheduled 12/1. Plan to return to [facility] once medically stable after surgery. Continue POC & notify providers with any acute changes.

## 2023-11-11 NOTE — PROGRESS NOTES
Assumed cares [id number]. A&O and able to make needs known. VSS on RA. Denies having any pain. SBA on bed alarm. Neuros remain unchanged and intact. 2 RN skin check completed by writer and Gabriela SALAZAR- unremarkable findings. Pt refused labs today- MD notified. Pt also refusing BG checks. Good appetite at meals. Continue with plan of care.

## 2023-11-12 NOTE — PROGRESS NOTES
[facility], [place]   11/12/2023   Neurosurgery Progress Note:    Interval History: BRIGIDA. Continues to intermittently refuse cares.    Assessment:  Mirian Cunningham is a 43 year old female with complex psychosocial situation including paranoid schizophrenia and lack of active guardianship transferred to [facility] with known large sellar/suprasellar mass. Godmother Adri is appointed as the medical decision maker. No other family members available or willing to participate in her care.  MRI with large multilobulated pituitary mass/adenoma with mass effect and encasement of the cavernous structures including the cavernous internal carotid arteries, M1 segment of the right MCA, and A1 and proximal A2 segments of both anterior cerebral arteries.     Plan:  Q4h neuro exams   Q3d labs  Pain control  Seroquel PRN  Maintain SBP < 160  Regular diet  Normonatremia   Continue to monitor for fevers and/or signs of infection  DVT: SCDs and lovenox, encouraging ambulation   Ongoing OR planning for combined EEA and transcranial approaches for tumor resection  Dispo: Floor status until OR 12/1  -----------------------------------  Renetta Goel MD, PhD  PGY-2 Neurosurgery    Please contact neurosurgery resident on call with questions.    Dial * * *[phone], enter [phone] when prompted.   -----------------------------------  Objective:   Temp:  [96.9  F (36.1  C)-97.2  F (36.2  C)] 96.9  F (36.1  C)  Pulse:  [81-90] 88  Resp:  [18-20] 18  BP: (126-141)/(82-86) 126/82  SpO2:  [96 %-99 %] 98 %  No intake/output data recorded.    NEUROLOGIC:  Gen: Appears comfortable, NAD  Resp: breathing comfortably on room air  Neurologic:  -- Awake; Alert; oriented x 3  -- Follows commands intermittently  -- Speech fluent, spontaneous. No aphasia or dysarthria.  -- no gaze preference. No apparent hemineglect.  Cranial Nerves:  -- R eye blindness; L eye temporal field cut. Left nasal superio qudrant 20/30. R WYATT.  -- gaze is  intermittently dysconjugate w R exotropia   -- face symmetrical, tongue midline  -- hearing grossly intact bilat  -- Trapezii 5/5 strength bilat symmetric     Motor:  Normal bulk / tone; no tremor, rigidity, or bradykinesia.  No muscle wasting or fasciculations       Delt Bi Tri Hand Flexion/  Extension Iliopsoas Quadriceps Hamstrings Tibialis Anterior Gastroc     C5 C6 C7 C8/T1 L2 L3 L4-S1 L4 S1   R 5 5 5 5 5 5 5 5 5   L 5 5 5 5 5 5 5 5 5      Sensory:  intact to LT x 4 extremities      Reflexes: no hyperreflexia, no Hoffmans, mute Babinski bilaterally      LABS:  Recent Labs   Lab 11/09/23  1446 11/08/23  1050 11/07/23  1423 11/06/23  2246 11/05/23  1301   NA  --   --   --   --  147*   POTASSIUM  --  4.2 4.5  --  4.0   CHLORIDE  --   --   --   --  110*   CO2  --   --   --   --  26   ANIONGAP  --   --   --   --  11   GLC 85  --   --  106* 131*   BUN  --   --   --   --  10.7   CR  --   --   --   --  0.84   FADUMO  --   --   --   --  9.2     Recent Labs   Lab 11/05/23  1301   WBC 5.5   RBC 3.68*   HGB 10.8*   HCT 35.7   MCV 97   MCH 29.3   MCHC 30.3*   RDW 14.5        IMAGING:  No results found for this or any previous visit (from the past 24 hour(s)).

## 2023-11-12 NOTE — PROGRESS NOTES
Assumed cares [id number]. A&O and able to make needs known. VSS on RA. Denies having any pain. Neuro checks remain unchanged. Good appetite today. Ambulates independently to the bathroom and having good UO. Plan to stay hospitalized until surgery 12/1. Continue with plan of care.

## 2023-11-12 NOTE — PROGRESS NOTES
Assumed cares 4032-1887. A&O and able to make needs known. VSS on RA. PRN Tylenol and Benadryl given x1 for pain. Neuro checks remain unchanged. Good appetite today. Ambulates independently to the bathroom and having good UO. Plan to stay hospitalized until surgery 12/1. Continue with plan of care

## 2023-11-12 NOTE — PLAN OF CARE
Assumed care [id number]     V/S: VSS on RA  Pain: Denies  Neuro: A&Ox4, forgetful. R blindess, L visual field cut.  Respiratory: WDL on RA  Cardiac: WDL; HR 80s, BP 120s/80s.   Skin: No new deficits  GI/: Medium BM x1. Voiding adequately. Denies N/V.  Nutrition: Tolerating regular diet with fair appetite  Lines/drains: None  Activity: xSBA    Events this shift: PRN benadryl x1. Pt slept well between cares, requested PRN neb tx this AM.     Plan: Pt to remain hospitalized until surgery, tentatively scheduled 12/1. Plan to return to [facility]note 3[facility] once medically stable after surgery. Continue POC & notify providers with any acute changes.

## 2023-11-13 NOTE — PLAN OF CARE
"Blood pressure 133/88, pulse 90, temperature 97.8  F (36.6  C), temperature source Axillary, resp. rate 20, height 1.54 m (5' 0.63\"), weight 94.3 kg (207 lb 12.8 oz), SpO2 97%.Via RA       Patient A&Ox4, VSS  forgetful. R blindess, L visual field cut. Q4h neuros intact. No respiratory distress noted. C/o left shoulder pain received Tylenol and Benadryl X 1 post assessment with relief. Appetite good . Up independently in room. Void adequate and patient report bowel movement X 1. Call light within reach , and hourly round completed. Continue with POC and update MD with concerns.        Patient has no IV access MD aware .         Goal Outcome Evaluation:      Plan of Care Reviewed With: patient    Overall Patient Progress: no change.            "

## 2023-11-13 NOTE — PLAN OF CARE
"Assumed care [id number]     V/S: VSS on RA  Pain: L shoulder pain  Neuro: A&Ox4, forgetful. R blindess, L visual field cut. Q4h neuros intact.  Respiratory: WDL on RA ex intermittent SOB; 1L NC for comfort.   Cardiac: WDL; HR 80s, -130s/70-80s.   Skin: No new deficits  GI/: Medium BM x1. Voiding adequately. Denies N/V.  Nutrition: Tolerating regular diet with good appetite  Lines/drains: None  Activity: xSBA    Events this shift: BRIGIDA. Pt reports nosebleeds and \"coughing & sneezing a ton of blood the last few days,\" no evidence of this witnessed by writer, RT aware. PRN benadryl given x1, tylenol x1.      Plan: Pt agreeable to labs today after breakfast. Requests walk around unit today. Plan to remain hospitalized until surgery, tentatively scheduled 12/1. Plan to return to [facility] once medically stable after surgery. Continue POC & notify providers with any acute changes.  "

## 2023-11-13 NOTE — PROGRESS NOTES
[facility], [place]   11/13/2023   Neurosurgery Progress Note:    Interval History: BRIGIDA. Would like the labs to be drawn after 9 am.     Assessment:  Mirian Cunningham is a 43 year old female with complex psychosocial situation including paranoid schizophrenia and lack of active guardianship transferred to [facility] with known large sellar/suprasellar mass. Godmother Adri is appointed as the medical decision maker. No other family members available or willing to participate in her care.  MRI with large multilobulated pituitary mass/adenoma with mass effect and encasement of the cavernous structures including the cavernous internal carotid arteries, M1 segment of the right MCA, and A1 and proximal A2 segments of both anterior cerebral arteries.     Plan:  Q4h neuro exams   Q3d labs  Pain control  Seroquel PRN  Maintain SBP < 160  Regular diet  Normonatremia   Continue to monitor for fevers and/or signs of infection  DVT: SCDs and lovenox, encouraging ambulation   Ongoing OR planning for combined EEA and transcranial approaches for tumor resection  Dispo: Floor status until OR 12/1  -----------------------------------  Fernando Welch MD, PGY-1  Department of Neurosurgery  Pager: [phone]    Please contact neurosurgery resident on call with questions.    Dial * * *[phone], enter [phone] when prompted.   -----------------------------------  Objective:   Temp:  [98.1  F (36.7  C)] 98.1  F (36.7  C)  Pulse:  [74] 74  Resp:  [18] 18  BP: (132)/(75) 132/75  SpO2:  [97 %-99 %] 98 %  No intake/output data recorded.    NEUROLOGIC:  Gen: Appears comfortable, NAD  Resp: breathing comfortably on room air  Neurologic:  -- Awake; Alert; oriented x 3  -- Follows commands intermittently  -- Speech fluent, spontaneous. No aphasia or dysarthria.  -- no gaze preference. No apparent hemineglect.  Cranial Nerves:  -- R eye blindness; L eye temporal field cut. Left nasal superio qudrant 20/30. R RAPD.  -- gaze is  intermittently dysconjugate w R exotropia   -- face symmetrical, tongue midline  -- hearing grossly intact bilat  -- Trapezii 5/5 strength bilat symmetric     Motor:  Normal bulk / tone; no tremor, rigidity, or bradykinesia.  No muscle wasting or fasciculations       Delt Bi Tri Hand Flexion/  Extension Iliopsoas Quadriceps Hamstrings Tibialis Anterior Gastroc     C5 C6 C7 C8/T1 L2 L3 L4-S1 L4 S1   R 5 5 5 5 5 5 5 5 5   L 5 5 5 5 5 5 5 5 5      Sensory:  intact to LT x 4 extremities      Reflexes: no hyperreflexia, no Hoffmans, mute Babinski bilaterally      LABS:  Recent Labs   Lab 11/09/23  1446 11/08/23  1050 11/07/23  1423 11/06/23  2246   POTASSIUM  --  4.2 4.5  --    GLC 85  --   --  106*     No lab results found in last 7 days.    IMAGING:  No results found for this or any previous visit (from the past 24 hour(s)).

## 2023-11-14 NOTE — PLAN OF CARE
"Blood pressure 120/58, pulse 62, temperature 98  F (36.7  C), temperature source Oral, resp. rate 16, height 1.54 m (5' 0.63\"), weight 94.3 kg (207 lb 12.8 oz), SpO2 93%. Via RA        Patient , AOx4, forgetful, R eye with ongoing blurry vision .Neuro's intact   Received  scheduled medications and request for Tylenol and Benadryl . Patient refused Lasix and Lovenox . MD aware .Appetite good . Up independently in room..  Patient ambulate in room refused in hallway on this shift . Still needs sputum culture  call light within reach. Hourly round completed . Continue with POC and update MD with change. .     Goal Outcome Evaluation:      Plan of Care Reviewed With: patient    Overall Patient Progress: no change.          "

## 2023-11-14 NOTE — PROGRESS NOTES
[facility], [place]   11/14/2023   Neurosurgery Progress Note:    Interval History: BRIGIDA.     Assessment:  Mirian Cunningham is a 43 year old female with complex psychosocial situation including paranoid schizophrenia and lack of active guardianship transferred to [facility] with known large sellar/suprasellar mass. Godmother Adri is appointed as the medical decision maker. No other family members available or willing to participate in her care.  MRI with large multilobulated pituitary mass/adenoma with mass effect and encasement of the cavernous structures including the cavernous internal carotid arteries, M1 segment of the right MCA, and A1 and proximal A2 segments of both anterior cerebral arteries.     Plan:  Q4h neuro exams   Q3d labs  Pain control  Seroquel PRN  Maintain SBP < 160  Regular diet  Normonatremia   Continue to monitor for fevers and/or signs of infection  DVT: SCDs and lovenox, encouraging ambulation   Ongoing OR planning for combined EEA and transcranial approaches for tumor resection  Dispo: Floor status until OR 12/1-12/2  -----------------------------------  Kelsey Briceno MD   NSGY PGY-3    Please contact neurosurgery resident on call with questions.    Dial * * [phone], enter [phone] when prompted.   -----------------------------------  Objective:   Temp:  [97  F (36.1  C)-98.7  F (37.1  C)] 98.7  F (37.1  C)  Pulse:  [79-90] 89  Resp:  [20-22] 20  BP: (114-133)/(78-90) 114/78  SpO2:  [97 %-99 %] 97 %  I/O last 3 completed shifts:  In: 580 [P.O.:580]  Out: -     NEUROLOGIC:  Gen: Appears comfortable, NAD  Resp: breathing comfortably on room air  Neurologic:  -- Awake; Alert; oriented x 3  -- Follows commands intermittently  -- Speech fluent, spontaneous. No aphasia or dysarthria.  -- no gaze preference. No apparent hemineglect.  Cranial Nerves:  -- R eye blindness; L eye temporal field cut. Left nasal superio qudrant 20/30. R RAPD.  -- gaze is intermittently dysconjugate w R  exotropia   -- face symmetrical, tongue midline  -- hearing grossly intact bilat  -- Trapezii 5/5 strength bilat symmetric     Motor:  Normal bulk / tone; no tremor, rigidity, or bradykinesia.  No muscle wasting or fasciculations       Delt Bi Tri Hand Flexion/  Extension Iliopsoas Quadriceps Hamstrings Tibialis Anterior Gastroc     C5 C6 C7 C8/T1 L2 L3 L4-S1 L4 S1   R 5 5 5 5 5 5 5 5 5   L 5 5 5 5 5 5 5 5 5      Sensory:  intact to LT x 4 extremities      Reflexes: no hyperreflexia, no Hoffmans, mute Babinski bilaterally      LABS:  Recent Labs   Lab 11/13/23  1006 11/09/23  1446 11/08/23  1050 11/07/23  1423     --   --   --    POTASSIUM 4.1  --  4.2 4.5   CHLORIDE 106  --   --   --    CO2 27  --   --   --    ANIONGAP 10  --   --   --    * 85  --   --    BUN 13.5  --   --   --    CR 1.00*  --   --   --    FADUMO 9.5  --   --   --      Recent Labs   Lab 11/13/23  1006   WBC 4.5   RBC 3.83   HGB 11.3*   HCT 36.7   MCV 96   MCH 29.5   MCHC 30.8*   RDW 14.7          IMAGING:  No results found for this or any previous visit (from the past 24 hour(s)).

## 2023-11-14 NOTE — PLAN OF CARE
Goal Outcome Evaluation:      Plan of Care Reviewed With: patient    Overall Patient Progress: no change    Outcome Evaluation: AOx4, forgetful, R eye with ongoing blurry vision. No c/o pain this shift. Nol IV access MD aware. Tolerating reg diet. Still needing resp culture but pt is not producing at this time. Neuros intact q4h. Pt needs weight and height for elecrolyte prtocol. Will be here until Dec 1st for tumur removal.

## 2023-11-15 NOTE — PLAN OF CARE
Goal Outcome Evaluation:      Plan of Care Reviewed With: patient    Overall Patient Progress: no changeOverall Patient Progress: no change    Outcome Evaluation: AOx4, forgetful, R eye with ongoing blurry vision. No c/o pain this shift--declined lidocaine patches. No IV access MD aware. Tolerating reg diet. Still needing resp culture but pt is not producing at this time. Neuros intact q4h. Pt needs weight and height for elecrolyte protocol. Showered and brushed teeth this AM. Walked halls x1. Will be here until Dec 1st for tumur removal.

## 2023-11-15 NOTE — PROGRESS NOTES
[facility], [place]   11/15/2023   Neurosurgery Progress Note:    Interval History: BRIGIDA.     Assessment:  Mirian Cunningham is a 43 year old female with complex psychosocial situation including paranoid schizophrenia and lack of active guardianship transferred to [facility] with known large sellar/suprasellar mass. Godmother Adri is appointed as the medical decision maker. No other family members available or willing to participate in her care.  MRI with large multilobulated pituitary mass/adenoma with mass effect and encasement of the cavernous structures including the cavernous internal carotid arteries, M1 segment of the right MCA, and A1 and proximal A2 segments of both anterior cerebral arteries.     Plan:  Q4h neuro exams   Q3d labs  Pain control  Seroquel PRN  Maintain SBP < 160  Regular diet  Normonatremia   Continue to monitor for fevers and/or signs of infection  DVT: SCDs and lovenox, encouraging ambulation   Ongoing OR planning for combined EEA and transcranial approaches for tumor resection  Dispo: Floor status until OR 12/1-12/2  -----------------------------------  Kelsey Briceno MD   NSGY PGY-3    Please contact neurosurgery resident on call with questions.    Dial * * *[phone], enter [phone] when prompted.   -----------------------------------  Objective:   Temp:  [97.8  F (36.6  C)-98.2  F (36.8  C)] 98.2  F (36.8  C)  Pulse:  [62-82] 82  Resp:  [15-16] 16  BP: (120-129)/(58-88) 129/88  SpO2:  [93 %-99 %] 97 %  I/O last 3 completed shifts:  In: 680 [P.O.:680]  Out: -     NEUROLOGIC:  Gen: Appears comfortable, NAD  Resp: breathing comfortably on room air  Neurologic:  -- Awake; Alert; oriented x 3  -- Follows commands intermittently  -- Speech fluent, spontaneous. No aphasia or dysarthria.  -- no gaze preference. No apparent hemineglect.  Cranial Nerves:  -- R eye blindness; L eye temporal field cut. Left nasal superio qudrant 20/30. R RAPD.  -- gaze is intermittently dysconjugate w R  exotropia   -- face symmetrical, tongue midline  -- hearing grossly intact bilat  -- Trapezii 5/5 strength bilat symmetric     Motor:  Normal bulk / tone; no tremor, rigidity, or bradykinesia.  No muscle wasting or fasciculations       Delt Bi Tri Hand Flexion/  Extension Iliopsoas Quadriceps Hamstrings Tibialis Anterior Gastroc     C5 C6 C7 C8/T1 L2 L3 L4-S1 L4 S1   R 5 5 5 5 5 5 5 5 5   L 5 5 5 5 5 5 5 5 5      Sensory:  intact to LT x 4 extremities      Reflexes: no hyperreflexia, no Hoffmans, mute Babinski bilaterally      LABS:  Recent Labs   Lab 11/13/23  1006 11/09/23  1446 11/08/23  1050     --   --    POTASSIUM 4.1  --  4.2   CHLORIDE 106  --   --    CO2 27  --   --    ANIONGAP 10  --   --    * 85  --    BUN 13.5  --   --    CR 1.00*  --   --    FADUMO 9.5  --   --      Recent Labs   Lab 11/13/23  1006   WBC 4.5   RBC 3.83   HGB 11.3*   HCT 36.7   MCV 96   MCH 29.5   MCHC 30.8*   RDW 14.7          IMAGING:  No results found for this or any previous visit (from the past 24 hour(s)).

## 2023-11-15 NOTE — PLAN OF CARE
Goal Outcome Evaluation:      Plan of Care Reviewed With: patient    Overall Patient Progress: no changeOverall Patient Progress: no change    Jackie Henry RNCC

## 2023-11-15 NOTE — PROGRESS NOTES
Care Management Follow Up    Length of Stay (days): 25    Expected Discharge Date: 12/09/2023     Concerns to be Addressed:    TBD post surgery   Patient plan of care discussed at interdisciplinary rounds: No    Anticipated Discharge Disposition:  TBD     Anticipated Discharge Services:  TBD  Anticipated Discharge DME:  TBD    Patient/family educated on Medicare website which has current facility and service quality ratings:  NA  Education Provided on the Discharge Plan:    Patient/Family in Agreement with the Plan:      Referrals Placed by CM/SW:  NA  Private pay costs discussed: Not applicable    Additional Information:    Patient's status reviewed by chart. Pt will be here for 12/1 tumor removal surgery. Writer continue to follow and support related to safe discharge planning.    Jackie Henry, RN  7C RN Care Coordinator  Phone: [phone]  Pager: [phone]  [facility] ([id number]) Saturday & Sunday; ([id number]) [facility] Recognized Holidays   Units: 5A, 5B & 5C  Pager: [phone]  Units: 6B, 6C & 6D    Pager: [phone]  Units: 7A, 7B, 7C & 7D    Pager: [phone]  Units: 6A & ICU   Pager: [phone]  Units: 5 Ortho, 5MS & WB ED Pager: [phone]  Units: 6MS, 8A & 10 ICU  Pager [phone]

## 2023-11-16 NOTE — PROGRESS NOTES
CLINICAL NUTRITION SERVICES - REASSESSMENT NOTE     Nutrition Prescription    RECOMMENDATIONS FOR MDs/PROVIDERS TO ORDER:  None currently    Malnutrition Status:    Patient does not meet two of the established criteria necessary for diagnosing malnutrition    Recommendations already ordered by Registered Dietitian (RD):  Continue room service ordering assistance    Future/Additional Recommendations:  Monitor PO     EVALUATION OF THE PROGRESS TOWARD GOALS   Diet: Regular    Intake/Tolerance: Patient consuming 50-75 % of 3-4 meal(s) daily. Per HT review, pt ordering around 4000 kcal/day and 115-150 g protein/day so even with PO of 50%, pt still meeting estimated nutrition needs.      NEW FINDINGS   Pt remains admitted until surgery around 12/1-12/2 per chart review.     GI:  Last BM: 11/16/23    Weight:  Most Recent Weight: 96 kg (211 lb 10.3 oz)  on 11/15/23 via Bed scale  Body mass index is 40.48 kg/m .  Wt up 3.1 kg from admission.    Meds:  Lasix  Multivitamin w/ minerals    Labs:  Reviewed       MALNUTRITION  % Intake: No decreased intake noted  % Weight Loss: None noted  Subcutaneous Fat Loss: None observed   Muscle Loss: None observed  Fluid Accumulation/Edema: None noted  Malnutrition Diagnosis: Patient does not meet two of the established criteria necessary for diagnosing malnutrition    Previous Goals   Patient to consume % of nutritionally adequate meal trays TID, or the equivalent with supplements/snacks.  Evaluation: Met    Previous Nutrition Diagnosis  Predicted inadequate nutrient intake related to potential for decreased appetite if prolonged admission.      Evaluation: No change    CURRENT NUTRITION DIAGNOSIS  No nutrition diagnosis at this time     INTERVENTIONS  Implementation  Continue room service ordering assistance      Goals  Patient to consume % of nutritionally adequate meal trays TID, or the equivalent with supplements/snacks.    Monitoring/Evaluation  Progress toward goals will  be monitored and evaluated per protocol.      Sima Troy, JDN, LD    6C (beds 8325-6196) + 7C (beds 3189-5726) + ED   RD pager: [phone]  Weekend/Holiday RD pager: [phone]

## 2023-11-16 NOTE — PLAN OF CARE
Pt continues to refuse some medications; states MD discontinued certain medications because she was up independently (furosemide + Lovenox).   Has refused BG checks; labs stable. Up independently; reports voiding and having more than one BM today. Wants to speak with team regarding home discharge before surgery for tumor removal on 12/1.

## 2023-11-16 NOTE — PLAN OF CARE
"Goal Outcome Evaluation:      Plan of Care Reviewed With: patient    Overall Patient Progress: no changeOverall Patient Progress: no change    Outcome Evaluation: neuros intact,  On 3l per nc while sleeping.  Denies pain.    A:  patient awake this morning states \"I want to meet my neuro surgery team.  I've been going to [place] and they were going to do the  surgery.  I don't know why I got transferred.  I want to be discharged over thankgiving and come back for surgery after. I don't want to be forced to have the surgery without going home to be with my family before.\"  R:  continue to monitor and treat per plan of care.  "

## 2023-11-16 NOTE — PLAN OF CARE
"Cares 1100 to 1900    /84 (BP Location: Right arm)   Pulse 76   Temp 98  F (36.7  C) (Axillary)   Resp 18   Ht 1.54 m (5' 0.63\")   Wt 96 kg (211 lb 10.3 oz)   SpO2 97%   BMI 40.48 kg/m      Goal Outcome Evaluation:    Plan of Care Reviewed With: patient    Overall Patient Progress: no changeOverall Patient Progress: no change    Outcome Evaluation: Neuros intact. Intermittent SOB, on room air. Ambulating in room independently. Denies pain. Continue to monitor pt.      "

## 2023-11-16 NOTE — PROGRESS NOTES
[facility], [place]   11/16/2023   Neurosurgery Progress Note:    Interval History: BRIGIDA.     Assessment:  Mirian Cunningham is a 43 year old female with complex psychosocial situation including paranoid schizophrenia and lack of active guardianship transferred to [facility] with known large sellar/suprasellar mass. Godmother Adri is appointed as the medical decision maker. No other family members available or willing to participate in her care.  MRI with large multilobulated pituitary mass/adenoma with mass effect and encasement of the cavernous structures including the cavernous internal carotid arteries, M1 segment of the right MCA, and A1 and proximal A2 segments of both anterior cerebral arteries.     Plan:  Q4h neuro exams   Q3d labs  Pain control  Seroquel PRN  Maintain SBP < 160  Regular diet  Normonatremia   Continue to monitor for fevers and/or signs of infection  DVT: SCDs and lovenox, encouraging ambulation   Ongoing OR planning for combined EEA and transcranial approaches for tumor resection  Dispo: Floor status until OR 12/1-12/2  -----------------------------------  Fernando Welch MD, PGY-1  Department of Neurosurgery  Pager: [phone]    Please contact neurosurgery resident on call with questions.    Dial * * *[phone], enter [phone] when prompted.   -----------------------------------  Objective:   Temp:  [98  F (36.7  C)] 98  F (36.7  C)  Pulse:  [74-76] 74  Resp:  [18] 18  BP: (123-128)/(84-86) 128/86  SpO2:  [95 %-100 %] 97 %  I/O last 3 completed shifts:  In: 580 [P.O.:580]  Out: -     NEUROLOGIC:  Gen: Appears comfortable, NAD  Resp: breathing comfortably on room air  Neurologic:  -- Awake; Alert; oriented x 3  -- Follows commands intermittently  -- Speech fluent, spontaneous. No aphasia or dysarthria.  -- no gaze preference. No apparent hemineglect.  Cranial Nerves:  -- R eye blindness; L eye temporal field cut. Left nasal superio qudrant 20/30. R RAPD.  -- gaze is intermittently  dysconjugate w R exotropia   -- face symmetrical, tongue midline  -- hearing grossly intact bilat  -- Trapezii 5/5 strength bilat symmetric     Motor:  Normal bulk / tone; no tremor, rigidity, or bradykinesia.  No muscle wasting or fasciculations       Delt Bi Tri Hand Flexion/  Extension Iliopsoas Quadriceps Hamstrings Tibialis Anterior Gastroc     C5 C6 C7 C8/T1 L2 L3 L4-S1 L4 S1   R 5 5 5 5 5 5 5 5 5   L 5 5 5 5 5 5 5 5 5      Sensory:  intact to LT x 4 extremities      Reflexes: no hyperreflexia, no Hoffmans, mute Babinski bilaterally      LABS:  Recent Labs   Lab 11/13/23  1006 11/09/23  1446     --    POTASSIUM 4.1  --    CHLORIDE 106  --    CO2 27  --    ANIONGAP 10  --    * 85   BUN 13.5  --    CR 1.00*  --    FADUMO 9.5  --      Recent Labs   Lab 11/13/23  1006   WBC 4.5   RBC 3.83   HGB 11.3*   HCT 36.7   MCV 96   MCH 29.5   MCHC 30.8*   RDW 14.7          IMAGING:  No results found for this or any previous visit (from the past 24 hour(s)).

## 2023-11-17 NOTE — PROGRESS NOTES
[facility], [place]   11/17/2023   Neurosurgery Progress Note:    Interval History: BRIGIDA.     Assessment:  Mirian Cunningham is a 43 year old female with complex psychosocial situation including paranoid schizophrenia and lack of active guardianship transferred to [facility] with known large sellar/suprasellar mass. Godmother Adri is appointed as the medical decision maker. No other family members available or willing to participate in her care.  MRI with large multilobulated pituitary mass/adenoma with mass effect and encasement of the cavernous structures including the cavernous internal carotid arteries, M1 segment of the right MCA, and A1 and proximal A2 segments of both anterior cerebral arteries.     Plan:  Q4h neuro exams   Q3d labs  Pain control  Seroquel PRN  Maintain SBP < 160  Regular diet  Normonatremia   Continue to monitor for fevers and/or signs of infection  DVT: SCDs and lovenox, encouraging ambulation   Ongoing OR planning for combined EEA and transcranial approaches for tumor resection  Dispo: Floor status until OR 12/1-12/2  -----------------------------------  Fernando Welch MD, PGY-1  Department of Neurosurgery  Pager: [phone]    Please contact neurosurgery resident on call with questions.    Dial * * *[phone], enter [phone] when prompted.   -----------------------------------  Objective:   Temp:  [97.6  F (36.4  C)-98.6  F (37  C)] 97.9  F (36.6  C)  Pulse:  [58-82] 81  Resp:  [18-20] 18  BP: (124-142)/(79-87) 124/79  SpO2:  [96 %-98 %] 96 %  I/O last 3 completed shifts:  In: 240 [P.O.:240]  Out: -     NEUROLOGIC:  Gen: Appears comfortable, NAD  Resp: breathing comfortably on room air  Neurologic:  -- Awake; Alert; oriented x 3  -- Follows commands intermittently  -- Speech fluent, spontaneous. No aphasia or dysarthria.  -- no gaze preference. No apparent hemineglect.  Cranial Nerves:  -- R eye blindness; L eye temporal field cut. Left nasal superio qudrant 20/30. R RAPD.  --  gaze is intermittently dysconjugate w R exotropia   -- face symmetrical, tongue midline  -- hearing grossly intact bilat  -- Trapezii 5/5 strength bilat symmetric     Motor:  Normal bulk / tone; no tremor, rigidity, or bradykinesia.  No muscle wasting or fasciculations       Delt Bi Tri Hand Flexion/  Extension Iliopsoas Quadriceps Hamstrings Tibialis Anterior Gastroc     C5 C6 C7 C8/T1 L2 L3 L4-S1 L4 S1   R 5 5 5 5 5 5 5 5 5   L 5 5 5 5 5 5 5 5 5      Sensory:  intact to LT x 4 extremities      Reflexes: no hyperreflexia, no Hoffmans, mute Babinski bilaterally    LABS:  Recent Labs   Lab 11/16/23  1015 11/13/23  1006    143   POTASSIUM 4.1 4.1   CHLORIDE 106 106   CO2 27 27   ANIONGAP 9 10   * 115*   BUN 15.5 13.5   CR 0.81 1.00*   FADUMO 9.4 9.5     Recent Labs   Lab 11/16/23  1015   WBC 5.2   RBC 3.96   HGB 11.5*   HCT 37.1   MCV 94   MCH 29.0   MCHC 31.0*   RDW 14.3        IMAGING:  No results found for this or any previous visit (from the past 24 hour(s)).

## 2023-11-17 NOTE — PLAN OF CARE
Goal Outcome Evaluation: Ongoing, progressing    Plan of Care Reviewed With: patient    Overall Patient Progress: no change    Outcome Evaluation: Neuro intact, 3L NC overnight, slept well between cares. Pt is alert and oriented and able to make needs known. Continue to monitor for changes.

## 2023-11-17 NOTE — INTERIM SUMMARY
Neurosurgery Brief Note    Patient was seen this afternoon with Hawa Jordan, MARIE present and questions were answered. Request was also made to change neurochecks to q8h, discontinue the frequent glucose checks, lovenox and lasix as patient is refusing them. We will make appropriate modifications based on our recommendations.     Fernando Welch MD, PGY-1  Department of Neurosurgery    Please contact neurosurgery resident on call with questions.    Dial * * [phone], enter [phone] when prompted.

## 2023-11-17 NOTE — PLAN OF CARE
Goal Outcome Evaluation: Ongoing, progressing     Plan of Care Reviewed With: patient     Overall Patient Progress: no change     Outcome Evaluation:   Neuro checks unchanged. Remains on RA with no c/o SOB.   Wants to speak with MD; RN requested MD contact RN for rounding moving forward, pt remains forgetful. Sticky note in chart with questions patient would like addressed.

## 2023-11-18 NOTE — PLAN OF CARE
Goal Outcome Evaluation:      Plan of Care Reviewed With: patient    Overall Patient Progress: no changeOverall Patient Progress: no change    Outcome Evaluation: Continue with POC    Patient Aox4 on RA. Neuro checks intact. Patient was able to sleep most of the night. No PRN meds given this shift. Up independent to bathroom. No MD LAURA aware and OK. Refuses continuous pulse ox. Call light in reach and able to make needs known.

## 2023-11-18 NOTE — PLAN OF CARE
Goal Outcome Evaluation: Ongoing, progressing     Plan of Care Reviewed With: patient     Overall Patient Progress: no change     Outcome Evaluation:   Neuro checks unchanged. Remains on RA with no c/o SOB. Independent in room, reports walking in halls and completing own personal cares.   Slept off and on during day, withdrawn most of shift and continues refuse same medications as previous shifts.

## 2023-11-18 NOTE — PROGRESS NOTES
[facility], [place]   11/18/2023   Neurosurgery Progress Note:    Interval History: BRIGIDA.     Assessment:  Mirian Cunningham is a 43 year old female with complex psychosocial situation including paranoid schizophrenia and lack of active guardianship transferred to [facility] with known large sellar/suprasellar mass. Godmother Adri is appointed as the medical decision maker. No other family members available or willing to participate in her care.  MRI with large multilobulated pituitary mass/adenoma with mass effect and encasement of the cavernous structures including the cavernous internal carotid arteries, M1 segment of the right MCA, and A1 and proximal A2 segments of both anterior cerebral arteries.     Plan:  Q4h neuro exams   Q3d labs  Pain control  Seroquel PRN  Maintain SBP < 160  Regular diet  Normonatremia   Continue to monitor for fevers and/or signs of infection  DVT: SCDs and lovenox, encouraging ambulation   Ongoing OR planning for combined EEA and transcranial approaches for tumor resection  Dispo: Floor status until OR 12/1-12/2  -----------------------------------  Kelsey Briceno MD  NSGY PGY-3    Please contact neurosurgery resident on call with questions.    Dial * * *[phone], enter [phone] when prompted.   -----------------------------------  Objective:   Temp:  [97.6  F (36.4  C)-98.1  F (36.7  C)] 97.6  F (36.4  C)  Pulse:  [67-87] 75  Resp:  [16-20] 18  BP: (124-165)/() 132/92  SpO2:  [97 %-98 %] 97 %  I/O last 3 completed shifts:  In: 540 [P.O.:540]  Out: -     NEUROLOGIC:  Gen: Appears comfortable, NAD  Resp: breathing comfortably on room air  Neurologic:  -- Awake; Alert; oriented x 3  -- Follows commands intermittently  -- Speech fluent, spontaneous. No aphasia or dysarthria.  -- no gaze preference. No apparent hemineglect.  Cranial Nerves:  -- R eye blindness; L eye temporal field cut. Left nasal superio qudrant 20/30. R RAPD.  -- gaze is intermittently dysconjugate w R  exotropia   -- face symmetrical, tongue midline  -- hearing grossly intact bilat  -- Trapezii 5/5 strength bilat symmetric     Motor:  Normal bulk / tone; no tremor, rigidity, or bradykinesia.  No muscle wasting or fasciculations       Delt Bi Tri Hand Flexion/  Extension Iliopsoas Quadriceps Hamstrings Tibialis Anterior Gastroc     C5 C6 C7 C8/T1 L2 L3 L4-S1 L4 S1   R 5 5 5 5 5 5 5 5 5   L 5 5 5 5 5 5 5 5 5      Sensory:  intact to LT x 4 extremities      Reflexes: no hyperreflexia, no Hoffmans, mute Babinski bilaterally    LABS:  Recent Labs   Lab 11/16/23  1015 11/13/23  1006    143   POTASSIUM 4.1 4.1   CHLORIDE 106 106   CO2 27 27   ANIONGAP 9 10   * 115*   BUN 15.5 13.5   CR 0.81 1.00*   FADUMO 9.4 9.5     Recent Labs   Lab 11/16/23  1015   WBC 5.2   RBC 3.96   HGB 11.5*   HCT 37.1   MCV 94   MCH 29.0   MCHC 31.0*   RDW 14.3        IMAGING:  No results found for this or any previous visit (from the past 24 hour(s)).

## 2023-11-18 NOTE — PLAN OF CARE
Goal Outcome Evaluation:       A&O (forgetful) VSS except HTN (MD aware, no interventions) on RA. No PIV Ok. Pt refusing meds and cares. Refuses Continuous pulse Ox monitoring. Neuros in tact. Sleeping most of shift. Reg diet, good appetite.Frequent rounding.

## 2023-11-18 NOTE — PROGRESS NOTES
Brief Neurosurgery Progress note:    Called to update Adri about patient. She is understanding and agrees with patient remaining in hospital until surgery.    Kelsey Briceno MD  NSGY PGY-3

## 2023-11-19 NOTE — PROGRESS NOTES
[facility], [place]   11/19/2023   Neurosurgery Progress Note:    Interval History: BRIGIDA.     Called and updated brother Garth and relayed the message regarding Adri's request for family members to visit Mirian. Stated that he is currently in [place], but will get in touch with her sister living in [place] to visit Mirian. He is also in agreement with the hospitalization and current plan for Mirian.      Assessment:  Mirian Cunningham is a 43 year old female with complex psychosocial situation including paranoid schizophrenia and lack of active guardianship transferred to [facility] with known large sellar/suprasellar mass. Godmother Adri is appointed as the medical decision maker. No other family members available or willing to participate in her care.  MRI with large multilobulated pituitary mass/adenoma with mass effect and encasement of the cavernous structures including the cavernous internal carotid arteries, M1 segment of the right MCA, and A1 and proximal A2 segments of both anterior cerebral arteries.     Plan:  Q4h neuro exams   Q3d labs  Pain control  Seroquel PRN  Maintain SBP < 160  Regular diet  Normonatremia   Continue to monitor for fevers and/or signs of infection  DVT: SCDs and lovenox, encouraging ambulation   Ongoing OR planning for combined EEA and transcranial approaches for tumor resection  Dispo: Floor status until OR 12/1-12/2  -----------------------------------  Fernando Welch MD, PGY-1  Department of Neurosurgery  Pager: [phone]    Please contact neurosurgery resident on call with questions.    Dial * * *[phone], enter [phone] when prompted.   -----------------------------------  Objective:   Temp:  [97.5  F (36.4  C)-98.6  F (37  C)] 98  F (36.7  C)  Pulse:  [75-85] 80  Resp:  [16-20] 20  BP: (116-149)/(73-97) 143/86  SpO2:  [94 %-98 %] 98 %  I/O last 3 completed shifts:  In: 480 [P.O.:480]  Out: 625 [Urine:625]    NEUROLOGIC:  Gen: Appears comfortable, NAD  Resp:  breathing comfortably on room air  Neurologic:  -- Awake; Alert; oriented x 3  -- Follows commands intermittently  -- Speech fluent, spontaneous. No aphasia or dysarthria.  -- no gaze preference. No apparent hemineglect.  Cranial Nerves:  -- R eye blindness; L eye temporal field cut. Left nasal superio qudrant 20/30. R RAPD.  -- gaze is intermittently dysconjugate w R exotropia   -- face symmetrical, tongue midline  -- hearing grossly intact bilat  -- Trapezii 5/5 strength bilat symmetric     Motor:  Normal bulk / tone; no tremor, rigidity, or bradykinesia.  No muscle wasting or fasciculations       Delt Bi Tri Hand Flexion/  Extension Iliopsoas Quadriceps Hamstrings Tibialis Anterior Gastroc     C5 C6 C7 C8/T1 L2 L3 L4-S1 L4 S1   R 5 5 5 5 5 5 5 5 5   L 5 5 5 5 5 5 5 5 5      Sensory:  intact to LT x 4 extremities      Reflexes: no hyperreflexia, no Hoffmans, mute Babinski bilaterally    LABS:  Recent Labs   Lab 11/19/23  1104 11/16/23  1015 11/13/23  1006    142 143   POTASSIUM 4.3 4.1 4.1   CHLORIDE 105 106 106   CO2 27 27 27   ANIONGAP 10 9 10   * 128* 115*   BUN 13.9 15.5 13.5   CR 0.92 0.81 1.00*   FADUMO 9.7 9.4 9.5     Recent Labs   Lab 11/19/23  1104   WBC 5.1   RBC 4.29   HGB 12.5   HCT 39.6   MCV 92   MCH 29.1   MCHC 31.6   RDW 14.4        IMAGING:  No results found for this or any previous visit (from the past 24 hour(s)).

## 2023-11-19 NOTE — PLAN OF CARE
Goal Outcome Evaluation:      Plan of Care Reviewed With: patient    Overall Patient Progress: no change    Outcome Evaluation: 6134-2088. No actue changes on shift. VSS on RA. Denies pain. Neuros intact. Requested Rentin-A cream for scars on wrists and blister on hand that she had been taking PTA, ordered by team and will start tonight. Pt showered, ambulating room.

## 2023-11-19 NOTE — PLAN OF CARE
Goal Outcome Evaluation:      Plan of Care Reviewed With: patient    Overall Patient Progress: no changeOverall Patient Progress: no change    Outcome Evaluation: Continue with POC    Patient Aox4, VSS on 2 L NC. Slept well most of the night. Neuros intact. No PRNs given this shift. Call light in reach and able to make needs known.

## 2023-11-19 NOTE — PLAN OF CARE
Goal Outcome Evaluation:       A&O VSS on RA 2L NC for comfort. No PIV. Up independently in room. Neuros at baseline. No lidocaine patches on. Calls to make needs known.

## 2023-11-20 NOTE — PROGRESS NOTES
Care Management Follow Up    Length of Stay (days): 30    Concerns to be Addressed:    TBD post surgery   Patient plan of care discussed at interdisciplinary rounds: No     Anticipated Discharge Disposition:  TBD     Anticipated Discharge Services:  TBD  Anticipated Discharge DME:  TBD     Patient/family educated on Medicare website which has current facility and service quality ratings:  NA  Education Provided on the Discharge Plan:    Patient/Family in Agreement with the Plan:       Referrals Placed by CM/SW:  NA  Private pay costs discussed: Not applicable     Additional Information:     Patient's status reviewed by chart. Pt remains stable. Pt continues to stay here for 12/1 tumor removal surgery.     Continue to monitor.    Jackie Henry RN  7C RN Care Coordinator  Phone: [phone]  Pager: [phone]  [facility] ([id number]) Saturday & Sunday; ([id number]) [facility] Recognized Holidays   Units: 5A, 5B & 5C  Pager: [phone]  Units: 6B, 6C & 6D    Pager: [phone]  Units: 7A, 7B, 7C & 7D    Pager: [phone]  Units: 6A & ICU   Pager: [phone]  Units: 5 Ortho, 5MS & WB ED Pager: [phone]  Units: 6MS, 8A & 10 ICU  Pager [phone]

## 2023-11-20 NOTE — PLAN OF CARE
Goal Outcome Evaluation:      Plan of Care Reviewed With: patient    Overall Patient Progress: no changeOverall Patient Progress: no change    Outcome Evaluation: Pleasant and cooperative.  Declined lovenox injection;states she is walking to prevent blood clots. Continue plan of care.

## 2023-11-20 NOTE — PROGRESS NOTES
Attempted to contact patient's brother to update today. No answer. Left VM to call back if questions.    Renetta Goel MD, PhD  PGY-2 Neurosurgery    Please contact neurosurgery resident on call with questions.    Dial * * [phone], enter [phone] when prompted.

## 2023-11-20 NOTE — PROGRESS NOTES
[facility], [place]   11/20/2023   Neurosurgery Progress Note:    Interval History: BRIGIDA. Patient reports she feels well. Denies headaches, nausea, or new weakness. She reports unchanged vision.    Assessment:  Mirian Cunningham is a 43 year old female with complex psychosocial situation including paranoid schizophrenia and lack of active guardianship transferred to [facility] with known large sellar/suprasellar mass. Godmother Adri is appointed as the medical decision maker. No other family members available or willing to participate in her care.  MRI with large multilobulated pituitary mass/adenoma with mass effect and encasement of the cavernous structures including the cavernous internal carotid arteries, M1 segment of the right MCA, and A1 and proximal A2 segments of both anterior cerebral arteries.     Plan:  Q4h neuro exams   Q3d labs  Pain control  Seroquel PRN  Maintain SBP < 160  Regular diet  Normonatremia   Continue to monitor for fevers and/or signs of infection  DVT: SCDs and lovenox, encouraging ambulation   Ongoing OR planning for combined EEA and transcranial approaches for tumor resection  Dispo: Floor status until OR 12/1-12/2  -----------------------------------  Eneida Zimmerman PA-C  Neurosurgery Department  Pager: [phone]  -----------------------------------  Objective:   Temp:  [97.3  F (36.3  C)-98.5  F (36.9  C)] 98  F (36.7  C)  Pulse:  [71-88] 71  Resp:  [20-22] 20  BP: (133-155)/(79-96) 144/90  SpO2:  [96 %-98 %] 97 %  I/O last 3 completed shifts:  In: 500 [P.O.:500]  Out: -     NEUROLOGIC:  Gen: Appears comfortable, NAD  Resp: breathing comfortably on room air  Neurologic:  -- Awake; Alert; oriented x 3  -- Follows commands intermittently  -- Speech fluent, spontaneous. No aphasia or dysarthria.  -- no gaze preference. No apparent hemineglect.  Cranial Nerves:  -- R eye blindness; L eye temporal field cut. Left nasal superio qudrant 20/30. R RAPD.  -- gaze is  intermittently dysconjugate w R exotropia   -- face symmetrical, tongue midline  -- hearing grossly intact bilat  -- Trapezii 5/5 strength bilat symmetric     Motor:  Normal bulk / tone; no tremor, rigidity, or bradykinesia.  No muscle wasting or fasciculations       Delt Bi Tri Hand Flexion/  Extension Iliopsoas Quadriceps Hamstrings Tibialis Anterior Gastroc     C5 C6 C7 C8/T1 L2 L3 L4-S1 L4 S1   R 5 5 5 5 5 5 5 5 5   L 5 5 5 5 5 5 5 5 5      Sensory:  intact to LT x 4 extremities      Reflexes: no hyperreflexia, no Hoffmans, mute Babinski bilaterally    LABS:  Recent Labs   Lab 11/19/23  1104 11/16/23  1015 11/13/23  1006    142 143   POTASSIUM 4.3 4.1 4.1   CHLORIDE 105 106 106   CO2 27 27 27   ANIONGAP 10 9 10   * 128* 115*   BUN 13.9 15.5 13.5   CR 0.92 0.81 1.00*   FADUMO 9.7 9.4 9.5     Recent Labs   Lab 11/19/23  1104   WBC 5.1   RBC 4.29   HGB 12.5   HCT 39.6   MCV 92   MCH 29.1   MCHC 31.6   RDW 14.4        IMAGING:  No results found for this or any previous visit (from the past 24 hour(s)).

## 2023-11-20 NOTE — PLAN OF CARE
Goal Outcome Evaluation:     Plan of Care Reviewed With: patient     Overall Patient Progress: no change     Outcome Evaluation: Shift 1900 - 2300. No changes to report. VSS on RA. Pt alert and oriented. OK for no PIV. Regular diet. VDSP. Up ad devaughn in room, SBA in hallway. Order for continuous pulse ox but pt refusing. Needing 2L O2 overnight when sleeping for MAXX. Tentative plan for OR 12/1 - 12/2. Cont with current POC

## 2023-11-20 NOTE — PLAN OF CARE
Goal Outcome Evaluation:      Plan of Care Reviewed With: patient    Overall Patient Progress: no changeOverall Patient Progress: no change    Outcome Evaluation: Continue with POC    No acute changes this shift. Patient Aox4, VSS on RA. Patient slept intermittently throughout night. Up independently in room. Refused continuous pulse ox. No PIV. No PRN meds given this shift. Call light in reach and able to make needs known.

## 2023-11-20 NOTE — PLAN OF CARE
Goal Outcome Evaluation:      Plan of Care Reviewed With: patient    Overall Patient Progress: no changeOverall Patient Progress: no change    Outcome Evaluation: Stable, awaiting for surgery by 12/1/23.    Jackie Henry RNCC

## 2023-11-20 NOTE — PLAN OF CARE
Goal Outcome Evaluation: Ongoing, progressing    Plan of Care Reviewed With: patient    Outcome Evaluation: 43 y.o. female admitted with pituitary tumor. Pt is alert and oriented and able to make needs known. Refusing lovenox and alternativly walks 4x/day. Continue to monitor for changes.

## 2023-11-21 NOTE — PROGRESS NOTES
[facility], [place]   11/21/2023   Neurosurgery Progress Note:    Interval History: BRIGIDA.     Assessment:  Mirian Cunningham is a 43 year old female with complex psychosocial situation including paranoid schizophrenia and lack of active guardianship transferred to [facility] with known large sellar/suprasellar mass. Godmother Adri is appointed as the medical decision maker. No other family members available or willing to participate in her care.  MRI with large multilobulated pituitary mass/adenoma with mass effect and encasement of the cavernous structures including the cavernous internal carotid arteries, M1 segment of the right MCA, and A1 and proximal A2 segments of both anterior cerebral arteries.     Plan:  Q4h neuro exams   Q3d labs  Pain control  Seroquel PRN  Maintain SBP < 160  Regular diet  Normonatremia   Continue to monitor for fevers and/or signs of infection  DVT: SCDs and lovenox, encouraging ambulation   Ongoing OR planning for combined EEA and transcranial approaches for tumor resection  Dispo: Floor status until OR 12/1-12/2  -----------------------------------  Renetta Goel MD, PhD  PGY-2 Neurosurgery    Please contact neurosurgery resident on call with questions.    Dial * * [phone], enter [phone] when prompted.   -----------------------------------  Objective:   Temp:  [97.6  F (36.4  C)-98.6  F (37  C)] 97.6  F (36.4  C)  Pulse:  [76-91] 89  Resp:  [16-20] 16  BP: (119-150)/(63-91) 132/84  SpO2:  [94 %-99 %] 94 %  I/O last 3 completed shifts:  In: 360 [P.O.:360]  Out: -     NEUROLOGIC:  Gen: Appears comfortable, NAD  Resp: breathing comfortably on room air  Neurologic:  -- Awake; Alert; oriented x 3  -- Follows commands intermittently  -- Speech fluent, spontaneous. No aphasia or dysarthria.  -- no gaze preference. No apparent hemineglect.  Cranial Nerves:  -- R eye blindness; L eye temporal field cut. Left nasal superio qudrant 20/30. R RAPD.  -- gaze is intermittently  dysconjugate w R exotropia   -- face symmetrical, tongue midline  -- hearing grossly intact bilat  -- Trapezii 5/5 strength bilat symmetric     Motor:  Normal bulk / tone; no tremor, rigidity, or bradykinesia.  No muscle wasting or fasciculations       Delt Bi Tri Hand Flexion/  Extension Iliopsoas Quadriceps Hamstrings Tibialis Anterior Gastroc     C5 C6 C7 C8/T1 L2 L3 L4-S1 L4 S1   R 5 5 5 5 5 5 5 5 5   L 5 5 5 5 5 5 5 5 5      Sensory:  intact to LT x 4 extremities      Reflexes: no hyperreflexia, no Hoffmans, mute Babinski bilaterally    LABS:  Recent Labs   Lab 11/19/23  1104 11/16/23  1015    142   POTASSIUM 4.3 4.1   CHLORIDE 105 106   CO2 27 27   ANIONGAP 10 9   * 128*   BUN 13.9 15.5   CR 0.92 0.81   FADUMO 9.7 9.4     Recent Labs   Lab 11/19/23  1104   WBC 5.1   RBC 4.29   HGB 12.5   HCT 39.6   MCV 92   MCH 29.1   MCHC 31.6   RDW 14.4        IMAGING:  No results found for this or any previous visit (from the past 24 hour(s)).

## 2023-11-21 NOTE — PLAN OF CARE
Goal Outcome Evaluation:      Plan of Care Reviewed With: patient    Overall Patient Progress: no changeOverall Patient Progress: no change    Outcome Evaluation: Continue with POC    Patient Aox4, VSS on RA. No acute changes this shift. Neuro checks intact. Patient slept most of the shift. Call light in reach and able to make needs known.

## 2023-11-21 NOTE — PLAN OF CARE
Goal Outcome Evaluation: Ongoing, progressing    Plan of Care Reviewed With: patient    Overall Patient Progress: no change    Outcome Evaluation: 43 y.o. female admitted with pituitary tumor. Pt is alert and oriented and able to make needs known. Refusing lovenox and alternativly walks 4x/day. Previous RN reports overuse of albuterol inhaler, reinforced education regarding proper dosage of medication. Humidifier connected to NC for nosebleeds. Continue to monitor for changes.

## 2023-11-21 NOTE — PLAN OF CARE
Goal Outcome Evaluation:      Plan of Care Reviewed With: patient    Overall Patient Progress: no changeOverall Patient Progress: no change    Outcome Evaluation: No medical changes.  Waiting for surgery next week.  Patient not following prescriptions for her inhalers or flonase spray.  Used albuterol for 5 spays, and used flonase 4 times in each notstil.   Edcuated patient on the proper technique and amounts.  Will need reinforcement.  Had conversation about the plans for surgery next week.  Patient had much to say about this in rambling, run on sentences.  Appears very unhappy about the plan to stay here until surgery on December 1st.  She would like to cook her own thanksgiving meal.  Continue to monitor.

## 2023-11-22 NOTE — PROGRESS NOTES
NEUROSURGERY BRIEF NOTE    Contacted patient's godmother Adri today. No questions or concerns at this time. Continues to be agreeable to consenting for surgery in December. States she and Mirian talked on the phone this morning and she provided reassurance to Mirian that surgery is in her best interest.    Confirmed that Adri has contact information for the hospital if she has any questions. Will continue to call Adri and other family members periodically to update.    Renetta Goel MD, PhD  PGY-2 Neurosurgery    Please contact neurosurgery resident on call with questions.    Dial * * [phone], enter [phone] when prompted.

## 2023-11-22 NOTE — PROGRESS NOTES
[facility], [place]   11/22/2023   Neurosurgery Progress Note:    Interval History: BRIGIDA.     Assessment:  Mirian Cunningham is a 43 year old female with complex psychosocial situation including paranoid schizophrenia and lack of active guardianship transferred to [facility] with known large sellar/suprasellar mass. Godmother Adri is appointed as the medical decision maker. No other family members available or willing to participate in her care.  MRI with large multilobulated pituitary mass/adenoma with mass effect and encasement of the cavernous structures including the cavernous internal carotid arteries, M1 segment of the right MCA, and A1 and proximal A2 segments of both anterior cerebral arteries.     Plan:  Q4h neuro exams   Q3d labs - intermittently refusing  Pain control  Seroquel PRN  Maintain SBP < 160  Regular diet  Normonatremia   Continue to monitor for fevers and/or signs of infection  DVT: SCDs and lovenox, encouraging ambulation   Ongoing OR planning for combined EEA and transcranial approaches for tumor resection  Dispo: Floor status until OR 12/1-12/2  -----------------------------------  Renetta Goel MD, PhD  PGY-2 Neurosurgery    Please contact neurosurgery resident on call with questions.    Dial * * *[phone], enter [phone] when prompted.   -----------------------------------  Objective:   Temp:  [97.3  F (36.3  C)-97.7  F (36.5  C)] 97.3  F (36.3  C)  Pulse:  [71-96] 71  Resp:  [16-20] 18  BP: (118-145)/(76-88) 145/88  SpO2:  [94 %-100 %] 100 %  No intake/output data recorded.    NEUROLOGIC:  Gen: Appears comfortable, NAD  Resp: breathing comfortably on room air  Neurologic:  -- Awake; Alert; oriented x 3  -- Follows commands intermittently  -- Speech fluent, spontaneous. No aphasia or dysarthria.  -- no gaze preference. No apparent hemineglect.  Cranial Nerves:  -- R eye blindness; L eye temporal field cut. Left nasal superio qudrant 20/30. R RAPD.  -- gaze is intermittently  dysconjugate w R exotropia   -- face symmetrical, tongue midline  -- hearing grossly intact bilat  -- Trapezii 5/5 strength bilat symmetric     Motor:  Normal bulk / tone; no tremor, rigidity, or bradykinesia.  No muscle wasting or fasciculations       Delt Bi Tri Hand Flexion/  Extension Iliopsoas Quadriceps Hamstrings Tibialis Anterior Gastroc     C5 C6 C7 C8/T1 L2 L3 L4-S1 L4 S1   R 5 5 5 5 5 5 5 5 5   L 5 5 5 5 5 5 5 5 5      Sensory:  intact to LT x 4 extremities      Reflexes: no hyperreflexia, no Hoffmans, mute Babinski bilaterally    LABS:  Recent Labs   Lab 11/19/23  1104 11/16/23  1015    142   POTASSIUM 4.3 4.1   CHLORIDE 105 106   CO2 27 27   ANIONGAP 10 9   * 128*   BUN 13.9 15.5   CR 0.92 0.81   FADUMO 9.7 9.4     Recent Labs   Lab 11/19/23  1104   WBC 5.1   RBC 4.29   HGB 12.5   HCT 39.6   MCV 92   MCH 29.1   MCHC 31.6   RDW 14.4        IMAGING:  No results found for this or any previous visit (from the past 24 hour(s)).

## 2023-11-22 NOTE — PLAN OF CARE
"RN [id number]:    Vital signs: /73 (BP Location: Right arm)   Pulse 86   Temp 98.1  F (36.7  C) (Oral)   Resp 18   Ht 1.54 m (5' 0.63\")   Wt 96 kg (211 lb 11.2 oz)   SpO2 100%   BMI 40.49 kg/m      Status: 10/21 admit with known large sellar/suprasellar mass   Neuro: AOX4  Pain/Nausea: denies pain and nausea  Cardiac: WNL  Respiratory: WNL on room air  Mobility: independent  Diet: regular, tolerating  Labs: reviewed  LDAs: none  Skin/incisions: no new deficits found  GI/: continent B/B  Plan: continue with plan of care  "

## 2023-11-22 NOTE — PLAN OF CARE
Assumed cares from 2300 to 0700    Pt is A/OX4, intermittently forgetful, but able to make her needs known. Denies chest pain, N/V, VSS on 3L NC for comfort. Patient slept intermittently throughout night. Up independently in room. Refused continuous pulse ox. No PIV. No PRN meds given this shift. Call light in reach, safety checks done, Q4VS, no BG checks, regular diet. No acute changes this shift.     Plan of Care Reviewed With: patient    Overall Patient Progress: no change

## 2023-11-23 NOTE — PLAN OF CARE
[id number]: Patient denies pain. Expressed frustration having to stay in the hospital until surgery on 12/1. Has been resting/watching TV in-between cares. Ordered take-out for dinner. Up independently. Reported having a few BMs today and voiding spontaneously. No PIV in place. Will continue to provide emotional support and notify MD of any changes.      Goal Outcome Evaluation:      Plan of Care Reviewed With: patient    Overall Patient Progress: no changeOverall Patient Progress: no change    Outcome Evaluation: Unhappy with staying in the hospital (especially on Thanksgiving) until surgery on 12/1. Intermittently forgetful.

## 2023-11-23 NOTE — PLAN OF CARE
Assumed Cares: [id number]  Vitals: VSS on RA. Wears O2 for comfort  Pain: c/o pain in feet  Neuro: A&Ox4 but forgetful  Cardiac: WDL  Respiratory: WDL  GI/: Voids spontaneously. No BM this shift. Denies nausea  Skin: No new skin concerns  IV/Drains: No IV access  Activity: Ind  Events: No acute events this shift    Plan of Care: Continue with POC

## 2023-11-23 NOTE — PROGRESS NOTES
[facility], [place]   11/23/2023   Neurosurgery Progress Note:    Interval History: BRIGIDA. Not happy about being in the hospital during Thanksgiving.     Assessment:  Mirian Cunningham is a 43 year old female with complex psychosocial situation including paranoid schizophrenia and lack of active guardianship transferred to [facility] with known large sellar/suprasellar mass. Godmother Adri is appointed as the medical decision maker. No other family members available or willing to participate in her care.  MRI with large multilobulated pituitary mass/adenoma with mass effect and encasement of the cavernous structures including the cavernous internal carotid arteries, M1 segment of the right MCA, and A1 and proximal A2 segments of both anterior cerebral arteries.     Plan:  Q4h neuro exams   Q3d labs - intermittently refusing  Pain control  Seroquel PRN  Maintain SBP < 160  Regular diet  Normonatremia   Continue to monitor for fevers and/or signs of infection  DVT: SCDs and lovenox, encouraging ambulation   Ongoing OR planning for combined EEA and transcranial approaches for tumor resection  Dispo: Floor status until OR 12/1-12/2  -----------------------------------  Fernando Welch MD, PGY-1  Department of Neurosurgery  Pager: [phone]    Please contact neurosurgery resident on call with questions.    Dial * * *[phone], enter [phone] when prompted.   -----------------------------------  Objective:   Temp:  [97.7  F (36.5  C)-98.1  F (36.7  C)] 97.9  F (36.6  C)  Pulse:  [79-87] 87  Resp:  [14-18] 18  BP: (126-149)/() 146/65  SpO2:  [93 %-99 %] 96 %  I/O last 3 completed shifts:  In: 240 [P.O.:240]  Out: 0     NEUROLOGIC:  Gen: Appears comfortable, NAD  Resp: breathing comfortably on room air  Neurologic:  -- Awake; Alert; oriented x 3  -- Follows commands intermittently  -- Speech fluent, spontaneous. No aphasia or dysarthria.  -- no gaze preference. No apparent hemineglect.  Cranial Nerves:  -- R  eye blindness; L eye temporal field cut. Left nasal superio qudrant 20/30. R RAPD.  -- gaze is intermittently dysconjugate w R exotropia   -- face symmetrical, tongue midline  -- hearing grossly intact bilat  -- Trapezii 5/5 strength bilat symmetric     Motor:  Normal bulk / tone; no tremor, rigidity, or bradykinesia.  No muscle wasting or fasciculations       Delt Bi Tri Hand Flexion/  Extension Iliopsoas Quadriceps Hamstrings Tibialis Anterior Gastroc     C5 C6 C7 C8/T1 L2 L3 L4-S1 L4 S1   R 5 5 5 5 5 5 5 5 5   L 5 5 5 5 5 5 5 5 5      Sensory:  intact to LT x 4 extremities      Reflexes: no hyperreflexia, no Hoffmans, mute Babinski bilaterally    LABS:  Recent Labs   Lab 11/22/23  1004 11/19/23  1104    142   POTASSIUM 4.4 4.3   CHLORIDE 107 105   CO2 23 27   ANIONGAP 12 10   * 107*   BUN 11.8 13.9   CR 0.90 0.92   FADUMO 9.5 9.7     Recent Labs   Lab 11/22/23  1004   WBC 5.9   RBC 4.24   HGB 12.4   HCT 40.4   MCV 95   MCH 29.2   MCHC 30.7*   RDW 14.4        IMAGING:  No results found for this or any previous visit (from the past 24 hour(s)).

## 2023-11-23 NOTE — PLAN OF CARE
Goal Outcome Evaluation:      Plan of Care Reviewed With: patient    Overall Patient Progress: no changeOverall Patient Progress: no change      Assumed Cares: [id number]  Vitals: q4h  Pain: Denies pain  Neuro: A&Ox4; forgetful  Cardiac: WNL  Respiratory: RA; pt given PRN neb and inhaler  GI/: WNL; No BM  Skin: WNL  IV/Drains: None; MD aware  Activity: Independent  Events: None     Plan of Care: Planning for tumor removal on 12/1. Pt remains here until date of surgery as pt's family is unable to take care of pt in the meantime. Continue with plan of care.

## 2023-11-24 NOTE — PROVIDER NOTIFICATION
" 1432 BGAMAL GREGORIO pt states that she will be leaving the hospital Mon by bus because she \"wants to do a few things\" and needs \"fresh air\" and will return for surgery on Friday. Thanks. MARIE Brantley [phone]  "

## 2023-11-24 NOTE — INTERIM SUMMARY
NEUROSURGERY BRIEF NOTE    Notified patient's decision-maker/godmother Adri Ramos of patient's wish to leave the hospital on Monday. Surgery team and Adri in agreement that this is not a safe plan for the patient, given that she is currently homeless. She will discuss with Mirian today.    Renetta Goel MD, PhD  PGY-2 Neurosurgery    Please contact neurosurgery resident on call with questions.    Dial * * *[phone], enter [phone] when prompted.

## 2023-11-24 NOTE — PROGRESS NOTES
[facility], [place]   11/24/2023   Neurosurgery Progress Note:    Interval History: BRIGIDA.     Assessment:  Mirian Cunningham is a 43 year old female with complex psychosocial situation including paranoid schizophrenia and lack of active guardianship transferred to [facility] with known large sellar/suprasellar mass. Godmother Adri is appointed as the medical decision maker. No other family members available or willing to participate in her care.  MRI with large multilobulated pituitary mass/adenoma with mass effect and encasement of the cavernous structures including the cavernous internal carotid arteries, M1 segment of the right MCA, and A1 and proximal A2 segments of both anterior cerebral arteries.     Plan:  Q8h neuro exams and vital signs  Q3d labs - intermittently refusing  Pain control  Seroquel PRN  Maintain SBP < 160  Regular diet  Normonatremia   Continue to monitor for fevers and/or signs of infection  DVT: SCDs and lovenox, encouraging ambulation   Ongoing OR planning for combined EEA and transcranial approaches for tumor resection  Dispo: Floor status until OR 12/1-12/2  -----------------------------------  Renetta Goel MD, PhD  PGY-2 Neurosurgery    Please contact neurosurgery resident on call with questions.    Dial * * *, enter [phone] when prompted.   -----------------------------------  Objective:   Temp:  [97.9  F (36.6  C)-98.1  F (36.7  C)] 98.1  F (36.7  C)  Pulse:  [75-89] 75  Resp:  [18-20] 20  BP: (116-156)/(65-98) 145/86  SpO2:  [96 %-100 %] 97 %  I/O last 3 completed shifts:  In: 200 [P.O.:200]  Out: -     NEUROLOGIC:  Gen: Appears comfortable, NAD  Resp: breathing comfortably on room air  Neurologic:  -- Awake; Alert; oriented x 3  -- Follows commands intermittently  -- Speech fluent, spontaneous. No aphasia or dysarthria.  -- no gaze preference. No apparent hemineglect.  Cranial Nerves:  -- R eye blindness; L eye temporal field cut. Left nasal superio qudrant 20/30.  R RAPD.  -- gaze is intermittently dysconjugate w R exotropia   -- face symmetrical, tongue midline  -- hearing grossly intact bilat  -- Trapezii 5/5 strength bilat symmetric     Motor:  Normal bulk / tone; no tremor, rigidity, or bradykinesia.  No muscle wasting or fasciculations       Delt Bi Tri Hand Flexion/  Extension Iliopsoas Quadriceps Hamstrings Tibialis Anterior Gastroc     C5 C6 C7 C8/T1 L2 L3 L4-S1 L4 S1   R 5 5 5 5 5 5 5 5 5   L 5 5 5 5 5 5 5 5 5      Sensory:  intact to LT x 4 extremities      Reflexes: no hyperreflexia, no Hoffmans, mute Babinski bilaterally    LABS:  Recent Labs   Lab 11/22/23  1004 11/19/23  1104    142   POTASSIUM 4.4 4.3   CHLORIDE 107 105   CO2 23 27   ANIONGAP 12 10   * 107*   BUN 11.8 13.9   CR 0.90 0.92   FADUMO 9.5 9.7     Recent Labs   Lab 11/22/23  1004   WBC 5.9   RBC 4.24   HGB 12.4   HCT 40.4   MCV 95   MCH 29.2   MCHC 30.7*   RDW 14.4        IMAGING:  No results found for this or any previous visit (from the past 24 hour(s)).

## 2023-11-24 NOTE — PLAN OF CARE
Cares from: 7805-8883    V/S & pain: vitally stable. Slightly hypertensive at times. Denies pain  Neuro: alert, orientedx4  Respiratory:denies sob  Skin: WDL  GI/: WDL. Voids spontaneously without difficulty  Nutrition: Regular diet  Lines/drains: no IV access ( known by MD)  Activity: independent    Events this shift: call light w/in reach and able to make needs known, will continue to monitor.  Rounds done. Slept in between cares. No acute event this shift.    Plan:  for brain surgery on Dec. 1, 2023 (God mother Adri as medical decision-maker)

## 2023-11-24 NOTE — PLAN OF CARE
Assumed Cares: [id number]  Vitals: VSS on RA. Wears O2 intermittently for comfort  Pain: Denies  Neuro: A&Ox4 but forgetful   Cardiac: WDL ex HTN   Respiratory: WDL  GI/: Voids spontaneously. No BM this shift. Denies nausea  Skin: No new skin concerns  IV/Drains: No IV access  Activity: Ind  Events: No acute events this shift     Plan of Care: Pt to stay on the unit until surgery on 12/1. Continue with POC    Goal Outcome Evaluation:   Plan of care reviewed with: patient    Overall patient progress: no change

## 2023-11-25 NOTE — PLAN OF CARE
Goal Outcome Evaluation:      Plan of Care Reviewed With: patient    Overall Patient Progress: no changeOverall Patient Progress: no change    Outcome Evaluation: Remains stable, here for 12/1 surgery    Jackie Henry RNCC

## 2023-11-25 NOTE — PLAN OF CARE
Goal Outcome Evaluation:      Plan of Care Reviewed With: patient    Overall Patient Progress: no changeOverall Patient Progress: no change    Outcome Evaluation: pt A&Ox4, VSS on RA, pt calm and cooporative this shift.  Forgetful at times.  No C/O pain this shift.  Ind in room.  waiting for surgery, scheduled 12/1.

## 2023-11-25 NOTE — PROGRESS NOTES
[facility], [place]   11/25/2023   Neurosurgery Progress Note:    Interval History: BRIGIDA. Discussed patient's wish to return home on Monday w her decision maker Adri. She is in agreement w operative team that patient should remain admitted until surgery.    Assessment:  Mirian Cunningham is a 43 year old female with complex psychosocial situation including paranoid schizophrenia and lack of active guardianship transferred to [facility] with known large sellar/suprasellar mass. Godmother Adri is appointed as the medical decision maker. No other family members available or willing to participate in her care.  MRI with large multilobulated pituitary mass/adenoma with mass effect and encasement of the cavernous structures including the cavernous internal carotid arteries, M1 segment of the right MCA, and A1 and proximal A2 segments of both anterior cerebral arteries.     Plan:  Q8h neuro exams and vital signs  Q3d labs - intermittently refusing  Pain control  Seroquel PRN  Maintain SBP < 160  Regular diet  Normonatremia   Continue to monitor for fevers and/or signs of infection  DVT: SCDs and lovenox, encouraging ambulation   Ongoing OR planning for combined EEA and transcranial approaches for tumor resection  Dispo: Floor status until OR 12/1-12/2  -----------------------------------  Renetta Goel MD, PhD  PGY-2 Neurosurgery    Please contact neurosurgery resident on call with questions.    Dial * * *[phone], enter [phone] when prompted.   -----------------------------------  Objective:   Temp:  [97.7  F (36.5  C)-98.6  F (37  C)] 98.2  F (36.8  C)  Pulse:  [71-83] 71  Resp:  [18-20] 20  BP: (136-144)/(92-95) 136/95  SpO2:  [97 %-100 %] 100 %  No intake/output data recorded.    NEUROLOGIC:  Gen: Appears comfortable, NAD  Resp: breathing comfortably on room air  Neurologic:  -- Awake; Alert; oriented x 3  -- Follows commands intermittently  -- Speech fluent, spontaneous. No aphasia or dysarthria.  -- no  gaze preference. No apparent hemineglect.  Cranial Nerves:  -- R eye blindness; L eye temporal field cut. Left nasal superio qudrant 20/30. R RAPD.  -- gaze is intermittently dysconjugate w R exotropia   -- face symmetrical, tongue midline  -- hearing grossly intact bilat  -- Trapezii 5/5 strength bilat symmetric     Motor:  Normal bulk / tone; no tremor, rigidity, or bradykinesia.  No muscle wasting or fasciculations       Delt Bi Tri Hand Flexion/  Extension Iliopsoas Quadriceps Hamstrings Tibialis Anterior Gastroc     C5 C6 C7 C8/T1 L2 L3 L4-S1 L4 S1   R 5 5 5 5 5 5 5 5 5   L 5 5 5 5 5 5 5 5 5      Sensory:  intact to LT x 4 extremities      Reflexes: no hyperreflexia, no Hoffmans, mute Babinski bilaterally    LABS:  Recent Labs   Lab 11/22/23  1004 11/19/23  1104    142   POTASSIUM 4.4 4.3   CHLORIDE 107 105   CO2 23 27   ANIONGAP 12 10   * 107*   BUN 11.8 13.9   CR 0.90 0.92   FADUMO 9.5 9.7     Recent Labs   Lab 11/22/23  1004   WBC 5.9   RBC 4.24   HGB 12.4   HCT 40.4   MCV 95   MCH 29.2   MCHC 30.7*   RDW 14.4        IMAGING:  No results found for this or any previous visit (from the past 24 hour(s)).

## 2023-11-25 NOTE — PROGRESS NOTES
Care Management Follow Up    Length of Stay (days): 35    Expected Discharge Date: 12/09/2023     Concerns to be Addressed:       Patient plan of care discussed at interdisciplinary rounds: No     Anticipated Discharge Disposition:  TBD     Anticipated Discharge Services:  TBD  Anticipated Discharge DME:  TBD     Patient/family educated on Medicare website which has current facility and service quality ratings:  NA  Education Provided on the Discharge Plan:    Patient/Family in Agreement with the Plan:       Referrals Placed by CM/SW:  NA  Private pay costs discussed: Not applicable     Additional Information:     Patient's status reviewed by chart. Pt remains stable. Pt continues to stay here for 12/1 tumor removal surgery.      Continue to monitor.    Jackie Henry, RN  7C RN Care Coordinator  Phone: [phone]  Pager: [phone]  [facility] ([phone]) Saturday & Sunday; ([id number]) [facility] Recognized Holidays   Units: 5A, 5B & 5C  Pager: [phone]  Units: 6B, 6C & 6D    Pager: [phone]  Units: 7A, 7B, 7C & 7D    Pager: [phone]  Units: 6A & ICU   Pager: [phone]  Units: 5 Ortho, 5MS & WB ED Pager: [phone]  Units: 6MS, 8A & 10 ICU  Pager [phone]

## 2023-11-25 NOTE — PLAN OF CARE
Goal Outcome Evaluation:      Plan of Care Reviewed With: patient    Overall Patient Progress: no changeOverall Patient Progress: no change    Outcome Evaluation: Pt. AOx4 up ad devaughn. Pt. denies any nausea or pain. BP hypertenseive 140's/90's. While pt was sleeping O2 dropped to 88%. NC placed at 2L. No acute changes on shift.

## 2023-11-25 NOTE — PROGRESS NOTES
"[id number]  BP (!) 144/93 (BP Location: Right arm)   Pulse 83   Temp 98.6  F (37  C) (Oral)   Resp 18   Ht 1.54 m (5' 0.63\")   Wt 96 kg (211 lb 11.2 oz)   SpO2 99%   BMI 40.49 kg/m      A&Ox4, forgetful. VSS on room air. Up ad devaughn. Regular diet, did not like dinner, ordering takeout on iPad to be delivered. Voids spontaneously w/out difficulty. Denies pain and nausea. No IV access ok per team. Awaiting OR 12/1-12/2. No acute changes on shift.   "

## 2023-11-26 NOTE — PROGRESS NOTES
[facility], [place]   11/26/2023   Neurosurgery Progress Note:    Interval History: BRIGIDA.     Assessment:  Mirian Cunningham is a 43 year old female with complex psychosocial situation including paranoid schizophrenia and lack of active guardianship transferred to [facility] with known large sellar/suprasellar mass. Godmother Adri is appointed as the medical decision maker. No other family members available or willing to participate in her care.  MRI with large multilobulated pituitary mass/adenoma with mass effect and encasement of the cavernous structures including the cavernous internal carotid arteries, M1 segment of the right MCA, and A1 and proximal A2 segments of both anterior cerebral arteries.     Plan:  Q8h neuro exams and vital signs  Q3d labs - intermittently refusing  Pain control  Seroquel PRN  Maintain SBP < 160  Regular diet  Normonatremia   Continue to monitor for fevers and/or signs of infection  DVT: SCDs and lovenox, encouraging ambulation   Ongoing OR planning for combined EEA and transcranial approaches for tumor resection  Dispo: Floor status until OR 12/1-12/2  -----------------------------------  Renetta Goel MD, PhD  PGY-2 Neurosurgery    Please contact neurosurgery resident on call with questions.    Dial * * *240, enter [phone] when prompted.   -----------------------------------  Objective:   Temp:  [98.2  F (36.8  C)] 98.2  F (36.8  C)  Pulse:  [71-86] 86  Resp:  [16-20] 16  BP: (124-138)/(80-95) 138/95  SpO2:  [97 %-100 %] 97 %  No intake/output data recorded.    NEUROLOGIC:  Gen: Appears comfortable, NAD  Resp: breathing comfortably on room air  Neurologic:  -- Awake; Alert; oriented x 3  -- Follows commands intermittently  -- Speech fluent, spontaneous. No aphasia or dysarthria.  -- no gaze preference. No apparent hemineglect.  Cranial Nerves:  -- R eye blindness; L eye temporal field cut. Left nasal superio qudrant 20/30. R RAPD.  -- gaze is intermittently  dysconjugate w R exotropia   -- face symmetrical, tongue midline  -- hearing grossly intact bilat  -- Trapezii 5/5 strength bilat symmetric     Motor:  Normal bulk / tone; no tremor, rigidity, or bradykinesia.  No muscle wasting or fasciculations       Delt Bi Tri Hand Flexion/  Extension Iliopsoas Quadriceps Hamstrings Tibialis Anterior Gastroc     C5 C6 C7 C8/T1 L2 L3 L4-S1 L4 S1   R 5 5 5 5 5 5 5 5 5   L 5 5 5 5 5 5 5 5 5      Sensory:  intact to LT x 4 extremities      Reflexes: no hyperreflexia, no Hoffmans, mute Babinski bilaterally    LABS:  Recent Labs   Lab 11/25/23  1035 11/22/23  1004 11/19/23  1104    142 142   POTASSIUM 4.8 4.4 4.3   CHLORIDE 105 107 105   CO2 29 23 27   ANIONGAP 8 12 10   * 121* 107*   BUN 13.9 11.8 13.9   CR 0.84 0.90 0.92   FADUMO 9.4 9.5 9.7     Recent Labs   Lab 11/25/23  1035   WBC 5.8   RBC 4.05   HGB 12.0   HCT 38.6   MCV 95   MCH 29.6   MCHC 31.1*   RDW 14.3        IMAGING:  No results found for this or any previous visit (from the past 24 hour(s)).

## 2023-11-26 NOTE — PLAN OF CARE
Goal Outcome Evaluation:      Plan of Care Reviewed With: patient    Overall Patient Progress: no changeOverall Patient Progress: no change    Outcome Evaluation: Pt AOx4 VSS except BP. placed on NC while sleeping. Denies pain. Independent in room. no significant events on shift.

## 2023-11-26 NOTE — PROGRESS NOTES
"[id number]  /80 (BP Location: Right arm)   Pulse 72   Temp 98.2  F (36.8  C) (Oral)   Resp 16   Ht 1.54 m (5' 0.63\")   Wt 96 kg (211 lb 11.2 oz)   SpO2 100%   BMI 40.49 kg/m    A&Ox4, forgetful. VSS on room air. Up ad devaughn, ambulates in room often. Denies pain and nausea. Voids spontaneously w/out difficulty, reports she had a BM this afternoon. Regular diet, good appetite. No PIV OK per team. Awaiting OR 12/1-12/2. No acute changes on shift.   "

## 2023-11-26 NOTE — PLAN OF CARE
Goal Outcome Evaluation:      Plan of Care Reviewed With: patient    Overall Patient Progress: no changeOverall Patient Progress: no change    Outcome Evaluation: pt A&Ox4, VSS on RA.  denies pain this shift.  pt forgetful at times, calm and cooporative this shift.  Good appetite, and fluid intake.  plan for pt to stay in hospital until surgery, likey 12/1 or 12/2.

## 2023-11-27 NOTE — CONSULTS
"Otolaryngology Consult Note  November 27, 2023      CC: pituitary adenoma    HPI: Mirian Cunningham is a 43 year old female with a past medical history of a known large sellar/suprasellar mass and paranoid schizophrenia. She has a history of blindness in the right eye and now has worsening vision in the left eye. She reports he vision fluctuates and is unable to provide a timeline for her vision changes. Management of her pituitary mass has been complicated by her mental health and social concerns including lack of an active guardian. The pituitary mass has mass effect and encasement of the cavernous structures. ENT was consulted for assistance with endoscopic endonasal approach to tumor resection.     No past medical history on file.    Past Surgical History:   Procedure Laterality Date    ANESTHESIA OUT OF OR MRI N/A 10/26/2023    Procedure: Anesthesia out of OR MRI CTA, MRI Under Anesthesia;  Surgeon: GENERIC ANESTHESIA PROVIDER;  Location: [facility]       No current outpatient medications on file.          Allergies   Allergen Reactions    Powder Difficulty breathing     Per pt, allergy to MILK PROTEIN POWDER but not milk or dairy products    Hydrocodone Fatigue    Iodinated Contrast Media Other (See Comments)     \"Paralysis of legs\", \"was unable to walk for 6 months\"    Mushroom Hives and Difficulty breathing    Other Food Allergy      Food coloring, artificial preservatives, high fructose syrup    Seeds      Poppy seeds, sesame seeds        Social History     Socioeconomic History    Marital status: Single     Spouse name: Not on file    Number of children: Not on file    Years of education: Not on file    Highest education level: Not on file   Occupational History    Not on file   Tobacco Use    Smoking status: Not on file    Smokeless tobacco: Not on file   Substance and Sexual Activity    Alcohol use: Not on file    Drug use: Not on file    Sexual activity: Not on file   Other Topics Concern    Not on file " "  Social History Narrative    Not on file     Social Determinants of Health     Financial Resource Strain: Not on file   Food Insecurity: Not on file   Transportation Needs: Not on file   Physical Activity: Not on file   Stress: Not on file   Social Connections: Not on file   Interpersonal Safety: Not on file   Housing Stability: Not on file       No family history on file.    ROS: 12 point review of systems is negative unless noted in HPI.    PHYSICAL EXAM:  General: laying in bed, no acute distress  BP (!) 155/96 (BP Location: Left arm, Patient Position: Semi-Perez's, Cuff Size: Adult Large)   Pulse 80   Temp 97.6  F (36.4  C) (Axillary)   Resp 18   Ht 1.54 m (5' 0.63\")   Wt 96 kg (211 lb 11.2 oz)   SpO2 100%   BMI 40.49 kg/m    HEAD: normocephalic, atraumatic  Face: symmetrical, CN VII intact bilaterally (HB 1), no swelling, edema, or erythema. Sensation V1-V3 intact and equal bilaterally.   Eyes: EOMI without spontaneous or gaze evoked nystagmus, clear sclera, patient declined pupil exam  Ears: external auricles appear normal  Nose: no anterior drainage, intact and midline septum without perforation or hematoma   Mouth: moist, tongue midline and symmetric  Oropharynx:  uvula midline, no oropharyngeal erythema  Neck: soft,  trachea midline  Respiratory: breathing non-labored on RA, no stridor  Skin: no rashes or skin lesions of the face/neck  Psych: pleasant affect  Cardio: extremities warm and well perfused     ROUTINE IP LABS (Last four results)  BMP  Recent Labs   Lab 11/25/23  1035 11/22/23  1004    142   POTASSIUM 4.8 4.4   CHLORIDE 105 107   FADUMO 9.4 9.5   CO2 29 23   BUN 13.9 11.8   CR 0.84 0.90   * 121*     CBC  Recent Labs   Lab 11/25/23  1035 11/22/23  1004   WBC 5.8 5.9   RBC 4.05 4.24   HGB 12.0 12.4   HCT 38.6 40.4   MCV 95 95   MCH 29.6 29.2   MCHC 31.1* 30.7*   RDW 14.3 14.4    348     INRNo lab results found in last 7 days.    Imaging:  MR Brain w/o & w contrast " 10/26/23  Impression:  1.  Large multilobulated pituitary mass/adenoma with mass effect and  encasement of the cavernous structures including the cavernous  internal carotid arteries, M1 segment of the right MCA, and A1 and  proximal A2 segments of both anterior cerebral arteries. The optic  chiasm is not well visualized, also possibly encased.  2.  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    Assessment and Plan  Mirian Cunningham is a 43 year old female with a past medical history of a known large sellar/suprasellar mass and paranoid schizophrenia. The pituitary mass has mass effect and encasement of the cavernous structures. ENT was consulted for assistance with endoscopic endonasal approach to tumor resection.     - Planning for OR 12/1 with Dr Ritchie and neurosurgery  - Remainder of care per primary team    -- Patient and above plan to be discussed with Dr. Ritchie.     Ynes Johnson PA-C  Otolaryngology-Head & Neck Surgery  Please page ENT with questions by dialing * [phone] and entering job [phone] when prompted.

## 2023-11-27 NOTE — PROGRESS NOTES
"BP (!) 135/90   Pulse 97   Temp 98.2  F (36.8  C) (Oral)   Resp 16   Ht 1.54 m (5' 0.63\")   Wt 96 kg (211 lb 11.2 oz)   SpO2 95%   BMI 40.49 kg/m      Neuro: A&Ox4. forgetful  Cardiac: Afebrile, VSS.   Respiratory:1.5L NC  GI/: Voiding spontaneously. No BM this shift.   Diet/appetite: Tolerating diet. Denies nausea   Activity: Up    Pain: Denies   Skin: No new deficits noted.  Lines: PIV   Drains:None  No new complaints.Will continue to monitor and follow plan of care.    "

## 2023-11-27 NOTE — PLAN OF CARE
Goal Outcome Evaluation:      Plan of Care Reviewed With: patient    Overall Patient Progress: no changeOverall Patient Progress: no change    Outcome Evaluation: Pt. VSS on RA except BP that is HTN at times. Pt on 1L NC when sleeping. Pt c/o abd pain that was 7/10 prn tylenol, simethicone and heat pack given MD notified.

## 2023-11-27 NOTE — PROVIDER NOTIFICATION
"Pt. C/o abd pain at 7/10. Prn tylenol, simethicone and heat pack given. MD notified. As of 0350 pt. Refused tylenol and stated \"pain is much better now\".   "

## 2023-11-28 NOTE — PLAN OF CARE
"/86 (BP Location: Right arm)   Pulse 90   Temp 97.5  F (36.4  C) (Axillary)   Resp 18   Ht 1.54 m (5' 0.63\")   Wt 96 kg (211 lb 11.2 oz)   SpO2 97%   BMI 40.49 kg/m       Neuro: Alert and oriented x 4, but forgetful  Cardiac:wnl, denies chest pain  Respiratory:WNL, Denies SOB  GI/:voiding, no BM this shift  Diet/Appetite:Regular diet, Tolerating , no nausea  Skin: no new deficits  LDA:no lines. Team aware  Activity:Independent in room  Pain: denies pain  Plan: Awaiting surgery      Goal Outcome Evaluation:ongoing                        "

## 2023-11-28 NOTE — PROGRESS NOTES
[facility], [place]   11/28/2023   Neurosurgery Progress Note:    Interval History: BRIGIDA.     Assessment:  Mirian Cunningham is a 43 year old female with complex psychosocial situation including paranoid schizophrenia and lack of active guardianship transferred to [facility] with known large sellar/suprasellar mass. Godmother Adri is appointed as the medical decision maker. No other family members available or willing to participate in her care.  MRI with large multilobulated pituitary mass/adenoma with mass effect and encasement of the cavernous structures including the cavernous internal carotid arteries, M1 segment of the right MCA, and A1 and proximal A2 segments of both anterior cerebral arteries.     Plan:  Q8h neuro exams and vital signs  Q3d labs - intermittently refusing  Pain control  Seroquel PRN  Maintain SBP < 160  Regular diet  Normonatremia   Continue to monitor for fevers and/or signs of infection  DVT: SCDs and lovenox, encouraging ambulation   Ongoing OR planning for combined EEA and transcranial approaches for tumor resection  Marked and consented  Will obtain CT and CTA pre op  Dispo: Floor status until OR 12/1-12/2  -----------------------------------  Can Garcia MD  Neurosurgery Resident  Pager: [phone]    Please contact neurosurgery resident on call with questions.    Dial * * *[phone], enter [phone] when prompted.   -----------------------------------  Objective:   Temp:  [97.5  F (36.4  C)-98.3  F (36.8  C)] 98.3  F (36.8  C)  Pulse:  [90-99] 99  Resp:  [18-20] 20  BP: (139-141)/(86-91) 141/91  SpO2:  [97 %-100 %] 100 %  I/O last 3 completed shifts:  In: 480 [P.O.:480]  Out: -     NEUROLOGIC:  Gen: Appears comfortable, NAD  Resp: breathing comfortably on room air  Neurologic:  -- Awake; Alert; oriented x 3  -- Follows commands intermittently  -- Speech fluent, spontaneous. No aphasia or dysarthria.  -- no gaze preference. No apparent hemineglect.  Cranial Nerves:  -- R eye  blindness; L eye temporal field cut. Left nasal superio qudrant 20/30. R RAPD.  -- gaze is intermittently dysconjugate w R exotropia   -- face symmetrical, tongue midline  -- hearing grossly intact bilat  -- Trapezii 5/5 strength bilat symmetric     Motor:  Normal bulk / tone; no tremor, rigidity, or bradykinesia.  No muscle wasting or fasciculations       Delt Bi Tri Hand Flexion/  Extension Iliopsoas Quadriceps Hamstrings Tibialis Anterior Gastroc     C5 C6 C7 C8/T1 L2 L3 L4-S1 L4 S1   R 5 5 5 5 5 5 5 5 5   L 5 5 5 5 5 5 5 5 5      Sensory:  intact to LT x 4 extremities      Reflexes: no hyperreflexia, no Hoffmans, mute Babinski bilaterally    LABS:  Recent Labs   Lab 11/25/23  1035 11/22/23  1004    142   POTASSIUM 4.8 4.4   CHLORIDE 105 107   CO2 29 23   ANIONGAP 8 12   * 121*   BUN 13.9 11.8   CR 0.84 0.90   FADUMO 9.4 9.5     Recent Labs   Lab 11/25/23  1035   WBC 5.8   RBC 4.05   HGB 12.0   HCT 38.6   MCV 95   MCH 29.6   MCHC 31.1*   RDW 14.3        IMAGING:  No results found for this or any previous visit (from the past 24 hour(s)).

## 2023-11-28 NOTE — PLAN OF CARE
"Goal Outcome Evaluation:      Plan of Care Reviewed With: patient    Overall Patient Progress: no change    Outcome Evaluation: VSS on RA, ex HTN. PRN tylenol, benadryl, ocean spray, and albuterol given x1 with adequate results. Pt reports lost debit/[facility], potentially on 6[facility]. Writer calls and discussed missing card w/6A staff and there has been no card found. Plan to follow up with security to check lost and found. Pt needed repeated redirection this afternoon, attempting to go outside for \"fresh air\". Discussed w/pt the temperature as well as lack of warm clothing and footwear. Pt understanding of limitations and will try to go outside tomorrow during day. No other acute changes to report. Prepare for surgery 12/1 and discharge 12/9 back to [facility].    /86 (BP Location: Right arm)   Pulse 90   Temp 97.5  F (36.4  C) (Axillary)   Resp 18   Ht 1.54 m (5' 0.63\")   Wt 96 kg (211 lb 11.2 oz)   SpO2 98%   BMI 40.49 kg/m       Merissa Henry RN on 11/27/2023 at 6:52 PM     "

## 2023-11-29 NOTE — PLAN OF CARE
"Goal Outcome Evaluation:      Plan of Care Reviewed With: patient    Overall Patient Progress: no change    Outcome Evaluation: No acute changes to report during shift. Ordered additional nasal spray for pt. Continuing PRN tylenol, albuterol, and benadryl for comfort. Flyer helped pt shower and change. Prepare for procedure 12/1    BP (!) 141/97 (BP Location: Right arm)   Pulse 85   Temp 98.3  F (36.8  C) (Axillary)   Resp 20   Ht 1.54 m (5' 0.63\")   Wt 96 kg (211 lb 11.2 oz)   SpO2 100%   BMI 40.49 kg/m       Merissa Henry RN on 11/28/2023 at 6:21 PM       "

## 2023-11-29 NOTE — PROGRESS NOTES
[facility], [place]   11/29/2023   Neurosurgery Progress Note:    Interval History: BRIGIDA.     Assessment:  Mirian Cunningham is a 43 year old female with complex psychosocial situation including paranoid schizophrenia and lack of active guardianship transferred to [facility] with known large sellar/suprasellar mass. Godmother Adri is appointed as the medical decision maker. No other family members available or willing to participate in her care.  MRI with large multilobulated pituitary mass/adenoma with mass effect and encasement of the cavernous structures including the cavernous internal carotid arteries, M1 segment of the right MCA, and A1 and proximal A2 segments of both anterior cerebral arteries.     Plan:  Q8h neuro exams and vital signs  Q3d labs - intermittently refusing  Pain control  Seroquel PRN  Maintain SBP < 160  Regular diet  Normonatremia   Continue to monitor for fevers and/or signs of infection  DVT: SCDs and lovenox, encouraging ambulation   Ongoing OR planning for combined EEA and transcranial approaches for tumor resection  Marked and consented  Will obtain CT pre op  Dispo: Floor status until OR 12/1-12/2  -----------------------------------  Can Garcia MD  Neurosurgery Resident  Pager: [phone]    Please contact neurosurgery resident on call with questions.    Dial * * *[phone], enter [phone] when prompted.   -----------------------------------  Objective:   Temp:  [98  F (36.7  C)] 98  F (36.7  C)  Pulse:  [70-85] 70  Resp:  [16] 16  BP: (133-141)/(83-97) 133/83  SpO2:  [98 %] 98 %  No intake/output data recorded.    NEUROLOGIC:  Gen: Appears comfortable, NAD  Resp: breathing comfortably on room air  Neurologic:  -- Awake; Alert; oriented x 3  -- Follows commands intermittently  -- Speech fluent, spontaneous. No aphasia or dysarthria.  -- no gaze preference. No apparent hemineglect.  Cranial Nerves:  -- R eye blindness; L eye temporal field cut. Left nasal superio qudrant 20/30.  R RAPD.  -- gaze is intermittently dysconjugate w R exotropia   -- face symmetrical, tongue midline  -- hearing grossly intact bilat  -- Trapezii 5/5 strength bilat symmetric     Motor:  Normal bulk / tone; no tremor, rigidity, or bradykinesia.  No muscle wasting or fasciculations       Delt Bi Tri Hand Flexion/  Extension Iliopsoas Quadriceps Hamstrings Tibialis Anterior Gastroc     C5 C6 C7 C8/T1 L2 L3 L4-S1 L4 S1   R 5 5 5 5 5 5 5 5 5   L 5 5 5 5 5 5 5 5 5      Sensory:  intact to LT x 4 extremities      Reflexes: no hyperreflexia, no Hoffmans, mute Babinski bilaterally    LABS:  Recent Labs   Lab 11/28/23  1110 11/25/23  1035 11/22/23  1004    142 142   POTASSIUM 4.7 4.8 4.4   CHLORIDE 103 105 107   CO2 30* 29 23   ANIONGAP 9 8 12   * 122* 121*   BUN 13.5 13.9 11.8   CR 0.88 0.84 0.90   FADUMO 9.8 9.4 9.5     Recent Labs   Lab 11/28/23  1110   WBC 6.2   RBC 4.20   HGB 12.4   HCT 39.4   MCV 94   MCH 29.5   MCHC 31.5   RDW 14.0        IMAGING:  No results found for this or any previous visit (from the past 24 hour(s)).

## 2023-11-30 NOTE — PLAN OF CARE
"Goal Outcome Evaluation:      Plan of Care Reviewed With: patient    Overall Patient Progress: no changeOverall Patient Progress: no change    Outcome Evaluation: Pt had shower and bed change today. Given additional clothing items to go on walk around unit. Denies pain. Continue to monitor while awaiting surgery 12/1    /77 (BP Location: Right arm)   Pulse 89   Temp 97.7  F (36.5  C) (Oral)   Resp 18   Ht 1.54 m (5' 0.63\")   Wt 96 kg (211 lb 11.2 oz)   SpO2 97%   BMI 40.49 kg/m       Merissa Henry RN on 11/29/2023 at 6:50 PM       "

## 2023-11-30 NOTE — PLAN OF CARE
"Blood pressure 136/78, pulse 98, temperature 98.8  F (37.1  C), temperature source Oral, resp. rate 18, height 1.54 m (5' 0.63\"), weight 96 kg (211 lb 11.2 oz), SpO2 91%. Via RA .     Patient A & O X 4 forgetful at time . VSS no respiratory distress noted . C./o  shoulder and back pain . Received Tylenol and Benadryl. Patient up independently in room . Appetite good. Void adequate and had bowel movement X 1.  Patient had Shower with 4% soap , and CHG prior to going for head CT. CT completed . Has no IV access MD aware . NPO after MN for procedure tomorrow. Update Neurosurgery with any change.          Goal Outcome Evaluation:      Plan of Care Reviewed With: patient    Overall Patient Progress: no change. Plan  NPO after MN for procedure tomorrow. 12/01/23             "

## 2023-11-30 NOTE — PROGRESS NOTES
[facility], [place]   11/30/2023   Neurosurgery Progress Note:    Interval History: BRIGIDA.     Assessment:  Mirian Cunningham is a 43 year old female with complex psychosocial situation including paranoid schizophrenia and lack of active guardianship transferred to [facility] with known large sellar/suprasellar mass. Godmother Adri is appointed as the medical decision maker. No other family members available or willing to participate in her care.  MRI with large multilobulated pituitary mass/adenoma with mass effect and encasement of the cavernous structures including the cavernous internal carotid arteries, M1 segment of the right MCA, and A1 and proximal A2 segments of both anterior cerebral arteries.     Plan:  Q8h neuro exams and vital signs  Q3d labs - intermittently refusing  Pain control  Seroquel PRN  Maintain SBP < 160  Regular diet  Normonatremia   Continue to monitor for fevers and/or signs of infection  DVT: SCDs and lovenox, encouraging ambulation   Ongoing OR planning for combined EEA and transcranial approaches for tumor resection  Marked and consented  Will obtain CT pre op  Dispo: Floor status until OR 12/1-12/2  -----------------------------------  Can Garcia MD  Neurosurgery Resident  Pager: [phone]    Please contact neurosurgery resident on call with questions.    Dial * * *[phone], enter [phone] when prompted.   -----------------------------------  Objective:   Temp:  [97.3  F (36.3  C)-97.9  F (36.6  C)] 97.3  F (36.3  C)  Pulse:  [61-89] 61  Resp:  [16-18] 18  BP: (114-147)/(68-96) 147/96  SpO2:  [97 %-99 %] 99 %  I/O last 3 completed shifts:  In: 50 [P.O.:50]  Out: -     NEUROLOGIC:  Gen: Appears comfortable, NAD  Resp: breathing comfortably on room air  Neurologic:  -- Awake; Alert; oriented x 3  -- Follows commands intermittently  -- Speech fluent, spontaneous. No aphasia or dysarthria.  -- no gaze preference. No apparent hemineglect.  Cranial Nerves:  -- R eye blindness; L eye  temporal field cut. Left nasal superio qudrant 20/30. R RAPD.  -- gaze is intermittently dysconjugate w R exotropia   -- face symmetrical, tongue midline  -- hearing grossly intact bilat  -- Trapezii 5/5 strength bilat symmetric     Motor:  Normal bulk / tone; no tremor, rigidity, or bradykinesia.  No muscle wasting or fasciculations       Delt Bi Tri Hand Flexion/  Extension Iliopsoas Quadriceps Hamstrings Tibialis Anterior Gastroc     C5 C6 C7 C8/T1 L2 L3 L4-S1 L4 S1   R 5 5 5 5 5 5 5 5 5   L 5 5 5 5 5 5 5 5 5      Sensory:  intact to LT x 4 extremities      Reflexes: no hyperreflexia, no Hoffmans, mute Babinski bilaterally    LABS:  Recent Labs   Lab 11/28/23  1110 11/25/23  1035    142   POTASSIUM 4.7 4.8   CHLORIDE 103 105   CO2 30* 29   ANIONGAP 9 8   * 122*   BUN 13.5 13.9   CR 0.88 0.84   FADUMO 9.8 9.4     Recent Labs   Lab 11/28/23  1110   WBC 6.2   RBC 4.20   HGB 12.4   HCT 39.4   MCV 94   MCH 29.5   MCHC 31.5   RDW 14.0        IMAGING:  No results found for this or any previous visit (from the past 24 hour(s)).

## 2023-11-30 NOTE — PLAN OF CARE
Goal Outcome Evaluation:      Plan of Care Reviewed With: patient    Overall Patient Progress: no changeOverall Patient Progress: no change    Outcome Evaluation: Pt. denies pain VSS and intermitently uses NC on 2-3L. Pt requested only to have nose spray, albuterol, tylenol, benadryl and lidocaine patches. Pt remained in bed and slept well throughout shift. Awaiting surgery on 12/1.

## 2023-11-30 NOTE — PROGRESS NOTES
NUTRITION SERVICES BRIEF NOTE    Reviewed nutrition risk factors. Pt continues to have adequate PO, weight stable.  No nutrition issues identified at this time.   RD to sign off at this time. RD can be consulted if needed.       Sima Troy RDN, LD    6C (beds 9293-5738) + 7C (beds 9619-4109) + ED   RD pager: [phone]  Weekend/Holiday RD pager: [phone]

## 2023-11-30 NOTE — PROVIDER NOTIFICATION
Patient declined CHG and shower until 2pm . Neurosurgery team paged to update  ([phone] awaiting for kong back

## 2023-12-01 PROBLEM — T88.4XXA HARD TO INTUBATE: Status: ACTIVE | Noted: 2023-01-01

## 2023-12-01 NOTE — ANESTHESIA PROCEDURE NOTES
Airway       Patient location during procedure: OR       Procedure Start/Stop Times: 12/1/2023 8:27 AM  Staff -        Anesthesiologist:  Ying Gonzalez MD       CRNA: Jensen Carter APRN CRNA       Performed By: anesthesiologist  Consent for Airway        Urgency: elective  Indications and Patient Condition       Indications for airway management: anam-procedural       Induction type:intravenous       Mask difficulty assessment: 2 - vent by mask + OA or adjuvant +/- NMBA    Final Airway Details       Final airway type: endotracheal airway       Successful airway: ETT - single  Endotracheal Airway Details        ETT size (mm): 7.5       Cuffed: yes       Successful intubation technique: video laryngoscopy       VL Blade Size: Glidescope 4       Grade View of Cords: 3       Position: Left       Measured from: gums/teeth       Secured at (cm): 22       Bite block used: None    Post intubation assessment        Placement verified by: capnometry and equal breath sounds        Number of attempts at approach: 3       Ease of procedure: difficult       Dentition: Intact    Medication(s) Administered   Medication Administration Time: 12/1/2023 8:27 AM    Additional Comments       First and second attempt by CRNA with Glidescope Low Pro S3 and Low Pro S4  respectively. Laryngoscopy view Grade 4 Unable to pass the tube. Third attempt by Anesthesiologist successful with Glidescope Low Pro S4, Grade view 3. Atraumatic intubation, dentition intact. Patient was ventilated with oral airway in between attempts, Oxygen saturation maintained 100% until ETT was secured.

## 2023-12-01 NOTE — ANESTHESIA PREPROCEDURE EVALUATION
"Anesthesia Pre-Procedure Evaluation    Patient: Mirian Cunningham   MRN: [id number] : 1980        Procedure : Procedure(s):  stealth assisted Combined endoscopic endonasal approach  and bifrontal craniotomy for tumor  Insert drain lumbar  possible abdominal fat graft (left possible right), fascia oswaldo graft, left possible right          No past medical history on file.   Past Surgical History:   Procedure Laterality Date    ANESTHESIA OUT OF OR MRI N/A 10/26/2023    Procedure: Anesthesia out of OR MRI CTA, MRI Under Anesthesia;  Surgeon: GENERIC ANESTHESIA PROVIDER;  Location: [facility] OR      Allergies   Allergen Reactions    Powder Difficulty breathing     Per pt, allergy to MILK PROTEIN POWDER but not milk or dairy products    Hydrocodone Fatigue    Iodinated Contrast Media Other (See Comments)     \"Paralysis of legs\", \"was unable to walk for 6 months\"    Mushroom Hives and Difficulty breathing    Other Food Allergy      Food coloring, artificial preservatives, high fructose syrup    Seeds      Poppy seeds, sesame seeds       Social History     Tobacco Use    Smoking status: Not on file    Smokeless tobacco: Not on file   Substance Use Topics    Alcohol use: Not on file      Wt Readings from Last 1 Encounters:   23 96 kg (211 lb 10.3 oz)        Anesthesia Evaluation   Pt has not had prior anesthetic         ROS/MED HX  ENT/Pulmonary:     (+)     MAXX risk factors, snores loudly,  obese,                               Neurologic: Comment: 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 Right to left midline shift 1 cm      Cardiovascular:       METS/Exercise Tolerance: 3 - Able to walk 1-2 blocks without stopping    Hematologic:  - neg hematologic  ROS     Musculoskeletal:  - neg musculoskeletal ROS     GI/Hepatic:  - neg GI/hepatic ROS     Renal/Genitourinary:  - neg Renal ROS     Endo:  - neg endo ROS     Psychiatric/Substance Use:     (+) psychiatric history schizophrenia       Infectious Disease:  - neg " infectious disease ROS     Malignancy:       Other:            Physical Exam    Airway        Mallampati: III   TM distance: > 3 FB   Neck ROM: full   Mouth opening: > 3 cm    Respiratory Devices and Support         Dental       (+) Modest Abnormalities - crowns, retainers, 1 or 2 missing teeth      Cardiovascular   cardiovascular exam normal          Pulmonary   pulmonary exam normal                OUTSIDE LABS:  CBC:   Lab Results   Component Value Date    WBC 5.2 12/01/2023    WBC 5.1 11/30/2023    HGB 12.1 12/01/2023    HGB 12.2 11/30/2023    HCT 38.9 12/01/2023    HCT 38.9 11/30/2023     12/01/2023     11/30/2023     BMP:   Lab Results   Component Value Date     11/30/2023     11/28/2023    POTASSIUM 4.1 11/30/2023    POTASSIUM 4.7 11/28/2023    CHLORIDE 105 11/30/2023    CHLORIDE 103 11/28/2023    CO2 29 11/30/2023    CO2 30 (H) 11/28/2023    BUN 13.4 11/30/2023    BUN 13.5 11/28/2023    CR 0.68 11/30/2023    CR 0.88 11/28/2023     (H) 11/30/2023     (H) 11/28/2023     COAGS:   Lab Results   Component Value Date    PTT 28 11/30/2023    INR 1.02 11/30/2023     POC:   Lab Results   Component Value Date    HCG Negative 10/21/2023     HEPATIC:   Lab Results   Component Value Date    ALBUMIN 3.5 10/28/2023    PROTTOTAL 6.6 10/28/2023    ALT 20 10/28/2023    AST 32 10/28/2023    ALKPHOS 56 10/28/2023    BILITOTAL 1.0 10/28/2023     OTHER:   Lab Results   Component Value Date    PH 7.38 10/27/2023    LACT 1.0 10/28/2023    FADUMO 9.4 11/30/2023    PHOS 3.8 11/08/2023    MAG 1.8 11/08/2023    TSH 1.00 10/21/2023    T4 6.3 10/21/2023    T4 1.31 10/21/2023    SED 18 10/26/2023       Anesthesia Plan    ASA Status:  3    NPO Status:  NPO Appropriate    Anesthesia Type: General.     - Airway: ETT   Induction: Intravenous.   Maintenance: Balanced.   Techniques and Equipment:     - Airway: Video-Laryngoscope     - Lines/Monitors: 2nd IV, Arterial Line     - Blood: T&C      Consents    Anesthesia Plan(s) and associated risks, benefits, and realistic alternatives discussed. Questions answered and patient/representative(s) expressed understanding.     - Discussed:     - Discussed with:  Patient      - Extended Intubation/Ventilatory Support Discussed: Yes.      - Patient is DNR/DNI Status: No     Use of blood products discussed: Yes.     - Discussed with: Patient, Healthcare Power of .     - Consented: consented to blood products     Postoperative Care    Pain management: Multi-modal analgesia.   PONV prophylaxis: Ondansetron (or other 5HT-3), Dexamethasone or Solumedrol     Comments:              PAC Discussion and Assessment    ASA Classification: 3    Anesthetic techniques and relevant risks discussed: GA  Invasive monitoring and risk discussed: Yes    Possibility and Risk of blood transfusion discussed: Yes  NPO instructions given: NPO after midnight    Needs early admission to pre-op area: No                  Attending Anesthesiologist Anesthesia Assessment: I discussed potential risks and complications of GETA, invasive monitoring and possibility of transfusion with the patient and her healthcare proxy (Adri: Godmother). All questions answered. Healthcare proxy agrees with the plan.                          Ying Gonzalez MD    I have reviewed the pertinent notes and labs in the chart from the past 30 days and (re)examined the patient.  Any updates or changes from those notes are reflected in this note.

## 2023-12-01 NOTE — PLAN OF CARE
Goal Outcome Evaluation:      Plan of Care Reviewed With: patient    Overall Patient Progress: no changeOverall Patient Progress: no change    Outcome Evaluation: pt. denies pain AOx4 forgetful at times. VSS except BP that is HTn. Pt intermittently uses NC at 1-2L. Pt was NPO at midnight in preperation for surgery (12/1). CHG bath performed. Slept well throughout shift.

## 2023-12-01 NOTE — ANESTHESIA PROCEDURE NOTES
Arterial Line Procedure Note    Pre-Procedure   Staff -        Performed By: anesthesiologist       Location: OR       Pre-Anesthestic Checklist: patient identified, IV checked, risks and benefits discussed, informed consent, monitors and equipment checked, pre-op evaluation and at physician/surgeon's request  Timeout:       Correct Patient: Yes        Correct Procedure: Yes        Correct Site: Yes        Correct Position: Yes   Line Placement:   This line was placed Post Induction starting at 12/1/2023 10:06 AM and ending at 12/1/2023 10:06 AM  Procedure   Procedure: arterial line       Laterality: left       Insertion Site: brachial.  Sterile Prep        Standard elements of sterile barrier followed       Skin prep: Chloraprep  Insertion/Injection        Technique: ultrasound guided        1. Ultrasound was used to evaluate the access site.       2. Artery evaluated via ultrasound for patency/adequacy.       3. Using real-time ultrasound the needle/catheter was observed entering the artery/vein.       Catheter Type/Size: 20 G, 12 cm  Narrative        Tegaderm dressing used.       Complications: None apparent,        Arterial waveform: Yes    Comments:  Unable to cannulate radial artery due to patient's anatomy. Left brachial a-line placed under Ultrasound guidance, atraumatic, single attempt, good waveform, good blood return.

## 2023-12-02 NOTE — PROGRESS NOTES
[facility]   12/02/2023   Neurosurgery Progress Note:    Interval History: OR for combined endonasal and transcranial approach for resection of large pituitary adenoma. EVD placed intra-operatively.     Assessment:  Mirian Cunningham is a 43 year old female with complex psychosocial situation including paranoid schizophrenia and lack of active guardianship transferred to [facility] with known large sellar/suprasellar mass. Godmother Adri is appointed as the medical decision maker. No other family members available or willing to participate in her care.  MRI with large multilobulated pituitary mass/adenoma with mass effect and encasement of the cavernous structures including the cavernous internal carotid arteries, M1 segment of the right MCA, and A1 and proximal A2 segments of both anterior cerebral arteries.     Now post operative day 0 status post combined endonasal and transcranial approach for resection of pituitary adenoma and placement of external ventricular drain.    Plan:  Q1h neuro exams  EVD at 10  Decadron 7 day taper  Keppra 7 days  3 days vanc/cefepime/flagyl  Sinus precautions  Endo labs  DI watch - strict I&O, daily weights  MRI/MRA prior to extubation  Remain intubated for now  Pain control  Seroquel PRN  Maintain SBP < 140  Will need enteral access today  Strict I&O  DVT prophylaxis: mechanical  Dispo: [facility]      Renetta Goel MD, PhD  PGY-2 Neurosurgery    Please contact neurosurgery resident on call with questions.    Dial * * [phone], enter [phone] when prompted.     -----------------------------------  Objective:   Temp:  [97.5  F (36.4  C)] 97.5  F (36.4  C)  Pulse:  [89] 89  Resp:  [16] 16  BP: (126)/(102) 126/102  SpO2:  [95 %] 95 %  I/O last 3 completed shifts:  In: 8440 [I.V.:7000; IV Piggyback:40]  Out: 2390 [Urine:1790; Blood:600]    NEUROLOGIC:  Intubated and sedated, awaiting reversal of anesthetic agents.    LABS:  Recent Labs   Lab 12/02/23  0248 12/02/23  0107  12/01/23  2341 12/01/23  0948 12/01/23  0625 11/30/23  1115 11/28/23  1110    140 141   < > 140 143 142   POTASSIUM 4.1 4.4 4.5   < > 4.0 4.1 4.7   CHLORIDE  --   --   --   --  103 105 103   CO2  --   --   --   --  28 29 30*   ANIONGAP  --   --   --   --  9 9 9   * 164* 169*   < > 91 134* 102*   BUN  --   --   --   --  13.0 13.4 13.5   CR  --   --   --   --  0.71 0.68 0.88   FADUMO  --   --   --   --  9.5 9.4 9.8    < > = values in this interval not displayed.     Recent Labs   Lab 12/02/23 0248 12/01/23 2058 12/01/23 2055   WBC  --   --  14.6*   RBC  --   --  2.84*   HGB 10.6*   < > 8.5*   HCT  --   --  26.4*   MCV  --   --  93   MCH  --   --  29.9   MCHC  --   --  32.2   RDW  --   --  14.9   PLT  --   --  201    < > = values in this interval not displayed.     IMAGING:  No results found for this or any previous visit (from the past 24 hour(s)).

## 2023-12-02 NOTE — PROGRESS NOTES
Patient unable to sign consent for PICC line. Staff unsure who is legal guardian and can sign for patient.  Azalia SALAZAR notified. Picc line on hold

## 2023-12-02 NOTE — INTERIM SUMMARY
Glucose as necessary, likely from poor nutrition   Picc line is deemed necessary by ICU team and an emergency consent is obtained.

## 2023-12-02 NOTE — CONSULTS
Neurocritical Care Consultation    Reason for critical care admission: combined endonasal and transcranial approach for resection of pituitary adenoma and placement of external ventricular drain.   Admitting Team: Neurosurgery   Date of Service:  12/02/2023  Date of Admission:  10/21/2023  Hospital Day: 43    Assessment/Plan  Mirian Cunningham is a 43 year old female with a past medical history of complex psychosocial situation including paranoid schizophrenia and lacks active guardianship admitted on 10/21/2023 for large sellar/suprasellar mass surgical evaluation. MRI with large multilobulated pituitary mass/adenoma with mass effect and encasement of the cavernous structures including the cavernous internal carotid arteries, M1 segment of the right MCA, and A1 and proximal A2 segments of both anterior cerebral arteries. OR on 12/1/23 s/p combined endonasal and transcranial approach for resection of pituitary adenoma and placement of external ventricular drain. Transferred to neuro ICU for post op care intubated and sedated.       Neuro  #Pituitary sellar and suprasellar mass encasement of cavernous structures s/p combined endoscopic endonasal and bifrontal craniotomy   #s/p EVD placement   #Right eye complete blindness  #Left right hemanopia   -Neurochecks every 1H hrs  -SBP goal < 140 mmHg  -Neurosurgery primary    -EVD height +10  -Decadron taper 7 days (4 mg TID for 2 days, then 2 mg TID for 2 days, then 1 mg TID for 3 days)   -keppra 500 mg BID for 7 days   -Abx: vancomycin, cefepime 2 g Q8H, flagyl 500 Q8H for 3 days  -endocrinology labs: TSH, FSH, LH, testosterone, insulin growth factor, prolactin, human growth hormone   -MRI brain w/ and w/o contrast and MRA head prior to extubation tentatively planning 12/3  -Repeat CT head post op 12/2 expected pneumocephalus, new right EVD, inter pedicular cistern and anterior cranial fossa to 3rd ventricle poss residual tumor vs post op hemorrhage, R frontal lob  hypodensity concern for vasogenic edema/gliosis  -DI monitoring: strict I&Os, daily weights   -HOB > 30   -PT/OT/SLP when able     #Analgesics & sedation  RASS goal: -1 to 0  -Continue propofol gtt  -Continue fentanyl gtt with fentanyl boluses PRN Q2H   -Tylenol 975 Q8H for 3 days, PRN tylenol 650 mg Q4H  -PRN zofran and compazine for nausea and vomiting      #Schizophrenia   Not on PTA psych medications and declines to starting them in the past     CV  #History of HTN   -Not on PTA medications   -Cardiac monitoring  -SBP goal < 140 mmHg  -PRN Labetalol and Hydralazine for > 140     Resp  #Pulmonary edema  #Diffuse pulmonary infiltrates   #Intubated  #hx of prior AHRF 2/2 aspiration pna vs pulmonary edema  #H/o mild intermittent asthma  #MAXX,  Oxygen/vent: intubated CMV/380/18/7/40%  -Continuous pulse ox  -Maintain O2 saturations greater than 92%  -CXR ordered s/p intubation assessment with diffuse bilateral pulmonary infiltrates concerning for edema   -Repeat ABG at 1:30 PM   -Repeat CXR in AM   -Continue Breo ellipta inhaler daily, PRN albuterol inhaler Q6H and neb 2.5 mg Q8H PRN,     GI  #KAELYN  Diet: NPO, F/U with ENT for placement of OG tube for TF   -Nutrition consult for TF tentatively tomorrow   Last BM: 11/30  GI prophylaxis: famotidine 20 mg IV vs PO   -Bowel regimen: scheduled senna-docusate BID and Miralax daily     Renal/  #KAELYN  -Daily BMP  -Na checks Q6H starting at 12 PM   -IV fluids: decreased to 75 ml/hr NS   -Sodium goal normal range   -High electrolyte replacement protocol    Endo  #Hyperglycemia 2/2 steroids  -Monitor glucose levels  -Added medium dose sliding scale insulin   -A1c pending    Heme  #Acute anemia 2/2 blood loss   -s/p transfusion 5 units pRBCs in OR 12/2   -Repeat hgb at 12 PM   -Daily CBC  -INR and PTT in AM   -Hgb goal >7, plt goal >50k  -Transfuse to meet Hgb and plt goals    ID  #Leukocytosis   #Hx septic shock 2/2 likely aspiration pneumonia during hospitalization   -Daily  CBC  -Follow temperature curve  -Post procedure ppx Abx: vancomycin, cefepime 2 g Q8H, flagyl 500 Q8H for 3 days  -Past abxs: zosyn (10/27-11/1) and vanco (x1 dose 10/27, (-) MRSA)    ICU Checklist  Lines/tubes/drains: x3 PIV, A line, ETT, carrillo, EVD  FEN: NPO, nutrition consult in AM for possible TF   PPX: DVT - SCDs ; GI - famotidine.  Code: Full code   Dispo: ICU - NCC    The patient was seen and discussed with the NCC attending, Dr. Montero.    Gabriela Calderon MD  PGY2 Neurology     History of Present Illness:  43 year old female with a past medical history of pituitary mass (2018) with right eye blindness, complex psychosocial situation including paranoid schizophrenia and lacks active guardianship admitted on 10/21/2023 for large sellar/suprasellar mass surgical evaluation. MRI with large multilobulated pituitary mass/adenoma with mass effect and encasement of the cavernous structures including the cavernous internal carotid arteries, M1 segment of the right MCA, and A1 and proximal A2 segments of both anterior cerebral arteries.     Initially presented to  [facility] on 10/6/2023 with dizziness, head pressure, and worsening vision in the left eye. Management of her pituitary mass has been complicated by her concomitant mental health and social concerns that have resulted in an inability to provide informed consent for surgery and question as to who an appropriate guardian would be.     Per chart review, seen by Neurosurgery in 2018 who recommended surgical resection of the lesion if visual symptoms worsened, but she did not cooperate in vision assessment at that time and did not consent for MRI. Guardianship was obtained sometime later and in 2023 she underwent MRI under sedation after guardian gave consent for forcible sedation. This MRI showed a large sellar/suprasellar mass w effacement of the inferior frontal lobes, hypothalamus, optic chiasm, third ventricle, and midbrain, as well as encasement  "of the basilar artery and bilateral carotid arteries. [facility] recommended a staged subtotal resection w subsequent chemotherapy and/or radiation. The patient did not agree to the surgery at that time, and during her current admission at [place] she has stated that she is unsure that she is willing to undergo the surgery. She does not have active guardianship anymore. [facility] was pursued for transfer by [place] as she had previously been following there, but the patient was declined due to 1) her not providing assent for the offered surgery and 2) lack of safe post-operative discharge plan given her complex psychosocial situation.     On arrival to [facility] the patient reports that she has had significant vision loss in both eyes.    Allergies   Allergen Reactions    Powder Difficulty breathing     Per pt, allergy to MILK PROTEIN POWDER but not milk or dairy products    Hydrocodone Fatigue    Iodinated Contrast Media Other (See Comments)     \"Paralysis of legs\", \"was unable to walk for 6 months\"    Mushroom Hives and Difficulty breathing    Other Food Allergy      Food coloring, artificial preservatives, high fructose syrup    Seeds      Poppy seeds, sesame seeds        Current Medications:   acetaminophen  975 mg Oral or Feeding Tube Q8H    ceFEPIme  2 g Intravenous Q8H    dexAMETHasone  4 mg Intravenous TID    Followed by    [START ON 12/4/2023] dexAMETHasone  2 mg Intravenous TID    Followed by    [START ON 12/6/2023] dexAMETHasone  1 mg Intravenous TID    famotidine  20 mg Oral or Feeding Tube BID    Or    famotidine  20 mg Intravenous BID    fluticasone-vilanterol  1 puff Inhalation Daily    influenza quadrivalent (PF) vacc  0.5 mL Intramuscular Prior to discharge    levETIRAcetam  500 mg Intravenous BID    metroNIDAZOLE  500 mg Intravenous Q8H    polyethylene glycol  17 g Oral or Feeding Tube Daily    senna-docusate  1 tablet Oral or Feeding Tube BID    sodium chloride (PF)  3 mL Intracatheter Q8H " "      PRN Medications:  [START ON 12/4/2023] acetaminophen, albuterol, albuterol, bisacodyl, fentaNYL, hydrALAZINE, labetalol, lidocaine 4%, lidocaine (buffered or not buffered), magnesium hydroxide, propofol **AND** propofol **AND** CK total **AND** Triglycerides **AND** - MEDICATION INSTRUCTIONS - **AND** Notify Physician, naloxone **OR** naloxone **OR** naloxone **OR** naloxone, ondansetron **OR** ondansetron, prochlorperazine **OR** prochlorperazine, sodium chloride (PF)    Infusions:   fentaNYL      propofol      And    - MEDICATION INSTRUCTIONS -      sodium chloride         Physical Examination:  Vitals: /75   Pulse 93   Temp 98  F (36.7  C) (Axillary)   Resp 16   Ht 1.549 m (5' 1\")   Wt 96 kg (211 lb 10.3 oz)   SpO2 95%   BMI 39.99 kg/m    General: Adult female patient, lying in bed, critically-ill, intubated   HEENT: Normocephalic, atraumatic, no icterus, oral cavity/oropharynx pink and moist  Cardiac: RRR  Pulm: CTAB, unlabored, expansion symmetric, no retractions or use of accessory muscles  Abdomen: Soft, non-distended  Extremities: Warm, no edema, distal pulses +2, well perfused  Skin: No rash or lesion    Neuro:  Mental status: unresponsive on propofol gtt 75, intubated, doesn't open eyes to voice or noxious stimuli, doesn't follow commands     Cranial nerves: No blink to threat in right eye but intact in left eye, non reactive fixed 1-2 mm right pupil with left pupil reactive and symmetric to light,  conjugate gaze,  corneal reflex intact bilaterally, cough intact  Motor: Normal bulk and tone. No abnormal or spontaneous movements. No clonus, toes mute bilaterally  Sensory: doesn't withdrawal to noxious stimuli x4 extremities   Coordination: KELLY, deferred   Gait: KELLY, deferred.  Labs and Imaging:    Results for orders placed or performed during the hospital encounter of 10/21/23 (from the past 24 hour(s))   Arterial Panel POCT   Result Value Ref Range    pH Arterial POCT 7.44 7.35 - 7.45 "    pCO2 Arterial POCT 34 (L) 35 - 45 mm Hg    pO2 Arterial POCT 83 80 - 105 mm Hg    Bicarbonate Arterial POCT 23 21 - 28 mmol/L    Sodium POCT 141 135 - 145 mmol/L    Potassium POCT 4.0 3.5 - 5.0 mmol/L    Hemoglobin POCT 10.7 (L) 11.7 - 15.7 g/dL    Glucose Whole Blood POCT 147 (H) 70 - 99 mg/dL    Calcium, Ionized Whole Blood POCT 4.6 4.4 - 5.2 mg/dL    Base Excess/Deficit (+/-) POCT -0.7 -9.6 - 2.0 mmol/L    FIO2 POCT 43.0 %    Lactic Acid POCT 4.9 (HH) <=2.0 mmol/L   Arterial Panel   Result Value Ref Range    pH Arterial 7.44 7.35 - 7.45    pCO2 Arterial 35 35 - 45 mm Hg    pO2 Arterial 89 80 - 105 mm Hg    Bicarbonate Arterial 24 21 - 28 mmol/L    Base Excess/Deficit -0.3 -9.0 - 1.8 mmol/L    FIO2 50     Sodium Whole Blood 141 135 - 145 mmol/L    Potassium Whole Blood 4.0 3.4 - 5.3 mmol/L    Glucose 146 (H) 70 - 99 mg/dL    Calcium Ionized Whole Blood 4.5 4.4 - 5.2 mg/dL    Hemoglobin 11.2 (L) 11.7 - 15.7 g/dL    Maximiliano's Test No     Lactic Acid 5.3 (HH) 0.7 - 2.0 mmol/L   Lactic acid whole blood   Result Value Ref Range    Lactic Acid 5.3 (HH) 0.7 - 2.0 mmol/L   Venous Panel POCT   Result Value Ref Range    pH Venous POCT 7.34 7.32 - 7.43    pCO2 Venous POCT 48 40 - 50 mm Hg    pO2 Venous POCT 32 25 - 47 mm Hg    Bicarbonate Venous POCT 26 21 - 28 mmol/L    Sodium POCT 139 135 - 145 mmol/L    Potassium POCT 4.2 3.5 - 5.0 mmol/L    Hemoglobin POCT 9.6 (L) 11.7 - 15.7 g/dL    Glucose Whole Blood POCT 123 (H) 70 - 99 mg/dL    Base Excess/Deficit (+/-) POCT -0.5 -8.1 - 1.9 mmol/L    Calcium, Ionized Whole Blood POCT 4.5 4.4 - 5.2 mg/dL    Lactic Acid POCT 4.6 (HH) <=2.0 mmol/L    FIO2 POCT 44.0 %   Surgical Pathology Exam   Result Value Ref Range    Case Report       Surgical Pathology Report                         Case: [id number]                                  Authorizing Provider:  Shyam Ritchie MD           Collected:           12/01/2023 01:18 PM          Ordering Location:     [facility]IN OR                  Received:            12/01/2023 01:22 PM          Pathologist:           Aubrey Jerome MD                                                      Intraop:               Brian Cifuentes MD                                                        Specimen:    Other, Sellar Mass                                                                         Synoptic Checklist      Intraoperative Consultation       A(1). Other, Sellar Mass:  AFS1: Brain, sellar mass, biopsy:  -Pituitary adenoma       Arterial Panel POCT   Result Value Ref Range    pH Arterial POCT 7.45 7.35 - 7.45    pCO2 Arterial POCT 34 (L) 35 - 45 mm Hg    pO2 Arterial POCT 94 80 - 105 mm Hg    Bicarbonate Arterial POCT 23 21 - 28 mmol/L    Sodium POCT 141 135 - 145 mmol/L    Potassium POCT 4.2 3.5 - 5.0 mmol/L    Hemoglobin POCT 8.8 (L) 11.7 - 15.7 g/dL    Glucose Whole Blood POCT 159 (H) 70 - 99 mg/dL    Calcium, Ionized Whole Blood POCT 4.2 (L) 4.4 - 5.2 mg/dL    Base Excess/Deficit (+/-) POCT -0.4 -9.6 - 2.0 mmol/L    FIO2 POCT 42.0 %    Lactic Acid POCT 2.8 (H) <=2.0 mmol/L   Arterial Panel POCT   Result Value Ref Range    pH Arterial POCT 7.42 7.35 - 7.45    pCO2 Arterial POCT 37 35 - 45 mm Hg    pO2 Arterial POCT 79 (L) 80 - 105 mm Hg    Bicarbonate Arterial POCT 24 21 - 28 mmol/L    Sodium POCT 141 135 - 145 mmol/L    Potassium POCT 4.2 3.5 - 5.0 mmol/L    Hemoglobin POCT 8.5 (L) 11.7 - 15.7 g/dL    Glucose Whole Blood POCT 168 (H) 70 - 99 mg/dL    Calcium, Ionized Whole Blood POCT 4.3 (L) 4.4 - 5.2 mg/dL    Base Excess/Deficit (+/-) POCT -0.2 -9.6 - 2.0 mmol/L    FIO2 POCT 42.0 %    Lactic Acid POCT 2.5 (H) <=2.0 mmol/L   Arterial Panel POCT   Result Value Ref Range    pH Arterial POCT 7.39 7.35 - 7.45    pCO2 Arterial POCT 38 35 - 45 mm Hg    pO2 Arterial POCT 98 80 - 105 mm Hg    Bicarbonate Arterial POCT 23 21 - 28 mmol/L    Sodium POCT 141 135 - 145 mmol/L    Potassium POCT 4.3 3.5 - 5.0 mmol/L    Hemoglobin POCT 7.8 (L) 11.7 - 15.7 g/dL     Glucose Whole Blood POCT 160 (H) 70 - 99 mg/dL    Calcium, Ionized Whole Blood POCT 5.1 4.4 - 5.2 mg/dL    Base Excess/Deficit (+/-) POCT -1.9 -9.6 - 2.0 mmol/L    FIO2 POCT 43.0 %    Lactic Acid POCT 2.6 (H) <=2.0 mmol/L   Arterial Panel POCT   Result Value Ref Range    pH Arterial POCT 7.38 7.35 - 7.45    pCO2 Arterial POCT 38 35 - 45 mm Hg    pO2 Arterial POCT 86 80 - 105 mm Hg    Bicarbonate Arterial POCT 22 21 - 28 mmol/L    Sodium POCT 140 135 - 145 mmol/L    Potassium POCT 4.7 3.5 - 5.0 mmol/L    Hemoglobin POCT 9.3 (L) 11.7 - 15.7 g/dL    Glucose Whole Blood POCT 163 (H) 70 - 99 mg/dL    Calcium, Ionized Whole Blood POCT 4.8 4.4 - 5.2 mg/dL    Base Excess/Deficit (+/-) POCT -2.8 -9.6 - 2.0 mmol/L    FIO2 POCT 42.0 %    Lactic Acid POCT 3.0 (H) <=2.0 mmol/L   CBC with platelets   Result Value Ref Range    WBC Count 14.6 (H) 4.0 - 11.0 10e3/uL    RBC Count 2.84 (L) 3.80 - 5.20 10e6/uL    Hemoglobin 8.5 (L) 11.7 - 15.7 g/dL    Hematocrit 26.4 (L) 35.0 - 47.0 %    MCV 93 78 - 100 fL    MCH 29.9 26.5 - 33.0 pg    MCHC 32.2 31.5 - 36.5 g/dL    RDW 14.9 10.0 - 15.0 %    Platelet Count 201 150 - 450 10e3/uL   INR   Result Value Ref Range    INR 1.25 (H) 0.85 - 1.15   Partial thromboplastin time   Result Value Ref Range    aPTT 26 22 - 38 Seconds   Fibrinogen activity   Result Value Ref Range    Fibrinogen Activity 278 170 - 490 mg/dL   Arterial Panel POCT   Result Value Ref Range    pH Arterial POCT 7.44 7.35 - 7.45    pCO2 Arterial POCT 33 (L) 35 - 45 mm Hg    pO2 Arterial POCT 109 (H) 80 - 105 mm Hg    Bicarbonate Arterial POCT 22 21 - 28 mmol/L    Sodium POCT 140 135 - 145 mmol/L    Potassium POCT 4.6 3.5 - 5.0 mmol/L    Hemoglobin POCT 8.5 (L) 11.7 - 15.7 g/dL    Glucose Whole Blood POCT 167 (H) 70 - 99 mg/dL    Calcium, Ionized Whole Blood POCT 4.4 4.4 - 5.2 mg/dL    Base Excess/Deficit (+/-) POCT -1.5 -9.6 - 2.0 mmol/L    FIO2 POCT 42.0 %    Lactic Acid POCT 2.8 (H) <=2.0 mmol/L   Extra Tube    Narrative     The following orders were created for panel order Extra Tube.  Procedure                               Abnormality         Status                     ---------                               -----------         ------                     Extra Green Top (Lithium...[[id number]]                      Final result                 Please view results for these tests on the individual orders.   Extra Green Top (Lithium Heparin) Tube   Result Value Ref Range    Hold Specimen JIC    Prepare red blood cells (unit)   Result Value Ref Range    Blood Component Type Red Blood Cells     Product Code E0874S50     Unit Status Transfused     Unit Number [id number]     CROSSMATCH Compatible     CODING SYSTEM [id number]     ISSUE DATE AND TIME 20231201222400     UNIT ABO/RH A+     UNIT TYPE ISBT 6200    Prepare red blood cells (unit)   Result Value Ref Range    Blood Component Type Red Blood Cells     Product Code T0533T73     Unit Status Transfused     Unit Number [id number]     CROSSMATCH Compatible     CODING SYSTEM [id number]     ISSUE DATE AND TIME 20231201222400     UNIT ABO/RH A+     UNIT TYPE ISBT 6200    Arterial Panel POCT   Result Value Ref Range    pH Arterial POCT 7.43 7.35 - 7.45    pCO2 Arterial POCT 34 (L) 35 - 45 mm Hg    pO2 Arterial POCT 89 80 - 105 mm Hg    Bicarbonate Arterial POCT 23 21 - 28 mmol/L    Sodium POCT 141 135 - 145 mmol/L    Potassium POCT 4.5 3.5 - 5.0 mmol/L    Hemoglobin POCT 11.8 11.7 - 15.7 g/dL    Glucose Whole Blood POCT 169 (H) 70 - 99 mg/dL    Calcium, Ionized Whole Blood POCT 5.0 4.4 - 5.2 mg/dL    Base Excess/Deficit (+/-) POCT -1.0 -9.6 - 2.0 mmol/L    FIO2 POCT 42.0 %    Lactic Acid POCT 2.9 (H) <=2.0 mmol/L   Arterial Panel POCT   Result Value Ref Range    pH Arterial POCT 7.43 7.35 - 7.45    pCO2 Arterial POCT 33 (L) 35 - 45 mm Hg    pO2 Arterial POCT 90 80 - 105 mm Hg    Bicarbonate Arterial POCT 22 21 - 28 mmol/L    Sodium POCT 140 135 - 145 mmol/L    Potassium POCT 4.4 3.5 - 5.0 mmol/L     Hemoglobin POCT 11.1 (L) 11.7 - 15.7 g/dL    Glucose Whole Blood POCT 164 (H) 70 - 99 mg/dL    Calcium, Ionized Whole Blood POCT 4.8 4.4 - 5.2 mg/dL    Base Excess/Deficit (+/-) POCT -1.6 -9.6 - 2.0 mmol/L    FIO2 POCT 42.0 %    Lactic Acid POCT 2.9 (H) <=2.0 mmol/L   Arterial Panel POCT   Result Value Ref Range    pH Arterial POCT 7.46 (H) 7.35 - 7.45    pCO2 Arterial POCT 33 (L) 35 - 45 mm Hg    pO2 Arterial POCT 90 80 - 105 mm Hg    Bicarbonate Arterial POCT 24 21 - 28 mmol/L    Sodium POCT 141 135 - 145 mmol/L    Potassium POCT 4.1 3.5 - 5.0 mmol/L    Hemoglobin POCT 10.6 (L) 11.7 - 15.7 g/dL    Glucose Whole Blood POCT 162 (H) 70 - 99 mg/dL    Calcium, Ionized Whole Blood POCT 4.6 4.4 - 5.2 mg/dL    Base Excess/Deficit (+/-) POCT 0.1 -9.6 - 2.0 mmol/L    FIO2 POCT 42.0 %    Lactic Acid POCT 2.5 (H) <=2.0 mmol/L   Arterial Panel POCT   Result Value Ref Range    pH Arterial POCT 7.44 7.35 - 7.45    pCO2 Arterial POCT 33 (L) 35 - 45 mm Hg    pO2 Arterial POCT 157 (H) 80 - 105 mm Hg    Bicarbonate Arterial POCT 22 21 - 28 mmol/L    Sodium POCT 140 135 - 145 mmol/L    Potassium POCT 4.0 3.5 - 5.0 mmol/L    Hemoglobin POCT 8.8 (L) 11.7 - 15.7 g/dL    Glucose Whole Blood POCT 149 (H) 70 - 99 mg/dL    Calcium, Ionized Whole Blood POCT 4.3 (L) 4.4 - 5.2 mg/dL    Base Excess/Deficit (+/-) POCT -1.3 -9.6 - 2.0 mmol/L    FIO2 POCT 36.0 %    Lactic Acid POCT 2.8 (H) <=2.0 mmol/L   INR   Result Value Ref Range    INR 1.44 (H) 0.85 - 1.15   Partial thromboplastin time   Result Value Ref Range    aPTT 28 22 - 38 Seconds   Fibrinogen activity   Result Value Ref Range    Fibrinogen Activity 225 170 - 490 mg/dL   CBC with platelets   Result Value Ref Range    WBC Count 13.8 (H) 4.0 - 11.0 10e3/uL    RBC Count 2.93 (L) 3.80 - 5.20 10e6/uL    Hemoglobin 8.7 (L) 11.7 - 15.7 g/dL    Hematocrit 25.6 (L) 35.0 - 47.0 %    MCV 87 78 - 100 fL    MCH 29.7 26.5 - 33.0 pg    MCHC 34.0 31.5 - 36.5 g/dL    RDW 14.9 10.0 - 15.0 %    Platelet  Count 156 150 - 450 10e3/uL   Electrolyte panel   Result Value Ref Range    Sodium 142 135 - 145 mmol/L    Potassium 4.2 3.4 - 5.3 mmol/L    Chloride 109 (H) 98 - 107 mmol/L    Carbon Dioxide (CO2) 22 22 - 29 mmol/L    Anion Gap 11 7 - 15 mmol/L   Arterial Panel POCT   Result Value Ref Range    pH Arterial POCT 7.45 7.35 - 7.45    pCO2 Arterial POCT 35 35 - 45 mm Hg    pO2 Arterial POCT 120 (H) 80 - 105 mm Hg    Bicarbonate Arterial POCT 24 21 - 28 mmol/L    Sodium POCT 141 135 - 145 mmol/L    Potassium POCT 4.0 3.5 - 5.0 mmol/L    Hemoglobin POCT 9.0 (L) 11.7 - 15.7 g/dL    Glucose Whole Blood POCT 160 (H) 70 - 99 mg/dL    Calcium, Ionized Whole Blood POCT 4.3 (L) 4.4 - 5.2 mg/dL    Base Excess/Deficit (+/-) POCT 0.3 -9.6 - 2.0 mmol/L    FIO2 POCT 36.0 %    Lactic Acid POCT 2.6 (H) <=2.0 mmol/L   Arterial Panel POCT   Result Value Ref Range    pH Arterial POCT 7.43 7.35 - 7.45    pCO2 Arterial POCT 36 35 - 45 mm Hg    pO2 Arterial POCT 126 (H) 80 - 105 mm Hg    Bicarbonate Arterial POCT 24 21 - 28 mmol/L    Sodium POCT 142 135 - 145 mmol/L    Potassium POCT 3.9 3.5 - 5.0 mmol/L    Hemoglobin POCT 7.3 (L) 11.7 - 15.7 g/dL    Glucose Whole Blood POCT 146 (H) 70 - 99 mg/dL    Calcium, Ionized Whole Blood POCT 4.6 4.4 - 5.2 mg/dL    Base Excess/Deficit (+/-) POCT -0.1 -9.6 - 2.0 mmol/L    FIO2 POCT 40.0 %    Lactic Acid POCT 2.3 (H) <=2.0 mmol/L   Prepare red blood cells (unit)   Result Value Ref Range    Blood Component Type Red Blood Cells     Product Code H4770O21     Unit Status Transfused     Unit Number [id number]     CROSSMATCH Compatible     CODING SYSTEM [id number]     ISSUE DATE AND TIME 20231202081500     UNIT ABO/RH A+     UNIT TYPE ISBT 6200    CT Head w/o Contrast    Narrative    EXAM: Head CT without contrast 12/2/2023 9:45 AM    HISTORY: S/p craniotomy.    COMPARISON: Head CT 11/30/2023.    TECHNIQUE: Using multidetector thin collimation helical acquisition  technique, axial, coronal and sagittal CT  images from the skull base  to the vertex were obtained without intravenous contrast.   (topogram) image(s) also obtained and reviewed.    FINDINGS: The patient is status post bifrontal craniotomy, bilateral  ethmoidectomy, bilateral maxillary antrostomy, and left frontal  sinusotomy for a nasal/transnasal pituitary mass resection. There is a  new right frontal approach ventriculostomy catheter in place with the  tip in the right lateral ventricle. There is expected postprocedural  pneumocephalus and overlying soft tissue swelling. Small amount of  hyperattenuating hemorrhage in the surgical site near the sella  turcica and frontal lobes. Gray-white matter differentiation is  preserved in both cerebral hemispheres. Hyperdense appearance in the  interpeduncular cistern and in the anterior cranial fossa ventral to  the third ventricle, indeterminant if this represents residual tumor  or hemorrhage. There is no hydrocephalus, herniation, or significant  extra-axial fluid collection. Hypoattenuation of the liver parenchyma  in the right inferior ventral frontal lobe, likely represents  vasogenic edema from prior mass effect.    The orbits are intact. Fluid layers in the maxillary sinuses. Frothy  debris in the ethmoid sinus. The mastoid air cells are relatively  clear.      Impression    IMPRESSION: Status post extensive anterior frontal approach and  transsphenoidal endonasal approach giant suprasellar mass resection.  Expected pneumocephalus and new right-sided ventriculostomy catheter  in place. Hyperdense appearance in the interpedicular cistern and in  the anterior cranial fossa ventral to the third ventricle, may  represent residual tumor versus postoperative hemorrhage. Attention on  future follow-up exams. Hypoattenuation in the right ventral frontal  lobe, likely residual vasogenic edema/gliosis from the mass effect  from removed mass.    I have personally reviewed the examination and initial  interpretation  and I agree with the findings.    JIL BORGES MD         SYSTEM ID:  [id number]       All relevant imaging and laboratory values personally reviewed.

## 2023-12-02 NOTE — PROGRESS NOTES
"Otolaryngology Progress Note  December 2, 2023    S: Patient is post-op day 0 today. She remains on the vent. Scant bloody drainage from nose per RN.     O: /75   Pulse 92   Temp 98  F (36.7  C) (Axillary)   Resp 14   Ht 1.549 m (5' 1\")   Wt 96 kg (211 lb 10.3 oz)   SpO2 100%   BMI 39.99 kg/m     General: Intubated and sedated.    HEENT: Clot noted at anterior nares, does not appear to be actively bleeding.    Pulmonary: Mechanically ventilated via ETT intubation.   LABS:  Hgb 8.7 this AM    A/P: Mirian Cunningham is a 43 year old female with a past medical history of large pituitary adenoma s/p external and transnasal approach to resection on 12/1-12/2. [place] [facility] she will likely be intubated for another day at least.     - Sinus precautions    -- Patient and above plan to be discussed with Dr. Chau Harmon MD  Otolaryngology - Head and Neck Surgery PGY-3  "

## 2023-12-02 NOTE — BRIEF OP NOTE
"[facility]    Brief Operative Note    Pre-operative diagnosis: Pituitary tumor [D49.7]  Post-operative diagnosis Same as pre-operative diagnosis    Procedure: stealth assisted Combined endoscopic endonasal approach; Bilateral Maxillary Antrostomy; Bilateral Total Ethmoidectomy; Left Frontal Sinusotomy., Bilateral - Nose  and bifrontal craniotomy for tumor, N/A - Head  Ventriculostomy, N/A - Head    Surgeon: Surgeon(s) and Role:     * True Buenrostro MD - Primary     * Shyam Ritchie MD - Assisting     * Heike Mccoy MD - Resident - Assisting     * Rosario Hatch MD - Resident - Assisting  Anesthesia: General   Estimated Blood Loss: 2000ml    Drains: EVD  Specimens:   ID Type Source Tests Collected by Time Destination   1 : Sellar Mass Tissue Other SURGICAL PATHOLOGY EXAM Shyam Ritchie MD 12/1/2023  1:18 PM    2 : Planum Tumor Tissue Other SURGICAL PATHOLOGY EXAM True Buenrostro MD 12/1/2023  7:43 PM    3 : Endonasal Planum Tumor with sonopet contents Tissue Other SURGICAL PATHOLOGY EXAM Shyam Ritchie MD 12/2/2023  3:13 AM      Findings:   Combined bifrontal craniotomy and endonasal approach to pituitary adenoma. Skull base repaired with pericranial flap passing through left frontal sinus, bilateral nasoseptal flaps, duraseal, gelfoam. Nose formally packed with bacitracin soaked plain gauze.    .  Complications: None.  Implants:   Implant Name Type Inv. Item Serial No.  Lot No. LRB No. Used Action   GRAFT DUREGEN SUTURABLE MATRIX [id number] - [id number] Bone/Tissue/Biologic GRAFT DUREGEN SUTURABLE MATRIX [id number]  [id number] [id number] N/A 1 Implanted   GRAFT DURAGEN 2X2\"  - [id number] Other GRAFT DURAGEN 2X2\"   [employer] [id number] N/A 1 Implanted   GRAFT DUREGEN SUTURABLE MATRIX [id number] - [id number] Bone/Tissue/Biologic GRAFT DUREGEN SUTURABLE MATRIX [id number]  [employer] " [facility] [id number] N/A 1 Implanted

## 2023-12-02 NOTE — PLAN OF CARE
Admitted/transferred from: OR to  at 0930   Reason for admission/transfer: Post-operative ICU care  2 RN skin assessment: completed by: Ashanti MADSEN & Meg POLK   Result of skin assessment and interventions/actions: Crani incision, EVD site, bilateral bloody nasal drainage  Height, weight, drug calc weight: Done  Patient belongings (see Flowsheet)  MDRO education added to care plan: N/A    Major Shift Events: Neuros Q1hr. Not opening eyes to stimulation, but (+) gag response and cough. Withdrawing in all extremities. R pupil fixed, L pupil brisk. Pupillometry Q4hrs.  EVD 10 above EAM. Output clear; 0-7 ml. ICPs <10. Titrating propofol and fentanyl for pain/sedation. NSR, MAPs >65 and SBP <140 without interventions. Afebrile. ETT 22 at lip. CMV/AC 40%/18/380/7. LS clear. OG at 63 (need okay to use order). BS+. Galeas in place, high UOP. Spec grav 1.004, DDAVP ordered. Na+ checks Q4hrs. K+ and mag replaced.  Plan: Remove L a-line and replace, place PICC, repeat chest-xray tomorrow am. Keep intubated and sedated. Restraint orders in place if needed.   For vital signs and complete assessments, please see documentation flowsheets.        ?

## 2023-12-02 NOTE — PROGRESS NOTES
44 y/o POD 0 Combined endonasal and transcranial approach for resection of pituitary adenoma and placement of EVD.    Transferred to Neuro ICU intubated and sedated due to complex and prolonged surgery (>24 hours).    I spoke with Neuro-ICU team (Dr Merritt) to relate information regarding this patient's difficult airway and the need to remove patient's a-line. If long term hemodynamic monitoring is needed, a new radial a line should be placed to minimize risk of potential complications.    No

## 2023-12-02 NOTE — ANESTHESIA CARE TRANSFER NOTE
Patient: Mirian Cunningham    Procedure: Procedure(s):  stealth assisted Combined endoscopic endonasal approach; Bilateral Maxillary Antrostomy; Bilateral Total Ethmoidectomy; Left Frontal Sinusotomy.  and bifrontal craniotomy for tumor  Ventriculostomy       Diagnosis: Pituitary tumor [D49.7]  Diagnosis Additional Information: No value filed.    Anesthesia Type:   General     Note:    Oropharynx: oral airway in place, endotracheal tube in place and ventilatory support  Level of Consciousness: iatrogenic sedation      Independent Airway: airway patency not satisfactory and stable  Dentition: dentition unchanged  Vital Signs Stable: post-procedure vital signs reviewed and stable  Report to RN Given: handoff report given  Patient transferred to: ICU  Comments: Pt transported to ICU sedated with ETT in situ. VSS throughout transport and on arrival to ICU. Paralytic reversed after discussion with surgical fellow. Report shared at  bedside.  ICU Handoff: Call for PAUSE to initiate/utilize ICU HANDOFF, Identified Patient, Identified Responsible Provider, Reviewed the Pertinent Medical History, Discussed Surgical Course, Reviewed Intra-OP Anesthesia Management and Issues during Anesthesia, Set Expectations for Post Procedure Period and Allowed Opportunity for Questions and Acknowledgement of Understanding      Vitals:  Vitals Value Taken Time   /60    Temp     Pulse 88 12/02/23 0936   Resp 18 12/02/23 0936   SpO2 100 % 12/02/23 0930   Vitals shown include unfiled device data.    Electronically Signed By: NICOLE Méndez CRNA  December 2, 2023  9:36 AM

## 2023-12-02 NOTE — ANESTHESIA POSTPROCEDURE EVALUATION
Patient: Mirian Cunningham    Procedure: Procedure(s):  stealth assisted Combined endoscopic endonasal approach; Bilateral Maxillary Antrostomy; Bilateral Total Ethmoidectomy; Left Frontal Sinusotomy.  and bifrontal craniotomy for tumor  Ventriculostomy       Anesthesia Type:  General    Note:  Disposition: ICU            ICU Sign Out: Anesthesiologist/ICU physician sign out WAS performed   Postop Pain Control: Uneventful            Sign Out: Well controlled pain   PONV: No   Neuro/Psych: Uneventful            Sign Out: PLANNED postop sedation   Airway/Respiratory: Uneventful            Sign Out: AIRWAY IN SITU/Resp. Support               Airway in situ/Resp. Support: ETT                 Reason: Planned Pre-op   CV/Hemodynamics: Uneventful            Sign Out: Acceptable CV status; No obvious hypovolemia; No obvious fluid overload   Other NRE: NONE   DID A NON-ROUTINE EVENT OCCUR? No    Event details/Postop Comments:  Stable on transport to CT scanner, then to ICU with CRNA, resident physician, and attending anesthesiologist.            Last vitals:  Vitals Value Taken Time   /68 12/02/23 0946   Temp 36.7  C (98  F) 12/02/23 0945   Pulse 93 12/02/23 0948   Resp 14 12/02/23 0947   SpO2 100 % 12/02/23 0947       Electronically Signed By: Jose Tompkins MD  December 2, 2023  9:48 AM

## 2023-12-02 NOTE — PROCEDURES
[facility]    Double Lumen PICC Placement    Date/Time: 12/2/2023 5:29 PM    Performed by: Cailin Zavala RN  Authorized by: Gabriela Calderon MD  Indications: vascular access      UNIVERSAL PROTOCOL   Site Marked: Yes  Prior Images Obtained and Reviewed:  Yes  Required items: Required blood products, implants, devices and special equipment available    Patient identity confirmed:  Arm band, provided demographic data, hospital-assigned identification number and anonymous protocol, patient vented/unresponsive  NA - No sedation, light sedation, or local anesthesia  Confirmation Checklist:  Patient's identity using two indicators, relevant allergies, procedure was appropriate and matched the consent or emergent situation and correct equipment/implants were available  Universal Protocol: the Joint Commission Universal Protocol was followed    Preparation: Patient was prepped and draped in usual sterile fashion       ANESTHESIA    Anesthesia:  Local infiltration  Local Anesthetic:  Lidocaine 1% without epinephrine  Anesthetic Total (mL):  3.5      SEDATION    Patient Sedated: No        Preparation: skin prepped with ChloraPrep  Skin prep agent: skin prep agent completely dried prior to procedure  Sterile barriers: maximum sterile barriers were used: cap, mask, sterile gown, sterile gloves, and large sterile sheet  Hand hygiene: hand hygiene performed prior to central venous catheter insertion  Type of line used: PICC  Catheter type: double lumen  Lumen type: non-valved and power PICC  Lumen Identification: Purple and Red  Catheter size: 5 Fr  Brand: Bard  Lot number: [id number]  Placement method: MST, ultrasound, tip navigation system and venipuncture  Number of attempts: 1  Difficulty threading catheter: no  Successful placement: yes  Orientation: right  Catheter to Vein (%): 33  Location: basilic vein (0.55 cm vein diameter)  Tip Location: SVC  Arm circumference:  adults 10 cm  Visible catheter length: 3  Total catheter length: 44  Dressing and securement: adhesive securement device, alcohol impregnated caps, blood cleaned with CHG, chlorhexidine disc applied, glue, gloves changed prior to final dressing, dressing applied, sterile dressing applied, site cleansed, statlock and transparent dressing  Post procedure assessment: free fluid flow, blood return through all ports and placement verified by 3CG technology  PROCEDURE   Disposal: sharps and needle count correct at the end of procedure, needles and guidewire disposed in sharps container

## 2023-12-02 NOTE — OP NOTE
[facility]  Department of Neurosurgery  Operative report    Procedure date: 12/1/2023    Preoperative diagnosis:  1.  Sellar/anterior/posterior fossa tumor  2.  Right blindness, left vision loss    Postoperative diagnosis:  1.  Sellar/anterior/posterior fossa tumor  2.  Right blindness, left vision loss    Procedures performed:  1.  Modified transbasal approach for approach to the anterior and posterior fossa  2.  Resection of anterior and posterior fossa skull base mass (probable giant pituitary adenoma)  3.  Endoscopic endonasal transplanum approach to the anterior fossa  4.  Endoscopic resection of anterior and posterior fossa skull base mass (probable giant pituitary adenoma)  5.  Use of operative microscope  6.  Stereotactic neuronavigation  7.  Right frontal ventriculostomy  8.  [place] of left pericranial flap pedicled on the supratrochlear and supraorbital arteries  9.  Neuromonitoring for SSEP, EEG    Surgeon: True Buenrostro MD    Co-surgeon: Shyam Ritchie MD    Assistants:  1.  Heike Mccoy MD  2.  Rosario Hatch MD    Estimated blood loss: 2 L    Anesthesia: General    Indications for procedure: Ms. Cunningham is a 43-year-old female with history of schizophrenia who lacks capacity for decision-making and whose godmother is the most appropriate advocate per our ethics team.  She developed right sided blindness and significant left vision loss and she was found with a rapidly growing giant sellar/suprasellar and posterior fossa mass.  Given the large size and apparent high vascularity of the lesion and the challenging location, she was referred to the [facility] for management.  We met to discuss options for management with her and I spoke with her godmother who is her spokesperson.  Given the tumor had grown significantly compared to the prior imaging in 2018, surgery was recommended.  Given that she did not have capacity to make decisions, our ethics team determined that  her godmother was the best spokesperson.  We discussed options for management and the risks including death, blindness, CSF leak, neurologic injury including paralysis, numbness, aphasia, coma, anosmia, double vision, stroke, subtotal removal.  They requested we proceed.  Informed consent was obtained.    Procedure in detail: After informed consent was obtained, the patient was brought to the operating room and placed supine on the operating room table.  The anesthesia service performed an intubation and establish intravenous and intra-arterial access.  The bed was turned 180 degrees.  The patient's head was placed in a Dyer head brady and turned slightly towards the right side with mild extension.  Stereotactic neuronavigation was registered and its accuracy was confirmed.  We marked a bicoronal incision for the transnasal approach and a hair shave was performed.  The [employer] team placed leads to monitor SSEPs and EEG.  The patient received dexamethasone, cefazolin, and Decadron as well as mannitol 0.5 g/kg.  A timeout was performed confirm details of the procedure.    After timeout was performed, we then began the open and endonasal approaches.  The neurosurgery team began by performing the modified transbasal approach while Dr. Ritchie performed the endoscopic endonasal transplanum/transsellar approach. Please see Dr. Ritchie's note for details of the endonasal approach.    The scalp incision was opened and a pericranial flap was harvested and reflected anteriorly pedicled on the supratrochlear and supraorbital arteries.  A subfascial dissection on the right and intrafascial dissection on the left were carefully performed.  This allowed us to expose the orbital bars bilaterally.  The right temporalis was reflected inferiorly.  We next placed a right frontal ventriculostomy drain.  A bur hole was created approximately 11 cm behind the nasion and 3 cm to the right of midline.  Stereotactic navigation was used to  place a Bactiseal catheter into the right lateral ventricle.  The catheter was tunneled posteriorly.  It was set to 7 cm at the outer table.  Clear CSF was obtained.  It was under high-pressure.  We then continued our approach.  Bur holes were placed on either side of the superior sagittal sinus and a modified transbasal approach was performed with a combined right temporal craniotomy.  The dura was largely intact but the dura was very full consistent with high pressure.  We had entered the frontal sinus which was on the left side.  This was opened the frontal sinus widely and connected this partially towards the right side to enable reconstruction using the pericranial graft at the end.  This completed the approach to the anterior and posterior facet.    We then worked towards tumor resection through the transcranial approach.  The operating microscope was brought to the field.  We used navigation to confirm our landmarks.  We then opened the dura and ligated the superior sagittal sinus anteriorly.  We then divided the falx.  We then carefully debulked the anterior portion of tumor until we could identify the margins of the tumor capsule.  The tumor was highly vascularized requiring many rounds of tumor resection and hemostasis, we eventually were able to core out the center of the anterior portion of the tumor and fold in the edges.  This allowed us to identify the planum and tuberculum.  We worked laterally to identify the optic nerves which were significantly thinned and splayed laterally by the tumor.  We then divided the right optic nerve given her ipsilateral blindness and we then identified the carotid artery on the right side and followed it to the bifurcation into the MCA and HENRIETTA.  We then debulk the posterior portion of tumor keeping our A1 and A2 protected.  As we rolled posteriorly along the posterior rim, we entered the peritumoral cyst as expected based on the preoperative imaging.  We then identified  both A1s, the ACOM, and both A2s.  We were able to resect the posterior margin of tumor and followed this towards the left-hand side.  We then debulk the left hand portion of tumor and followed this around the capsule margin.  As we did this, we had decompressed the left optic nerve and followed this back to the chiasm.  The nerve was significantly thinned and we were careful to avoid traction on the nerve.  After the anterior fossa portion of tumor was resected, we then worked towards the sella and posterior fossa.  We entered the expanded sella and emptied the tumor in this area.  Again, the tumor was highly vascularized along the skull base which required patient's and multiple rounds of hemostasis.  We were able to identify the cavernous carotid arteries bilaterally.  A small arterial bleeder was encountered on the right side near the carotid artery, and bipolar cautery failed to stop the bleeding, so a 5 mm curved aneurysm clip was used.  We then continued to resect tumor from the sella down towards the posterior fossa.  We emptied out the posterior fossa portion of tumor until we identified the arachnoid and the basilar apex and PCAs, as well as the oculomotor nerves.  The tumor was densely adherent to the posterior margin of the A1s, and we were able to lift and remove most of the tumor in this area but a small portion was left given its tight adherence to the major vessels.  We similarly left a small portion of tumor capsule along the inferior aspect of the tumor in the posterior fossa.  The majority of the tumor had been removed.  The brain appeared nicely relaxed after we were done with this portion.    At this point, we then turned to the endonasal approach for tumor resection.  We scrubbed into the endonasal portion of the procedure to join our ENT colleagues.  Using a 0 degree endoscope, we removed some additional tumor over the planum and expose the dura of the planum and the sella and upper clivus.   We resected the tumor along the planum and then turned our attention to the sella and upper clivus.  We inspected the area of the posterior fossa and removed some additional tumor under the right A1, and then towards the left A1. The basilar apex was then identified again on its left side. The tumor was heterogeneous and more firm in this area than the initial tumor anteriorly.  A small portion of tumor was left over the right P1/Pcomm complex. We identified the left optic nerve as having less mass effect on it.    After we had removed everything that we felt was safe to remove, we then turned to reconstruction.  We rescrubbed into the cranial portion of the procedure, while Dr. Ritchei performed the endonasal portion of the reconstruction.  We took the left side of the pericranial graft and fed this down to the endonasal corridor where it was laid over the defect by Dr. Ritchie.  The pericranium filled the entire frontal sinus opening to occlude any communication to the paranasal sinuses. He then performed the remainder of the endonasal reconstruction.  From the transbasal approach, we placed an inlay with DuraGen over the dural opening along the sella and posterior fossa from above.  We then reinspected the area and found no evidence of residual tumor that could be removed safely.  We irrigated copiously.  We then sewed in a suturable DuraGen in watertight fashion to close the large dural defect.  The bone flap was then replaced using the Synthe[employer] plating system leaving the wound for the pericranial graft to exit inferiorly.  The wound was copiously irrigated again.  The right temporalis was then tacked back into position.  The galea was then closed with interrupted Vicryl sutures.  The skin was closed with running 3-0 Monocryl sutures.  The wound was sterilely dressed. The ventriculostomy was connected to the drainage system and sutured to secure the drain to the skin. The drain was kept clamped except for some  intermittent drainage during the early transcranial phase of tumor resection. There was a brief period of mild global slowing that lasted approximately two minutes during the tumor resection that resolved spontaneously, without an SSEP correlate. No other monitoring changes were seen during the procedure.    The sterile drapes were removed.  The patient's head was released from the Dyer head brady and the head of bed was replaced.  Given the length of the case, the patient was kept intubated by the anesthesia team and brought to the ICU for monitoring.     Given the complexity of the case including the large size, multicompartmental nature, high vascularity, and high intracranial pressure, this case required an extra 50% time and effort.  Upon conclusion of the procedure, I called the patient's godmother for an update.  I was scrubbed and present through the critical portions of the neurosurgical portion of the procedure, and remained immediately available throughout.    True Buenrostro MD

## 2023-12-02 NOTE — PHARMACY-CONSULT NOTE
Pharmacy Tube Feeding Consult    Medication reviewed for administration by feeding tube and for potential food/drug interactions.    Recommendation: No changes are needed at this time.     Pharmacy will continue to follow as new medications are ordered.    Luis Plummer, PharmD, BCPS

## 2023-12-02 NOTE — PHARMACY-VANCOMYCIN DOSING SERVICE
Pharmacy Vancomycin Initial Note  Date of Service 2023  Patient's  1980  43 year old female    Indication: Surgical Prophylaxis    Current estimated CrCl = Estimated Creatinine Clearance: 107.1 mL/min (based on SCr of 0.71 mg/dL).    Creatinine for last 3 days  2023: 11:15 AM Creatinine 0.68 mg/dL  2023:  6:25 AM Creatinine 0.71 mg/dL    Recent Vancomycin Level(s) for last 3 days  No results found for requested labs within last 3 days.      Vancomycin IV Administrations (past 72 hours)        No vancomycin orders with administrations in past 72 hours.                    Nephrotoxins and other renal medications (From now, onward)      Start     Dose/Rate Route Frequency Ordered Stop    23 2300  vancomycin (VANCOCIN) 1,000 mg in 200 mL dextrose intermittent infusion         1,000 mg  200 mL/hr over 1 Hours Intravenous EVERY 12 HOURS 23 1022      23 1100  vancomycin (VANCOCIN) 1,500 mg in 0.9% NaCl 250 mL intermittent infusion         1,500 mg  over 90 Minutes Intravenous ONCE 23 1022              Contrast Orders - past 72 hours (72h ago, onward)      None                  Plan:  Start vancomycin 1500mg IV x1 dose followed by 1000mg IV vancomcycin q12h.  Vancomycin monitoring method: Trough (Method 2 = manual dose calculation) -Vancomycin therapeutic monitoring goal: 15-20 mg/L - due to neurosurgical indication  Pharmacy will check vancomycin levels as appropriate in 1-3 Days.    Serum creatinine levels will be ordered daily for the first week of therapy and at least twice weekly for subsequent weeks.      Cande Diaz, PharmD, BCCCP

## 2023-12-03 NOTE — PROGRESS NOTES
Major Shift Events:  PICC line placed, MRI complete  Plan: Continue scheduled Na+ labs, hourly neuro and I/O's  For vital signs and complete assessments, please see documentation flowsheets.

## 2023-12-03 NOTE — PROGRESS NOTES
Brief Endocrine Progress note:    Mirian Cunningham is a 43 year old female with complex psychosocial situation including paranoid schizophrenia and lack of active guardianship transferred to [facility] with known large sellar/suprasellar mass. Godmother Adri is appointed as the medical decision maker. No other family members available or willing to participate in her care.  MRI with large multilobulated pituitary mass/adenoma with mass effect and encasement of the cavernous structures including the cavernous internal carotid arteries, M1 segment of the right MCA, and A1 and proximal A2 segments of both anterior cerebral arteries.      Now post operative day 1 status post combined endonasal and transcranial approach for resection of pituitary adenoma and placement of external ventricular drain.    Review of patient I/o chart shows that her intake and corresponding urine output has been balanced or been on the positive side. Looks like she possibly had breakthrough urination around 2pm. Patient received one dose of DDVAP 1 mcg at 1531(12/2/2023). Since receiving DDAVP, urine output has been < 250 ml/hr.    Serum sodium levels have been slowly up trending and the recent serum Na is at 152 at 1929. Given that she received one dose of we can expect improvement in her serum sodium levels.    # Arginine Vasopressin deficiency s/p combined endonasal and transcranial approach for resection of pituitary adenoma.    She just received first dose of DDAVP 1 mcg ( I.V.) and there is improvement of urine output. Will monitor urine output and sodium levels closely and will decide if she would need any more dose of DDAVP      - recommend to place order for DDAVP 1 mcg q12h PRN.   - If urine output is > 250 ml/hr for > 2 hours consecutively then would recommend check serum sodium and urine specific gravity.    Jacqui Luna MD on 12/2/2023 at 10:56 PM

## 2023-12-03 NOTE — PROGRESS NOTES
"Otolaryngology Progress Note  December 2, 2023    S: Patient is post-op day 1 today. She remains on the vent. Hypernatremia - DDAVP given, endocrine consulted.     O: /66   Pulse 91   Temp 98.4  F (36.9  C) (Oral)   Resp 15   Ht 1.549 m (5' 1\")   Wt 104.6 kg (230 lb 9.6 oz)   SpO2 100%   BMI 43.57 kg/m     General: Intubated and sedated.    HEENT: No active bleeding from the nose. Some dried blood at anterior nares   Pulmonary: Mechanically ventilated via ETT intubation.   LABS:  Hgb 8.0 this AM    A/P: Mirian Cunningham is a 43 year old female with a past medical history of large pituitary adenoma s/p external and transnasal approach to resection on 12/1-12/2.    - Sinus precautions  - Start nasal saline tomorrow 12/4  - Recommend antibiotics to cover staph for the duration of packing (currently on cefepime which is appropriate)    -- Patient and above plan to be discussed with Dr. Chau Moran MD  Otolaryngology - Head and Neck Surgery PGY-1  "

## 2023-12-03 NOTE — PLAN OF CARE
Major Shift Events:  MRI completed. Q1h neuros. Follows commands intermittently, right hand grasp strong, left weak. Right pupil fixed. Left reactive and brisk. Remains intubated. Monitoring urine output- No DDAVP given. IVF changed to D5 replacing UOP. EVD at 10.   Plan: Continue to monitor urine output and neuros. Sodium levels q2h. Weaning sedation as tolerated for extubation- difficult intubation.  For vital signs and complete assessments, please see documentation flowsheets.       Goal Outcome Evaluation:         Problem: Adult Inpatient Plan of Care  Goal: Plan of Care Review  Description: The Plan of Care Review/Shift note should be completed every shift.  The Outcome Evaluation is a brief statement about your assessment that the patient is improving, declining, or no change.  This information will be displayed automatically on your shift  note.  Outcome: Not Progressing

## 2023-12-03 NOTE — PROGRESS NOTES
CLINICAL NUTRITION SERVICES - ASSESSMENT NOTE     Nutrition Prescription    RECOMMENDATIONS FOR MDs/PROVIDERS TO ORDER:  - Total daily fluids/adjustments per MD   - Electrolyte replacement per protocol as indicated      Malnutrition Status:    - Patient does not meet two of the established criteria necessary for diagnosing malnutrition    Recommendations already ordered by Registered Dietitian (RD):  - Initiate via current access: Vital High Protein @ goal of 55ml/hr (1320ml/day) will provide: 1320 kcals (22 kcals/kg), 115 g PRO (1.9 gm/kg) 1103 ml free H20, 146 g CHO, and 0 g fiber daily.   - Initiate @ 15 ml/hr and advance by 10 ml q4hr as tolerated  - Do not start or advance until lytes (Mg++,K+) WNL and phos>2.0  - Recommend 30-60 ml q4hr fluid flushes for tube patency. Additional fluids and/or adjustments per MD.    - Order multivitamin/mineral (15 ml/day via FT) to help ensure micronutrient needs being met with suspected hypermetabolic demands and potential interruptions to TF infusions.  - Elevated HOB with gastric feeds      Future/Additional Recommendations:  - EN initiation/adv/marcin via OGT (AXR)  - Weight trends     REASON FOR ASSESSMENT  Mirian Cunningham is a/an 43 year old female assessed by the dietitian for Provider Order - Registered Dietitian to Assess and Order TF per Medical Nutrition Therapy Protocol. New assessment completed after prior sign off - change in medical status.     Medical history:  PMHx schizophrenia, HTN, asthma, pre-DM, and known pituitary mass since at least 2018 who was transferred to [facility] for surgical evaluation for her pituitary lesion. Now post-resection which was performed on 12/1/2023. Pt admitted to Neuro ICU post- operatively. Intubated for procedure and remains vented.     NUTRITION HISTORY  Pt has been eating well since admit/prior to surgery (ordering around 4000 kcal/day and 115-150 g protein/day per prior unit RD). Pt has housing insecurity which could put her at risk  What Type Of Note Output Would You Prefer (Optional)?: Standard Output "for food insecurity as well. Unable to obtain nutrition history at this time, pt vented, no family in room.     CURRENT NUTRITION ORDERS  Diet: NPO  Enteral Access: OGT (AXR)  EN running with Osmolite 1.5 per NCC orders prior to RD assessment. Discussed changes with bedside RN.     LABS  Labs reviewed  Na+ 150 (H)  Mg++ 2.5 (H)  Phos: 2.3 (L)  Glucose: 214, 149  A1C: 5.8 (H)  Lactic acid (12/2) 2.3 (H)    MEDICATIONS  Medications reviewed  Insulin  Keppra  Flagyl; vancomycin  Miralax  Neutra-phos   Senokot     ANTHROPOMETRICS  Height: 154.9 cm (5' 1\") 61\"   Most Recent Weight: 104.6 kg (230 lb 9.6 oz)    IBW: 48 kg  BMI: Obesity Grade III BMI >40  Weight History:   Wt Readings from Last 9 Encounters:   12/03/23 104.6 kg (230 lb 9.6 oz)     Dosing Weight: 59 kg adjusted weight using driest/admit weight of 93 kg and IBW of 48 kg)    ASSESSED NUTRITION NEEDS  Estimated Energy Needs: 5223-8518+ kcals/day (20 - 25+ kcals/kg)  Justification: Obese and Vented  Estimated Protein Needs:  grams protein/day (1.5 - 2 grams of pro/kg)  Justification: Increased needs and Obesity guidelines  Estimated Fluid Needs: 4955-9603 mL/day (25 - 30 mL/kg)   Justification: Maintenance and Per provider pending fluid status    PHYSICAL FINDINGS  See malnutrition section below.  Difficult fat/muscle loss assessment with body habitus   Hair, skin appear healthy   Nails: painted on toes, fingernails covered in mitts  Packing/guaze over saldana/lip area  OGT  ETT     MALNUTRITION  % Intake: No decreased intake noted - eating well prior to surgery   % Weight Loss: None noted - none since admit, trending up   Subcutaneous Fat Loss: None observed  Muscle Loss: None observed  Fluid Accumulation/Edema: fluid-up likely related to post-op status  Malnutrition Diagnosis: Patient does not meet two of the established criteria necessary for diagnosing malnutrition    NUTRITION DIAGNOSIS  Inadequate oral intake related to inability to take oral PO/vented " How Severe Are Your Spot(S)?: mild Have Your Spot(S) Been Treated In The Past?: has not been treated as evidenced by NPO and meeting 0 %nutrition needs at present       INTERVENTIONS  Implementation  Nutrition Education: Not appropriate at this time due to patient condition   Enteral Nutrition - initiate as ordered and monitor      Goals  Total avg nutritional intake to meet a minimum of 20 kcal/kg and 1.5 g PRO/kg daily (per dosing wt 59 kg).     Monitoring/Evaluation  Progress toward goals will be monitored and evaluated per protocol.  Reyna Esteban RD, DAVE, [facility]  Neuro ICU  Pager: [phone]       Hpi Title: Evaluation of a Skin Lesion Year Removed: 1900

## 2023-12-03 NOTE — PLAN OF CARE
Problem: Oral Intake Inadequate  Goal: Improved Oral Intake  Outcome: Unable to Meet  Intervention: Promote and Optimize Oral Intake  Recent Flowsheet Documentation  Taken 12/3/2023 1200 by Reyna Esteban RD  Nutrition Interventions: other (see comments)   Goal Outcome Evaluation: start EN via OGT

## 2023-12-03 NOTE — PROGRESS NOTES
[facility], [place]   12/03/2023   Neurosurgery Progress Note:    Interval History: Remains intubated in ICU. Rising hypernatremia with high UOP, DDAVP 1mcg given around 3:30pm. Na continued to uptrend; discussed with endocrinology fellow overnight who recommended redosing DDAVP around 3am if urine output remains high (>250mL/hr for 3 hours). Changed IVF to D5 water, replacing UOP 1/2 to 1, changed IV flagyl to po. Na improved slightly to 151. MRI/MRA completed without acute concerns, likely gross total resection.    Assessment:  Mirian Cunningham is a 43 year old female with complex psychosocial situation including paranoid schizophrenia and lack of active guardianship transferred to [facility] with known large sellar/suprasellar mass. Godmother Adri is appointed as the medical decision maker. No other family members available or willing to participate in her care.  MRI with large multilobulated pituitary mass/adenoma with mass effect and encasement of the cavernous structures including the cavernous internal carotid arteries, M1 segment of the right MCA, and A1 and proximal A2 segments of both anterior cerebral arteries.     Now post operative day 1 status post combined endonasal and transcranial approach for resection of pituitary adenoma and placement of external ventricular drain.    Plan:  Q1h neuro exams  DDAVP q12hr prn if urine output is > 250/hr for 3 hours  D5 water to replace UOP at 1/2:1 ratio  EVD at 10  Decadron 7 day taper  Keppra 7 days  3 days vanc/cefepime/flagyl  Sinus precautions  Pituitary panel  DI watch - strict I&O q1h, daily weights  MRI/MRA prior to extubation - completed  Wean to extubation as able, cautiously as patient was a difficult intubation  Pain control  Seroquel PRN  Maintain SBP < 140  DVT prophylaxis: mechanical  Dispo: ICU    -----------------------------------  Renetta Goel MD, PhD  PGY-2 Neurosurgery    Please contact neurosurgery resident on call with  questions.    Dial * * [phone], enter [phone] when prompted.   -----------------------------------  Objective:   Temp:  [97.9  F (36.6  C)-98  F (36.7  C)] 98  F (36.7  C)  Pulse:  [] 84  Resp:  [11-18] 14  BP: (101-158)/(54-91) 108/62  MAP:  [75 mmHg-103 mmHg] 103 mmHg  Arterial Line BP: (101-138)/(56-78) 126/72  FiO2 (%):  [40 %-90 %] 40 %  SpO2:  [94 %-100 %] 100 %  I/O last 3 completed shifts:  In: 8715.03 [I.V.:7000.03; NG/GT:75]  Out: 6020 [Urine:4595; Other:25; Blood:1400]    Gen: Appears comfortable, intubated and iatrogenically sedated  Wound: dressing in place with some strikethrough  Resp: mechanically ventilated, sat > 90%  Cardiovasc: moderate peripheral edema, RRR on tele  Neurologic:  Intubated and iatrogenically sedated  Does not follow commands  No gaze preference  R pupil non-reactive (baseline), L pupil briskly reactive  Face symmetric at rest  + VOR, + cough w suctioning  Reflexes diminished throughout, Babinski mute, no Zamorano's  Withdraws to noxious stimuli x 4      LABS:  Recent Labs   Lab 12/02/23  2214 12/02/23  2051 12/02/23  1934 12/02/23  1929 12/02/23  1715 12/02/23  1443 12/02/23  1220 12/02/23  1054 12/02/23  0805 12/02/23  0542 12/02/23  0540 12/01/23  0948 12/01/23  0625 11/30/23  1115   *  --   --  152*  --  149*  --  146*  146* 142 141 142   < > 140 143   POTASSIUM  --   --   --   --   --   --   --  3.8 3.9 4.0 4.2   < > 4.0 4.1   CHLORIDE  --   --   --   --   --   --   --  111*  --   --  109*  --  103 105   CO2  --   --   --   --   --   --   --  24  --   --  22  --  28 29   ANIONGAP  --   --   --   --   --   --   --  11  --   --  11  --  9 9   GLC  --  139* 159*  --  130*  --    < > 169* 146* 160*  --    < > 91 134*   BUN  --   --   --   --   --   --   --  12.2  --   --   --   --  13.0 13.4   CR  --   --   --   --   --   --   --  0.80  --   --   --   --  0.71 0.68   FADUMO  --   --   --   --   --   --   --  8.7  --   --   --   --  9.5 9.4    < > = values in this interval not  displayed.     Recent Labs   Lab 12/02/23  1054   WBC 13.5*   RBC 3.03*   HGB 9.0*  9.0*   HCT 25.3*   MCV 84   MCH 29.7   MCHC 35.6   RDW 14.6   *     IMAGING:  Recent Results (from the past 24 hour(s))   CT Head w/o Contrast    Narrative    EXAM: Head CT without contrast 12/2/2023 9:45 AM    HISTORY: S/p craniotomy.    COMPARISON: Head CT 11/30/2023.    TECHNIQUE: Using multidetector thin collimation helical acquisition  technique, axial, coronal and sagittal CT images from the skull base  to the vertex were obtained without intravenous contrast.   (topogram) image(s) also obtained and reviewed.    FINDINGS: The patient is status post bifrontal craniotomy, bilateral  ethmoidectomy, bilateral maxillary antrostomy, and left frontal  sinusotomy for a nasal/transnasal pituitary mass resection. There is a  new right frontal approach ventriculostomy catheter in place with the  tip in the right lateral ventricle. There is expected postprocedural  pneumocephalus and overlying soft tissue swelling. Small amount of  hyperattenuating hemorrhage in the surgical site near the sella  turcica and frontal lobes. Gray-white matter differentiation is  preserved in both cerebral hemispheres. Hyperdense appearance in the  interpeduncular cistern and in the anterior cranial fossa ventral to  the third ventricle, indeterminant if this represents residual tumor  or hemorrhage. There is no hydrocephalus, herniation, or significant  extra-axial fluid collection. Hypoattenuation of the liver parenchyma  in the right inferior ventral frontal lobe, likely represents  vasogenic edema from prior mass effect.    The orbits are intact. Fluid layers in the maxillary sinuses. Frothy  debris in the ethmoid sinus. The mastoid air cells are relatively  clear.      Impression    IMPRESSION: Status post extensive anterior frontal approach and  transsphenoidal endonasal approach giant suprasellar mass resection.  Expected pneumocephalus and  new right-sided ventriculostomy catheter  in place. Hyperdense appearance in the interpedicular cistern and in  the anterior cranial fossa ventral to the third ventricle, may  represent residual tumor versus postoperative hemorrhage. Attention on  future follow-up exams. Hypoattenuation in the right ventral frontal  lobe, likely residual vasogenic edema/gliosis from the mass effect  from removed mass.    I have personally reviewed the examination and initial interpretation  and I agree with the findings.    JIL BORGES MD         SYSTEM ID:  [id number]   XR Chest Port 1 View    Narrative    Chest one view portable    HISTORY: Status post intubation    COMPARISON STUDY: 11/1/2023    FINDINGS: Endotracheal tube in midtrachea. Interstitial and airspace  opacities bilaterally unchanged. Left basilar atelectasis. Cardiac  silhouette is large.      Impression    IMPRESSION: Endotracheal tube in midtrachea.    DARWIN MAGANA MD         SYSTEM ID:  [id number]   XR Abdomen Port 1 View    Narrative    Exam: XR ABDOMEN PORT 1 VIEW, 12/2/2023 1:13 PM    Indication: s/p OG tube placement    Comparison: 11/27/2023    Findings:   Partially visualized enteric tube tip and sidehole project over the  stomach. Endotracheal tube tip projects of the mid thoracic trachea.  Stable enlarged cardiac silhouette and indistinct pulmonary  vasculature. The central airways are clear. Left basilar atelectasis,  possible trace left pleural effusion.  Surgical clips in the pelvis, Galeas catheter. Pelvic phleboliths. No  obstructive bowel gas pattern. Retained cecal stool.      Impression    Impression: Enteric tube tip and sidehole project over the stomach. No  obstructive bowel gas pattern.     I have personally reviewed the examination and initial interpretation  and I agree with the findings.    ADRIENNE ARGUETA MD         SYSTEM ID:  [id number]   MRA Brain ([facility]) wo Contrast    Narrative    Brain/ Pituitary MRI without and with  contrast  Head MRA without contrast.    History: post op pituitary resection.    Comparison:  MRI 10/26/2023, 10/7/2023. Outside CT head 10/7/2023,  1/5/2023.     Technique:     Brain MRI: Axial diffusion and FLAIR images of the whole brain  obtained without intravenous contrast. Sagittal T1 and T2-weighted,  coronal T2-weighted, coronal T1-weighted images with focus on the  sella were obtained without intravenous contrast. Post intravenous  contrast using gadolinium coronal and sagittal T1-weighted images were  obtained focused on the sella. Dynamic postcontrast coronal  T1-weighted images were also obtained.    MRA Head:  Using a 3D time-of-flight image acquisition technique, MRA  of the major arteries at the base of the brain was obtained without  intravenous contrast. Three-dimensional reconstructions of the head  MRA were created, which were reviewed by the radiologist.    Contrast: 9.2 mL Gadavist.    Findings:      Brain MRI:  Postsurgical changes of status post bifrontal craniotomy, bilateral  ethmoidectomy, bilateral maxillary antrostomy, and left frontal  sinusotomy for a nasal/transnasal pituitary mass resection. Stable  right frontal approach ventriculostomy catheter tip terminating in the  right lateral ventricle. Reduced postprocedural pneumocephalus. Stable  small developing hygroma now occupying area of previously seen  postoperative pneumocephalus. Extensive T1 hyperintense blood products  occupying the resection cavity. Question curvilinear enhancement most  notably at the inferior/midline, dorsal aspect of the resection  cavity, anterior to the right cerebral peduncle and third ventricle,  with corresponding diffusion restriction. No corresponding T2 signal  in these regions similar to previously removed tumor. These findings  may represent cauterization related findings rather than residual  tumor. Expected diffuse dural thickening and enhancement.  No hydrocephalus or herniation.  Scattered  pneumocephalus in the anterior cranial fossa. Scattered  small amount of subarachnoid blood products in the anterior cranial  fossa.    No worrisome abnormal bone marrow signal. Layering mixed density  debris within the paranasal sinuses and nasal cavity. Orbits are  unremarkable.    Head MRA:  Susceptibility artifact from the entrapped air within the blood  products in the right paraclinoid region obscuring the signal from the  right carotid terminus. However, there is preserved flow within the  anterior cerebral arteries, left middle cerebral artery and right MCA  M1 segment. No abnormal stenosis or occlusion in the distal M2 and M3  branches. Anterior cerebral arteries are patent. Posterior circulation  is unremarkable. Overall no vascular abnormality is suspected.      Impression    Impression:  1. Status post extensive anterior frontal approach and  transnasal/transsphenoidal approach giant suprasellar mass resection.  Extensive clot-like blood products in the resection cavity. No  definite residual tumor identified. Curvilinear enhancement anterior  to the cerebral peduncle and third ventricle likely representing  cauterization related enhancement rather than residual tumor.  Recommend follow-up MRI in 3 months after hemorrhage is resolved.   2. Susceptibility artifacts from the entrapped air in the blood  products in the right paraclinoid region, obscuring the right carotid  terminus. No definite stenosis or arterial occlusion identified.   3. Developing small hygroma along the bifrontal convexities.    I have personally reviewed the examination and initial interpretation  and I agree with the findings.    JIL BORGES MD         SYSTEM ID:  [id number]   MR Brain w/o & w Contrast    Narrative    Brain/ Pituitary MRI without and with contrast  Head MRA without contrast.    History: post op pituitary resection.    Comparison:  MRI 10/26/2023, 10/7/2023. Outside CT head 10/7/2023,  1/5/2023.     Technique:      Brain MRI: Axial diffusion and FLAIR images of the whole brain  obtained without intravenous contrast. Sagittal T1 and T2-weighted,  coronal T2-weighted, coronal T1-weighted images with focus on the  sella were obtained without intravenous contrast. Post intravenous  contrast using gadolinium coronal and sagittal T1-weighted images were  obtained focused on the sella. Dynamic postcontrast coronal  T1-weighted images were also obtained.    MRA Head:  Using a 3D time-of-flight image acquisition technique, MRA  of the major arteries at the base of the brain was obtained without  intravenous contrast. Three-dimensional reconstructions of the head  MRA were created, which were reviewed by the radiologist.    Contrast: 9.2 mL Gadavist.    Findings:      Brain MRI:  Postsurgical changes of status post bifrontal craniotomy, bilateral  ethmoidectomy, bilateral maxillary antrostomy, and left frontal  sinusotomy for a nasal/transnasal pituitary mass resection. Stable  right frontal approach ventriculostomy catheter tip terminating in the  right lateral ventricle. Reduced postprocedural pneumocephalus. Stable  small developing hygroma now occupying area of previously seen  postoperative pneumocephalus. Extensive T1 hyperintense blood products  occupying the resection cavity. Question curvilinear enhancement most  notably at the inferior/midline, dorsal aspect of the resection  cavity, anterior to the right cerebral peduncle and third ventricle,  with corresponding diffusion restriction. No corresponding T2 signal  in these regions similar to previously removed tumor. These findings  may represent cauterization related findings rather than residual  tumor. Expected diffuse dural thickening and enhancement.  No hydrocephalus or herniation.  Scattered pneumocephalus in the anterior cranial fossa. Scattered  small amount of subarachnoid blood products in the anterior cranial  fossa.    No worrisome abnormal bone marrow signal.  Layering mixed density  debris within the paranasal sinuses and nasal cavity. Orbits are  unremarkable.    Head MRA:  Susceptibility artifact from the entrapped air within the blood  products in the right paraclinoid region obscuring the signal from the  right carotid terminus. However, there is preserved flow within the  anterior cerebral arteries, left middle cerebral artery and right MCA  M1 segment. No abnormal stenosis or occlusion in the distal M2 and M3  branches. Anterior cerebral arteries are patent. Posterior circulation  is unremarkable. Overall no vascular abnormality is suspected.      Impression    Impression:  1. Status post extensive anterior frontal approach and  transnasal/transsphenoidal approach giant suprasellar mass resection.  Extensive clot-like blood products in the resection cavity. No  definite residual tumor identified. Curvilinear enhancement anterior  to the cerebral peduncle and third ventricle likely representing  cauterization related enhancement rather than residual tumor.  Recommend follow-up MRI in 3 months after hemorrhage is resolved.   2. Susceptibility artifacts from the entrapped air in the blood  products in the right paraclinoid region, obscuring the right carotid  terminus. No definite stenosis or arterial occlusion identified.   3. Developing small hygroma along the bifrontal convexities.    I have personally reviewed the examination and initial interpretation  and I agree with the findings.    JIL BORGES MD         SYSTEM ID:  [id number]

## 2023-12-04 NOTE — PROGRESS NOTES
[facility], [place]   12/04/2023   Neurosurgery Progress Note:    Interval History: Remains intubated in ICU. Hypernatremia yesterday, D5 increased per Endocrinology, Na downtrending.     Assessment:  Mirian Cunningham is a 43 year old female with complex psychosocial situation including paranoid schizophrenia and lack of active guardianship transferred to [facility] with known large sellar/suprasellar mass. Godmother Adri is appointed as the medical decision maker. No other family members available or willing to participate in her care.  MRI with large multilobulated pituitary mass/adenoma with mass effect and encasement of the cavernous structures including the cavernous internal carotid arteries, M1 segment of the right MCA, and A1 and proximal A2 segments of both anterior cerebral arteries.     Now post operative day 2 status post combined endonasal and transcranial approach for resection of pituitary adenoma and placement of external ventricular drain.    Plan:  Q1h neuro exams  EVD at 10  Wean to extubation as able, cautiously as patient was a difficult intubation  Decadron 7 day taper  Keppra 7 days  3 days vanc/cefepime/flagyl  Sinus precautions  Pituitary panel  DI watch - strict I&O q1h, daily weights  DDAVP q8hr prn if urine output is > 250/hr for 3 hours  D5 water to replace UOP, 100ml/hr  Pain control  Seroquel PRN  Maintain SBP < 140  DVT prophylaxis: mechanical, SQH today  Dispo: ICU    -----------------------------------  Kelsey Briceno MD, PhD  PGY-3 Neurosurgery    Please contact neurosurgery resident on call with questions.    Dial * * [phone], enter [phone] when prompted.   -----------------------------------  Objective:   Temp:  [98  F (36.7  C)-98.8  F (37.1  C)] 98.8  F (37.1  C)  Pulse:  [73-95] 73  Resp:  [14-17] 14  BP: (107-143)/(56-89) 126/69  FiO2 (%):  [40 %] 40 %  SpO2:  [98 %-100 %] 100 %  I/O last 3 completed shifts:  In: 4722.3 [I.V.:2747.3; NG/GT:510; IV  Piggyback:500]  Out: 3478 [Urine:3348; Other:130]    Gen: Appears comfortable, intubated and iatrogenically sedated  Wound: dressing in place with some strikethrough  Resp: mechanically ventilated, sat > 90%  Cardiovasc: moderate peripheral edema, RRR on tele  Neurologic:  Intubated and iatrogenically sedated  Does not follow commands  No gaze preference  R pupil non-reactive (baseline), L pupil briskly reactive  Face symmetric at rest  + VOR, + cough w suctioning  Reflexes diminished throughout, Babinski mute, no Zamorano's  Withdraws to noxious stimuli x 4      LABS:  Recent Labs   Lab 12/04/23  0338 12/04/23  0337 12/04/23  0139 12/03/23  2330 12/03/23  2329 12/03/23  0411 12/03/23  0406 12/02/23  1220 12/02/23  1054   *  148*  --  149* 149*  --    < > 152*  152*   < > 146*  146*   POTASSIUM 4.1  --   --   --   --   --  4.0  --  3.8   CHLORIDE 116*  --   --   --   --   --  119*  --  111*   CO2 25  --   --   --   --   --  25  --  24   ANIONGAP 7  --   --   --   --   --  8  --  11   * 169*  --   --  199*   < > 214*   < > 169*   BUN 14.9  --   --   --   --   --  13.2  --  12.2   CR 0.77  --   --   --   --   --  0.80  --  0.80   FADUMO 7.9*  --   --   --   --   --  8.2*  --  8.7    < > = values in this interval not displayed.     Recent Labs   Lab 12/04/23 0338   WBC 12.7*   RBC 2.64*   HGB 8.2*   HCT 24.0*   MCV 91   MCH 31.1   MCHC 34.2   RDW 16.0*   *     IMAGING:  No results found for this or any previous visit (from the past 24 hour(s)).

## 2023-12-04 NOTE — PROGRESS NOTES
Major Shift Events:  x1 dose DDAVP given for serum sodium. Sodium checks every 4 hours. Low Hgb noted, x1 unit PRBC's given. Large amounts of serosanguinous nasal drainage noted this shift, MD's aware.    Plan: Continue hourly I&O's with D5 infusion rate at half of urine output each hour. Maintain RASS -1 to 0.   For vital signs and complete assessments, please see documentation flowsheets.

## 2023-12-04 NOTE — PROGRESS NOTES
Brief Endocrine Note:    Reviewed patient Sodium levels, they still continued to be elevated at 153 meq/l  She had received one dose of DDAVP 1 mcg at around 1230 PM today. After receiving DDAVP, her urine out had reduced but Na levels are still elevated    Looks like her free water deficit is around 1.9 L    - Recommend to give D5W 500 ml bolus now  - Increase the D5 rate to 100 ml/hr for now  - changed DDAVP dose to 1 mcg q8h PRN  - Will continue to monitor her Sodium Q4H. ( tried to do q2h but looks like it would generate one thousand scheduled time)     I have reviewed the fellow's note and agree with the plan  Dr. Leelee Biggs [phone]

## 2023-12-04 NOTE — PROGRESS NOTES
Neurocritical Care Progress Note    Reason for critical care admission: status post pituitary adenoma resection   Admitting Team: NSGY  Date of Service:  12/04/2023  Date of Admission:  10/21/2023  Hospital Day: 45    Assessment/Plan  Mirian Cunningham is a 43 year old female with a past medical history including paranoid schizophrenia who was admitted to [facility] as a transfer from [facility] on 10/21/2023 for continued evaluation and management of dizziness, head pressure, and worsening vision in the left eye in the setting of previously known large pituitary mass. She was deemed suitable for resection which was performed on 12/1/2023. Ms. Cunningham was admitted to Neuro ICU post- operatively.     24 hour events:   -no acute events overnight  -Na overnight 152-->150-->152-->153 stable   -VS: afebrile, HR 70s, 110-140s/60-70s on fentanyl gtt 25 and propofol gtt 25  -Plan today: wean to extubate as able,     Neuro  #Status post pituitary tumor resection, 12/1/2023   #s/p EVD placement   #Right eye complete blindness  #Left partial hemanopia   -Neurochecks every Q2H   -SBP goal < 140 mmHg  -EVD 10 above  -Decadron taper per NSGY  -Keppra per NSGY  -Post-operative antibiotics per NSGY  -MRI, 12/2/2023: extensive clot-like blood products in the resection cavity, no definite residual tumor, developing small hydroma along the bifrontal convexities  -CT head post op 12/2 expected pneumocephalus, new right EVD, inter pedicular cistern and anterior cranial fossa to 3rd ventricle poss residual tumor vs post op hemorrhage, R frontal lob hypodensity concern for vasogenic edema/gliosis  -HOB > 30   -PT/OT/SLP when able      #Analgesics & sedation  RASS goal: 0 to -1  -Plan to wean off Propofol gtt and precedex gtt in plan for extubation   -Continue Fentanyl gtt  -Tylenol 975 Q8H for 3 days, PRN tylenol 650 mg Q4H  -PRN zofran and compazine for nausea and vomiting       #Schizophrenia   #Housing insecurity   Prior concern that  Mirian does not have capacity to make her own decisions, she is not currently under guardianship, however was under a temporary guardianship for 6 months earlier in 2023 through [place], [place]. Ms. Cunningham has a prior history of civil commitment for mental illness treatment and on 10/21/2023 she was evaluated by psychiatry with recommendation for a petition for civil commitment with [employer] to facilitate mental health support and treatment; civil commitment and [employer] have not been further pursued this admission. Psychiatry felt Ms. Cunningham lacks decision making capacity however deemed suitable to retain the ability to name her next of kin and surrogate decision maker. Her godmother, Adri, has been serving as medical decision maker signed consent for surgery and subsequent procedures in ICU. Adri lives in [place] and is available by phone.   -Hold previously recommended Haldol 5 BID plus PRN Haldol and PRN Haldol/Benadryl for now, monitor for psychiatric symptoms  -Discharge planning when appropriate      CV  #Hypertension   #Pulmonary hypertension, mild  #Left ventricular hypertrophy  -Not on PTA medications   -Cardiac monitoring  -SBP goal < 140 mmHg  -PRN Labetalol and Hydralazine for > 140   -Echocardiogram, 10/26 - normal LV with mild concentric wall thickening and EF 60-65%, normal RV, mild pulmonary hypertension     Resp  #Intubated for surgery  #Difficult airway   #Mild intermittent asthma, treated   #MAXX, chronic   Oxygen/vent: mechanical ventilator, pressure supporting trial today with plan to extubate as tolerated   -Continuous pulse ox  -Maintain O2 saturations greater than 92%  -Continue Breo ellipta inhaler daily, PRN albuterol inhaler Q6H and neb 2.5 mg Q8H PRN     GI  #Severe obesity  Diet: TF with FWF   -Nutrition consult for TF  Last BM: 11/30  GI prophylaxis: famotidine   -Bowel regimen: scheduled senna-docusate BID and Miralax BID, milk of magnesia daily until stools      Renal/  #Hypernatremia  #Hyperchloremia   #Hypophosphatemia  #Hypermagnesemia   #Hypokalemia, resolved   -Daily BMP  -Na checks Q4H  -IV fluids: saline lock, discontinued IVF   -High electrolyte replacement protocols  -add carrillo for strict I&Os  -discontinued D5W IV and increased to 150 ml/hr FWF   -Transitioned to DDVAP PO from IV     Endo  #Prediabetes  -Monitor glucose levels  -Medium correction scale insulin   -A1c 5.8%  -TSH 0.85  -Prolactin 10  -FSH 18.0  -LH 8.4  -Urine osmolality 353, serum osmolality 313   -DDAVP PRN PO   -Endocrine consulted by AYAH Garces  #Acute surgical blood loss anemia  #Thrombocytopenia   #Coagulation defect  -Daily CBC  -Hgb goal >7, plt goal >50k  -Trend INR  -Transfuse to meet Hgb and plt goals     ID  #Leukocytosis, reactive versus steroid induced   -Daily CBC  -Follow temperature curve     ICU Checklist  Lines/tubes/drains: x2 PIV, PICC, ETT, carrillo, EVD, OG   FEN: saline lock, repletion protocol available, TF with FWF   PPX: DVT - SCDs & started SQH /; GI - famotidine.  Code: Full code   Dispo: ICU - NCC    The patient was seen and discussed with the NCC attending, Dr. Montero.    Gabriela Calderon MD  PGY2 Neurology     24 Hour Vital Signs Summary:  Temp: 97.9  F (36.6  C) Temp  Min: 97.9  F (36.6  C)  Max: 98.8  F (37.1  C)  Resp: 14 Resp  Min: 14  Max: 17  SpO2: 97 % SpO2  Min: 97 %  Max: 100 %  Pulse: 76 Pulse  Min: 72  Max: 90    No data recorded  BP: 117/61 Systolic (24hrs), Av , Min:108 , Max:157   Diastolic (24hrs), Av, Min:57, Max:121      Respiratory monitoring:   Vent Mode: CMV/AC  (Continuous Mandatory Ventilation/ Assist Control)  FiO2 (%): 40 %  Resp Rate (Set): 14 breaths/min  Tidal Volume (Set, mL): 380 mL  PEEP (cm H2O): 7 cmH2O  Resp: 14      I/O last 3 completed shifts:  In: 5679.2 [I.V.:2974.2; NG/GT:970; IV Piggyback:500]  Out: 3290 [Urine:3160; Other:130]    Current Medications:   acetaminophen  975 mg Oral or Feeding Tube Q8H    ceFEPIme  2  g Intravenous Q8H    dexAMETHasone  2 mg Intravenous TID    Followed by    [START ON 12/6/2023] dexAMETHasone  1 mg Intravenous TID    famotidine  20 mg Oral or Feeding Tube BID    Or    famotidine  20 mg Intravenous BID    fluticasone-salmeterol  1 puff Inhalation Q12H    heparin ANTICOAGULANT  5,000 Units Subcutaneous Q8H    influenza quadrivalent (PF) vacc  0.5 mL Intramuscular Prior to discharge    insulin aspart  1-6 Units Subcutaneous Q4H    levETIRAcetam  500 mg Oral or Feeding Tube BID    magnesium hydroxide  30 mL Oral Daily    metroNIDAZOLE  500 mg Oral BID    multivitamins w/minerals  15 mL Per Feeding Tube Daily    polyethylene glycol  17 g Oral or Feeding Tube BID    senna-docusate  2 tablet Oral or Feeding Tube BID    sodium chloride  1 spray Both Nostrils 4x Daily    sodium chloride (PF)  10-40 mL Intracatheter Q8H    sodium chloride (PF)  3 mL Intracatheter Q8H    vancomycin  1,000 mg Intravenous Q12H       PRN Medications:  acetaminophen, albuterol, albuterol, bisacodyl, glucose **OR** dextrose **OR** glucagon, fentaNYL, heparin lock flush, hydrALAZINE, labetalol, lidocaine 4%, lidocaine 4%, lidocaine (buffered or not buffered), lidocaine (buffered or not buffered), magnesium hydroxide, propofol **AND** propofol **AND** CK total **AND** Triglycerides **AND** - MEDICATION INSTRUCTIONS - **AND** Notify Physician, naloxone **OR** naloxone **OR** naloxone **OR** naloxone, ondansetron **OR** ondansetron, prochlorperazine **OR** prochlorperazine, sodium chloride (PF), sodium chloride (PF), sodium chloride (PF)    Infusions:   dexmedeTOMIDine 0.4 mcg/kg/hr (12/04/23 1346)    fentaNYL 25 mcg/hr (12/04/23 1000)    propofol 15 mcg/kg/min (12/04/23 1400)    And    - MEDICATION INSTRUCTIONS -         Allergies   Allergen Reactions    Powder Difficulty breathing     Per pt, allergy to MILK PROTEIN POWDER but not milk or dairy products    Hydrocodone Fatigue    Iodinated Contrast Media Other (See Comments)      "\"Paralysis of legs\", \"was unable to walk for 6 months\"    Mushroom Hives and Difficulty breathing    Other Food Allergy      Food coloring, artificial preservatives, high fructose syrup    Seeds      Poppy seeds, sesame seeds        Physical Examination:  Vitals: /61   Pulse 76   Temp 97.9  F (36.6  C)   Resp 14   Ht 1.549 m (5' 1\")   Wt 104.6 kg (230 lb 9.6 oz)   SpO2 97%   BMI 43.57 kg/m    General: Adult female patient, lying in bed, critically-ill.  HEENT: Significant post-operative facial edema.   Cardiac: She appears adequately perfused.  Pulm: Synchronous with mechanical ventilator.   Abdomen: Non-distended.   Extremities: Warm, peripheral edema present.   Skin: No obvious rashes or lesions on exposed skin.   Psych: Sedated.     Neuro:  Mental status: Exam is limited by sedation. No eye opening, no verbal, does not follow commands.   Cranial nerves: Face edematous, appears symmetric. Right pupil intermittently with NPI 0. Right eye blindness at baseline. Left pupil 2 and reactive to light.    Motor: Normal bulk and tone. No abnormal movements.   Sensory: Deferred.   Coordination: Deferred.   Gait: Deferred.    Labs and Imaging:    Results for orders placed or performed during the hospital encounter of 10/21/23 (from the past 24 hour(s))   Sodium   Result Value Ref Range    Sodium 153 (H) 135 - 145 mmol/L   Glucose by meter   Result Value Ref Range    GLUCOSE BY METER POCT 149 (H) 70 - 99 mg/dL   CBC with platelets   Result Value Ref Range    WBC Count 11.4 (H) 4.0 - 11.0 10e3/uL    RBC Count 2.87 (L) 3.80 - 5.20 10e6/uL    Hemoglobin 8.6 (L) 11.7 - 15.7 g/dL    Hematocrit 25.5 (L) 35.0 - 47.0 %    MCV 89 78 - 100 fL    MCH 30.0 26.5 - 33.0 pg    MCHC 33.7 31.5 - 36.5 g/dL    RDW 16.0 (H) 10.0 - 15.0 %    Platelet Count 127 (L) 150 - 450 10e3/uL   Sodium   Result Value Ref Range    Sodium 153 (H) 135 - 145 mmol/L   Glucose by meter   Result Value Ref Range    GLUCOSE BY METER POCT 153 (H) 70 - 99 " mg/dL   Glucose by meter   Result Value Ref Range    GLUCOSE BY METER POCT 199 (H) 70 - 99 mg/dL   Sodium   Result Value Ref Range    Sodium 149 (H) 135 - 145 mmol/L   Sodium   Result Value Ref Range    Sodium 149 (H) 135 - 145 mmol/L   Glucose by meter   Result Value Ref Range    GLUCOSE BY METER POCT 169 (H) 70 - 99 mg/dL   Basic metabolic panel   Result Value Ref Range    Sodium 148 (H) 135 - 145 mmol/L    Potassium 4.1 3.4 - 5.3 mmol/L    Chloride 116 (H) 98 - 107 mmol/L    Carbon Dioxide (CO2) 25 22 - 29 mmol/L    Anion Gap 7 7 - 15 mmol/L    Urea Nitrogen 14.9 6.0 - 20.0 mg/dL    Creatinine 0.77 0.51 - 0.95 mg/dL    GFR Estimate >90 >60 mL/min/1.73m2    Calcium 7.9 (L) 8.6 - 10.0 mg/dL    Glucose 186 (H) 70 - 99 mg/dL   CBC with platelets   Result Value Ref Range    WBC Count 12.7 (H) 4.0 - 11.0 10e3/uL    RBC Count 2.64 (L) 3.80 - 5.20 10e6/uL    Hemoglobin 8.2 (L) 11.7 - 15.7 g/dL    Hematocrit 24.0 (L) 35.0 - 47.0 %    MCV 91 78 - 100 fL    MCH 31.1 26.5 - 33.0 pg    MCHC 34.2 31.5 - 36.5 g/dL    RDW 16.0 (H) 10.0 - 15.0 %    Platelet Count 122 (L) 150 - 450 10e3/uL   Magnesium   Result Value Ref Range    Magnesium 2.4 (H) 1.7 - 2.3 mg/dL   Phosphorus   Result Value Ref Range    Phosphorus 2.5 2.5 - 4.5 mg/dL   Sodium   Result Value Ref Range    Sodium 148 (H) 135 - 145 mmol/L   XR Chest Port 1 View    Narrative    Exam: XR CHEST PORT 1 VIEW, 12/4/2023 5:50 AM    Comparison: 12/3/2023    History: follow lungs prior to extubation    Findings:  Single AP portable semiupright chest. Endotracheal tube tip in  midthoracic trachea. Upper extremity PICC with tip in the right  atrium. Enteric tube traverses into the stomach and below the field of  view.    Trachea is midline. Stable enlarged cardiac silhouette. Increased  perihilar and bibasilar mixed opacities. There is no pneumothorax or  pleural effusion. The upper abdomen is unremarkable.      Impression    Impression:   1. Mildly increased diffuse mixed  opacities which may represent edema  .  2. Slightly increased retrocardiac opacities which may represent  atelectasis or pneumonia.    I have personally reviewed the examination and initial interpretation  and I agree with the findings.    GILA AVILA DO         SYSTEM ID:  [id number]   Glucose by meter   Result Value Ref Range    GLUCOSE BY METER POCT 137 (H) 70 - 99 mg/dL   Sodium   Result Value Ref Range    Sodium 148 (H) 135 - 145 mmol/L   Sodium   Result Value Ref Range    Sodium 149 (H) 135 - 145 mmol/L   Glucose by meter   Result Value Ref Range    GLUCOSE BY METER POCT 167 (H) 70 - 99 mg/dL       All relevant imaging and laboratory values personally reviewed.

## 2023-12-04 NOTE — PROGRESS NOTES
"Otolaryngology Progress Note  December 4, 2023    S: No acute events, moderate nasal darinage. She remains on the vent. Receiving ddAVP for DI per endocrine    O: /65   Pulse 75   Temp 98.8  F (37.1  C) (Oral)   Resp 14   Ht 1.549 m (5' 1\")   Wt 104.6 kg (230 lb 9.6 oz)   SpO2 100%   BMI 43.57 kg/m     General: Intubated and sedated.    HEENT: No active bleeding from the nose. Moderate serosang mucous    Pulmonary: Mechanically ventilated via ETT intubation.     LABS:  Na 148  Hb 8.2    A/P: Mirian Cunningham is a 43 year old female with a past medical history of large pituitary adenoma s/p bifrontal craniotomy and transnasal approach to resection with b/l nasoseptal flap and pericranial flaps with formal nasal packing on 12/1-12/2.    - Sinus precautions  - Start nasal saline sprays to b/l nares today  - Recommend antibiotics to cover staph for the duration of packing (currently on cefepime which is appropriate)  - We will remove packing on POD7     -- Patient and above plan discussed with Dr. Chau Hatch MD  Otolaryngology - Head and Neck Surgery PGY-4  "

## 2023-12-04 NOTE — PROGRESS NOTES
Brief Endocrine Note:    Sodium improved to 148    Received D5W bolus last PM and 100 ml/hour    Now carrillo removed and straight cath every 4 hours with ~  450 ml out each time.     Will reduce D5W to 75 ml/hour and please continue monitoring.  (ordered for you just because of transition this AM of service team members - it would be best for primary team to order and manage moving forward- this was a one time event.       Leelee Biggs MD, MS    Component      Latest Ref Rng 12/4/2023  1:39 AM 12/4/2023  3:37 AM 12/4/2023  3:38 AM   Sodium      135 - 145 mmol/L 149 (H)   148 (H)    Sodium      135 - 145 mmol/L   148 (H)    Potassium      3.4 - 5.3 mmol/L   4.1    Chloride      98 - 107 mmol/L   116 (H)    Carbon Dioxide (CO2)      22 - 29 mmol/L   25    Anion Gap      7 - 15 mmol/L   7    Urea Nitrogen      6.0 - 20.0 mg/dL   14.9    Creatinine      0.51 - 0.95 mg/dL   0.77    GFR Estimate      >60 mL/min/1.73m2   >90    Calcium      8.6 - 10.0 mg/dL   7.9 (L)    Glucose      70 - 99 mg/dL   186 (H)    WBC      4.0 - 11.0 10e3/uL   12.7 (H)    RBC Count      3.80 - 5.20 10e6/uL   2.64 (L)    Hemoglobin      11.7 - 15.7 g/dL   8.2 (L)    Hematocrit      35.0 - 47.0 %   24.0 (L)    MCV      78 - 100 fL   91    MCH      26.5 - 33.0 pg   31.1    MCHC      31.5 - 36.5 g/dL   34.2    RDW      10.0 - 15.0 %   16.0 (H)    Platelet Count      150 - 450 10e3/uL   122 (L)    Magnesium      1.7 - 2.3 mg/dL   2.4 (H)    Phosphorus      2.5 - 4.5 mg/dL   2.5    GLUCOSE BY METER POCT      70 - 99 mg/dL  169 (H)        Legend:  (H) High  (L) Low

## 2023-12-04 NOTE — PLAN OF CARE
Goal Outcome Evaluation:    Major Shift Events: EVD at 10 above, ICPs 8-15 and output 5-9. Sedated on propofol and fentanyl. With sedation off, follows simple commands. Afebrile. PRNs given to maintain SBP <140. CMV 14/380/40/7. Tube feed at goal with Q1 50cc FWF. No BM overnight. Galeas removed- straight cathed x2. D5 at 100. Trending sodiums.     Plan: Wean vent and sedation as able. Monitor neuros and notify team with change in patient condition.     For vital signs and complete assessments, please see documentation flowsheets.

## 2023-12-04 NOTE — PROGRESS NOTES
Care Management Follow Up    Length of Stay (days): 44    Expected Discharge Date: 12/08/2023     Concerns to be Addressed:  sharing info with pt siblings     Patient plan of care discussed at interdisciplinary rounds: Yes    Anticipated Discharge Disposition:  TBD     Anticipated Discharge Services:  TBD  Anticipated Discharge DME:  TBD    Patient/family educated on Medicare website which has current facility and service quality ratings:  n/a  Education Provided on the Discharge Plan:  n/a  Patient/Family in Agreement with the Plan:  n/a    Referrals Placed by CM/SW:  none at this encounter  Private pay costs discussed: N/A  Additional Information:  SW responding to voice mail from 4E Charge RN Nataly, asking about sharing information about pt with siblings as a sister has called and wanting to speak to  about being able to get info on pt (Deon at [phone]). DELORIS called pt bedside MARIE Gregg and relayed that until Mirian can say otherwise her NOK is godmother Adri and family should contact her for detailed info.   SW called and left message with Deon and will try again tomorrow.    LUIS ALBERTO Lopez, LGSW  4A & 4E ICU   Pager: [phone]  Phone: [phone]

## 2023-12-04 NOTE — PLAN OF CARE
Major Shift Events:  Neuro unchanged right pupil fixed at baseline. Following commands when awake, withdrawing to stimuli when sedated. Switching Propofol to Precedex for pressure support trial, but pt became to agitated and was sitting up in bed, Propofol back on. Fentanyl 25 and Propofol 25. EVD remains at 10 above, ICP 8-15, output 4-7. VSS. NSR. SBP<140 with PRN labetalol given x3. Tolerating current vent settings 30% 380-14-7. TF at goal 55ml/hr. 150ml free water flush q1hr. No BM, milk of mag added to bowel regimen. Straight cath this AM, but carrillo placed this afternoon for strict IOs. DDAVP given x1. D5 stopped. Moderate serosanguinous drainage from nose. Adri (guardian) was updated by bedside RN.    Plan: Continue to monitor IOs and neuro status. Continue to manage EVD. Wean sedation and vent as able, plan for PS trial in morning.    For vital signs and complete assessments, please see documentation flowsheets.

## 2023-12-04 NOTE — PROGRESS NOTES
Received page about patient sodium levels at around 0211AM  Patient sodium had dropped to 149.  Talked to the neurosurgery team and advised to continue d5 @ 100 ml /hr rate.  Will need to re-assess based on the sodium levels about the need to DDAVP and I.V.F rates    Jacqui Luna MD on 12/4/2023 at 8:40 AM
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
## 2023-12-05 NOTE — PROGRESS NOTES
[facility], [place]   12/05/2023   Neurosurgery Progress Note:    Interval History: Remains intubated in ICU. Na improving. UOP slightly increased. DDAVP not dosed overnight. SQH started.     Assessment:  Mirian Cunningham is a 43 year old female with complex psychosocial situation including paranoid schizophrenia and lack of active guardianship transferred to [facility] with known large sellar/suprasellar mass. Godmother Adri is appointed as the medical decision maker. No other family members available or willing to participate in her care.  MRI with large multilobulated pituitary mass/adenoma with mass effect and encasement of the cavernous structures including the cavernous internal carotid arteries, M1 segment of the right MCA, and A1 and proximal A2 segments of both anterior cerebral arteries.     Now post operative day 3 status post combined endonasal and transcranial approach for resection of pituitary adenoma and placement of external ventricular drain.    Plan:  Q2h neuro exams  Continue EVD @ 10 cm H20  Wean sedation and ventilator  Decadron 7 day taper  Keppra 7 days  3 days vanc/cefepime/flagyl- to end 12/5  Sinus precautions  DI watch - strict I&O q1h, daily weights  DDAVP q8hr prn if urine output is > 300/hr for 3 hours/SG < 1.005, contact primary team first  Continue free water 150cc q1hr  Endocrinology following- appreciate recs  Pain control  Seroquel PRN  Maintain SBP < 140  DVT prophylaxis: SCDs, SQH  Dispo: ICU    -----------------------------------  Kelsey Briceno MD, PhD  PGY-3 Neurosurgery    Please contact neurosurgery resident on call with questions.    Dial * * [phone], enter [phone] when prompted.   -----------------------------------  Objective:   Temp:  [97.8  F (36.6  C)-99.6  F (37.6  C)] 98.6  F (37  C)  Pulse:  [70-84] 71  Resp:  [14-16] 14  BP: (117-157)/() 129/70  FiO2 (%):  [30 %-40 %] 30 %  SpO2:  [96 %-100 %] 99 %  I/O last 3 completed shifts:  In: 5892.01  [I.V.:2502.01; NG/GT:2080]  Out: 3001 [Urine:2880; Other:121]    Gen: Appears comfortable, intubated and iatrogenically sedated  Wound: clean, dry, intact  Neurologic:  Intubated and sedated  Follows commands in bilateral lower extremities  No gaze preference  R pupil non-reactive (baseline), L pupil briskly reactive  Face symmetric at rest  + VOR, + cough w suctioning  Reflexes diminished throughout, Babinski mute, no Zamorano's  Withdraws to noxious stimuli x 4      LABS:  Recent Labs   Lab 12/05/23  0033 12/05/23  0031 12/04/23  2042 12/04/23  2041 12/04/23  1631 12/04/23  1630 12/04/23  0829 12/04/23  0338 12/03/23  0411 12/03/23  0406 12/02/23  1220 12/02/23  1054     --  147*  --  150*  --    < > 148*  148*   < > 152*  152*   < > 146*  146*   POTASSIUM  --   --   --   --   --   --   --  4.1  --  4.0  --  3.8   CHLORIDE  --   --   --   --   --   --   --  116*  --  119*  --  111*   CO2  --   --   --   --   --   --   --  25  --  25  --  24   ANIONGAP  --   --   --   --   --   --   --  7  --  8  --  11   GLC  --  156*  --  149*  --  137*   < > 186*   < > 214*   < > 169*   BUN  --   --   --   --   --   --   --  14.9  --  13.2  --  12.2   CR  --   --   --   --   --   --   --  0.77  --  0.80  --  0.80   FADUMO  --   --   --   --   --   --   --  7.9*  --  8.2*  --  8.7    < > = values in this interval not displayed.     Recent Labs   Lab 12/04/23 0338   WBC 12.7*   RBC 2.64*   HGB 8.2*   HCT 24.0*   MCV 91   MCH 31.1   MCHC 34.2   RDW 16.0*   *     IMAGING:  Recent Results (from the past 24 hour(s))   XR Chest Port 1 View    Narrative    Exam: XR CHEST PORT 1 VIEW, 12/4/2023 5:50 AM    Comparison: 12/3/2023    History: follow lungs prior to extubation    Findings:  Single AP portable semiupright chest. Endotracheal tube tip in  midthoracic trachea. Upper extremity PICC with tip in the right  atrium. Enteric tube traverses into the stomach and below the field of  view.    Trachea is midline. Stable  enlarged cardiac silhouette. Increased  perihilar and bibasilar mixed opacities. There is no pneumothorax or  pleural effusion. The upper abdomen is unremarkable.      Impression    Impression:   1. Mildly increased diffuse mixed opacities which may represent edema  .  2. Slightly increased retrocardiac opacities which may represent  atelectasis or pneumonia.    I have personally reviewed the examination and initial interpretation  and I agree with the findings.    GILA AVILA DO         SYSTEM ID:  [id number]

## 2023-12-05 NOTE — PROGRESS NOTES
Brief endocrinology recommendations note:    I was contacted by the primary team regarding the redosing of desmopressin .  Last dose of DDAVP Was 0.1 mg p.o. at 2 PM.  Continue to receive free water with a rate of 150 mill through the NG tube.  Sodium is trending down for the last 24 hours:    149>> 148>> 148>> 149>> 150>> 147>> 144 (00: 33).  Urine output for the last 3 hours: 350-325-375    Despite having the sodium trending down to the normal range increase in the  sodium usually lagging behind after increase in the urine output.    Recommendations:  -To check sodium level last check was at 12:30 AM.  -Check urine specific gravity of urine specific gravity is=<1.005 to give another dose of desmopressin 0.1 mg p.o.  -If there is any questions or concerns please contact endocrinology on-call.      Kenney Moore     Endocrinology diabetes and metabolism  fellow   Pager number: [phone]

## 2023-12-05 NOTE — PROGRESS NOTES
Patient is sedated on propofol and fentanyl and weaned to precedex in hopes of a successful pressure support trial, able to wiggle toes and move hands to command, EVD at 10 above EAM, ICP 3-12, output about 5-10 mL/hour, pupils intermittently brisk in right pupil; brisk to sluggish in L pupil; sinus rhythm, HR 60-80s, VSS; on CMV settings on vent (30%/14/380/7), moderate secretions via ETT, no cuff leak noted per RT; TF at 55 mL/hour, free water flushes at 150 mL/hour, no BM this shift, increasing UOP, DDVAP given, dressing removed from incision, incision with crusted blood, EVD site clean, mild to moderate nasal drainage.  Continue with current plan of care and notify MD as needed.    Diandra Roldan RN

## 2023-12-05 NOTE — PROGRESS NOTES
Brief endocrine note:    Repeat Sodium at 1230 is 150.  We will give 1 dose of desmopressin 0.1 mg oral at 1430.  Order desmopressin dose at 2230 if sodium is more than 140, and/or urine output more than 250 cc for 2 consecutive hours or more than 500 cc for an hour . Paged primary team about these recommendations.  Other management as mentioned in progress note.    Matt Sanchez  PGY-4  Endocrinology fellow

## 2023-12-05 NOTE — PROGRESS NOTES
"Otolaryngology Progress Note  December 4, 2023    S: No acute events, moderate nasal darinage that is mildly blood tinged. She remains on the vent. Na improving.     O: BP (!) 143/97   Pulse 70   Temp 99  F (37.2  C) (Axillary)   Resp 14   Ht 1.549 m (5' 1\")   Wt 104.7 kg (230 lb 13.2 oz)   SpO2 99%   BMI 43.61 kg/m     General: Intubated and sedated.    HEENT: No active bleeding from the nose. Moderate serosang mucous    Pulmonary: Mechanically ventilated via ETT intubation.     LABS:  Na 145  Hb 7.9 (8.2)    A/P: Mirian Cunningham is a 43 year old female with a past medical history of large pituitary adenoma s/p bifrontal craniotomy and transnasal approach to resection with b/l nasoseptal flap and pericranial flaps with formal nasal packing on 12/1-12/2.    - Sinus precautions  - Start nasal saline sprays to b/l nares today  - Recommend antibiotics to cover staph for the duration of packing (Ancef)  - We will remove packing on POD7     -- Patient and above plan discussed with Dr. Chau Moran MD  Otolaryngology - Head and Neck Surgery PGY-1  "

## 2023-12-05 NOTE — PROGRESS NOTES
Neurocritical Care Progress Note    Reason for critical care admission: status post pituitary adenoma resection   Admitting Team: NSGY  Date of Service:  12/05/2023  Date of Admission:  10/21/2023  Hospital Day: 46    Assessment/Plan  Mirian Cunningham is a 43 year old female with a past medical history including paranoid schizophrenia who was admitted to [facility] as a transfer from [facility] on 10/21/2023 for continued evaluation and management of dizziness, head pressure, and worsening vision in the left eye in the setting of previously known large pituitary mass. She was deemed suitable for resection which was performed on 12/1/2023. Ms. Cunningham was admitted to Neuro ICU post- operatively.     24 hour events:   -sodium overnight most recently 144 prior 147 --> 150  -UOP ~300cc for 3 hours. Discussion with endocrinology with normal sodium with plan to obtain urine specific gravity and if < 1.005 give DDAVP with levels of 1.004 and given 1 mcg DDVAP PO.   -Titrating off propofol gtt and restarted precedex gtt with patient being able to open eyes, wiggles toes  -VS: afebrile, HR 70-80s, 130-140s/60-90s on fentanyl gtt 25 and propofol gtt titrated off overnight around 6 AM and started on precedex gtt up to 0.9  -Duonebs Q6H scheduled   -stop fent gtt and continue with pushes PRN Q1H 25-50, add 5-10 oxycodone PRN Q4H  -scheduled 975 tylenol TID       Neuro  #Status post pituitary tumor resection, 12/1/2023   #s/p EVD placement   #Right eye complete blindness  #Left partial hemanopia   -Neurochecks every Q2H   -SBP goal < 140 mmHg  -EVD 10 above  -Decadron taper per NSGY  -Keppra per NSGY  -Post-operative antibiotics per NSGY  -MRI, 12/2/2023: extensive clot-like blood products in the resection cavity, no definite residual tumor, developing small hydroma along the bifrontal convexities  -CT head post op 12/2 expected pneumocephalus, new right EVD, inter pedicular cistern and anterior cranial fossa to 3rd ventricle  poss residual tumor vs post op hemorrhage, R frontal lob hypodensity concern for vasogenic edema/gliosis  -HOB > 30   -PT/OT/SLP when able      #Analgesics & sedation  RASS goal: 0 to -1  -stop fent gtt and continue with pushes PRN 25-50 Q1H, add 5-10 oxycodone PRN Q4H  -scheduled 975 tylenol TID for 3 days   -discontinue propofol gtt   -PRN zofran and compazine for nausea and vomiting       #Schizophrenia   #Housing insecurity   Prior concern that Mirian does not have capacity to make her own decisions, she is not currently under guardianship, however was under a temporary guardianship for 6 months earlier in 2023 through [place], [place]. Ms. Cunningham has a prior history of civil commitment for mental illness treatment and on 10/21/2023 she was evaluated by psychiatry with recommendation for a petition for civil commitment with [facility] to facilitate mental health support and treatment; civil commitment and [facility] have not been further pursued this admission. Psychiatry felt Ms. Cunningham lacks decision making capacity however deemed suitable to retain the ability to name her next of kin and surrogate decision maker. Her godmother, Adri, has been serving as medical decision maker signed consent for surgery and subsequent procedures in ICU. Adri lives in [place] and is available by phone.   -Hold previously recommended Haldol 5 BID plus PRN Haldol and PRN Haldol/Benadryl for now, monitor for psychiatric symptoms  -Discharge planning when appropriate      CV  #Hypertension   #Pulmonary hypertension, mild  #Left ventricular hypertrophy  -Not on PTA medications   -Cardiac monitoring  -SBP goal < 140 mmHg  -PRN Labetalol and Hydralazine for > 140   -Echocardiogram, 10/26 - normal LV with mild concentric wall thickening and EF 60-65%, normal RV, mild pulmonary hypertension     Resp  #Intubated for surgery  #Difficult airway   #Mild intermittent asthma, treated   #MAXX, chronic   Oxygen/vent: mechanical ventilator,  pressure supporting trial today with plan to extubate as tolerated   -Continuous pulse ox  -Maintain O2 saturations greater than 92%  -Continue Breo ellipta inhaler BID, PRN albuterol neb 2.5 mg Q8H PRN  -Added duonebs Q6H scheduled   -12/5 CXR mildly increased diffuse opacities with possible edema and retrocardiac atelectasis vs infectious     GI  #Severe obesity  Diet: TF with FWF   Last BM: 11/30  GI prophylaxis: famotidine   -Bowel regimen: scheduled senna-docusate BID and Miralax BID, milk of magnesia daily until stools, PRN suppository dulcolax daily      Renal/  #Hypernatremia  #Central DI   #Hyperchloremia   #Hypophosphatemia  #Hypermagnesemia   #Hypokalemia, resolved   -Daily BMP, Mg, and Phos   -Na checks Q4H  -IV fluids:none  -High electrolyte replacement protocols  -carrillo for strict I&Os  -Continue 150 ml/hr FWF   -DDAVP q8hr PO prn if urine output is > 300/hr for 3 hours/SG < 1.005      Endo  #Prediabetes  -Monitor glucose levels  -Medium correction scale insulin   -A1c 5.8%  -TSH 0.85  -Prolactin 10  -FSH 18.0  -LH 8.4  -Endocrine consulted by NSGY: F/U at TSH repeat needed      Heme  #Acute surgical blood loss anemia  #Thrombocytopenia   #Coagulation defect  -Daily CBC  -Hgb goal >7, plt goal >50k  -Trend INR  -Transfuse to meet Hgb and plt goals     ID  #Leukocytosis, reactive versus steroid induced   -Daily CBC  -Follow temperature curve  -3 days vanc/cefepime/flagyl- to end 12/5   -Continue cefazolin 2 g TID while nasal packing in place ppx      ICU Checklist  Lines/tubes/drains: x2 PIV, PICC, ETT, carrillo, EVD, OG   FEN: saline lock, repletion protocol available, TF with FWF   PPX: DVT - SCDs & SQH (started 12/4); GI - famotidine PO  Code: Full code   Dispo: ICU - NCC    The patient was seen and discussed with the NCC attending, Dr. Montero.    Gabriela Calderon MD  PGY2 Neurology     24 Hour Vital Signs Summary:  Temp: 99  F (37.2  C) Temp  Min: 97.8  F (36.6  C)  Max: 99.6  F (37.6  C)  Resp: 14  Resp  Min: 14  Max: 16  SpO2: 99 % SpO2  Min: 96 %  Max: 100 %  Pulse: 70 Pulse  Min: 65  Max: 84    No data recorded  BP: (!) 143/97 Systolic (24hrs), Av , Min:117 , Max:146   Diastolic (24hrs), Av, Min:60, Max:97      Respiratory monitoring:   Vent Mode: CMV/AC  (Continuous Mandatory Ventilation/ Assist Control)  FiO2 (%): 30 %  Resp Rate (Set): 14 breaths/min  Tidal Volume (Set, mL): 380 mL  PEEP (cm H2O): 7 cmH2O  Resp: 14      I/O last 3 completed shifts:  In: 6106.23 [I.V.:1846.23; NG/GT:2940]  Out: 4097 [Urine:3975; Other:122]    Current Medications:   acetaminophen  975 mg Oral or Feeding Tube Q8H    ceFAZolin  2 g Intravenous Q8H    dexAMETHasone  2 mg Intravenous TID    Followed by    [START ON 2023] dexAMETHasone  1 mg Intravenous TID    famotidine  20 mg Oral or Feeding Tube BID    fluticasone-salmeterol  1 puff Inhalation Q12H    heparin ANTICOAGULANT  5,000 Units Subcutaneous Q8H    influenza quadrivalent (PF) vacc  0.5 mL Intramuscular Prior to discharge    insulin aspart  1-6 Units Subcutaneous Q4H    ipratropium - albuterol 0.5 mg/2.5 mg/3 mL  3 mL Nebulization Q6H    levETIRAcetam  500 mg Oral or Feeding Tube BID    magnesium hydroxide  30 mL Oral Daily    metroNIDAZOLE  500 mg Oral BID    multivitamins w/minerals  15 mL Per Feeding Tube Daily    polyethylene glycol  17 g Oral or Feeding Tube BID    senna-docusate  2 tablet Oral or Feeding Tube BID    sodium chloride  1 spray Both Nostrils 4x Daily    sodium chloride (PF)  10-40 mL Intracatheter Q8H    sodium chloride (PF)  3 mL Intracatheter Q8H    vancomycin  1,000 mg Intravenous Q12H       PRN Medications:  acetaminophen, albuterol, bisacodyl, desmopressin, glucose **OR** dextrose **OR** glucagon, fentaNYL, heparin lock flush, hydrALAZINE, labetalol, lidocaine 4%, lidocaine (buffered or not buffered), magnesium hydroxide, naloxone **OR** naloxone **OR** naloxone **OR** naloxone, ondansetron **OR** ondansetron, oxyCODONE,  "prochlorperazine **OR** prochlorperazine, sodium chloride (PF), sodium chloride (PF), sodium chloride (PF)    Infusions:   dexmedeTOMIDine 0.9 mcg/kg/hr (12/05/23 0746)       Allergies   Allergen Reactions    Powder Difficulty breathing     Per pt, allergy to MILK PROTEIN POWDER but not milk or dairy products    Hydrocodone Fatigue    Iodinated Contrast Media Other (See Comments)     \"Paralysis of legs\", \"was unable to walk for 6 months\"    Mushroom Hives and Difficulty breathing    Other Food Allergy      Food coloring, artificial preservatives, high fructose syrup    Seeds      Poppy seeds, sesame seeds        Physical Examination:  Vitals: BP (!) 143/97   Pulse 70   Temp 99  F (37.2  C) (Axillary)   Resp 14   Ht 1.549 m (5' 1\")   Wt 104.7 kg (230 lb 13.2 oz)   SpO2 99%   BMI 43.61 kg/m    General: Adult female patient, lying in bed, critically-ill.  HEENT: Significant post-operative facial edema.   Cardiac: She appears adequately perfused.  Pulm: Synchronous with mechanical ventilator.   Abdomen: Non-distended.   Extremities: Warm, peripheral edema present.   Skin: No obvious rashes or lesions on exposed skin.   Psych: Sedated.     Neuro:  Mental status: Exam is limited by sedation on precedex 0.9 and fentanyl gtt 25. eye opening to physical stimuli, no verbal, does not follow commands.   Cranial nerves: Face edematous, appears symmetric. Right pupil intermittently with NPI 0. Right eye blindness at baseline. Left pupil 2 and reactive to light. Cough reflex intact   Motor: Normal bulk and tone. No abnormal movements. Able to squeeze hands and wiggles toes bilaterally with spontaneous movement x4 extremities.  Sensory: Withdraws to noxious stimuli x4 extremties.   Coordination: Deferred.   Gait: Deferred.    Labs and Imaging:    Results for orders placed or performed during the hospital encounter of 10/21/23 (from the past 24 hour(s))   Sodium   Result Value Ref Range    Sodium 149 (H) 135 - 145 mmol/L "   Glucose by meter   Result Value Ref Range    GLUCOSE BY METER POCT 167 (H) 70 - 99 mg/dL   Glucose by meter   Result Value Ref Range    GLUCOSE BY METER POCT 137 (H) 70 - 99 mg/dL   Sodium   Result Value Ref Range    Sodium 150 (H) 135 - 145 mmol/L   Glucose by meter   Result Value Ref Range    GLUCOSE BY METER POCT 149 (H) 70 - 99 mg/dL   Sodium   Result Value Ref Range    Sodium 147 (H) 135 - 145 mmol/L   Glucose by meter   Result Value Ref Range    GLUCOSE BY METER POCT 156 (H) 70 - 99 mg/dL   Sodium   Result Value Ref Range    Sodium 144 135 - 145 mmol/L   Glucose by meter   Result Value Ref Range    GLUCOSE BY METER POCT 363 (H) 70 - 99 mg/dL   Basic metabolic panel   Result Value Ref Range    Sodium 145 135 - 145 mmol/L    Potassium 4.3 3.4 - 5.3 mmol/L    Chloride 112 (H) 98 - 107 mmol/L    Carbon Dioxide (CO2) 27 22 - 29 mmol/L    Anion Gap 6 (L) 7 - 15 mmol/L    Urea Nitrogen 17.3 6.0 - 20.0 mg/dL    Creatinine 0.69 0.51 - 0.95 mg/dL    GFR Estimate >90 >60 mL/min/1.73m2    Calcium 8.0 (L) 8.6 - 10.0 mg/dL    Glucose 133 (H) 70 - 99 mg/dL   CBC with platelets   Result Value Ref Range    WBC Count 12.3 (H) 4.0 - 11.0 10e3/uL    RBC Count 2.60 (L) 3.80 - 5.20 10e6/uL    Hemoglobin 7.9 (L) 11.7 - 15.7 g/dL    Hematocrit 23.8 (L) 35.0 - 47.0 %    MCV 92 78 - 100 fL    MCH 30.4 26.5 - 33.0 pg    MCHC 33.2 31.5 - 36.5 g/dL    RDW 16.4 (H) 10.0 - 15.0 %    Platelet Count 158 150 - 450 10e3/uL   Magnesium   Result Value Ref Range    Magnesium 2.6 (H) 1.7 - 2.3 mg/dL   Phosphorus   Result Value Ref Range    Phosphorus 2.1 (L) 2.5 - 4.5 mg/dL   Sodium   Result Value Ref Range    Sodium 145 135 - 145 mmol/L   INR   Result Value Ref Range    INR 1.19 (H) 0.85 - 1.15   Specific gravity urine   Result Value Ref Range    Specific Gravity Urine 1.004 1.003 - 1.035   Glucose by meter   Result Value Ref Range    GLUCOSE BY METER POCT 127 (H) 70 - 99 mg/dL   Glucose by meter   Result Value Ref Range    GLUCOSE BY METER POCT  119 (H) 70 - 99 mg/dL   Glucose   Result Value Ref Range    Glucose 126 (H) 70 - 99 mg/dL   Osmolality   Result Value Ref Range    Osmolality Blood 312 (H) 275 - 295 mmol/kg    Narrative    Greater than 385 mmol/kg relates to stupor in hyperglycemia   Greater than 400 mmol/kg can relate to seizures   Greater than 420 mmol/kg can be lethal    Serum Osmalar Gap:   Normal <10   Larger suggest unmeasured substances present in serum (ethanol, methanol, isopropanol, mannitol, ethylene glycol).   Glucose by meter   Result Value Ref Range    GLUCOSE BY METER POCT 114 (H) 70 - 99 mg/dL   Sodium   Result Value Ref Range    Sodium 146 (H) 135 - 145 mmol/L   XR Chest Port 1 View    Narrative    Portable chest    INDICATION: Concern for pulmonary edema    COMPARISON: Yesterday    FINDINGS: Heart size normal. Indistinct Central pulmonary vasculature  noted. This appears similar to previous.  Endotracheal tube tip approximately 2.2 cm above the lexi.  Nasogastric tube tip in stomach with mildly prominent central  pulmonary vasculature which may indicate pulmonary venous congestion  and edema.      Impression    IMPRESSION: Continued probable pulmonary venous congestion suggestive  of edema/positive fluid volume balance in the lungs.    JOSE RAUL MCNULTY MD         SYSTEM ID:  [id number]       All relevant imaging and laboratory values personally reviewed.

## 2023-12-05 NOTE — OP NOTE
DATE OF PROCEDURE: 12/1/23  ATTENDING SURGEON: Shyam Ritchie MD  ASSISTANT SURGEON: Rosamaria Hatch MD     CO-SURGEON: True Buenrostro MD     PREOPERATIVE DIAGNOSES:  1. Sellar/anterior/posterior fossa tumor        POSTOPERATIVE DIAGNOSES:  1. Sellar/anterior/posterior fossa tumor        PROCEDURES PERFORMED:  Endoscopic endonasal transplanum approach to the anterior fossa   2.    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 [place] of bilateral naso-septal flaps pedicled via posterior septal artery  4. [place] of Pericranial flap (pedicled on supratrochlear and supraorbital arteries)   5. Bilateral maxillary antrostomies  5. Bilateral total ethmoidectomy  6. Bilateral Sphenidotomy   7. Stereotactic image-guided surgery, extradural  8. Left frontal siusotomy for tunneling of pericranial flap      FINDINGS: large tumor, approach via bicoronal and endoscopic. Closure performed using tunneled pericranial flap, bilateral naso-septal flaps, duragen, gelfoam, and packing gauze  ANESTHESIA: General.  EBL: 2000 cc.  COMPLICATIONS: None     INDICATIONS: large sellar/anterior/posterior fossa tumor     PROCEDURE:  After informed consent was given, the patient was placed under satisfactory anesthesia by the anesthesiologist. The nose was topically decongested with pledgets moistened with epinephrine. The bed was rotated, and the patient was prepped and draped in the usual fashion. The patient was identified and a surgical time out was performed.     STEREOTACTIC IMAGE-GUIDED SURGERY: The Stealth navigation device was used to perform stereotactic navigation. The tracking instruments were calibrated appropriately with the headgear, and topical landmarks were confirmed to assure accuracy. During the case, the navigation probes were used to confirm our location along the skull base.  Extradural navigation was performed by otolaryngology while intradural  navigation was performed by neurosurgery.        ENDOSCOPIC ENDONASAL PITUITARY TUMOR RESECTION:  We first began on the right side.  The inferior turbinate was lateralized to improve the working corridor. Next the superior turbinate was identified and resected. The middle turbinate was then resected and preserved in case of a need for mucosal graft.  The natural os of the sphenoid was visualized and cannulated with a [place] elevator and rotated.  Next the Kerrison forceps were used to open the sphenoid face superiorly to the skull base and laterally to the orbit. There was obvious tumor as soon as the sphenoid was opened.  We then approached from the left.  The inferior turbinate was lateralized and then the middle turbinate was excised and preserved for possible graft. Once again tumor was observed as soon as the sphenoid was encountered.  We then turned back to the right nasal passage. An extended needle tip Bovie was used to create the superior limb of the nasal septal flap and a [place] was used to reflect this downwards to preserve the vascular pedicle (the posterior septal artery) and expose the rostrum and inferior face of the sphenoid sinus.  This was then taken down using Kerrison forceps. The same procedure was then performed on the other side also pedicled on the posterior septal artery. Bilateral formal naso-septal flaps were then raised on each side using a extended curved bovie tip to outline the flap superiorly, anteriorly, and inferiorly and then elevating it wit ha suction elevator. Once raised they were tucked into the nasopharynx to prevent damage during tumor removal.  A posterior septectomy was then performed using a combination of the microdebrider and the backbiting forcep. The intersinus septum was then taken down using Kerrison. Attention was then turned to the maxillary and ethmoid sinuses on the left. A ball-probe was used to outfracture the uncinate and a straight kevin-cut was used to open  the sinus posteriorly. The anterior and posterior ethmoids were removed with straight instrumentation and microdebrier until the lamina papyracea and skullbase were visualized. The left frontal sinus was also cannulated and found. This same procedure was then performed on the right though patient did not have a frontal sinus on this side. Due to the need to visualize the bilateral cribiform along with the planum decision was made to do a total septectomy for visualization leaving an anterior strut for support.     The tumor at this point was widely visible with extent from the clivus to the planum and from carotid to carotid. A Qinti pi-drill was used to drill through the clivus in-order to access the posterior portion of the tumor. The tumor capsule was gently entered mid-line with a straight retractable blade in-order to remove a portion to improve visualization. This was performed with an extended-tip sonopet to careful remove tumor. Image-guidance was used to ensure safe distance from the carotids. Once the tumor was debulked to the face of the sella additional bone was removed superiorly from the sella with kerrisons. This was done until we were flush with the skullbase to allow for geater dissection and visualization of the tumor    During the endoscopic portion Dr. Buenrostro was working on the bicoronal/transbasal approach at the same time.  This is dictated separately.     Once Dr. Quigley was able to remove as much tumor as possible from above we turned again to the endonasal approach for the final portion of tumor resection. Once all tumor that could be removed safely was we turned to the reconstruction     SELLAR RECONSTRUCTION:      Significant eggress of CSF was encountered. A pericranial flap had been harvested by Dr. Buenrostro's team previously. In order to tuse this we approached the frontal sinus from below and above in order to widen the frontal outflow tract with drills. This was done  carefully to ensure to violation of the cribiform medially or the lamina laterally. Once it was widened the pericranial flap was divided and the left flap was passed through the frontal sinus under direct visualization. This was then placed into the posterior defect. The right side was used to close the defect from the cranial side. The right nasoseptal flap was then placed in an overlay fashion into the sphenoid and clival areas and the left was placed along the skullbase/planum. Quarter inch strip gauze was then layered into the nasal cavity to support the repair.      Given the size of the tumor and impressive vascularity this case required an extra 50% time and effort.      At the termination of the case, hemostasis was assured. All counts were correct times two. An orogastric tube was used to suction gastric and pharyngeal contents.  The patient was then returned to the anesthesiologist for awakening.       I was present for the entire duration of the endoscopic procedures.     Shyam Ritchie MD    [facility]  Rhinology  Endoscopic Skull Base Surgery  [facility]  Department of Otolaryngology - Head & Neck Surgery

## 2023-12-05 NOTE — PROGRESS NOTES
Neurosurgery Brief Progress Note    External ventricular drain removal    The external ventricular drain catheter was found broken 3 cm away the scalp exit by nursing. It was immediately clamped with a cuauhtemoc clamp. The drain was previously open to drain at 10, and the patient's ICPs had been within normal levels throughout the ICU stay. After discussing with our attending, it was determined that the catheter should be removed.     Drain site was prepped in usual sterile fashion with chloraprep. The sutures securing the drain were cut and the site was re-prepped. The drain was removed with tip intact. A single figure of 8 stitch with 3-0 monocryl, placed at the time of EVD placement, was tied with adequate hemostasis. No immediate complication was observed and the patient tolerated the procedure well. Please reach out to the on-call resident for further questions.     Richy Morejon MD, PGY-1  Department of Neurosurgery  Pager: [phone]    Please contact neurosurgery resident on call with questions.    Dial * * [phone], enter [phone] when prompted.
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
## 2023-12-06 NOTE — PROGRESS NOTES
"Otolaryngology Progress Note  December 4, 2023    S: No acute events, mild nasal drainage. Failed pressure support trial yesterday. She remains on the vent.     O: BP (!) 144/91   Pulse 65   Temp 98  F (36.7  C) (Axillary)   Resp 14   Ht 1.549 m (5' 1\")   Wt 108.2 kg (238 lb 8.6 oz)   SpO2 100%   BMI 45.07 kg/m     General: Intubated and sedated.    HEENT: No active bleeding from the nose. Some serosang mucous    Pulmonary: Mechanically ventilated via ETT intubation.     LABS:  Na 140  Hb 8.4 (7.9)    A/P: Mirian Cunningham is a 43 year old female with a past medical history of large pituitary adenoma s/p bifrontal craniotomy and transnasal approach to resection with b/l nasoseptal flap and pericranial flaps with formal nasal packing on 12/1-12/2.    - Sinus precautions  - Start nasal saline sprays to b/l nares today  - Recommend antibiotics to cover staph for the duration of packing (Ancef)  - We will remove packing on POD7     -- Patient and above plan discussed with Dr. hCau Moran MD  Otolaryngology - Head and Neck Surgery PGY-1  "
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
## 2023-12-06 NOTE — PROGRESS NOTES
Neurocritical Care Progress Note    Reason for critical care admission: status post pituitary adenoma resection   Admitting Team: [facility]  Date of Service:  12/06/2023  Date of Admission:  10/21/2023  Hospital Day: 47    Assessment/Plan  Mirian Cunningham is a 43 year old female with a past medical history including paranoid schizophrenia who was admitted to [facility] as a transfer from [facility] on 10/21/2023 for continued evaluation and management of dizziness, head pressure, and worsening vision in the left eye in the setting of previously known large pituitary mass. She was deemed suitable for resection which was performed on 12/1/2023. Ms. Cunningham was admitted to Neuro ICU post- operatively.     24 hour events:   -yesterday afternoon EVD tubing snapped in half requiring removal of EVD with repeat CT head with decompressed ventricular system  -Given PRN x1 dose of DDVAP per endocrinology for Na> 140 and UOP > 250 ml/hr for two hours   -sodium overnight most recently 140 --> 144 with  ml/hr   -intermittently following commands on precedex 1.2 and fent 100   -Overnight VS: afebrile, HR 70-80s, 130-140s/60-90s on fentanyl gtt 50 and precedex gtt 1.2   -Plan to wean sedation and ventilator   -holding TF for 4 hours prior to possible extubation this afternoon around 1 PM   -F/U with ENT and anesthesia for extubation and enteric feeding   -repeat EKG stable with sinus rhythm       Neuro  #Status post pituitary tumor resection, 12/1/2023   #s/p EVD placement   #Right eye complete blindness  #Left partial hemanopia   -Neurochecks every Q2H   -SBP goal < 140 mmHg  -Decadron taper per NSGY  -Keppra per NSGY  -Post-operative antibiotics per NSGY  -MRI, 12/2/2023: extensive clot-like blood products in the resection cavity, no definite residual tumor, developing small hydroma along the bifrontal convexities  -CT head post op 12/5 Stable caliber of the decompressed ventricular system status post removal of right frontal  approach ventriculostomy catheter & postsurgical changes of suprasellar mass resection evolution of blood products and decreased pneumocephalus.   -HOB > 30   -PT/OT/SLP when able      #Analgesics & sedation  RASS goal: 0 to -1  -stop fent gtt and continue with pushes PRN 25-50 Q1H,   -Continue 5-10 mg oxycodone PRN Q4H  -Continue precedex gtt  -scheduled 975 tylenol TID for 3 days   -PRN zofran and compazine for nausea and vomiting       #Schizophrenia   #Housing insecurity   Prior concern that Mirian does not have capacity to make her own decisions, she is not currently under guardianship, however was under a temporary guardianship for 6 months earlier in 2023 through [place], [place]. Ms. Cunningham has a prior history of civil commitment for mental illness treatment and on 10/21/2023 she was evaluated by psychiatry with recommendation for a petition for civil commitment with [facility] to facilitate mental health support and treatment; civil commitment and [facility] have not been further pursued this admission. Psychiatry felt Ms. Cunningham lacks decision making capacity however deemed suitable to retain the ability to name her next of kin and surrogate decision maker. Her godmother, Adri, has been serving as medical decision maker signed consent for surgery and subsequent procedures in ICU. Adri lives in [place] and is available by phone.   -Hold previously recommended Haldol 5 BID plus PRN Haldol and PRN Haldol/Benadryl for now, monitor for psychiatric symptoms  -Discharge planning when appropriate      CV  #Hypertension   #Pulmonary hypertension, mild  #Left ventricular hypertrophy  -Not on PTA medications   -Cardiac monitoring  -SBP goal < 140 mmHg  -PRN Labetalol and Hydralazine for > 140   -Echocardiogram, 10/26 - normal LV with mild concentric wall thickening and EF 60-65%, normal RV, mild pulmonary hypertension     Resp  #Intubated for surgery  #Difficult airway   #Mild intermittent asthma, treated   #MAXX,  chronic   Oxygen/vent: mechanical ventilator, pressure supporting trial today with plan to extubate tentatively this afternoon   -Continuous pulse ox  -Maintain O2 saturations greater than 92%  -Continue Breo ellipta inhaler BID, PRN albuterol neb 2.5 mg Q8H PRN  -Continue duonebs Q6H scheduled   -12/5 CXR mildly increased diffuse opacities with possible edema and retrocardiac atelectasis vs infectious     GI  #Severe obesity  Diet: Holding TF with FWF with plan for extubation, discussion with ENT with plan to keep nasal packing till 12/9 and NG tube for feeds not available, SLP consulted for swallow and diet recs after extubation   Last BM: 12/5  GI prophylaxis: famotidine  -Bowel regimen: scheduled senna-docusate BID and Miralax BID, milk of magnesia daily until stools, PRN suppository dulcolax daily      Renal/  #Hypernatremia  #Central DI   #Hyperchloremia   #Hypophosphatemia  #Hypermagnesemia   #Hypokalemia, resolved   -Daily BMP, Mg, and Phos   -Na checks Q4H  -IV fluids:none, consider adding D5W pending extubation   -potassium & sodium phosphate PO packet daily  -High electrolyte replacement protocols  -carrillo for strict I&Os  -Holding 150 ml/hr FWF, consider reducing to FWF to 100 Q1H   -DDAVP q8hr PO prn if urine output is > 300/hr for 3 hours/SG < 1.005      Endo  #Prediabetes  -Monitor glucose levels  -Medium correction scale insulin   -A1c 5.8%  -TSH 0.85  -Prolactin 10  -FSH 18.0  -LH 8.4  -Endocrine consulted by [facility]     Heme  #Acute surgical blood loss anemia  #Thrombocytopenia   #Coagulation defect  -Daily CBC  -Hgb goal >7, plt goal >50k  -Trend INR  -Transfuse to meet Hgb and plt goals     ID  #Leukocytosis, reactive versus steroid induced   -Daily CBC  -Follow temperature curve  -3 days vanc/cefepime/flagyl- to end 12/5   -Continue cefazolin 2 g TID while nasal packing in place ppx (12/9)     ICU Checklist  Lines/tubes/drains: x1 PIV, PICC, ETT, carrillo, OG   FEN: saline lock, repletion protocol  available, holding TF with FWF   PPX: DVT - SCDs & SQH; GI - famotidine PO  Code: Full code   Dispo: ICU - NCC    The patient was seen and discussed with the NCC attending, Dr. Montero.    Gabriela Calderon MD  PGY2 Neurology     24 Hour Vital Signs Summary:  Temp: 98.2  F (36.8  C) Temp  Min: 98  F (36.7  C)  Max: 99.1  F (37.3  C)  Resp: 18 Resp  Min: 12  Max: 28  SpO2: 100 % SpO2  Min: 94 %  Max: 100 %  Pulse: 75 Pulse  Min: 64  Max: 110    No data recorded  BP: 132/74 Systolic (24hrs), Av , Min:104 , Max:169   Diastolic (24hrs), Av, Min:55, Max:95      Respiratory monitoring:   Vent Mode: CPAP/PS  (Continuous positive airway pressure with Pressure Support)  FiO2 (%): 30 %  Resp Rate (Set): 12 breaths/min  Tidal Volume (Set, mL): 380 mL  PEEP (cm H2O): 5 cmH2O  Pressure Support (cm H2O): 7 cmH2O  Resp: 18      I/O last 3 completed shifts:  In: 6477.48 [I.V.:1087.48; NG/GT:4070]  Out: 3058 [Urine:3030; Other:28]    Current Medications:   acetaminophen  975 mg Oral or Feeding Tube Q8H    ceFAZolin  2 g Intravenous Q8H    desmopressin  0.1 mg Oral Once    dexAMETHasone  1 mg Intravenous TID    famotidine  20 mg Oral or Feeding Tube BID    fluticasone-salmeterol  1 puff Inhalation Q12H    heparin ANTICOAGULANT  5,000 Units Subcutaneous Q8H    influenza quadrivalent (PF) vacc  0.5 mL Intramuscular Prior to discharge    insulin aspart  1-6 Units Subcutaneous Q4H    ipratropium - albuterol 0.5 mg/2.5 mg/3 mL  3 mL Nebulization Q6H    levETIRAcetam  500 mg Oral or Feeding Tube BID    magnesium hydroxide  30 mL Oral Daily    multivitamins w/minerals  15 mL Per Feeding Tube Daily    polyethylene glycol  17 g Oral or Feeding Tube BID    potassium & sodium phosphates  1 packet Oral or Feeding Tube Q4H    potassium & sodium phosphates  1 packet Oral 4x Daily    senna-docusate  2 tablet Oral or Feeding Tube BID    sodium chloride  1 spray Both Nostrils 4x Daily    sodium chloride (PF)  10-40 mL Intracatheter Q8H    sodium  "chloride (PF)  3 mL Intracatheter Q8H       PRN Medications:  acetaminophen, albuterol, bisacodyl, glucose **OR** dextrose **OR** glucagon, fentaNYL, heparin lock flush, hydrALAZINE, labetalol, lidocaine 4%, lidocaine (buffered or not buffered), magnesium hydroxide, naloxone **OR** naloxone **OR** naloxone **OR** naloxone, ondansetron **OR** ondansetron, oxyCODONE, prochlorperazine **OR** prochlorperazine, sodium chloride (PF), sodium chloride (PF), sodium chloride (PF)    Infusions:   dexmedeTOMIDine 1.2 mcg/kg/hr (12/06/23 1043)    fentaNYL 50 mcg/hr (12/06/23 0700)       Allergies   Allergen Reactions    Powder Difficulty breathing     Per pt, allergy to MILK PROTEIN POWDER but not milk or dairy products    Hydrocodone Fatigue    Iodinated Contrast Media Other (See Comments)     \"Paralysis of legs\", \"was unable to walk for 6 months\"    Mushroom Hives and Difficulty breathing    Other Food Allergy      Food coloring, artificial preservatives, high fructose syrup    Seeds      Poppy seeds, sesame seeds        Physical Examination:  Vitals: /74   Pulse 75   Temp 98.2  F (36.8  C) (Axillary)   Resp 18   Ht 1.549 m (5' 1\")   Wt 108.2 kg (238 lb 8.6 oz)   SpO2 100%   BMI 45.07 kg/m    General: Adult female patient, lying in bed, critically-ill.  HEENT: Significant post-operative facial edema.   Cardiac: She appears adequately perfused.  Pulm: Synchronous with mechanical ventilator.   Abdomen: Non-distended.   Extremities: Warm, peripheral edema present.   Skin: No obvious rashes or lesions on exposed skin.   Psych: Sedated.     Neuro:  Mental status: Exam is limited by sedation on precedex 1.2 and fentanyl gtt . Awake, eyes open spontaneously, no verbal, follow commands able to give a thumbs up, wiggle her toes, close eyes,  and participate in exam   Cranial nerves: Face edematous, appears symmetric. Right pupil intermittently non reactive with right eye blindness at baseline. Left pupil 2 and reactive to " light. Cough reflex intact   Motor: Normal bulk and tone. No abnormal movements. Able to squeeze hands and wiggles toes bilaterally with spontaneous movement x4 extremities.  Sensory: light touch sensation intact x4 extremties.   Coordination: Deferred.   Gait: Deferred.    Labs and Imaging:    Results for orders placed or performed during the hospital encounter of 10/21/23 (from the past 24 hour(s))   Sodium   Result Value Ref Range    Sodium 150 (H) 135 - 145 mmol/L   Glucose by meter   Result Value Ref Range    GLUCOSE BY METER POCT 135 (H) 70 - 99 mg/dL   CT Head w/o Contrast    Narrative    EXAM: CT HEAD W/O CONTRAST  12/5/2023 4:27 PM     HISTORY:  s/p EVD removal       COMPARISON:  CT and MRI 12/2/2023    TECHNIQUE: Using multidetector thin collimation helical acquisition  technique, axial, coronal and sagittal CT images from the skull base  to the vertex were obtained without intravenous contrast.   (topogram) image(s) also obtained and reviewed.    FINDINGS:  Right frontal approach ventriculostomy catheter has been removed.  Stable caliber of the decompressed ventricular system.     Postsurgical changes of bifrontal craniotomy, bilateral ethmoidectomy,  bilateral maxillary antrostomy and left frontal sinusotomy. Expected  evolution of hyperdense hemorrhage in the surgical site near the sella  turcica as well as hypodense appearance of the bifrontal lobes, right  greater than left, likely vasogenic edema. Trace residual  pneumocephalus overlying the bifrontal lobes with increase of  subcutaneous air in the adjacent frontal soft tissues. Redemonstration  of a hyperdense appearance in the inter peduncular cistern in the  anterior cranial fossa ventral to the third ventricle, indeterminant  for residual tumor versus blood product.    Hypodense hygroma overlying the bifrontal lobes measuring up to 10 mm,  similar to findings on MRI. Increased hypodense extracranial  collection overlying the frontal bones  measuring up to 8 mm.    Mass effect with mild effacement of the anterior horns of the lateral  ventricles, right greater than left. No significant midline shift. No  acute loss of gray-white matter differentiation in the cerebral  hemispheres.    No acute osseous abnormality. Debris throughout the paranasal sinuses.  Small mastoid air cell effusions. Grossly normal orbits.       Impression    IMPRESSION:   1. Stable caliber of the decompressed ventricular system status post  removal of right frontal approach ventriculostomy catheter.  2. Postsurgical changes of suprasellar mass resection with expected  evolution of blood products and decreased postoperative  pneumocephalus. Similar appearance of indeterminant hyperdensity in  the interpeduncular cistern, better evaluated on recent MRI.  3. Increased extracranial hypodense collection overlying the frontal  bones with increased associated subcutaneous air.    I have personally reviewed the examination and initial interpretation  and I agree with the findings.    JED CALDERON MD         SYSTEM ID:  [id number]   Sodium   Result Value Ref Range    Sodium 144 135 - 145 mmol/L   Glucose by meter   Result Value Ref Range    GLUCOSE BY METER POCT 144 (H) 70 - 99 mg/dL   Sodium   Result Value Ref Range    Sodium 144 135 - 145 mmol/L   Sodium   Result Value Ref Range    Sodium 141 135 - 145 mmol/L   Glucose by meter   Result Value Ref Range    GLUCOSE BY METER POCT 195 (H) 70 - 99 mg/dL   Basic metabolic panel   Result Value Ref Range    Sodium 140 135 - 145 mmol/L    Potassium 3.9 3.4 - 5.3 mmol/L    Chloride 108 (H) 98 - 107 mmol/L    Carbon Dioxide (CO2) 24 22 - 29 mmol/L    Anion Gap 8 7 - 15 mmol/L    Urea Nitrogen 19.0 6.0 - 20.0 mg/dL    Creatinine 0.60 0.51 - 0.95 mg/dL    GFR Estimate >90 >60 mL/min/1.73m2    Calcium 8.1 (L) 8.6 - 10.0 mg/dL    Glucose 153 (H) 70 - 99 mg/dL   CBC with platelets   Result Value Ref Range    WBC Count 14.1 (H) 4.0 - 11.0 10e3/uL     RBC Count 2.73 (L) 3.80 - 5.20 10e6/uL    Hemoglobin 8.4 (L) 11.7 - 15.7 g/dL    Hematocrit 25.4 (L) 35.0 - 47.0 %    MCV 93 78 - 100 fL    MCH 30.8 26.5 - 33.0 pg    MCHC 33.1 31.5 - 36.5 g/dL    RDW 16.1 (H) 10.0 - 15.0 %    Platelet Count 213 150 - 450 10e3/uL   Sodium   Result Value Ref Range    Sodium 140 135 - 145 mmol/L   Magnesium   Result Value Ref Range    Magnesium 2.4 (H) 1.7 - 2.3 mg/dL   Phosphorus   Result Value Ref Range    Phosphorus 2.0 (L) 2.5 - 4.5 mg/dL   Glucose by meter   Result Value Ref Range    GLUCOSE BY METER POCT 148 (H) 70 - 99 mg/dL   Sodium   Result Value Ref Range    Sodium 142 135 - 145 mmol/L   Specific gravity urine   Result Value Ref Range    Specific Gravity Urine 1.009 1.003 - 1.035   Glucose by meter   Result Value Ref Range    GLUCOSE BY METER POCT 149 (H) 70 - 99 mg/dL   EKG 12-lead, complete   Result Value Ref Range    Systolic Blood Pressure  mmHg    Diastolic Blood Pressure  mmHg    Ventricular Rate 82 BPM    Atrial Rate 82 BPM    LA Interval 172 ms    QRS Duration 92 ms     ms    QTc 429 ms    P Axis 66 degrees    R AXIS 52 degrees    T Axis 11 degrees    Interpretation ECG       Sinus rhythm with marked sinus arrhythmia  Otherwise normal ECG  When compared with ECG of 27-OCT-2023 17:58,  No significant change was found         All relevant imaging and laboratory values personally reviewed.

## 2023-12-06 NOTE — PROGRESS NOTES
[facility], [place]   12/06/2023   Neurosurgery Progress Note:    Interval History: Remains intubated in ICU. Na improving. EVD removed yesterday after fracture noted. Patient clinically stable.    Assessment:  Mirian Cunningham is a 43 year old female with complex psychosocial situation including paranoid schizophrenia and lack of active guardianship transferred to [facility] with known large sellar/suprasellar mass. Godmother Adri is appointed as the medical decision maker. No other family members available or willing to participate in her care.  MRI with large multilobulated pituitary mass/adenoma with mass effect and encasement of the cavernous structures including the cavernous internal carotid arteries, M1 segment of the right MCA, and A1 and proximal A2 segments of both anterior cerebral arteries.     Now post operative day 4 status post combined endonasal and transcranial approach for resection of pituitary adenoma and placement of external ventricular drain.    Plan:  Q2h neuro exams  Wean sedation and ventilator  Decadron 7 day taper  Keppra 7 days  Ancef while nasal packing in  Sinus precautions  DI watch - strict I&O q1h, daily weights  DDAVP q8hr prn if urine output is > 300/hr for 3 hours/SG < 1.005, contact primary team first  Continue free water 150cc q1hr  Endocrinology following- appreciate recs  Pain control  Seroquel PRN  Maintain SBP < 160  DVT prophylaxis: SCDs, SQH  Dispo: ICU    -----------------------------------  Kelsey Briceno MD, PhD  PGY-3 Neurosurgery    Please contact neurosurgery resident on call with questions.    Dial * * [phone], enter [phone] when prompted.   -----------------------------------  Objective:   Temp:  [98  F (36.7  C)-99.2  F (37.3  C)] 98  F (36.7  C)  Pulse:  [] 69  Resp:  [14-28] 16  BP: (104-169)/(55-97) 147/88  FiO2 (%):  [30 %] 30 %  SpO2:  [94 %-100 %] 100 %  I/O last 3 completed shifts:  In: 6595.6 [I.V.:1205.6; NG/GT:4070]  Out: 4167  [Urine:4100; Other:67]    Gen: Appears comfortable, intubated and iatrogenically sedated  Wound: clean, dry, intact  Neurologic:  Intubated and sedated  Follows commands in bilateral lower extremities  No gaze preference  R pupil non-reactive (baseline), L pupil briskly reactive  Face symmetric at rest  + VOR, + cough w suctioning  Reflexes diminished throughout, Babinski mute, no Zamorano's  Withdraws to noxious stimuli x 4      LABS:  Recent Labs   Lab 12/05/23  2342 12/05/23  2336 12/05/23 2009 12/05/23  1955 12/05/23  1821 12/05/23  1241 12/05/23  0545 12/05/23  0501 12/04/23  0829 12/04/23  0338 12/03/23  0411 12/03/23  0406   NA  --  141 144  --  144  --    < > 145  145   < > 148*  148*   < > 152*  152*   POTASSIUM  --   --   --   --   --   --   --  4.3  --  4.1  --  4.0   CHLORIDE  --   --   --   --   --   --   --  112*  --  116*  --  119*   CO2  --   --   --   --   --   --   --  27  --  25  --  25   ANIONGAP  --   --   --   --   --   --   --  6*  --  7  --  8   *  --   --  144*  --  135*   < > 133*   < > 186*   < > 214*   BUN  --   --   --   --   --   --   --  17.3  --  14.9  --  13.2   CR  --   --   --   --   --   --   --  0.69  --  0.77  --  0.80   FADUMO  --   --   --   --   --   --   --  8.0*  --  7.9*  --  8.2*    < > = values in this interval not displayed.     Recent Labs   Lab 12/05/23  0501   WBC 12.3*   RBC 2.60*   HGB 7.9*   HCT 23.8*   MCV 92   MCH 30.4   MCHC 33.2   RDW 16.4*        IMAGING:  Recent Results (from the past 24 hour(s))   XR Chest Port 1 View    Narrative    Portable chest    INDICATION: Concern for pulmonary edema    COMPARISON: Yesterday    FINDINGS: Heart size normal. Indistinct Central pulmonary vasculature  noted. This appears similar to previous.  Endotracheal tube tip approximately 2.2 cm above the lexi.  Nasogastric tube tip in stomach with mildly prominent central  pulmonary vasculature which may indicate pulmonary venous congestion  and edema.       Impression    IMPRESSION: Continued probable pulmonary venous congestion suggestive  of edema/positive fluid volume balance in the lungs.    JOSE RAUL MCNULTY MD         SYSTEM ID:  [id number]   CT Head w/o Contrast    Narrative    EXAM: CT HEAD W/O CONTRAST  12/5/2023 4:27 PM     HISTORY:  s/p EVD removal       COMPARISON:  CT and MRI 12/2/2023    TECHNIQUE: Using multidetector thin collimation helical acquisition  technique, axial, coronal and sagittal CT images from the skull base  to the vertex were obtained without intravenous contrast.   (topogram) image(s) also obtained and reviewed.    FINDINGS:  Right frontal approach ventriculostomy catheter has been removed.  Stable caliber of the decompressed ventricular system.     Postsurgical changes of bifrontal craniotomy, bilateral ethmoidectomy,  bilateral maxillary antrostomy and left frontal sinusotomy. Expected  evolution of hyperdense hemorrhage in the surgical site near the sella  turcica as well as hypodense appearance of the bifrontal lobes, right  greater than left, likely vasogenic edema. Trace residual  pneumocephalus overlying the bifrontal lobes with increase of  subcutaneous air in the adjacent frontal soft tissues. Redemonstration  of a hyperdense appearance in the inter peduncular cistern in the  anterior cranial fossa ventral to the third ventricle, indeterminant  for residual tumor versus blood product.    Hypodense hygroma overlying the bifrontal lobes measuring up to 10 mm,  similar to findings on MRI. Increased hypodense extracranial  collection overlying the frontal bones measuring up to 8 mm.    Mass effect with mild effacement of the anterior horns of the lateral  ventricles, right greater than left. No significant midline shift. No  acute loss of gray-white matter differentiation in the cerebral  hemispheres.    No acute osseous abnormality. Debris throughout the paranasal sinuses.  Small mastoid air cell effusions. Grossly normal  orbits.       Impression    IMPRESSION:   1. Stable caliber of the decompressed ventricular system status post  removal of right frontal approach ventriculostomy catheter.  2. Postsurgical changes of suprasellar mass resection with expected  evolution of blood products and decreased postoperative  pneumocephalus. Similar appearance of indeterminant hyperdensity in  the interpeduncular cistern, better evaluated on recent MRI.  3. Increased extracranial hypodense collection overlying the frontal  bones with increased associated subcutaneous air.    I have personally reviewed the examination and initial interpretation  and I agree with the findings.    JED CALDERON MD         SYSTEM ID:  [id number]

## 2023-12-06 NOTE — PLAN OF CARE
Major Shift Events:  Patient lethargic at times, and agitated/combative at times. Intermittently following simple commands, however limited participation in neuro exam. Blind in right eye, pupil non-reactive - MD notified/aware. Left eye reactive, limited visual field - difficult to assess limited participation in exam. Answers questions w/ nods, however frequently appears confused and prvides changing answers to the same question. Moving all 4 extremities independently. Strong throughout, w/o unilateral weakness. HR sinus rhythm, occasional PAC's. BP elevated, frequent PRN antihypertensive needed. Failed pressure support trail d/t low volumes and difficulty tolerating sedation wean (becomes very agitated). OG w/ TF at goal, free water flushes at 150ml/hr. Urethral cath w/ adequate output. Loose BMx1. EVD broke at suture site on red rubber catheter - MD removed EVD.    Plan: Continue to monitor closely.  For vital signs and complete assessments, please see documentation flowsheets.

## 2023-12-06 NOTE — PLAN OF CARE
SLP: Clinical swallow eval orders received. Per RN, pt remains intubated with possible plans for extubation later today. Will follow-up as appropriate.
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
## 2023-12-06 NOTE — PLAN OF CARE
Major Shift Events: Pt sedated on precedex gtt @ 1.2 and fentanyl gtt @ 50, for pain and agitation. RAAS -1 to +3, lethargic with frequent, intermittent periods of restlessness/agitation/combativeness. Pt intermittently following simple commands. Pupils equal and round however, R pupil remains fixed, L pupil reactive, brisk -- pupillometer used (see flowsheets). Moving all extremities, no unilateral weakness noted. Due to some lack of participation with exams, difficult to fully assess pt's visual perception. Bilateral soft wrist restraints in place due to line pulling behavior. Afebrile. Syst goal < 140 met with frequent PRN antihypertensive administration (see MAR), NSG team aware, did not want to start nicardipine gtt at this time. SR to sinus tachycardia with frequent PACs. Remains on CMV settings, 14/380/7, FiO2: 30%. High PIPs (see flowsheets). Galeas in place with adequate UOP. No BM this shift. OG with TF running at goal with 150 mL q1 hr FWFs. Continues to have moderate, serosanguineous drainage from nose, oceanspray use per orders (see MAR) and nasal sling/gauze in place for drainage. Trending Na levels q4 hrs (last Na = 140). Phos replaced.      Plan: Continue to manage pain and trend Na levels. Wean sedation as able. Continue to follow POC and notify team of any changes.       For vital signs and complete assessments, please see documentation flowsheets.

## 2023-12-07 NOTE — PROGRESS NOTES
"Otolaryngology Progress Note      S: Extubated last night. Doing fine, awake and alert until 4am this morning. Developed increased work of breathing and deteriorating mental status. No noisy breathing. No increased oxygen requirements.    O: BP (!) 160/87 (BP Location: Left leg)   Pulse 78   Temp 98  F (36.7  C) (Oral)   Resp 17   Ht 1.549 m (5' 1\")   Wt 107.8 kg (237 lb 10.5 oz)   SpO2 100%   BMI 44.90 kg/m     General: Disoriented, does not follow commands   HEENT: No active bleeding from the nose. Some serosang mucous    Pulmonary: On oxy mask 60%, satting well; work of breathing is moderately increased    LABS:  Na 140    A/P: Mirian Cunningham is a 43 year old female with a past medical history of large pituitary adenoma s/p bifrontal craniotomy and transnasal approach to resection with b/l nasoseptal flap and pericranial flaps with formal nasal packing on 12/1-12/2. Mental status and increased work of breathing today seems most likely related to metabolic process, no indication of airway compromise at this time.     - Sinus precautions  - nasal saline sprays to b/l nares   - Recommend antibiotics to cover staph for the duration of packing (Ancef)  - We will remove packing on POD7     -- Patient and above plan discussed with Dr. Chau Diaz MD  Otolaryngology Head and Neck Surgery Resident, PGY-2    "

## 2023-12-07 NOTE — PROGRESS NOTES
CLINICAL NUTRITION SERVICES - REASSESSMENT NOTE     Nutrition Prescription    RECOMMENDATIONS FOR MDs/PROVIDERS TO ORDER:  - ADAT per SLP recommendations for safe PO (now regular diet 12/8)  - Encourage and assist with meals as needed (ordering room service with assist)    Malnutrition Status:    - Patient does not meet two of the established criteria necessary for diagnosing malnutrition    Recommendations already ordered by Registered Dietitian (RD):  - Changed to Room Service with Assist     Future/Additional Recommendations:  - PO adequacy      EVALUATION OF THE PROGRESS TOWARD GOALS   Diet: Full Liquid  Nutrition Support: (stopped post-EXT 12/6) Vital High Protein @ goal of 55ml/hr (1320ml/day) will provide: 1320 kcals (22 kcals/kg), 115 g PRO (1.9 gm/kg) 1103 ml free H20, 146 g CHO, and 0 g fiber daily.   Intake: Now off TFs. Pt tolerating PO. Diet was fulls 12/7, now advanced to regular and pt is tolerating.        NEW FINDINGS   Nutrition/GI: Pt had EN via OGT while vented. Pt now extubated without enteral access. SLP passed pt for full liquid diet. GI: Last BM: 12/7. Bowel regimen: scheduled senna-docusate BID and Miralax BID, milk of magnesia daily until stools, PRN suppository dulcolax daily 12/8: diet advanced to regular. Pt reports good appetite. Just ordered more food items for breakfast. Pt reports not being able to see menu well. Per RN, pt is asking for help with ordering. Will order room service with assist.     Neuro Status post pituitary tumor resection, 12/1/2023; s/p EVD placement; Right eye complete blindness; Left partial hemanopia. Schizophrenia     CV Hypertension; Pulmonary hypertension, mild. Left ventricular hypertrophy. Echocardiogram, 10/26 - normal LV with mild concentric wall thickening and EF 60-65%.     Resp: Intubated for surgery; Difficult airway. Extubated 12/6.     Renal/ Hypernatremia; Central DI; Hyperchloremia; Hypophosphatemia; Hypermagnesemia; Hypokalemia, resolved. FWF  discontinued with loss of enteral access.      Endo Prediabetes. Medium correction scale insulin. Endocrine consulted by NSGY    Labs/meds: reviewed     Weights:  92.9 kg --> 106.5 kg. No weight loss    MALNUTRITION  % Intake: No decreased intake noted -  % Weight Loss: None noted - none since admit, trending up   Subcutaneous Fat Loss: None observed  Muscle Loss: None observed  Fluid Accumulation/Edema: fluid-up likely related to post-op status  Malnutrition Diagnosis: Patient does not meet two of the established criteria necessary for diagnosing malnutrition    Previous Goals   Total avg nutritional intake to meet a minimum of 20 kcal/kg and 1.5 g PRO/kg daily (per dosing wt 59 kg).   Evaluation: pt off TFs. Currently tolerating oral PO    Previous Nutrition Diagnosis  Inadequate oral intake related to inability to take oral PO/vented as evidenced by NPO and meeting 0 %nutrition needs at present     Evaluation: Improving    CURRENT NUTRITION DIAGNOSIS  Predicted inadequate nutrient intake related to suspected disruptions to PO and/or appetites post-op/hospitalization as evidenced by pt oral PO decreased from baseline with full liquid diet x 2 days and still improving PO      INTERVENTIONS  Implementation  Encouraged adequate PO  Changed to room service with assist     Goals  Diet adv > full liquids within 24-48 hours - met   Patient to consume % of nutritionally adequate meal trays TID, or the equivalent with supplements/snacks.    Monitoring/Evaluation  Progress toward goals will be monitored and evaluated per protocol.    Reyna Esteban RD, DAVE, [facility]  Neuro ICU  Pager: [phone]

## 2023-12-07 NOTE — PROGRESS NOTES
12/07/23 0837   Appointment Info   Signing Clinician's Name / Credentials (SLP) Toya Hernandez MS CCC-SLP   General Information   Onset of Illness/Injury or Date of Surgery 10/21/23   Referring Physician Gabriela Calderon MD   Patient/Family Therapy Goal Statement (SLP) None stated   Pertinent History of Current Problem Mirian Cunningham is a 43 year old female with complex psychosocial situation including paranoid schizophrenia and lack of active guardianship transferred to [facility] with known large sellar/suprasellar mass. Godmother Adri is appointed as the medical decision maker. No other family members available or willing to participate in her care.  MRI with large multilobulated pituitary mass/adenoma with mass effect and encasement of the cavernous structures including the cavernous internal carotid arteries, M1 segment of the right MCA, and A1 and proximal A2 segments of both anterior cerebral arteries.      Now post operative day 5 status post combined endonasal and transcranial approach for resection of pituitary adenoma and placement of external ventricular drain.  Pt extubated yesterday. Clinical swallow eval completed per MD orders to further assess oropharyngeal swallow function.   Type of Evaluation   Type of Evaluation Swallow Evaluation   Oral Motor   Oral Musculature generally intact   Structural Abnormalities none present   Mucosal Quality dry   Dentition (Oral Motor)   Dentition (Oral Motor) natural dentition;adequate dentition   Facial Symmetry (Oral Motor)   Facial Symmetry (Oral Motor) WNL   Lip Function (Oral Motor)   Lip Range of Motion (Oral Motor) WNL   Tongue Function (Oral Motor)   Tongue ROM (Oral Motor) WNL   Jaw Function (Oral Motor)   Jaw Function (Oral Motor) WNL   Cough/Swallow/Gag Reflex (Oral Motor)   Volitional Throat Clear/Cough (Oral Motor) reduced strength   Vocal Quality/Secretion Management (Oral Motor)   Vocal Quality (Oral Motor) WFL;harsh   Secretion Management (Oral  Motor) WNL   General Swallowing Observations   Past History of Dysphagia Pt familiar to SLP caseload with recommendations for regular diet and thin liquids 10/31/23.   Comment, General Swallowing Observations Sinus precautions   Respiratory Support humidified;face tent   Current Diet/Method of Nutritional Intake (General Swallowing Observations, NIS) NPO   Swallowing Evaluation Clinical swallow evaluation   Clinical Swallow Evaluation   Feeding Assistance frequent cues/help required   Clinical Swallow Evaluation Textures Trialed thin liquids;pureed   Clinical Swallow Eval: Thin Liquid Texture Trial   Mode of Presentation, Thin Liquids cup;spoon;self-fed;fed by clinician   Volume of Liquid or Food Presented 3 ice chips, 5 oz   Oral Phase of Swallow WFL   Pharyngeal Phase of Swallow intact   Diagnostic Statement No overt s/sx of aspiration   Clinical Swallow Evaluation: Puree Solid Texture Trial   Mode of Presentation, Puree spoon;fed by clinician   Volume of Puree Presented 3 tbsp   Oral Phase, Puree effortful AP movement   Pharyngeal Phase, Puree intact   Diagnostic Statement No overt s/sx of aspiration. Prolonged time for AP transit.   Esophageal Phase of Swallow   Patient reports or presents with symptoms of esophageal dysphagia No   Swallowing Recommendations   Diet Consistency Recommendations thin liquids (level 0);full liquid diet   Supervision Level for Intake 1:1 supervision needed   Mode of Delivery Recommendations bolus size, small;food moistened;no straws;slow rate of intake  (no straws per sinus precautions)   Swallowing Maneuver Recommendations alternate food and liquid intake;extra swallow   Monitoring/Assistance Required (Eating/Swallowing) monitor for cough or change in vocal quality with intake;optimize oral intake to minimize need for tube feeding;cue for finger/lingual sweep if oral pocketing present;stop eating activities when fatigue is present   Recommended Feeding/Eating Techniques (Swallow  Eval) maintain upright sitting position for eating;maintain upright posture during/after eating for 30 minutes;minimize distractions during oral intake;provide assist with feeding;provide oral hygiene prior to intake;provide 6 smaller meals throughout day;set-up and prepare tray   Medication Administration Recommendations, Swallowing (SLP) consider serving PO meds in puree   Instrumental Assessment Recommendations instrumental evaluation not recommended at this time   General Therapy Interventions   Planned Therapy Interventions Dysphagia Treatment   Dysphagia treatment Instruction of safe swallow strategies;Compensatory strategies for swallowing;Modified diet education   Clinical Impression   Criteria for Skilled Therapeutic Interventions Met (SLP Eval) Yes, treatment indicated   SLP Diagnosis mild oropharyngeal dysphagia   Risks & Benefits of therapy have been explained evaluation/treatment results reviewed;care plan/treatment goals reviewed;risks/benefits reviewed;current/potential barriers reviewed;participants voiced agreement with care plan;participants included;patient   Clinical Impression Comments Clinical swallow eval completed per MD orders. Pt presents with mild oropharyngeal dysphagia s/p extubation. Pt with mildly harsh voicing, otherwise oral mech exam WFL. Oral cares completed. Pt tolerated ice chips, thin liquids via spoon and cup, and pureed textures with no overt s/sx of aspiration. Pt required prolonged but functional time for AP transit. Solid textures deferred this date. Recommend full liquids (thin liquids) with 1:1 supervision. No straws per sinus precautions. Pt should be fully upright and alert for all PO, take small sips/bites, slow pacing, and alternate between consistencies. ST to continue to assess diet tolerance and advancement as appropriate. Anticipate pt will require ST follow-up at discharge.   SLP Discharge Recommendation Transitional Care Facility   SLP Rationale for DC Rec pt  below baseline oropharyngeal swallowing skills   SLP Brief overview of current status  Recommend full liquids (thin liquids) with 1:1 supervision. No straws per sinus precautions. Pt should be fully upright and alert for all PO, take small sips/bites, slow pacing, and alternate between consistencies.

## 2023-12-07 NOTE — PLAN OF CARE
Major Shift Events:  Oriented to self, forgetful, confused, delusional, anxious and combative at times. Blind right eye, poor/limited vision left eye. Moves all extremities purposefully. Head and face swollen, improving. Patient extubated, currently on humidified face tent, FIO2 50%. HR sinus rhythm, frequent PAC's. Bp elevated, SBP<160 w/ PRN's. Failed bedside swallow post extubation. Passing loose stools. Urethral cath in place w/ large volume output - receiving DDAVP. Denies pain.   Plan: Continue to monitor closely  For vital signs and complete assessments, please see documentation flowsheets.

## 2023-12-07 NOTE — PROGRESS NOTES
Subjective:    Mirian Cunningham is a 43 year old female who we continue to follow s/p pituitary surgery.    She was extubated yesterday and a full liquid diet is now ordered. Speaking with her this AM she has intact thirst and has been drinking.     On 12/6/2023 she received 1 dose of desmopressin 0.1 mg at 11:08 AM. This was the most recent dose. She had breakthrough urination around midnight (~13 hours after the dose).     She continues on IV dexamethasone.     She was on D5 100 mL/hour and this was stopped by the primary team at 07:27 on 12/7/2023.     This AM she had pulmonary edema and she received 40 mg of IV furosemide at 07:38 on 12/7/2023. She has had >2 L of urine output between 8 and 9:30 AM.    Objective:    VS, labs, and I/O reviewed. She was awake and alert when I met with her. Able to answer questions.     A/P:    # S/p surgery 12/1/2023 to treat an ~6 cm pituitary adenoma  -Pathology report shows a pituitary adenoma with stains pending     # Secondary adrenal insufficiency    She has been on high dose glucocorticoid therapy continuously since 10/6/2023.    Today is her last day of dexamethasone, if she remains stable then tomorrow can provide hydrocortisone 40 mg when she wakes up in the AM and 20 mg about 6 hours later around mid day. If her clinical trajectory worsens we would provide a higher dose, ex. IV hydrocortisone 50 mg q8h.     # Thyroid function     Added on to stored serum from this AM: TSH and free T4.    # Central diabetes insipidus    -Strict I/O with hourly urine output charting  -Measure serum sodium q4 hours  -Discuss with our service prior to subsequent diuresis attempts (per my review with the team this AM there are no more plans to diurese her)     Given that she received furosemide and had significant urine output afterward and her IV free water was stopped this will be an evolving situation today and we'll need to follow her urine output and serum sodium closely. Further decision  "making when her pending sodium lab returns.     Also note if she follows a classic triphasic response she may soon \"transition\" from central DI to SIADH.   "

## 2023-12-07 NOTE — PROGRESS NOTES
[facility], [place]   12/07/2023   Neurosurgery Progress Note:    Interval History: Extubated yesterday. Clinically stable overnight. Increased work of breathing this AM.    Assessment:  Mirian Cunningham is a 43 year old female with complex psychosocial situation including paranoid schizophrenia and lack of active guardianship transferred to [facility] with known large sellar/suprasellar mass. Godmother Adri is appointed as the medical decision maker. No other family members available or willing to participate in her care.  MRI with large multilobulated pituitary mass/adenoma with mass effect and encasement of the cavernous structures including the cavernous internal carotid arteries, M1 segment of the right MCA, and A1 and proximal A2 segments of both anterior cerebral arteries.     Now post operative day 5 status post combined endonasal and transcranial approach for resection of pituitary adenoma and placement of external ventricular drain.    Plan:  Q2h neuro exams  Decadron 7 day taper- hydrocort taper ordered to start following per Endocrinology  Keppra 7 days  ENT following- appreciate recs  Ancef while nasal packing in  Sinus precautions  DI watch - strict I&O q1h, daily weights  DDAVP PO if Na > 145, or Na > 140 and UOP > 250cc 2 consecutive hours  Continue maintenance D5  Endocrinology following- appreciate recs  Pain control  Seroquel PRN  Maintain SBP < 160  Monitor respiratory status closely- follow up VBG  DVT prophylaxis: SCDs, SQH  Repeat swallow study vs obtain oral access today  Dispo: ICU    -----------------------------------  Kelsey Briceno MD, PhD  PGY-3 Neurosurgery    Please contact neurosurgery resident on call with questions.    Dial * * *[phone], enter [phone] when prompted.   -----------------------------------  Objective:   Temp:  [98  F (36.7  C)-98.2  F (36.8  C)] 98  F (36.7  C)  Pulse:  [65-87] 78  Resp:  [14-24] 17  BP: (123-174)/() 160/87  FiO2 (%):  [30 %-80 %]  60 %  SpO2:  [98 %-100 %] 100 %  I/O last 3 completed shifts:  In: 3680.5 [I.V.:1375.5; NG/GT:1755]  Out: 3855 [Urine:3855]    Gen: no acute distress, increased work of breathing  Wound: clean, dry, intact  Neurologic:  Intermittently awake  Follows commands in all extremities  No gaze preference, does not participate in EOMI exam  R pupil non-reactive (baseline), L pupil briskly reactive  Face symmetric at rest  Moving all extremities      LABS:  Recent Labs   Lab 12/07/23  0613 12/07/23 0429 12/07/23  0427 12/07/23  0107 12/06/23  1930 12/06/23  1923 12/06/23  0334 12/06/23  0329 12/05/23  0545 12/05/23  0501   NA  --   --  140  140 142  --  144   < > 140  140   < > 145  145   POTASSIUM  --   --  4.3  --   --   --   --  3.9  --  4.3   CHLORIDE  --   --  106  --   --   --   --  108*  --  112*   CO2  --   --  27  --   --   --   --  24  --  27   ANIONGAP  --   --  7  --   --   --   --  8  --  6*   * 321* 337* 223*   < >  --    < > 153*   < > 133*   BUN  --   --  13.8  --   --   --   --  19.0  --  17.3   CR  --   --  0.47*  --   --   --   --  0.60  --  0.69   FADUMO  --   --  8.2*  --   --   --   --  8.1*  --  8.0*    < > = values in this interval not displayed.     Recent Labs   Lab 12/07/23 0427   WBC 14.5*   RBC 2.64*   HGB 8.3*   HCT 25.8*   MCV 98   MCH 31.4   MCHC 32.2   RDW 16.1*        IMAGING:  No results found for this or any previous visit (from the past 24 hour(s)).

## 2023-12-07 NOTE — PROGRESS NOTES
12/07/23 1414   Appointment Info   Signing Clinician's Name / Credentials (OT) SEFERINO Wasserman   Living Environment   Current Living Arrangements homeless   Living Environment Comments Pt reports she typically stays at shelters, has been either at [facility] or a hospital in [place] over last few months.   Self-Care   Usual Activity Tolerance good   Current Activity Tolerance fair   Regular Exercise No   Equipment Currently Used at Home none   Fall history within last six months yes   Number of times patient has fallen within last six months 2   Activity/Exercise/Self-Care Comment Pt reports typically IND with ADLs and mobility   General Information   Onset of Illness/Injury or Date of Surgery 12/01/23   Referring Physician Law Vivar MD   Patient/Family Therapy Goal Statement (OT) Did not state   Additional Occupational Profile Info/Pertinent History of Current Problem Mirian Cunningham is a 43 year old female with a past medical history including paranoid schizophrenia who was admitted to [facility] as a transfer from [facility] on 10/21/2023 for continued evaluation and management of dizziness, head pressure, and worsening vision in the left eye in the setting of previously known large pituitary mass. She was deemed suitable for resection which was performed on 12/1/2023. Ms. Cunningham was admitted to Neuro ICU post- operatively with ICU complicated by central DI.   Existing Precautions/Restrictions other (see comments)  (craniotomy & Nasal)   Cognitive Status Examination   Orientation Status orientation to person, place and time   Cognitive Status Comments Pt following simple commands, mildly confused/tangential   Visual Perception   Impact of Vision Impairment on Function (Vision) pt reports blindness in R eye at baseline, vision loss in L-eye prompted admission.   Sensory   Sensory Comments Pt reports baseline n/t in hands and feet   Posture   Posture forward head position;protracted shoulders   Range of Motion  Comprehensive   General Range of Motion bilateral upper extremity ROM WFL   Strength Comprehensive (MMT)   Comment, General Manual Muscle Testing (MMT) Assessment BUE at least 3/5   Coordination   Coordination Comments mild difficulty coordinating holding items in BUE's   Bed Mobility   Comment (Bed Mobility) Per RN Ax1-2   Transfers   Transfers sit-stand transfer;toilet transfer;shower transfer   Sit-Stand Transfer   Sit/Stand Transfer Comments Pt declining, per RN Ax1-2 when pivoting to chair   Shower Transfer   Type (Shower Transfer) lateral   [place] Level (Shower Transfer) minimum assist (75% patient effort);2 person assist   Shower Transfer Comments per clinical judgement   Toilet Transfer   Type (Toilet Transfer) sit-stand   [place] Level (Toilet Transfer) minimum assist (75% patient effort);2 person assist   Toilet Transfer Comments per clinical judgement   Activities of Daily Living   BADL Assessment/Intervention grooming;upper body dressing   Bathing Assessment/Intervention   [place] Level (Bathing) moderate assist (50% patient effort)   Comment, (Bathing) per clinical judgement   Upper Body Dressing Assessment/Training   Comment, (Upper Body Dressing) per clinical judgement   [place] Level (Upper Body Dressing) minimum assist (75% patient effort)   Lower Body Dressing Assessment/Training   Comment, (Lower Body Dressing) per clinical judgement   [place] Level (Lower Body Dressing) minimum assist (75% patient effort)   Toileting   Comment, (Toileting) per clinical judgement   [place] Level (Toileting) moderate assist (50% patient effort)   Clinical Impression   Criteria for Skilled Therapeutic Interventions Met (OT) Yes, treatment indicated   OT Diagnosis pt is below baseline for ADLs   OT Problem List-Impairments impacting ADL problems related to;activity tolerance impaired;balance;cognition;coordination;post-surgical precautions;pain   Assessment of Occupational Performance  "3-5 Performance Deficits   Identified Performance Deficits dressing, bathing, toileting, functional mobility   Planned Therapy Interventions (OT) ADL retraining;IADL retraining;bed mobility training;cognition;transfer training;home program guidelines;progressive activity/exercise;visual perception;strengthening   Clinical Decision Making Complexity (OT) problem focused assessment/low complexity   Risk & Benefits of therapy have been explained evaluation/treatment results reviewed;care plan/treatment goals reviewed;risks/benefits reviewed;current/potential barriers reviewed;participants voiced agreement with care plan;participants included;patient   Clinical Impression Comments Pt presents below baseline for ADLs, limited by activity tolerance, post-surgical precautions, mild confusion, insteadiness. Pt will benefit from skilled OT to progress toward PLOF.   OT Total Evaluation Time   OT Eval, Low Complexity Minutes (99599) 8   OT Goals   Therapy Frequency (OT) 5 times/week   OT: Upper Body Dressing Independent;within precautions   OT: Lower Body Dressing Independent;within precautions   OT: Lower Body Bathing Modified independent;using adaptive equipment;with precautions   OT: Toilet Transfer/Toileting Independent;toilet transfer;within precautions;cleaning and garment management   Self-Care/Home Management   Self-Care/Home Mgmt/ADL, Compensatory, Meal Prep Minutes (68075) 25   Symptoms Noted During/After Treatment (Meal Preparation/Planning Training) fatigue   Treatment Detail/Skilled Intervention Pt greeted seated in recliner, having just pivoted w/RN. Pt declining further mobility though perseverating on \"seeing her incision\". RN and TH offering to take picture and show pt, pt declining, asking for mirror. Th gathering mirror and A pt w/viewing incision. With Th A for stabilizing mirror in UE, pt able to look at incision, and fix hair. Pt  reassured of incision \"looking good\" and TH describing pt hair and parts " "of head that were shaven for surgery. Pt requiring some reorienting throughout, distractible and noting seeing someone \"open the door behind the curtain.\" Th offering reassurance of pt safety, using conversation to redirect. Pt educated on nasal and crani precautions, with pt expressing understanding, TH providing handout and examples w/ADLs. Pt agreeable to mobilizing later with OT, howevre on OT 2x attempts later in date, pt with another provider or sleeping soundly.   OT Discharge Planning   OT Plan screen PT, review precautions, standing ADLs   OT Discharge Recommendation (DC Rec) Transitional Care Facility;home with assist   OT Rationale for DC Rec TCU v. home, This therapist was unable to observe pt mobilizing this date, therefore recommending TCU v. home pending pt progress w/therapies during IP stay. Per RN pt requiring Ax1-2 for pivot this AM. OT to update recs as pt progresses   OT Brief overview of current status per RN Ax1-2   Total Session Time   Timed Code Treatment Minutes 25   Total Session Time (sum of timed and untimed services) 33     "

## 2023-12-07 NOTE — PLAN OF CARE
Problem: Restraint, Nonviolent  Goal: Absence of Harm or Injury  Outcome: Met  Intervention: Implement Least Restrictive Safety Strategies  Recent Flowsheet Documentation  Taken 12/7/2023 0000 by Rolo Larson RN  Medical Device Protection: IV pole/bag removed from visual field  Taken 12/6/2023 2000 by Rolo Larson RN  Medical Device Protection: IV pole/bag removed from visual field  Intervention: Protect Dignity, Rights and Personal Wellbeing  Recent Flowsheet Documentation  Taken 12/7/2023 0000 by Rolo Larson RN  Trust Relationship/Rapport:   care explained   choices provided   emotional support provided   empathic listening provided   questions answered   questions encouraged   reassurance provided   thoughts/feelings acknowledged  Taken 12/6/2023 2000 by Rolo Larson RN  Trust Relationship/Rapport:   care explained   choices provided   emotional support provided   empathic listening provided   questions answered   questions encouraged   reassurance provided   thoughts/feelings acknowledged  Intervention: Protect Skin and Joint Integrity  Recent Flowsheet Documentation  Taken 12/7/2023 0344 by Rolo Larson RN  Body Position:   position changed independently   log-rolled  Taken 12/7/2023 0245 by Rolo Larson RN  Body Position:   position changed independently   log-rolled  Taken 12/7/2023 0144 by Rolo Larson RN  Body Position: position changed independently  Taken 12/7/2023 0000 by Rolo Larson RN  Body Position:   position changed independently   weight shifting  Range of Motion: ROM (range of motion) performed  Taken 12/6/2023 2357 by Rolo Larson RN  Body Position: position changed independently  Taken 12/6/2023 2000 by Rolo Larson RN  Body Position:   log-rolled   position changed independently   weight shifting  Range of Motion: ROM (range of motion) performed   Goal Outcome Evaluation:       Restraints discontinued. Patient goals met.

## 2023-12-07 NOTE — PROGRESS NOTES
Major Shift Events:  Restless, confused at times and agitated on/off. Prn hydralazine x1. Bms x3 overnight. Urine output 150-300ml/hr. Sodium levels trending down. More lethargic and increaed work of breathing. Labs sent, CXR.   Plan: Monitor sodium levels and continue with plan of care.   For vital signs and complete assessments, please see documentation flowsheets.

## 2023-12-07 NOTE — PROGRESS NOTES
Care Management Follow Up    Length of Stay (days): 47    Expected Discharge Date: TBD        Patient plan of care discussed at interdisciplinary rounds: Yes    Anticipated Discharge Disposition:  transfer back to the referring hospital     Additional Information:  Pt had bifrontal craniotomy and transnasal approach to resection with b/l nasoseptal flap and pericranial flaps with formal nasal packing on 12/1/23 and transferred to ICU.    Pt extubated yesterday, 12/6/23.  Pt has transfer agreement from [facility].  RNCC discussed with Neurosurgery team when pt will be medically ready to transfer back to the referring hospital. The team stated pt need close monitoring by Neuro surgery and ENT team for 5-6 more days.  Pt nose packing needs to be removed by ENT team on POD 7.  Anticipated pt might be ready to transfer back to the referring hospital mid next week 12/13, 12/14 if remain stable.  RNCC will cont to follow the plan of care.      Mendy Mae RN, PHN, BSN  4A and 4E/ ICU  Care Coordinator  Phone: [phone]  Pager: [phone]      SEARCHABLE in [facility] - search CARE COORDINATOR     [place] ([phone]) Saturday & Sunday; ([phone]) [facility] Recognized Holidays     Units: 5A & 5C  Pager: [phone]    Units: 6B, 6C & 6D    Pager: [phone]    Units: 7A, 7B, & 7C   Pager: [phone]    Units: 6A & ICU   Pager: [phone]    Units: 5 Ortho, 5MS & WB ED Pager: [phone]    Units: 6MS, 8A & 10 ICU  Pager [phone]

## 2023-12-07 NOTE — PROGRESS NOTES
Neurocritical Care Progress Note    Reason for critical care admission: status post pituitary adenoma resection   Admitting Team: [facility]  Date of Service:  12/07/2023  Date of Admission:  10/21/2023  Hospital Day: 48    Assessment/Plan  Mirian Cunningham is a 43 year old female with a past medical history including paranoid schizophrenia who was admitted to [facility] as a transfer from [facility] on 10/21/2023 for continued evaluation and management of dizziness, head pressure, and worsening vision in the left eye in the setting of previously known large pituitary mass. She was deemed suitable for resection which was performed on 12/1/2023. Ms. Cunningham was admitted to Neuro ICU post- operatively with ICU complicated by central DI.    24 hour events:   -Sodium overnight initally < 145 but UOP > 300 ml/hr for 3 hours given x1 dose DDVAP with repeat 140   -Early in the AM was obtunded with VBG of pH of 7.28 and pCO2 63, CXR with increased diffuse infiltrates concerning for fluid overnight load, given x1 dose of 40 mg of lasix, holding D5W IVF   -Overnight VS: afebrile, HR 70-80s, 140-160s/80-100s off sedation and analgesic gtt, O2 > 99% on 10 L of cool aerosol   -F/U endocrinology recs for CDI management: rec give x1 this AM DDVAP, and resumed D5W IVF at 100 ml/hr    Neuro  #Status post pituitary tumor resection, 12/1/2023   #s/p EVD placement removed   #Right eye complete blindness  #Left partial hemanopia   -Neurochecks every Q2H   -SBP goal < 160 mmHg  -Decadron taper per NSGY  -Keppra per NSGY  -Post-operative antibiotics per NSGY  -MRI, 12/2/2023: extensive clot-like blood products in the resection cavity, no definite residual tumor, developing small hydroma along the bifrontal convexities  -CT head post op 12/5 Stable caliber of the decompressed ventricular system status post removal of right frontal approach ventriculostomy catheter & postsurgical changes of suprasellar mass resection evolution of blood products  and decreased pneumocephalus.   -HOB > 30   -PT/OT/SLP when able      #Analgesics & sedation  RASS goal: 0 to -1  -Discontinue fentanyl pushes PRN 25-50 Q1H  -Continue 5-10 mg oxycodone PRN Q4H  -discontinue precedex gtt  -scheduled 975 tylenol TID for 3 days   -PRN zofran and compazine for nausea and vomiting       #Schizophrenia   #Housing insecurity   Prior concern that Mirian does not have capacity to make her own decisions, she is not currently under guardianship, however was under a temporary guardianship for 6 months earlier in 2023 through [place], [place]. Ms. Cunningham has a prior history of civil commitment for mental illness treatment and on 10/21/2023 she was evaluated by psychiatry with recommendation for a petition for civil commitment with [employer] to facilitate mental health support and treatment; civil commitment and [employer] have not been further pursued this admission. Psychiatry felt Ms. Cunningham lacks decision making capacity however deemed suitable to retain the ability to name her next of kin and surrogate decision maker. Her godmother, Adri, has been serving as medical decision maker signed consent for surgery and subsequent procedures in ICU. Adri lives in [place] and is available by phone.   -Hold previously recommended Haldol 5 BID plus PRN Haldol and PRN Haldol/Benadryl for now, monitor for psychiatric symptoms  -Discharge planning when appropriate      CV  #Hypertension   #Pulmonary hypertension, mild  #Left ventricular hypertrophy  -Not on PTA medications   -Cardiac monitoring  -SBP goal < 160 mmHg  -PRN Labetalol and Hydralazine for > 160   -Echocardiogram, 10/26 - normal LV with mild concentric wall thickening and EF 60-65%, normal RV, mild pulmonary hypertension     Resp  #Intubated for surgery s/p extubation 12/6  #Difficult airway   #Mild intermittent asthma, treated   #MAXX, chronic   #Respiratory acidosis, improving   Oxygen/vent: room air with aerosol oxygen as needed   -Continuous  pulse ox  -Maintain O2 saturations greater than 92%  -Continue Breo ellipta inhaler BID, PRN albuterol neb 2.5 mg Q8H PRN  -Continue duonebs Q6H scheduled   -12/7 CXR Stable pulmonary edema and decreased bibasilar atelectasis. Increased left subsegmental atelectasis.    GI  #Severe obesity  Diet: Full liquid diet per SLP   Last BM: 12/7  GI prophylaxis: famotidine  -Bowel regimen: scheduled senna-docusate BID and Miralax BID, milk of magnesia daily until stools, PRN suppository dulcolax daily      Renal/  #Hypernatremia  #Central DI   #Hyperchloremia   #Hypophosphatemia  #Hypermagnesemia   #Hypokalemia, resolved   -Daily BMP, Mg, and Phos   -Na checks Q4H  -IV fluids: 100 ml/hr D5W   -potassium & sodium phosphate PO packet daily  -High electrolyte replacement protocols  -carrillo for strict I&Os  -DDAVP 100 mch PO prn if Na >145     Endo  #Prediabetes  #Secondary adrenal insufficiency   -Monitor glucose levels  -High correction scale insulin   -A1c 5.8%  -Endocrine consulted by NSGY   -stress dose hydrocortisone, 30 mg in the morning and 10 mg in the afternoon on 12/8/2023 and 12/9/2023 and then started on hydrocortisone 15 mg in the morning and 5 mg in the afternoon starting 12/10/2023.   -Pending IGF-1, TSH, and free T4 12/12/23        Heme  #Acute surgical blood loss anemia  #Thrombocytopenia   #Coagulation defect  -Daily CBC  -Hgb goal >7, plt goal >50k  -Trend INR  -Transfuse to meet Hgb and plt goals     ID  #Leukocytosis, reactive versus steroid induced   -Daily CBC  -Follow temperature curve  -3 days vanc/cefepime/flagyl- to end 12/5   -Continue cefazolin 2 g TID while nasal packing in place ppx (12/9)     ICU Checklist  Lines/tubes/drains: x1 PIV, PICC, carrillo   FEN: high electrolyte repletion protocol, full liquid diet   PPX: DVT - SCDs & SQH; GI - famotidine PO  Code: Full code   Dispo: ICU - NCC    The patient was seen and discussed with the NCC attending, Dr. Montero.    Gabriela Calderon MD  PGY2 Neurology      24 Hour Vital Signs Summary:  Temp: (!) 95.5  F (35.3  C) Temp  Min: 95.5  F (35.3  C)  Max: 98.1  F (36.7  C)  Resp: 18 Resp  Min: 16  Max: 24  SpO2: 100 % SpO2  Min: 98 %  Max: 100 %  Pulse: 90 Pulse  Min: 66  Max: 101    No data recorded  BP: (!) 160/103 Systolic (24hrs), Av , Min:123 , Max:174   Diastolic (24hrs), Av, Min:73, Max:116      Respiratory monitoring:   Vent Mode: CPAP/PS  (Continuous positive airway pressure with Pressure Support)  FiO2 (%): 30 %  Resp Rate (Set): 12 breaths/min  Tidal Volume (Set, mL): 380 mL  PEEP (cm H2O): 5 cmH2O  Pressure Support (cm H2O): 7 cmH2O  Resp: 18      I/O last 3 completed shifts:  In: 2941.16 [I.V.:2396.16; NG/GT:435]  Out: 4655 [Urine:4655]    Current Medications:   acetaminophen  975 mg Oral or Feeding Tube Q8H    ceFAZolin  2 g Intravenous Q8H    desmopressin  0.1 mg Oral Once    dexAMETHasone  2 mg Intravenous TID    famotidine  20 mg Intravenous BID    fluticasone-salmeterol  1 puff Inhalation Q12H    heparin ANTICOAGULANT  5,000 Units Subcutaneous Q8H    [START ON 2023] hydrocortisone  10 mg Oral Daily    [START ON 12/10/2023] hydrocortisone  15 mg Oral Daily    [START ON 2023] hydrocortisone  30 mg Oral Daily    [START ON 12/10/2023] hydrocortisone  5 mg Oral Daily    influenza quadrivalent (PF) vacc  0.5 mL Intramuscular Prior to discharge    insulin aspart  1-12 Units Subcutaneous Q4H    ipratropium - albuterol 0.5 mg/2.5 mg/3 mL  3 mL Nebulization Q6H    levETIRAcetam  500 mg Intravenous BID    magnesium hydroxide  30 mL Oral Daily    multivitamins w/minerals  15 mL Per Feeding Tube Daily    polyethylene glycol  17 g Oral or Feeding Tube BID    potassium & sodium phosphates  1 packet Oral 4x Daily    senna-docusate  2 tablet Oral or Feeding Tube BID    sodium chloride  1 spray Both Nostrils 4x Daily    sodium chloride (PF)  10-40 mL Intracatheter Q8H    sodium chloride (PF)  3 mL Intracatheter Q8H       PRN Medications:  acetaminophen,  "albuterol, bisacodyl, glucose **OR** dextrose **OR** glucagon, heparin lock flush, hydrALAZINE, labetalol, lidocaine 4%, lidocaine (buffered or not buffered), magnesium hydroxide, naloxone **OR** naloxone **OR** naloxone **OR** naloxone, ondansetron **OR** ondansetron, oxyCODONE, prochlorperazine **OR** prochlorperazine, sodium chloride (PF), sodium chloride (PF), sodium chloride (PF)    Infusions:   D5W Stopped (12/07/23 0820)       Allergies   Allergen Reactions    Powder Difficulty breathing     Per pt, allergy to MILK PROTEIN POWDER but not milk or dairy products    Hydrocodone Fatigue    Iodinated Contrast Media Other (See Comments)     \"Paralysis of legs\", \"was unable to walk for 6 months\"    Mushroom Hives and Difficulty breathing    Other Food Allergy      Food coloring, artificial preservatives, high fructose syrup    Seeds      Poppy seeds, sesame seeds        Physical Examination:  Vitals: BP (!) 160/103   Pulse 90   Temp (!) 95.5  F (35.3  C) (Axillary)   Resp 18   Ht 1.549 m (5' 1\")   Wt 107.8 kg (237 lb 10.5 oz)   SpO2 100%   BMI 44.90 kg/m    General: Adult female patient, lying in bed, critically-ill.  HEENT: Significant post-operative facial edema.   Cardiac: She appears adequately perfused.  Pulm: Synchronous with mechanical ventilator.   Abdomen: Non-distended.   Extremities: Warm, peripheral edema present.   Skin: No obvious rashes or lesions on exposed skin.   Psych: Sedated.     Neuro:  Mental status: Awake, eyes open spontaneously, follow  simple and complex commands, 3/3 naming objects, able to say the days of the week backwards from Sunday to Monday, no dysarthria or aphasia   Cranial nerves: Face edematous but improved, appears symmetric. Right pupil intermittently non reactive with right eye blindness at baseline. Left pupil reactive to light. Light touch intact of face, tongue midline with protrusion   Motor: Normal bulk and tone. No abnormal movements.  Spontaneous movement and " antigravity x4 extremities.  Sensory: light touch sensation intact x4 extremties.   Coordination: Deferred.   Gait: Deferred.    Labs and Imaging:    Results for orders placed or performed during the hospital encounter of 10/21/23 (from the past 24 hour(s))   Sodium   Result Value Ref Range    Sodium 143 135 - 145 mmol/L   Glucose by meter   Result Value Ref Range    GLUCOSE BY METER POCT 125 (H) 70 - 99 mg/dL   Glucose by meter   Result Value Ref Range    GLUCOSE BY METER POCT 129 (H) 70 - 99 mg/dL   Sodium   Result Value Ref Range    Sodium 146 (H) 135 - 145 mmol/L   Sodium   Result Value Ref Range    Sodium 144 135 - 145 mmol/L   Glucose by meter   Result Value Ref Range    GLUCOSE BY METER POCT 146 (H) 70 - 99 mg/dL   Sodium   Result Value Ref Range    Sodium 142 135 - 145 mmol/L   Glucose by meter   Result Value Ref Range    GLUCOSE BY METER POCT 223 (H) 70 - 99 mg/dL   Basic metabolic panel   Result Value Ref Range    Sodium 140 135 - 145 mmol/L    Potassium 4.3 3.4 - 5.3 mmol/L    Chloride 106 98 - 107 mmol/L    Carbon Dioxide (CO2) 27 22 - 29 mmol/L    Anion Gap 7 7 - 15 mmol/L    Urea Nitrogen 13.8 6.0 - 20.0 mg/dL    Creatinine 0.47 (L) 0.51 - 0.95 mg/dL    GFR Estimate >90 >60 mL/min/1.73m2    Calcium 8.2 (L) 8.6 - 10.0 mg/dL    Glucose 337 (H) 70 - 99 mg/dL   CBC with platelets   Result Value Ref Range    WBC Count 14.5 (H) 4.0 - 11.0 10e3/uL    RBC Count 2.64 (L) 3.80 - 5.20 10e6/uL    Hemoglobin 8.3 (L) 11.7 - 15.7 g/dL    Hematocrit 25.8 (L) 35.0 - 47.0 %    MCV 98 78 - 100 fL    MCH 31.4 26.5 - 33.0 pg    MCHC 32.2 31.5 - 36.5 g/dL    RDW 16.1 (H) 10.0 - 15.0 %    Platelet Count 241 150 - 450 10e3/uL   Sodium   Result Value Ref Range    Sodium 140 135 - 145 mmol/L   Magnesium   Result Value Ref Range    Magnesium 2.2 1.7 - 2.3 mg/dL   Phosphorus   Result Value Ref Range    Phosphorus 3.4 2.5 - 4.5 mg/dL   Glucose by meter   Result Value Ref Range    GLUCOSE BY METER POCT 321 (H) 70 - 99 mg/dL    Glucose by meter   Result Value Ref Range    GLUCOSE BY METER POCT 135 (H) 70 - 99 mg/dL   Blood gas venous   Result Value Ref Range    pH Venous 7.28 (L) 7.32 - 7.43    pCO2 Venous 63 (H) 40 - 50 mm Hg    pO2 Venous 42 25 - 47 mm Hg    Bicarbonate Venous 29 (H) 21 - 28 mmol/L    Base Excess/Deficit 1.9 -7.7 - 1.9 mmol/L    FIO2 60    XR Chest Port 1 View    Narrative    Exam: XR CHEST PORT 1 VIEW, 12/7/2023 7:16 AM    Comparison: 12/5/2023    History: Acute respiratory decline    Findings:  Single AP portable semiupright view chest. Right upper extremity PICC  with tip in the high right atrium.    Trachea is midline. Stable enlarged cardiac silhouette. Bibasilar  mixed opacities with decreased right and increased left subsegmental  atelectasis.. Diffuse interstitial opacities. There is no pneumothorax  or pleural effusion. The upper abdomen is unremarkable.      Impression    Impression:     Stable pulmonary edema and decreased bibasilar atelectasis. Increased  left subsegmental atelectasis.    I have personally reviewed the examination and initial interpretation  and I agree with the findings.    JOSE RAUL MCNULTY MD         SYSTEM ID:  [id number]   Glucose by meter   Result Value Ref Range    GLUCOSE BY METER POCT 110 (H) 70 - 99 mg/dL   Sodium   Result Value Ref Range    Sodium 144 135 - 145 mmol/L   Blood gas venous with oxyhemoglobin   Result Value Ref Range    pH Venous 7.37 7.32 - 7.43    pCO2 Venous 55 (H) 40 - 50 mm Hg    pO2 Venous 40 25 - 47 mm Hg    Bicarbonate Venous 32 (H) 21 - 28 mmol/L    FIO2 60     Oxyhemoglobin Venous 71 70 - 75 %    Base Excess/Deficit 5.6 (H) -7.7 - 1.9 mmol/L   TSH   Result Value Ref Range    TSH 0.19 (L) 0.30 - 4.20 uIU/mL   T4 free   Result Value Ref Range    Free T4 0.54 (L) 0.90 - 1.70 ng/dL       All relevant imaging and laboratory values personally reviewed.

## 2023-12-07 NOTE — PLAN OF CARE
Goal Outcome Evaluation:      Plan of Care Reviewed With: patient           Major Shift Events:  Some concern regarding increased work of breathing at beginning of shift.  Venous pCO2 62.  RR 22, with good sats.  Reported that she was slightly short of breath.  Lasix 40 given per order  with brisk response.  Repeat abg with improved pCO2.  Awake and conversational, although difficult to understand.   Neuro:  Alert and oriented to situation, not time.  Remains distrustful and asks multiple questions about medications and treatment plan.  Frequently mentions the possible need for litigation due to treatment from some team members, although no specifics offered.  Has been cooperative and pleasant this shift.  Neuro exam consistent with previous documentation.  Blind in R eye, and decreased vision, l eye.  LONDONO.  Out of bed with stand by assist of 2.  CV: BP within parameters  Resp:  FiO2 weaned to 30%.  No c/o sob.  Productive cough of small amount tan, blood tinged sputum.  GI//Endo: cleared for full liquids, which she is tolerating well.  Fluids available at bedside.  D5W infusing at 100cc/hr.  BM x1--425cc liquid brown stool. 1400 BG in 240s, which is elevated from previous, but is now able to take po.    Skin: No active concerns. Scalp incision intact.  Plan: Continue to monitor fluid and resp status.  Encourage activity.   No family available for updates.  For vital signs and complete assessments, please see documentation flowsheets.

## 2023-12-08 NOTE — PROGRESS NOTES
[facility], [place]   12/08/2023   Neurosurgery Progress Note:    Interval History: No acute events overnight    Assessment:  Mirian Cunningham is a 43 year old female with complex psychosocial situation including paranoid schizophrenia and lack of active guardianship transferred to [facility] with known large sellar/suprasellar mass. Godmother Adri is appointed as the medical decision maker. No other family members available or willing to participate in her care.  MRI with large multilobulated pituitary mass/adenoma with mass effect and encasement of the cavernous structures including the cavernous internal carotid arteries, M1 segment of the right MCA, and A1 and proximal A2 segments of both anterior cerebral arteries.     Now post operative day 6 status post combined endonasal and transcranial approach for resection of pituitary adenoma and placement of external ventricular drain.    Plan:  Q2h neuro exams  Decadron taper completed- start cortef taper per Endocrinology  Keppra 7 days  SBP < 160  ENT following- appreciate recs  Ancef while nasal packing in  Sinus precautions  DI watch - strict I&O q1h, daily weights  DDAVP PO if Na > 145, or Na > 140 and UOP > 250cc 2 consecutive hours  Endocrinology following- appreciate recs  Full liquid diet- appreciate SLP recs  Bowel regimen  DVT prophylaxis: SCDs, SQH  Dispo: ICU, possible transfer to floor today    -----------------------------------  Kelsey Briceno MD, PhD  PGY-3 Neurosurgery    Please contact neurosurgery resident on call with questions.    Dial * * [phone], enter [phone] when prompted.   -----------------------------------  Objective:   Temp:  [95.5  F (35.3  C)-98  F (36.7  C)] 97.5  F (36.4  C)  Pulse:  [] 108  Resp:  [15-29] 25  BP: (132-170)/() 138/78  FiO2 (%):  [30 %-60 %] 30 %  SpO2:  [93 %-100 %] 93 %  I/O last 3 completed shifts:  In: 4225.49 [P.O.:2380; I.V.:1845.49]  Out: 8075 [Urine:7650; Stool:425]    Gen: no acute  distress  Wound: clean, dry, intact  Neurologic:  Awake, alert  Follows commands in all extremities  No gaze preference, does not participate in EOMI exam  R pupil non-reactive (baseline), L pupil briskly reactive  No vision in left eye, endorses sight in right eye- no finger counting  Face symmetric at rest  Moving all extremities      LABS:  Recent Labs   Lab 12/08/23  0351 12/08/23  0350 12/07/23  2338 12/07/23  2337 12/07/23 2049 12/07/23 2048 12/07/23  0429 12/07/23  0427 12/06/23  0334 12/06/23  0329     144  --  144  --   --  144   < > 140  140   < > 140  140   POTASSIUM 3.8  --   --   --   --   --   --  4.3  --  3.9   CHLORIDE 106  --   --   --   --   --   --  106  --  108*   CO2 30*  --   --   --   --   --   --  27  --  24   ANIONGAP 8  --   --   --   --   --   --  7  --  8   GLC 88 87  --  99   < >  --    < > 337*   < > 153*   BUN 13.4  --   --   --   --   --   --  13.8  --  19.0   CR 0.73  --   --   --   --   --   --  0.47*  --  0.60   FADUMO 9.1  --   --   --   --   --   --  8.2*  --  8.1*    < > = values in this interval not displayed.     Recent Labs   Lab 12/08/23  0351   WBC 18.5*   RBC 2.74*   HGB 8.4*   HCT 25.8*   MCV 94   MCH 30.7   MCHC 32.6   RDW 16.6*        IMAGING:  Recent Results (from the past 24 hour(s))   XR Chest Port 1 View    Narrative    Exam: XR CHEST PORT 1 VIEW, 12/7/2023 7:16 AM    Comparison: 12/5/2023    History: Acute respiratory decline    Findings:  Single AP portable semiupright view chest. Right upper extremity PICC  with tip in the high right atrium.    Trachea is midline. Stable enlarged cardiac silhouette. Bibasilar  mixed opacities with decreased right and increased left subsegmental  atelectasis.. Diffuse interstitial opacities. There is no pneumothorax  or pleural effusion. The upper abdomen is unremarkable.      Impression    Impression:     Stable pulmonary edema and decreased bibasilar atelectasis. Increased  left subsegmental atelectasis.    I have  personally reviewed the examination and initial interpretation  and I agree with the findings.    JOSE RAUL MCNULTY MD         SYSTEM ID:  [id number]
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
## 2023-12-08 NOTE — PLAN OF CARE
Major Shift Events:  Alert and oriented x3-4. Intermittent confusion noted. Frequent hallucinations, very distrustful especially regarding medication administration and lab draws. Is pleasant and cooperative with lots of encouragement and distraction. Moves all extremities without difficulty. Blind in R eye, decreased vision in left eye. Rt pupil fixed, Lt pupil brisk. Systolic goal less than 160, 1 dose of hydralazine used to maintain goal. Afebrile. On RA, denies difficulty breathing or SOB. Galeas in place with adequate urine output. 1 large loose BM. Full liquid diet.  Plan: Continue with plan of care and notify team of any acute changes.  For vital signs and complete assessments, please see documentation flowsheets.        Plan of Care Reviewed With: patient    Overall Patient Progress: improvingOverall Patient Progress: improving    Outcome Evaluation: On RA, no SOB or difficulty breathing. Increased complaints of pain. Team aware.

## 2023-12-08 NOTE — PLAN OF CARE
Major Shift Events: Oriented but intermittently confused. Following commands, some hallucination noted. Neuro exam unchanged, vision impaired, assisting with reading menu. Pt up in chair x2, A+1 w/GB. ST, 100-120s, normotensive and afebrile. RA, clear/dim LS, tachypnea with activity/congestion. Regular diet, moderate appetite. 3x BM, continent to bedside commode. Galeas with adequate UOP,  ml/hr. Crani incision sutured, CDI. R PICC and L PIV. Mild headache/shoulder and arm pain managed with prn tyl and robaxin, lido patches in place. Complete CHG bath today.  Report given to 6A bedside nurse, all belongings packed up with patient and transported to 6A with nurse for handoff neuro exam.    Plan: Continuation of care on 6A  For vital signs and complete assessments, please see documentation flowsheets.

## 2023-12-08 NOTE — PROGRESS NOTES
"Otolaryngology Progress Note      S: Feeling much better this morning.     O: BP (!) 142/85   Pulse 115   Temp 97.5  F (36.4  C) (Oral)   Resp 24   Ht 1.549 m (5' 1\")   Wt 106.5 kg (234 lb 12.6 oz)   SpO2 95%   BMI 44.36 kg/m     General: Alert and interactive   HEENT: No bleeding from the nose. Some serosang mucous    Pulmonary: On oxy mask 30%, satting well; work of breathing is moderately increased    LABS:  Na 144    A/P: Mirian Cunningham is a 43 year old female with a past medical history of large pituitary adenoma s/p bifrontal craniotomy and transnasal approach to resection with b/l nasoseptal flap and pericranial flaps with formal nasal packing on 12/1-12/2. Improved mental status and respiratory status today.     - Sinus precautions  - nasal saline sprays to b/l nares   - Recommend antibiotics to cover staph for the duration of packing (Ancef)  - We will remove packing on POD7    -- Patient and above plan discussed with Dr. Chau Diaz MD  Otolaryngology Head and Neck Surgery Resident, PGY-2    "

## 2023-12-09 NOTE — PROVIDER NOTIFICATION
KALLIE paged @ [phone] about hourly urine output being 500-700 mL and asked if they want a specific gravity

## 2023-12-09 NOTE — PROGRESS NOTES
Arrived from: 4E    Belongings/meds: In pt room, meds in bin   2 RN Skin Assessment Completed by: Alexandra SALAZAR     Non-intact findings documented (yes/no/NA): Scattered bruising, crani incision

## 2023-12-09 NOTE — PLAN OF CARE
"Goal Outcome Evaluation:       BP (!) 154/106 (BP Location: Left arm)   Pulse 115   Temp 99  F (37.2  C) (Axillary)   Resp 16   Ht 1.549 m (5' 1\")   Wt 106.5 kg (234 lb 12.6 oz)   SpO2 98%   BMI 44.36 kg/m      Goal Outcome Evaluation:     Plan of Care Reviewed With: patient   Overall Patient Progress:     Time:[id number]  Status: Pt has PMH of paranoid schizophrenia. pt is POD 7 from combined endonasal approach for a resection of the pituitary adenoma and placement of EVD   VS: Pt is HTN with intermittent tachycardic   Neuros: pt is A&OX4, she is intermittently confused  Cardiac:Pt is tachy    Respiratory: WNL pt is on RA  GI/: Pt has a carrillo cath, and is strict I&O. No BM this shift  Diet/Nausea: Reg diet, denies nausea   Skin: Scattered bruising all over body, pt has a head incision  LDA: Has a PICC and PIV line SL  Pain: Denies pain  Activity: X1 assist   New changes this shift: No new concerns this shift   Plan:  CPC                 "

## 2023-12-09 NOTE — PROGRESS NOTES
"   12/09/23 1035   Appointment Info   Signing Clinician's Name / Credentials (PT) Lynn Paulino, PT, DPT   Rehab Comments (PT) crani, nasal, and seizure precautions; L field cut   Living Environment   People in Home other (see comments)  (homeless)   Current Living Arrangements homeless   Home Accessibility other (see comments);stairs within home  (pt reports needing to do stairs within shelters)   Number of Stairs, Within Home, Primary greater than 10 stairs  (one flight)   Stair Railings, Within Home, Primary railings safe and in good condition   Transportation Anticipated other (see comments)  (unknown at this time)   Living Environment Comments Prior to the last few months which pt has spent at [facility] and at a hospital in [place], pt reports/EMR state pt has been attempting to sleep in shelters or has no living arrangements and stays on the street.   Self-Care   Usual Activity Tolerance good   Current Activity Tolerance fair   Regular Exercise No   Equipment Currently Used at Home none   Fall history within last six months no   Number of times patient has fallen within last six months 0  (pt reports none however previous note states 2 falls in last 6 months on 10/21/23)   Activity/Exercise/Self-Care Comment Pt reports prior to admission she was IND with ADLs and mobility with no AD.   General Information   Onset of Illness/Injury or Date of Surgery 10/21/23   Referring Physician Law Vivar MD   Patient/Family Therapy Goals Statement (PT) none stated   Pertinent History of Current Problem (include personal factors and/or comorbidities that impact the POC) Per EMR: \"Mirian Cunningham is a 43 year old female with complex psychosocial situation including paranoid schizophrenia and lack of active guardianship transferred to [facility] with known large sellar/suprasellar mass. Godmother Adri is appointed as the medical decision maker. No other family members available or willing to participate in her care.  MRI with large " "multilobulated pituitary mass/adenoma with mass effect and encasement of the cavernous structures including the cavernous internal carotid arteries, M1 segment of the right MCA, and A1 and proximal A2 segments of both anterior cerebral arteries. Now post operative day 7 status post combined endonasal and transcranial approach for resection of pituitary adenoma and placement of external ventricular drain.\"   Existing Precautions/Restrictions fall;seizures;other (see comments)  (crani and nasal precautions)   General Observations Activity: PT orders, cleared by RN for therapy   Cognition   Affect/Mental Status (Cognition) low arousal/lethargic   Orientation Status (Cognition) oriented to;person;place;situation   Follows Commands (Cognition) follows one-step commands;25-49% accuracy;delayed response/completion;increased processing time needed;physical/tactile prompts required;repetition of directions required;verbal cues/prompting required   Safety Deficit (Cognition) awareness of need for assistance;impulsivity;insight into deficits/self-awareness;judgment;problem-solving;safety precautions awareness;safety precautions follow-through/compliance;moderate deficit   Cognitive Status Comments L field cut, R eye blindness at baseline per EMR however pt denies   Pain Assessment   Patient Currently in Pain Yes, see Vital Sign flowsheet  (8/10 abd pain RN aware providing Tylenol)   Integumentary/Edema   Integumentary/Edema other (describe)   Integumentary/Edema Comments crani incision JAIMIE, scattered bruising, R PICC, L PIV   Posture    Posture Forward head position;Protracted shoulders   Range of Motion (ROM)   ROM Comment BLE ROM grossly WFL   Strength (Manual Muscle Testing)   Strength Comments BLE strength grossly at least > 3+/5 per functional mobility; generalized deconditioning limiting activity tolerance   Bed Mobility   Comment, (Bed Mobility) modA supine -> sit, La sit -> supine from elevated HOB per precautions "   Transfers   Comment, (Transfers) La STS from EOB   Gait/Stairs (Locomotion)   Comment, (Gait/Stairs) La 5ft x 2 with FWW at EOB; stairs deferred at eval   Balance   Balance other (describe)   Sitting Balance: Static good balance   Sitting Balance: Dynamic fair balance   Standing Balance: Static fair balance   Standing Balance: Dynamic poor balance  (poor (+))   Balance Quick Add Sitting balance: Static;Sitting balance: dynamic;Standing balance: static;Standing balance: dynamic   Sensory Examination   Sensory Perception patient reports no sensory changes   Coordination   Coordination other (see comments)   Coordination Comments unable to formally assess d/t pt with impaired command following and difficulty performing assessment; per functional mobility generally uncoordinated during transfers and gait with multiple LOBs however may be d/t visual impairments; further assessment deferred   Muscle Tone   Muscle Tone no deficits were identified   Clinical Impression   Criteria for Skilled Therapeutic Intervention Yes, treatment indicated   PT Diagnosis (PT) impaired functional mobility   Influenced by the following impairments generalized weakness and deconditioning 2/2 prolonged hospitalization, visual impairments, cognition, impaired balance, post surgical precautions   Functional limitations due to impairments independence with bed mobility, transfers, gait, and stairs; decreased safety awareness; increased fall risk; decreased activity tolerance   Clinical Presentation (PT Evaluation Complexity) evolving   Clinical Presentation Rationale clinical judgement, pt presentation, PMHx   Clinical Decision Making (Complexity) moderate complexity   Planned Therapy Interventions (PT) balance training;bed mobility training;gait training;home exercise program;patient/family education;postural re-education;stair training;strengthening;stretching;transfer training;progressive activity/exercise;risk factor education;home  program guidelines   Risk & Benefits of therapy have been explained evaluation/treatment results reviewed;care plan/treatment goals reviewed;risks/benefits reviewed;current/potential barriers reviewed;participants voiced agreement with care plan;participants included;patient   PT Total Evaluation Time   PT Eval, Moderate Complexity Minutes (99649) 10   Physical Therapy Goals   PT Frequency 5x/week   PT Predicted Duration/Target Date for Goal Attainment 12/23/23   PT Goals Bed Mobility;Transfers;Gait;Stairs   PT: Bed Mobility Modified independent;Supine to/from sit;Rolling;Bridging;Within precautions  (from elevated HOB within precautions)   PT: Transfers Sit to/from stand;Bed to/from chair;Within precautions;Independent   PT: Gait Greater than 200 feet;Independent   PT: Stairs Modified independent;Greater than 10 stairs;Rail on right  (one flight)   PT Discharge Planning   PT Plan formal vision assessment?, gait with HHA and visual scanning, path finding, independence with bed mobility and STS/SPT   PT Discharge Recommendation (DC Rec) Transitional Care Facility;other (see comments)  (Per CC note: Pt has transfer agreement from [facility])   PT Rationale for DC Rec Pt presents well below previous stated baseline, limited by generalized deconditioning, cognitive, balance, and visual impairments leading to decreased safety awareness and placing pt at an increased risk for falls. At this time, recommend discharge to TCU to progress safety and IND with functional mobility. Per CC note, pt has transfer agreement from [facility]. Recommend continued IP PT over LOS to progress mobility prior to transfer.   PT Brief overview of current status Ax1 for transfers and short distance gait with FWW, encourage OOB to chair 3x/day as able   Total Session Time   Total Session Time (sum of timed and untimed services) 10

## 2023-12-09 NOTE — PROGRESS NOTES
"Endocrine Progress Note  Patient: Mirian Cunningham   MRN: [id number]  Date of Service: 12/09/2023    Summary:  Mirian Cunningham is a 43 year old female with complex psychosocial situation including paranoid schizophrenia and lack of active guardianship transferred to [facility] with known large sellar/suprasellar mass.  The mass was found to at least in 2018 however she lost to the follow-up following that developed vision loss in the right side in 2021.  Repeated MRI in 2023 showed increase in the size of the mass over 5 cm with encasement of the cavernous structures including right MCA admitted to the hospital had endonasal and transcranial approach for resection of the pituitary mass.  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  Desmopressin 1 mcg IV at 1244 on 12/03/23  Desmopressin 0.1 mg oral on 1358 at 12/4/23.  Desmopressin 0.1 mg on 0610 at 12/5/23.  Desmopressin 0.1 mg at 14:30 on 12/5/23  Desmopressin 0.1 mg at 11:10 on 12/6/23.    Desmopressin 0.1 mg p.o. at 20:00 12/6/2023.  Desmopressin 0.1 mg at 11:26 on 12/7/23  Desmopressin 0.1 mg oral dose at 2010 on 12/7/23.  Desmopressin 0.1 mg p.o. at 11 AM and 0.1 mg p.o. at 20: 00  Desmopressin 0.1 mg p.o. at 12:30 PM on 12/9/2023.    Subjective:  Now postoperative day 8.  She stated she feels much better today.  Abdominal pain improved today.  She continues to feel thirsty and she stated this is new after the surgery..  She stated her vision is back after the surgery in both eyes.      Physical Examination:  /82 (BP Location: Right arm)   Pulse 85   Temp 98  F (36.7  C) (Axillary)   Resp 16   Ht 1.549 m (5' 1\")   Wt 106.5 kg (234 lb 12.6 oz)   SpO2 100%   BMI 44.36 kg/m    Physical Exam  Vitals reviewed.   Constitutional:       General: She is not in acute distress.  Cardiovascular:      Rate and Rhythm: Normal rate and regular rhythm.      Heart sounds: Normal heart " sounds. No murmur heard.  Pulmonary:      Effort: Pulmonary effort is normal.      Breath sounds: Normal breath sounds. No wheezing or rales.   Abdominal:      General: There is no distension.      Tenderness: There is abdominal tenderness.   Musculoskeletal:      Right lower leg: Edema present.      Left lower leg: Edema present.   Neurological:      Mental Status: She is oriented to person, place, and time.   Psychiatric:         Behavior: Behavior normal.         Medications:  Reviewed    Endocrine Labs:     Latest Reference Range & Units 10/22/23 08:10 10/24/23 07:15 12/02/23 10:54 12/03/23 04:06 12/07/23 08:36   Cortisol Serum ug/dL 13.7 18.5      FSH mIU/mL    18.0    Luteinizing Hormone mIU/mL    8.4    T4 Free 0.90 - 1.70 ng/dL     0.54 (L)   TSH 0.30 - 4.20 uIU/mL   0.85  0.19 (L)   Ins Growth Factor 1 69 - 253 ng/mL    111    (L): Data is abnormally low    Pathology report:  Final Diagnosis   A. Sellar mass, biopsy:     - Pituitary neuroendocrine tumor (pituitary adenoma), gonadotroph (SF-1 lineage)     B, Endonasal planum tumor, excision with Sonopet contents:      - Sinus epithelium with a focus of pituitary neuroendocrine tumor (pituitary adenoma), gonadotroph        (SF-1 lineage)       C. Planum tumor, Sonopet contents:        - Pituitary neuroendocrine tumor (pituitary adenoma), gonadotroph (SF-1 lineage)       Assessment and plan:  Mirian Cunningham is a 43 year old female with complex psychosocial situation including paranoid schizophrenia and lack of active guardianship transferred to [facility] with known large sellar/suprasellar mass s/p endonasal and transcranial approach for resection of the pituitary mass on 12/1/2023.    # Nonfunctional gonadotroph pituitary adenoma:  Size of the pituitary adenoma was almost 6 cm.  Surgery was done on 12/20/2023.  Pathology report pituitary adenoma gonadotroph (SF-1 lineage).    # Secondary adrenal insufficiency:  She has been on high-dose of steroids since  10/6/2023.  Following admission continued on dexamethasone high doses.  On 12/6 tapered down to 1 mg 3 times daily.  12/8: Switch to hydrocortisone 40 mg a.m./10 mg p.m.  12/9: Hydrocortisone 30 mg a.m./10 mg p.m.    Recommendations:  -Following completion of the steroid tapering according to the neurosurgery protocol to continue with the maintenance dose of hydrocortisone 15 mg a.m. and 5 mg at 2 PM and to be discharged on this dose with assess HPA axis as outpatient.    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  Was started on levothyroxine 50 mcg daily on 10/7.    Recommendations:  -Repeat free T4 tomorrow if still low will increase the dose of levothyroxine to the weight-based dose.      # Central DI:  Developed postoperative central DI with hyponatremia.  Sodium level had been stable with urine output less than 3 L/day on desmopressin 0.1 mg p.o. twice daily.    Recommendations:  -Continue with a strict intake output monitoring.  -Continue with sodium level check 4 hourly.  -Will continue for now with desmopressin 0.1 mg p.o. twice daily.              Kenney Moore     Endocrinology diabetes and metabolism  fellow   Pager number: [phone]

## 2023-12-09 NOTE — PROGRESS NOTES
[facility], [place]   12/09/2023   Neurosurgery Progress Note:    Interval History: No acute events overnight    Assessment:  Mirian Cunningham is a 43 year old female with complex psychosocial situation including paranoid schizophrenia and lack of active guardianship transferred to [facility] with known large sellar/suprasellar mass. Godmother Adri is appointed as the medical decision maker. No other family members available or willing to participate in her care.  MRI with large multilobulated pituitary mass/adenoma with mass effect and encasement of the cavernous structures including the cavernous internal carotid arteries, M1 segment of the right MCA, and A1 and proximal A2 segments of both anterior cerebral arteries.     Now post operative day 7 status post combined endonasal and transcranial approach for resection of pituitary adenoma and placement of external ventricular drain.      Plan:  Q4h neuro exams  Decadron taper completed- start cortef taper per Endocrinology  [facility]  SBP < 160  ENT following- appreciate recs  Nasal packing removed today -> Ancef stopped  Sinus precautions  DI watch - strict I&O q1h, daily weights  DDAVP PO scheduled BID by Endo reccs  Endocrinology following- appreciate recs  Regular diet- appreciate SLP recs  Bowel regimen  DVT prophylaxis: SCDs, SQH  Dispo: Floor    -----------------------------------  CLINTON MARINO MD on 12/9/2023 at 3:15 PM      Please contact neurosurgery resident on call with questions.    Dial * * [phone], enter [phone] when prompted.   -----------------------------------  Objective:   Temp:  [98  F (36.7  C)-99  F (37.2  C)] 98  F (36.7  C)  Pulse:  [] 85  Resp:  [16-24] 16  BP: (116-155)/() 116/82  SpO2:  [97 %-100 %] 100 %  I/O last 3 completed shifts:  In: 1948 [P.O.:1928; I.V.:20]  Out: 4075 [Urine:3975; Stool:100]    Gen: no acute distress  Wound: clean, dry, intact  Neurologic:  Awake, alert  Follows commands  in all extremities  No gaze preference, does not participate in EOMI exam  R pupil non-reactive (baseline), L pupil briskly reactive  No vision in left eye, endorses sight in right eye- no finger counting; serviceable central vision, reduced peripheral vision bilaterally  Face symmetric at rest  Moving all extremities      LABS:  Recent Labs   Lab 12/09/23  1321 12/09/23  1200 12/09/23  0724 12/09/23  0613 12/09/23  0353 12/08/23  0811 12/08/23  0351 12/07/23  0429 12/07/23  0427   *  --  143 143  143  --    < > 144  144   < > 140  140   POTASSIUM  --   --   --  3.7  --   --  3.8  --  4.3   CHLORIDE  --   --   --  106  --   --  106  --  106   CO2  --   --   --  28  --   --  30*  --  27   ANIONGAP  --   --   --  9  --   --  8  --  7   GLC  --  133*  --  88 95   < > 88   < > 337*   BUN  --   --   --  12.6  --   --  13.4  --  13.8   CR  --   --   --  0.76  --   --  0.73  --  0.47*   FADUMO  --   --   --  8.6  --   --  9.1  --  8.2*    < > = values in this interval not displayed.     Recent Labs   Lab 12/09/23  0613   WBC 15.9*   RBC 2.82*   HGB 8.7*   HCT 27.2*   MCV 97   MCH 30.9   MCHC 32.0   RDW 16.9*        IMAGING:  No results found for this or any previous visit (from the past 24 hour(s)).

## 2023-12-09 NOTE — PROGRESS NOTES
"Otolaryngology Progress Note      S: Patient is feeling OK this AM. AF, some tachycardia and hypertension.     O: BP (!) 153/88 (BP Location: Left arm)   Pulse 102   Temp 98.9  F (37.2  C) (Axillary)   Resp 22   Ht 1.549 m (5' 1\")   Wt 106.5 kg (234 lb 12.6 oz)   SpO2 98%   BMI 44.36 kg/m     General: Alert and interactive   HEENT: No bleeding from the nose. Packing visualized and removed today without complication. Some air across forehead subcutaneously.    Pulmonary: On room air, satting well; work of breathingnormal    LABS:  Reviewed    A/P: Mirian Cunningham is a 43 year old female with a past medical history of large pituitary adenoma s/p bifrontal craniotomy and transnasal approach to resection with b/l nasoseptal flap and pericranial flaps with formal nasal packing, now removed as of 12/9.     - Sinus precautions  - nasal saline sprays to b/l nares   - OK to stop antibiotics from ENT perspective    -- Patient and above plan discussed with Dr. Chau Harmon MD  Otolaryngology - Head and Neck Surgery PGY-3    "

## 2023-12-09 NOTE — PLAN OF CARE
VS: HTN within parameters  Neuros: A&O x4, forgetful. Intermittently confused. L field cut, R eye blind and pupil fixed. Speech slightly garbled. BLE 4/5  IV: R DL PICC SL, L PIV SL.   Labs/Electrolytes: , 157 (Charge aware)  Resp/trach: SOB with activity, shallow breathing. Nebs q6  Diet: Regular diet, good intake. Strict I & O  Bowel status: BM 12/8 - Passing gas  : Galeas in place - See flowsheet. MD aware of high UOP (scheduled DDAVP given)  Skin: Crani incision JAIMIE  Pain: Tylenol given for mild headache   Activity: 1 GB walker. Worked with therapy. Bed/Chair alarm   Social: NA

## 2023-12-09 NOTE — PLAN OF CARE
Status: POD 6 combined endonasal and transcranial approach for resection of pituitary adenoma and placement of EVD. PMH of paranoid schizophrenia.    Vitals: HTN within parameters, tachycardic. On RA   Neuros: A&O x4, forgetful. Intermittently confused and paranoid. L field cut, R eye blind and pupil fixed. Speech slightly garbled, denies n/t. Strengths 5/5 UE and 4/5 LE.   IV: R DL PICC SL, L PIV SL. Labs drawn through PICC  Labs/Electrolytes: Last sodium 144. BG checks q4  Resp/trach: SOB with activity, shallow breathing. Nebs q6  Diet: Regular diet, good intake   Bowel status: LBM 12/8. BS+   : Galeas in place for strict I&Os.   Skin: Crani incision JAIMIE, scattered bruising.   Pain: Denied   Activity: A1 GB   Social: No visitors this shift   Plan: Continue POC   Updates this shift: Transferred from

## 2023-12-09 NOTE — PROVIDER NOTIFICATION
NSG paged regarding urine output and also last NA level.    Returned page and will continue to monitor both levels at this time

## 2023-12-10 NOTE — PROGRESS NOTES
[facility], [place]   12/10/2023   Neurosurgery Progress Note:    Interval History: No acute events overnight.     Assessment:  Mirian Cunningham is a 43 year old female with complex psychosocial situation including paranoid schizophrenia and lack of active guardianship transferred to [facility] with known large sellar/suprasellar mass. Godmother Adri is appointed as the medical decision maker. No other family members available or willing to participate in her care.  MRI with large multilobulated pituitary mass/adenoma with mass effect and encasement of the cavernous structures including the cavernous internal carotid arteries, M1 segment of the right MCA, and A1 and proximal A2 segments of both anterior cerebral arteries.     Now post operative day 8 status post combined endonasal and transcranial approach for resection of pituitary adenoma and placement of external ventricular drain.    Plan:  Q4h neuro exams  Decadron taper completed- start cortef taper per Endocrinology  Keppra 7 days (completed)  SBP < 160  ENT following- appreciate recs  Sinus precautions  DI watch - strict I&O q1h, daily weights  DDAVP PO scheduled BID by Endo reccs  Endocrinology following- appreciate recs  Regular diet- appreciate SLP recs  Bowel regimen  Will remove Galeas today   DVT prophylaxis: SCDs, SQH  Dispo: Floor  -----------------------------------  Fernando Welch MD, PGY-1  Department of Neurosurgery  Pager: [phone]    Please contact neurosurgery resident on call with questions.    Dial * * *[phone], enter [phone] when prompted.     -----------------------------------  Objective:   Temp:  [98  F (36.7  C)-99.2  F (37.3  C)] 99.2  F (37.3  C)  Pulse:  [] 123  Resp:  [16-25] 18  BP: (116-156)/() 156/112  SpO2:  [94 %-100 %] 94 %  I/O last 3 completed shifts:  In: 4086 [P.O.:4076; I.V.:10]  Out: 6375 [Urine:6375]    Gen: no acute distress  Wound: clean, dry, intact  Neurologic:  Awake, alert  Follows  commands in all extremities  No gaze preference, does not participate in EOMI exam  R pupil non-reactive (baseline), L pupil briskly reactive  No vision in left eye, endorses sight in right eye- no finger counting; serviceable central vision, reduced peripheral vision bilaterally  Face symmetric at rest  Moving all extremities    LABS:  Recent Labs   Lab 12/10/23  1005 12/10/23  0627 12/10/23  0349 12/10/23  0006 12/10/23  0001 12/09/23  0724 12/09/23  0613 12/08/23  0811 12/08/23  0351    143  143  --  145  --    < > 143  143   < > 144  144   POTASSIUM  --  3.8  --   --   --   --  3.7  --  3.8   CHLORIDE  --  106  --   --   --   --  106  --  106   CO2  --  30*  --   --   --   --  28  --  30*   ANIONGAP  --  7  --   --   --   --  9  --  8   GLC  --  101* 90  --  118*   < > 88   < > 88   BUN  --  9.2  --   --   --   --  12.6  --  13.4   CR  --  0.70  --   --   --   --  0.76  --  0.73   FADUMO  --  8.5*  --   --   --   --  8.6  --  9.1    < > = values in this interval not displayed.     Recent Labs   Lab 12/10/23  0627   WBC 14.6*   RBC 2.81*   HGB 8.7*   HCT 28.1*      MCH 31.0   MCHC 31.0*   RDW 16.9*        IMAGING:  Recent Results (from the past 24 hour(s))   CT Head w/o Contrast    Narrative    CT HEAD W/O CONTRAST 12/9/2023 7:10 PM    History: Eval stability     Comparison: Head CT from 12/5/2023.    Technique: Using multidetector thin collimation helical acquisition  technique, axial, coronal and sagittal CT images from the skull base  to the vertex were obtained without intravenous contrast.   (topogram) image(s) also obtained and reviewed.    Findings:   Slightly increased size of the previously decompressed ventricular  system, for example third ventricle measures 5 mm, previously effaced.      Postsurgical changes of bifrontal craniotomy and munira holes, bilateral  ethmoidectomy, bilateral maxillary antrostomy and left frontal  sinusotomy. Expected evolution of hyperdense hemorrhage  in the  surgical site near the sella turcica as well as hypodense appearance  of the bifrontal lobes, right greater than left, likely vasogenic  edema. Resolution of the pneumocephalus overlying the bifrontal lobes  with trace frontal subcutaneous air. Redemonstration of a hyperdense  appearance in the inter peduncular cistern in the anterior cranial  fossa ventral to the third ventricle, indeterminant for residual tumor  versus blood product.    Hypodense hygroma overlying the bifrontal lobes measuring up to 10 mm,  similar to findings on MRI. Slightly increased hypodense subcutaneous  fluid collection overlying the frontal bones measuring up to 1.7 cm,  previously 1.3 cm    No significant midline shift. No acute loss of gray-white matter  differentiation in the cerebral hemispheres.    No acute osseous abnormality. Layering fluid within the left maxillary  sinus. Small mastoid air cell effusions. Grossly normal orbits.       Impression    Impression:  1. Slightly increased size of the previously decompressed ventricular  system compared to 12/5/2023.  2. Postsurgical changes of sellar/suprasellar mass resection with  expected evolution of blood products. Resolution of the  pneumocephalus.  3. Slightly increased subcutaneous fluid collection overlying the left  frontal calvarium.      DARNELL OBREGON MD         SYSTEM ID:  [id number]

## 2023-12-10 NOTE — PLAN OF CARE
Speech Language Therapy Discharge Summary    Reason for therapy discharge:    All goals and outcomes met, no further needs identified.    Progress towards therapy goal(s). See goals on Care Plan in [facility] electronic health record for goal details.  Goals met    Therapy recommendation(s):    No further therapy is recommended.    Recommend continue regular textures and thin liquids. No straws per sinus precautions.

## 2023-12-10 NOTE — PROGRESS NOTES
"Endocrine Progress Note  Patient: Mirian Cunningham   MRN: [id number]  Date of Service: 12/10/2023    Summary:  Mirian Cunningham is a 43 year old female with complex psychosocial situation including paranoid schizophrenia and lack of active guardianship transferred to [facility] with known large sellar/suprasellar mass.  The mass was found to at least in 2018 however she lost to the follow-up following that developed vision loss in the right side in 2021.  Repeated MRI in 2023 showed increase in the size of the mass over 5 cm with encasement of the cavernous structures including right MCA admitted to the hospital had endonasal and transcranial approach for resection of the pituitary mass.  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  Desmopressin 1 mcg IV at 1244 on 12/03/23  Desmopressin 0.1 mg oral on 1358 at 12/4/23.  Desmopressin 0.1 mg on 0610 at 12/5/23.  Desmopressin 0.1 mg at 14:30 on 12/5/23  Desmopressin 0.1 mg at 11:10 on 12/6/23.    Desmopressin 0.1 mg p.o. at 20:00 12/6/2023.  Desmopressin 0.1 mg at 11:26 on 12/7/23  Desmopressin 0.1 mg oral dose at 2010 on 12/7/23.  Desmopressin 0.1 mg p.o. at 11 AM and 0.1 mg p.o. at 20: 00  Desmopressin 0.1 mg p.o. at 12:30 PM on 12/9/2023.  And 0.1 mg p.o. at 9 PM.    Subjective:  Now postoperative day 9.  Yesterday she had total urine output of 6.4 fluid balance -2 L.  Between 2 PM and 9 PM her urine output was between 400-750 mill per hour.  She stated she continued to drink to thirst.        Physical Examination:  BP (!) 140/89 (BP Location: Left arm)   Pulse 113   Temp 98  F (36.7  C) (Oral)   Resp 16   Ht 1.549 m (5' 1\")   Wt 106.5 kg (234 lb 12.6 oz)   SpO2 98%   BMI 44.36 kg/m    Physical Exam  Vitals reviewed.   Constitutional:       General: She is not in acute distress.  Cardiovascular:      Rate and Rhythm: Normal rate.      Heart sounds: No murmur heard.  Pulmonary:      " Effort: Pulmonary effort is normal.   Neurological:      Mental Status: She is oriented to person, place, and time.   Psychiatric:         Behavior: Behavior normal.         Medications:  Reviewed    Endocrine Labs:     Latest Reference Range & Units 10/22/23 08:10 10/24/23 07:15 12/02/23 10:54 12/03/23 04:06 12/07/23 08:36   Cortisol Serum ug/dL 13.7 18.5      FSH mIU/mL    18.0    Luteinizing Hormone mIU/mL    8.4    T4 Free 0.90 - 1.70 ng/dL     0.54 (L)   TSH 0.30 - 4.20 uIU/mL   0.85  0.19 (L)   Ins Growth Factor 1 69 - 253 ng/mL    111       Latest Reference Range & Units 12/07/23 08:36 12/10/23 00:06   T4 Free 0.90 - 1.70 ng/dL 0.54 (L) 0.64 (L)   TSH 0.30 - 4.20 uIU/mL 0.19 (L) 0.52       Pathology report:  Final Diagnosis   A. Sellar mass, biopsy:     - Pituitary neuroendocrine tumor (pituitary adenoma), gonadotroph (SF-1 lineage)     B, Endonasal planum tumor, excision with Sonopet contents:      - Sinus epithelium with a focus of pituitary neuroendocrine tumor (pituitary adenoma), gonadotroph        (SF-1 lineage)       C. Planum tumor, Sonopet contents:        - Pituitary neuroendocrine tumor (pituitary adenoma), gonadotroph (SF-1 lineage)       Assessment and plan:  Mirian Cunningham is a 43 year old female with complex psychosocial situation including paranoid schizophrenia and lack of active guardianship transferred to [facility] with known large sellar/suprasellar mass s/p endonasal and transcranial approach for resection of the pituitary mass on 12/1/2023.    # Nonfunctional gonadotroph pituitary adenoma:  Size of the pituitary adenoma was almost 6 cm.  Surgery was done on 12/2/2023.  Pathology report pituitary adenoma gonadotroph (SF-1 lineage).    # Secondary adrenal insufficiency:  She has been on high-dose of steroids since 10/6/2023.  Following admission continued on dexamethasone high doses.  On 12/6 tapered down to 1 mg 3 times daily.  12/8: Switch to hydrocortisone 40 mg a.m./10 mg p.m.  12/9:  Hydrocortisone 30 mg a.m./10 mg p.m.  12/10 hydrocortisone 15 mg a.m. and 5 mg at 2 PM.    Recommendations:  -Continue with the physiological dose hydrocortisone 15 mg a.m. and 5 mg at 2 PM.  -Can be discharged on this dose HPA axis assessment will be done as outpatient.    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  Was started on levothyroxine 50 mcg daily on 12/7.  Following receiving 3 doses repeated free T4 on 12/10 0.64, TSH normal 0.52.    Recommendations:  -Continue current dose of levothyroxine 50 mcg daily.  -For repeat free T4 in 1 week from starting levothyroxine this will be on 12/14/2023      # Central DI:  Developed postoperative central DI with hypernatremia  Currently on desmopressin 0.1 mg p.o. twice daily.  Had significant increase in the urine output yesterday> 7 L.  Sodium level was mildly elevated yesterday at the noon time however after that remained at the upper range of the normal.    Recommendations:  -She was requesting removal of the Galeas catheter it is okay to be removed however she to continue with a strict intake output monitoring.  -Continue with sodium level check 4 hourly.  -To increase desmopressin dose 0.15 mg p.o. twice daily              Kenney Moore     Endocrinology diabetes and metabolism  fellow   Pager number: [phone]

## 2023-12-10 NOTE — PROVIDER NOTIFICATION
"Provider Notification: KALLIE Garcia    NON-URGENT [address]    0324: \"Bradycardic with HR as low as 28. Asymptomatic. Do you want to do an EKG?\"    ThanksJodie 6A charge     6175 Call back: verbal order to order EKG  "

## 2023-12-10 NOTE — PLAN OF CARE
Goal Outcome Evaluation:      Plan of Care Reviewed With: patient    Overall Patient Progress: no changeOverall Patient Progress: no change    Outcome Evaluation: Px alert and oriented x4, able to make needs known most of the time. Sleepy throughout the morning, but had a shower and slept afterwards. A little bit anxious this morning but calmed down after shower. Refused some of her medications. Wanted carrillo to be removed and provider ordered it. Speech therapy worked with px and said they will sign out because she is tolerating her diet and PO meds. Ambulated to the bathroom AO1 with walker and gaitbelt. Continue with POC.  Carrillo removed @ 13:40, voided 25 mL after removal, told px that she needs to urinate more, agreed to it. R eye blind and L eye is seeing better today than yesterday as verbalized. Red port of PICC is not flushing and no blood return, provider made aware, no new orders made, verbalized to keep it that way at the moment. Lab in for 16:00 sodium check, 2 lumen PICC is not flushing in both lumen, PICC team paged for help. Provider made aware and talked to px about having a peripheral draw and px agreed. Provider wanted px to walk more in the hallway, px made aware.

## 2023-12-10 NOTE — PLAN OF CARE
Goal Outcome Evaluation:    Status: POD 8 combined endonasal and transcranial approach for resection of pituitary adenoma and placement of EVD. PMH of paranoid schizophrenia.    Vitals: VSS on RA ex sinus tachycardia. Intermittently chad as low 26. Provider notified.  Currently on CCM  Neuros: A&O X4, forgetful and confused at time. Denies n/t. L field cut, R eye blind and pupil fixed. Speech is slow.  5/5  BUE and  4/5 BLE strengths  IV: DL PICC SL. PIV TKO. Labs drawn through PICC  Labs/Electrolytes: Last sodium 145. BG checks and Na Q4H  Resp/trach: shallow breathing. Nebs PRN given  Diet: Regular (Strict I&Os )  Bowel status: LBM 12/9/2023  : Galeas in place, strict I&Os  Skin: Crani incision JAIMIE, scattered bruising.   Pain: c/o of sharp stomach pain, PRN tylenol given   Activity: A1/GB  (Not OOB this shift)  Plan: Continue to monitor sodium level and chart urine output Q2H. Continue POC

## 2023-12-10 NOTE — PLAN OF CARE
Status: POD 7 combined endonasal and transcranial approach for resection of pituitary adenoma and placement of EVD. PMH of paranoid schizophrenia.    Vitals: VSS ex intermittent tachycardia. On RA   Neuros: A&O x4, forgetful. Intermittently confused and paranoid. L field cut, R eye blind and pupil fixed. Speech slightly garbled, denies n/t. Strengths 5/5 UE and 4/5 LE.   IV: R DL PICC SL, L PIV SL. Labs drawn through PICC  Labs/Electrolytes: Last sodium 145. BG checks and Na q4  Resp/trach: SOB with activity, shallow breathing. Nebs PRN  Diet: Regular diet, good intake   Bowel status: BM x2. BS+   : Galeas in place for strict I&Os.   Skin: Crani incision JAIMIE, scattered bruising.   Pain: Tylenol given for stomach pain  Activity: A1 GB   Social: No visitors this shift   Plan: Continue POC

## 2023-12-10 NOTE — PROGRESS NOTES
"Otolaryngology Progress Note      S: Patient is feeling OK this AM. Endocrine following, administering DDAVP, steroid taper, synthroid.      O: BP (!) 156/112 (BP Location: Left arm)   Pulse (!) 123   Temp 99.2  F (37.3  C) (Axillary)   Resp 18   Ht 1.549 m (5' 1\")   Wt 106.5 kg (234 lb 12.6 oz)   SpO2 94%   BMI 44.36 kg/m     General: Alert and interactive   HEENT: No bleeding from the nose. Packing visualized and removed today without complication. Some air across forehead subcutaneously.    Pulmonary: On room air, satting well; work of breathingnormal    Procedure: Otoscopy with cerumen debridement  Patient was requesting cerumen be removed from her ear canals.She has had this done several times in the past and feels relief from it.  Verbal consent was obtained for the procedure.  On otoscopy there is significant cerumen obstructing view of her tympanic membranes.  This was gently debrided with a curette.  The patient tolerated the procedure well without difficulty.  TMs visualized, found to be intact without effusions.    LABS:  Reviewed    Imaging:   - CT Head 12/9  Impression:  1. Slightly increased size of the previously decompressed ventricular  system compared to 12/5/2023.  2. Postsurgical changes of sellar/suprasellar mass resection with  expected evolution of blood products. Resolution of the  pneumocephalus.  3. Slightly increased subcutaneous fluid collection overlying the left  frontal calvarium.    A/P: Mirian Cunningham is a 43 year old female with a past medical history of large pituitary adenoma s/p bifrontal craniotomy and transnasal approach to resection with b/l nasoseptal flap and pericranial flaps with formal nasal packing, now removed as of 12/9.     - Sinus precautions  - nasal saline sprays to b/l nares   - Please page ENT with questions or concerns    -- Patient and above plan discussed with Dr. Chau Harmon MD  Otolaryngology - Head and Neck Surgery PGY-3    "

## 2023-12-11 NOTE — PROGRESS NOTES
"Otolaryngology Progress Note      S: Patient is feeling OK this AM. Feels vision might be improving. Denies drainage      O: BP (!) 150/78 (BP Location: Right arm, Cuff Size: Adult Regular)   Pulse 108   Temp 98.7  F (37.1  C) (Axillary)   Resp 22   Ht 1.549 m (5' 1\")   Wt 106.5 kg (234 lb 12.6 oz)   SpO2 97%   BMI 44.36 kg/m     General: Alert and interactive   HEENT: No bleeding from the nose. No drainage   Some air across forehead subcutaneously.    Pulmonary: On room air, satting well; work of breathingnormal    LABS:  Reviewed    Imaging:   - CT Head 12/9  Impression:  1. Slightly increased size of the previously decompressed ventricular  system compared to 12/5/2023.  2. Postsurgical changes of sellar/suprasellar mass resection with  expected evolution of blood products. Resolution of the  pneumocephalus.  3. Slightly increased subcutaneous fluid collection overlying the left  frontal calvarium.    A/P: Mirian Cunningham is a 43 year old female with a past medical history of large pituitary adenoma s/p bifrontal craniotomy and transnasal approach to resection with b/l nasoseptal flap and pericranial flaps with formal nasal packing, now removed as of 12/9.     - Sinus precautions  - nasal saline sprays to b/l nares   - Please page ENT with questions or concerns    -- Patient and above plan discussed with Dr. Chau Dewitt MD  ENT resident    "

## 2023-12-11 NOTE — PLAN OF CARE
Temp: 98.7  F (37.1  C) Temp src: Axillary BP: (!) 150/78 Pulse: 108   Resp: 22 SpO2: 97 % O2 Device: None (Room air)       Neuro: A&Ox4. But occasionally forgetful. Able to make needs known. Pt is anxious but calmed down w/ conversation and explanation of cares. Pt continues to refusing injections (lab draws and scheduled subcutaneous heparin). Nurse provided education, pt still refused and would like lab to come and try again in the morning. Pt has R vision loss.  Cardiac: On tele, SR/ST. HR in 90-110s. SBP 130s-150.   Respiratory: Sating 96% on room air. Congested, fine inspiratory wheezing.   GI/: Adequate urine output. No BM overnight.   Diet/appetite: Tolerating regular diet. Eating well.  Activity:  Assist x1, GB and aiding visually. Bed alarm on.   Pain: At acceptable level on current regimen. Pt often requests PRN tylenol.   Skin: No new deficits noted.   LDA's: R PICC x2, both occluded, unable to flush. L PIV - SL    Plan: Continue with POC. Notify primary team with changes.
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
## 2023-12-11 NOTE — PROGRESS NOTES
[facility], [place]   12/11/2023   Neurosurgery Progress Note:    Interval History: No acute events overnight. PICC not withdrawing- will work with vascular access team to trouble shoot    Assessment:  Mirian Cunningham is a 43 year old female with complex psychosocial situation including paranoid schizophrenia and lack of active guardianship transferred to [facility] with known large sellar/suprasellar mass. Godmother Adri is appointed as the medical decision maker. No other family members available or willing to participate in her care.  MRI with large multilobulated pituitary mass/adenoma with mass effect and encasement of the cavernous structures including the cavernous internal carotid arteries, M1 segment of the right MCA, and A1 and proximal A2 segments of both anterior cerebral arteries.     Now post operative day 9 status post combined endonasal and transcranial approach for resection of pituitary adenoma and placement of external ventricular drain.    Plan:  Q4h neuro exams  Decadron taper completed- cortef taper per Endocrinology  Keppra 7 days (completed)  SBP < 160  ENT following- appreciate recs  Sinus precautions  DI watch - strict I&O q1h, daily weights  DDAVP PO scheduled BID by Endo reccs  Endocrinology following- appreciate recs  Regular diet- appreciate SLP recs  Bowel regimen  DVT prophylaxis: SCDs, SQH  Dispo: Floor  -----------------------------------  Can Garcia MD  Neurosurgery Resident  Pager: [phone]    Please contact neurosurgery resident on call with questions.    Dial * * [phone], enter [phone] when prompted.     -----------------------------------  Objective:   Temp:  [98  F (36.7  C)-99.9  F (37.7  C)] 99.3  F (37.4  C)  Pulse:  [] 111  Resp:  [16-26] 26  BP: (139-157)/() 157/108  SpO2:  [94 %-100 %] 94 %  I/O last 3 completed shifts:  In: 1020 [P.O.:1000; I.V.:20]  Out: 4575 [Urine:4575]    Gen: no acute distress  Wound: clean, dry,  intact  Neurologic:  Awake, alert  Follows commands in all extremities  No gaze preference, does not participate in EOMI exam  R pupil non-reactive (baseline), L pupil briskly reactive  No vision in left eye, endorses sight in right eye- no finger counting; serviceable central vision, reduced peripheral vision bilaterally  Face symmetric at rest  Moving all extremities    LABS:  Recent Labs   Lab 12/11/23  0753 12/11/23  0444 12/11/23  0425 12/10/23  1944 12/10/23  1751 12/10/23  1213 12/10/23  1130 12/10/23  1005 12/10/23  0627 12/09/23  0724 12/09/23  0613   NA  --   --  145  145  --  144  --  144   < > 143  143   < > 143  143   POTASSIUM  --   --  4.1  --   --   --   --   --  3.8  --  3.7   CHLORIDE  --   --  107  --   --   --   --   --  106  --  106   CO2  --   --  28  --   --   --   --   --  30*  --  28   ANIONGAP  --   --  10  --   --   --   --   --  7  --  9   * 100* 110*   < >  --    < >  --   --  101*   < > 88   BUN  --   --  8.5  --   --   --   --   --  9.2  --  12.6   CR  --   --  0.73  --   --   --   --   --  0.70  --  0.76   FADUMO  --   --  9.0  --   --   --   --   --  8.5*  --  8.6    < > = values in this interval not displayed.     Recent Labs   Lab 12/11/23 0425   WBC 14.0*   RBC 2.91*   HGB 9.0*   HCT 29.1*      MCH 30.9   MCHC 30.9*   RDW 16.9*        IMAGING:  No results found for this or any previous visit (from the past 24 hour(s)).

## 2023-12-11 NOTE — PLAN OF CARE
Goal Outcome Evaluation:      Plan of Care Reviewed With: patient    Overall Patient Progress: no changeOverall Patient Progress: no change    Outcome Evaluation: No Changes this Shift.    Status: endonasal and transcranial approach for resection of pituitary adenoma 12/2  VS: HTN within parameters. Intermittently bradycardic.   Neuros: A&O x4, forgetful. L field cut, R eye blind and pupil fixed. Speech slightly garbled. BLE 4/5  IV: R DL PICC SL  Labs/Electrolytes: , this shift   Resp/trach: PRN Neub completed. Cont. Pulse ox on.  Diet: Regular diet, good intake. Strict I & O. Sinus precautions.   Bowel status: BM this shift per pt.   : Voiding in bathroom. High outputs. (scheduled DDAVP given)  Skin: Crani incision JAIMIE.   Pain: PRN meds declined   Activity: A1/GB Refused to use walker. Worked with therapy. Bed/Chair alarm   Social: Family was here at bedside.   Updates this shift: Magnesium replaced-redraw in morning. Manage pain. NA labs Q 4hrs. Need Weight completed this evening.
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
## 2023-12-12 NOTE — PLAN OF CARE
Status: S/p endonasal and transcranial approach for resection of pituitary adenoma 12/2   Vitals: VSS on RA  Neuros: A&Ox4, forgetful. L field cut, R eye blind and pupil fixed. BUE 5/5, BLE 4/5   IV: PICC DL SL   Labs/Electrolytes: NA checks q4hr, BG checks q4hr   Resp/trach: WNL   Diet: Regular diet, good PO intake. Strict I/O. Sinus precautions   Bowel status: LBM 12/11  : Voiding   Skin: Crani incision JAIMIE   Pain: Denies   Activity: SBA, GB. Patient refuses to use walker and GB. Pt showered this shift. Patient is ok to shower per NSG without soaking head   Plan: Continue to monitor and follow POC

## 2023-12-12 NOTE — PROGRESS NOTES
"Otolaryngology Progress Note    S: Patient is feeling ok today.    O: /84 (BP Location: Left arm)   Pulse 110   Temp 99.1  F (37.3  C) (Axillary)   Resp 22   Ht 1.549 m (5' 1\")   Wt 106.5 kg (234 lb 12.6 oz)   SpO2 97%   BMI 44.36 kg/m     General: Resting quietly   HEENT: No bleeding from the nose. No drainage   Pulmonary: On room air, satting well; work of breathing normal    LABS:  Reviewed    Imaging:   - CT Head 12/9  Impression:  1. Slightly increased size of the previously decompressed ventricular  system compared to 12/5/2023.  2. Postsurgical changes of sellar/suprasellar mass resection with  expected evolution of blood products. Resolution of the  pneumocephalus.  3. Slightly increased subcutaneous fluid collection overlying the left  frontal calvarium.    A/P: Mirian Cunningham is a 43 year old female with a past medical history of large pituitary adenoma s/p bifrontal craniotomy and transnasal approach to resection with b/l nasoseptal flap and pericranial flaps with formal nasal packing, now removed as of 12/9.     - Sinus precautions  - nasal saline sprays to b/l nares   - Please page ENT with questions or concerns    -- Patient and above plan discussed with Dr. Chau Diaz MD  Otolaryngology Head and Neck Surgery Resident, PGY-2      "

## 2023-12-12 NOTE — PROGRESS NOTES
[facility], [place]   12/12/2023   Neurosurgery Progress Note:    Interval History: No acute events overnight. PICC rewired    Assessment:  Mirian Cunningham is a 43 year old female with complex psychosocial situation including paranoid schizophrenia and lack of active guardianship transferred to [facility] with known large sellar/suprasellar mass. Godmother Adri is appointed as the medical decision maker. No other family members available or willing to participate in her care.  MRI with large multilobulated pituitary mass/adenoma with mass effect and encasement of the cavernous structures including the cavernous internal carotid arteries, M1 segment of the right MCA, and A1 and proximal A2 segments of both anterior cerebral arteries.     Now post operative day 10 status post combined endonasal and transcranial approach for resection of pituitary adenoma and placement of external ventricular drain.    Plan:  Q4h neuro exams  Decadron taper completed- cortef taper per Endocrinology  Keppra 7 days (completed)  SBP < 160  ENT following- appreciate recs  Sinus precautions  DI watch - strict I&O q1h, daily weights  DDAVP PO scheduled BID by Endo reccs  Endocrinology following- appreciate recs  Regular diet- appreciate SLP recs  Bowel regimen  DVT prophylaxis: SCDs, SQH  Dispo: Floor  -----------------------------------  Can Garcia MD  Neurosurgery Resident  Pager: [phone]    Please contact neurosurgery resident on call with questions.    Dial * * [phone], enter [phone] when prompted.     -----------------------------------  Objective:   Temp:  [98.6  F (37  C)-99.7  F (37.6  C)] 98.6  F (37  C)  Pulse:  [] 96  Resp:  [18-24] 18  BP: (121-132)/(78-87) 128/82  SpO2:  [96 %-100 %] 100 %  I/O last 3 completed shifts:  In: 3518 [P.O.:3518]  Out: 5500 [Urine:5500]    Gen: no acute distress  Wound: clean, dry, intact  Neurologic:  Awake, alert  Follows commands in all extremities  No gaze preference, does  not participate in EOMI exam  R pupil non-reactive (baseline), L pupil briskly reactive  No vision in left eye, endorses sight in right eye- no finger counting; serviceable central vision, reduced peripheral vision bilaterally  Face symmetric at rest  Moving all extremities    LABS:  Recent Labs   Lab 12/12/23  0830 12/12/23  0433 12/12/23  0407 12/12/23  0207 12/12/23  0035 12/11/23  2217 12/11/23  0444 12/11/23  0425 12/10/23  1005 12/10/23  0627   NA  --  145  145  --  142  --  141   < > 145  145   < > 143  143   POTASSIUM  --  4.5  --   --   --   --   --  4.1  --  3.8   CHLORIDE  --  107  --   --   --   --   --  107  --  106   CO2  --  28  --   --   --   --   --  28  --  30*   ANIONGAP  --  10  --   --   --   --   --  10  --  7   GLC 85 95 101*  --    < >  --    < > 110*   < > 101*   BUN  --  9.4  --   --   --   --   --  8.5  --  9.2   CR  --  0.83  --   --   --   --   --  0.73  --  0.70   FADUOM  --  9.2  --   --   --   --   --  9.0  --  8.5*    < > = values in this interval not displayed.     Recent Labs   Lab 12/12/23 0433   WBC 13.3*   RBC 2.77*   HGB 8.4*   HCT 28.0*   *   MCH 30.3   MCHC 30.0*   RDW 17.0*        IMAGING:  No results found for this or any previous visit (from the past 24 hour(s)).

## 2023-12-12 NOTE — PLAN OF CARE
Status: S/p endonasal and transcranial approach for resection of pituitary adenoma 12/2   Vitals: VSS on RA ex/ intermittently tachy (100-110bpm) after activity  Neuros: A&Ox4, forgetful. L field cut. R eye blind and pupil fixed, intermittently dysconjugate. BUE 5/5, BLE 4/5   IV: PICC DL SL, +blood return to both lumen  Labs/Electrolytes: NA checks q4hr-most recent 142 at 0200, BG checks q4hr   Resp/trach: Sinus precautions maintained  Diet: Regular diet. Strict I/O.   Bowel status: LBM 12/11  : Voiding spontaneously in bathroom  Skin: Crani incision JAIMIE with sutures   Pain: Denies   Activity: SBA, GB. Patient refuses to use walker and GB. Patient is ok to shower per NSG without soaking head   Plan: Continue to monitor and follow POC

## 2023-12-12 NOTE — PROVIDER NOTIFICATION
Mirian Cunningham-DARLINE orders to notify for urine out >400mL/hr. Pt. voided 500mL. Thanks! Deanne SALAZAR [phone]

## 2023-12-12 NOTE — PROGRESS NOTES
Care Management Follow Up    Length of Stay (days): 52    Expected Discharge Date: 12/17/2023     Concerns to be Addressed: transfer back to [facility]  Patient plan of care discussed at interdisciplinary rounds: Yes    Anticipated Discharge Disposition:  transfer back to [facility]  Anticipated Discharge Services:  none  Anticipated Discharge DME:  none    Additional Information:  Patient status reviewed, discussed in discharge rounds. Patient expected to be medically ready for return transfer to [facility] in [place] on Saturday, 12/16/23.    Call placed to [facility] STAT Doc (ph:[phone]), spoke with Keisha. Informed Keisha of patient's anticipated return ready date and asked her to initiate return process. Keisha took down patient's information, RNCC information and provider information and states she will pass on to her . Keisha will call RNCC back with updates from the  initiating the return transfer.    RNCC will continue to follow and assist with return transfer to [facility] in [place]    [id number] Addendum:   Call received from Ileana (ph: [phone]),  at [facility]. Ileana is inquiring if patient could be admitted to their [place] location under the medicine service while they search for post hospital placement. They do not have neurosurgery on sight at [place]. Spoke with Warner with neurosurgery who confirmed this would be okay. If patient were to have neurosurgical complications, most appropriate action would be returning to [facility]. Relayed this information back to Ileana who states they will start working on transfer and looking for bed availability. Also informed Ileana that signed return transfer agreement states requesting facility will arrange transport to and from receiving hospital ([facility]), Ileana was in agreement and will work to arrange transportation. Provided her with [facility] transport number if needed.  Ileana also confirmed  patient is able to return over the weekend.     [facility] with return transfer agreement  Ph: [phone]  STAT Doc: [phone]    [facility] (possible location patient will return to)  [address] [place] [id number]    Carol Davis, RN, BSN  6A RN Care Coordinator  Ph: [phone]   Pager: [phone]

## 2023-12-12 NOTE — PROVIDER NOTIFICATION
Paged KALLIE at [phone]- Octavio, B     Can we switch glucose checks to ACHS?    [facility] [phone]

## 2023-12-12 NOTE — PLAN OF CARE
Status: endonasal and transcranial approach for resection of pituitary adenoma 12/2    Vitals: VSS  Neuros: A&Ox4, forgetful. L field cut. R eye blind and pupil fixed, intermittently dysconjugate. BUE 5/5, BLE 4/5   IV: PIV SL'd  Labs/Electrolytes: Na+ 143 lastly at 1130, set up for q4h checks. BG AC, HS  Resp/trach: WNL  Diet: tolerating regular diet  Bowel status: BM this shift 12/12, continent  : voiding without difficulty, strict I&Os  Skin: crani incision sutured, JAIMIE, no drainage  Pain: denied pain this shift  Activity: up with SBA, GB, pt has been known to refuse GB during stay but agreed to this shift x 2 in hallways  Social: no visitors or phone calls this shift  Plan: discharging 12/17 to OSH  Updates this shift: pt impulsive x 2 this shift to go to   Education completed: educated pt about taking measures to avoid falls including use of gait belt and bed alarms

## 2023-12-13 NOTE — PLAN OF CARE
Status: endonasal and transcranial approach for resection of pituitary adenoma 12/2    Vitals: VSS  Neuros: A&Ox4, forgetful. L field cut. R eye blind and pupil fixed, intermittently dysconjugate. BUE 5/5, BLE 4/5   IV: PIV SL'd  Labs/Electrolytes: Na+ 141 lastly at 1030, set up for q4h checks, possibly changing to q8h later. BG AC, HS. Mag replaced and redraw scheduled for tomorrow AM  Resp/trach: WNL  Diet: tolerating regular diet  Bowel status: BM this shift 12/12, continent  : voiding without difficulty, strict I&Os  Skin: crani incision sutured, JAIMIE, no drainage  Pain: denied pain this shift  Activity: up with SBA, GB, pt has been known to refuse GB during stay but agreed to this shift x 1 in hallways.  Social: no visitors or phone calls this shift  Plan: discharging 12/17 to OSH  Updates this shift: pt impulsive x 1 this shift to go to   Education completed: educated pt about taking measures to avoid falls including use of gait belt and bed alarms

## 2023-12-13 NOTE — PROGRESS NOTES
[facility], [place]   12/13/2023   Neurosurgery Progress Note:    Interval History: No acute events overnight. PICC rewired    Assessment:  Mirian Cunningham is a 43 year old female with complex psychosocial situation including paranoid schizophrenia and lack of active guardianship transferred to [facility] with known large sellar/suprasellar mass. Godmother Adri is appointed as the medical decision maker. No other family members available or willing to participate in her care.  MRI with large multilobulated pituitary mass/adenoma with mass effect and encasement of the cavernous structures including the cavernous internal carotid arteries, M1 segment of the right MCA, and A1 and proximal A2 segments of both anterior cerebral arteries.     Now post operative day 11 status post combined endonasal and transcranial approach for resection of pituitary adenoma and placement of external ventricular drain.    Plan:  Q4h neuro exams  Decadron taper completed- cortef taper per Endocrinology  Keppra 7 days (completed)  SBP < 160  ENT following- appreciate recs  Sinus precautions  DI watch - strict I&O q1h, daily weights  DDAVP PO scheduled BID by Endo reccs  Endocrinology following- appreciate recs  Regular diet- appreciate SLP recs  Bowel regimen  DVT prophylaxis: SCDs, SQH  Dispo: Floor  -----------------------------------  Cna Garcia MD  Neurosurgery Resident  Pager: [phone]    Please contact neurosurgery resident on call with questions.    Dial * * [phone], enter [phone] when prompted.     -----------------------------------  Objective:   Temp:  [97.1  F (36.2  C)-100  F (37.8  C)] 97.1  F (36.2  C)  Pulse:  [] 100  Resp:  [16-20] 16  BP: (112-136)/(63-97) 112/63  SpO2:  [97 %-100 %] 97 %  I/O last 3 completed shifts:  In: 3340 [P.O.:3330; I.V.:10]  Out: 5900 [Urine:5900]    Gen: no acute distress  Wound: clean, dry, intact  Neurologic:  Awake, alert  Follows commands in all extremities  No gaze  preference, does not participate in EOMI exam  R pupil non-reactive (baseline), L pupil briskly reactive  No vision in left eye, endorses sight in right eye- no finger counting; serviceable central vision, reduced peripheral vision bilaterally  Face symmetric at rest  Moving all extremities    LABS:  Recent Labs   Lab 12/13/23  1202 12/13/23  1013 12/13/23  0704 12/13/23  0549 12/13/23  0347 12/13/23  0226 12/12/23  0830 12/12/23  0433 12/11/23  0444 12/11/23  0425   NA  --  141  --  144  144  --  141   < > 145  145   < > 145  145   POTASSIUM  --   --   --  4.1  --   --   --  4.5  --  4.1   CHLORIDE  --   --   --  104  --   --   --  107  --  107   CO2  --   --   --  31*  --   --   --  28  --  28   ANIONGAP  --   --   --  9  --   --   --  10  --  10   *  --  89 99   < >  --    < > 95   < > 110*   BUN  --   --   --  8.9  --   --   --  9.4  --  8.5   CR  --   --   --  0.80  --   --   --  0.83  --  0.73   FADUMO  --   --   --  9.5  --   --   --  9.2  --  9.0    < > = values in this interval not displayed.     Recent Labs   Lab 12/13/23  0549   WBC 14.7*   RBC 2.87*   HGB 8.8*   HCT 29.3*   *   MCH 30.7   MCHC 30.0*   RDW 17.0*        IMAGING:  No results found for this or any previous visit (from the past 24 hour(s)).

## 2023-12-13 NOTE — CARE PLAN
Status: S/P combined endonasal and transcranial approach for resection of pituitary adenoma and placement of12/01. Packing removed at 12/9. PMHx paranoid schizophrenia.   Vitals: VSS on RA.   Neuros: AOX4 with forgetfullness. R eye fixed and blind with intermittent dysconjugate. L eye has field cuts. 5/5 uppers. 4/5 lowers.   IV: PICC with two lumen on R arm, blood return and hep locked.   Labs/Electrolytes: Mg replaced this am. Lab draws in morning. Glucose check q 4. Na 143 at 13h50.   Resp/trach: On RA satting well. Pt c/o SOB after ambulating and pt given PRN neb albuterol.   Diet: Reg diet good intake.   Bowel status: LBM 12/12 am. BS+. Passing gas.   : Strict Is and Os. Params to follow for urine output. Pt voids spont.   Skin: Head incision JAIMIE and approximated. Nasal packing removed 12/9.   Pain: Some cramping to stomach, ice applied. HA controlled with PO Tylenol.   Activity: SBA GB. Walked halls x1.   Social: No visitors.   Plan: Expected discharge date 12/17 to [facility].

## 2023-12-13 NOTE — PROVIDER NOTIFICATION
Page NSG at 20h16    4815- AMARI Cunningham  FYI- Pt voided over 400ml x 2 hours. Na 143.     [facility] [phone]

## 2023-12-13 NOTE — PROGRESS NOTES
CLINICAL NUTRITION SERVICES - REASSESSMENT NOTE     Nutrition Prescription    RECOMMENDATIONS FOR MDs/PROVIDERS TO ORDER:   None at this time.     Malnutrition Status:   Patient does not meet two of the established criteria necessary for diagnosing malnutrition    Recommendations already ordered by Registered Dietitian (RD):   Chart reviewed, no changes at this time.     Future/Additional Recommendations:   Monitor weight/intake trends.      EVALUATION OF THE PROGRESS TOWARD GOALS   Diet: Regular  Intake: Per flowsheets, intake documentation likely missing some meals/snacks, but on average 75%-100% of meals consumed. Per 5 day average, pt is ordering 3852 kcal and 115 g protein per [employer].    NEW FINDINGS   Chart reviewed.     Skin: Alex score 19 with nutrition marked as adequate per flowsheets. Mild edema noted in flowsheets.     LABS   Labs Reviewed   12/12/23 22:30 12/13/23 02:26 12/13/23 05:49 12/13/23 10:13   Sodium 143 141 144 141   Potassium   4.1    Creatinine   0.80    GFR Estimate   >90    Magnesium   2.0    WBC   14.7 (H)    Hemoglobin   8.8 (L)    Platelet Count   373    MCV   102 (H)       12/13/23 00:31 12/13/23 03:47 12/13/23 05:49 12/13/23 07:04 12/13/23 12:02   Glucose   99     Glucose by meter POCT 117 (H) 104 (H)  89 179 (H)     MEDICATIONS   Medications Reviewed  - DDAVP  - Pepcid  - Hydrocortisone  - Insulin Aspart  - Levothyroxine  - Milk of Magnesia   - Magnesium Oxide  - Miralax  - Potassium & Sodium Phosphates  - Senokot  - Simethicone PRN    ANTHROPOMETRICS   Weight Trends: Unable to assess weight trends due to use of bed scale.   Date/Time Weight Weight Method   12/08/23 106.5 kg (234 lb 12.6 oz) Bed scale   12/07/23 107.8 kg (237 lb 10.5 oz) Bed scale   12/06/23 108.2 kg (238 lb 8.6 oz) Bed scale   12/05/23 104.7 kg (230 lb 13.2 oz) Bed scale   12/03/23 104.6 kg (230 lb 9.6 oz) Bed scale   12/01/23 96 kg (211 lb 10.3 oz) --   11/17/23 96 kg (211 lb 11.2 oz) Standing scale    11/16/23 97.6 kg (215 lb 2.7 oz) Bed scale   11/15/23 96 kg (211 lb 10.3 oz) Bed scale   11/11/23 94.3 kg (207 lb 12.8 oz) Standing scale   11/06/23 95.5 kg (210 lb 8.6 oz) Standing scale   10/30/23 94.8 kg (208 lb 15.9 oz) Bed scale   10/28/23 93.7 kg (206 lb 9.1 oz) Bed scale   10/27/23 94.1 kg (207 lb 7.3 oz) Bed scale   10/22/23 92.9 kg (204 lb 12.9 oz) Standing scale     MALNUTRITION   % Intake: No decreased intake noted  % Weight Loss: Unable to assess  Subcutaneous Fat Loss: None observed  Muscle Loss: None observed  Fluid Accumulation/Edema: Mild  Malnutrition Diagnosis: Patient does not meet two of the established criteria necessary for diagnosing malnutrition    Previous Goals   Diet adv > full liquids within 24-48 hours - met   Patient to consume % of nutritionally adequate meal trays TID, or the equivalent with supplements/snacks.  Evaluation: Met    Previous Nutrition Diagnosis   Predicted inadequate nutrient intake related to suspected disruptions to PO and/or appetites post-op/hospitalization as evidenced by pt oral PO decreased from baseline with full liquid diet x 2 days and still improving PO     Evaluation: Resolved    CURRENT NUTRITION DIAGNOSIS   No nutrition diagnosis at this time     INTERVENTIONS   Implementation   Chart reviewed, no changes at this time.     Goals   Patient to consume % of nutritionally adequate meal trays TID, or the equivalent with supplements/snacks.    Monitoring/Evaluation   Progress toward goals will be monitored and evaluated per protocol.    Summer Lagos RD, LD   Available on Vocera and Pager  5A [phone]) + 6A pager [phone]  Weekend pager [phone]

## 2023-12-13 NOTE — PLAN OF CARE
"/79 (BP Location: Left arm)   Pulse 98   Temp 98.1  F (36.7  C) (Oral)   Resp 17   Ht 1.549 m (5' 1\")   Wt 106.5 kg (234 lb 12.6 oz)   SpO2 97%   BMI 44.36 kg/m      VSS. Afebrile. RA. Alert and oriented x 4 with intermittent forgetfulness. Impulsive OOB x 2. Right eye fixed and blind. Left eye field cuts. Denies n/t to extremities. Declined need for pain medication. Tolerating diet with no n/v. Up to bathroom with SBA and GB to bathroom. Voiding parameters overnight not met to notify MD (greater than 400 ml/hr or greater than 200 ml per 2 hrs). No BM overnight. Right PICC double lumen locked. NA checks q 4 hours, last 141. Repositioning self in bed. Continue to monitor.  "
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
## 2023-12-14 NOTE — PLAN OF CARE
"Status: endonasal and transcranial approach for resection of pituitary adenoma 12/2    Vitals: VSS  Neuros: A&Ox4, forgetful. L field cut. R eye blind and pupil fixed, intermittently dysconjugate. BUE 5/5, BLE 4/5   IV: PIV SL'd  Labs/Electrolytes: Na+ 142 lastly at 1340 , set up for q8h checks. BG q4h/ Mag replaced and redraw scheduled for tomorrow AM  Resp/trach: has significant sleep apnea and needs oxymask at 2 lpm while sleeping at 30 degrees. When upright, pt has mid-upper 90s in recliner.  Diet: tolerating regular diet  Bowel status: BM this shift 12/12, continent. Declined bowel meds because \"a couple days ago I pooped non-stop diarrhea\"  : voiding without difficulty, strict I&Os  Skin: crani incision sutured, JAIMIE, no drainage  Pain: denied pain this shift  Activity: up with SBA, GB, pt has been known to refuse GB during stay but agreed to this shift x 2 in hallways.  Social: no visitors or phone calls this shift  Plan: discharging 12/16-12/17 to OSH in [place]  Updates this shift: pt Impulsive x 1 this shift to go to bathroom.  Education completed: educated pt about taking measures to avoid falls including use of gait belt and bed alarms  "

## 2023-12-14 NOTE — PROVIDER NOTIFICATION
"Provider Notification: KALLIE Zimmerman     [id number],     1790: \"Patient desatted while sleeping, lowest 35%, sustained for 30 sec. Also stating she feels like she is not getting enough oxygen. Currently on 2L oxymask satting at 100%.\"    Thanks, Jodie [phone]  "

## 2023-12-14 NOTE — PLAN OF CARE
Status: S/P combined endonasal and transcranial approach for resection of pituitary adenoma and placement of12/01. Packing removed at 12/9. PMHx paranoid schizophrenia.   Vitals: VSS on RA.   Neuros: AOX4 with forgetfullness. R eye fixed and blind with intermittent dysconjugate. L eye has field cuts. 5/5 uppers. 4/5 lowers. Pt is labile in mood, not new.  IV: PICC with two lumen on R arm, blood return and hep locked.   Labs/Electrolytes: Mg replaced this am.  Glucose check q 4. NA checks q 8. Na 143 at 21h45.  Resp/trach: On RA satting well. Pt seems to dip in saturation while napping, had to apply 2L mask.   Diet: Reg diet good intake.   Bowel status: LBM 12/12 am. BS+. Passing gas.   : Strict Is and Os. Params to follow for urine output. Pt voids spont.   Skin: Head incision JAIMIE and approximated. Nasal packing removed 12/9.   Pain: Denies  Activity: SBA GB. Refused to walk halls this eduardo.  Social: No visitors.   Plan: Expected discharge date 12/17 to [facility]. Na checks moved to q 8.    Goal Outcome Evaluation:           Overall Patient Progress: improvingOverall Patient Progress: improving    Outcome Evaluation: Pt peeing less.

## 2023-12-14 NOTE — PROGRESS NOTES
ENT Progress Note    S:  Patient had a desat event to 35%, thought secondary to MAXX. Spontaneously resolved. Per NSG, they are planning for CXR today.     O:   Vitals generally stable, did have desat event x1   General: Resting quietly, interactive   HEENT: No bleeding from the nose. No drainage. Ballotable edema overlying forehead/bicoronal incision, interval increase from prior.    Pulmonary: On room air, satting well; work of breathing normal    A/P:   Mirian Cunningham is a 43 year old female with a past medical history of large pituitary adenoma s/p bifrontal craniotomy and transnasal approach to resection with b/l nasoseptal flap and pericranial flaps.     - CXR, pulm work up per NSG  - NSG to manage increased frontal edema, may order CT this week for evaluation  - Continue sinus precautions    Patient discussed with Dr. Ritchie.     Goran Harmon MD  Otolaryngology - Head and Neck Surgery PGY-3

## 2023-12-14 NOTE — PROGRESS NOTES
[facility], [place]   12/14/2023   Neurosurgery Progress Note:    Interval History: No acute events overnight. 2L NC this AM, likely from obstructive sleep apnea    Assessment:  Mirian Cunningham is a 43 year old female with complex psychosocial situation including paranoid schizophrenia and lack of active guardianship transferred to [facility] with known large sellar/suprasellar mass. Godmother Adri is appointed as the medical decision maker. No other family members available or willing to participate in her care.  MRI with large multilobulated pituitary mass/adenoma with mass effect and encasement of the cavernous structures including the cavernous internal carotid arteries, M1 segment of the right MCA, and A1 and proximal A2 segments of both anterior cerebral arteries.     Now post operative day 12 status post combined endonasal and transcranial approach for resection of pituitary adenoma and placement of external ventricular drain.    Plan:  Q4h neuro exams  Decadron taper completed- cortef taper per Endocrinology  Kera 7 days (completed)  SBP < 160  ENT following- appreciate recs  Sinus precautions  DI watch - strict I&O q1h, daily weights  DDAVP PO scheduled BID by Endo reccs  Endocrinology following- appreciate recs  Regular diet- appreciate SLP recs  Bowel regimen  DVT prophylaxis: SCDs, SQH  Dispo: Floor  Medically ready for discharge- working on transfer back to hospital  -----------------------------------  Can Garcia MD  Neurosurgery Resident  Pager: [phone]    Please contact neurosurgery resident on call with questions.    Dial * * [phone], enter [phone] when prompted.     -----------------------------------  Objective:   Temp:  [97.1  F (36.2  C)-98.8  F (37.1  C)] 98.7  F (37.1  C)  Pulse:  [] 86  Resp:  [16-20] 16  BP: (108-134)/(63-89) 115/89  SpO2:  [35 %-100 %] 93 %  I/O last 3 completed shifts:  In: 1610 [P.O.:1600; I.V.:10]  Out: 2250 [Urine:2250]    Gen: no acute  distress  Wound: clean, dry, intact  Neurologic:  Awake, alert  Follows commands in all extremities  No gaze preference, does not participate in EOMI exam  R pupil non-reactive (baseline), L pupil briskly reactive  No vision in left eye, endorses sight in right eye- no finger counting; serviceable central vision, reduced peripheral vision bilaterally  Face symmetric at rest  Moving all extremities    LABS:  Recent Labs   Lab 12/14/23  0718 12/14/23  0450 12/14/23  0357 12/14/23  0035 12/13/23  2145 12/13/23  1604 12/13/23  1403 12/13/23  0704 12/13/23  0549 12/12/23  0830 12/12/23  0433   NA  --  141  141  --   --  143  --  141   < > 144  144   < > 145  145   POTASSIUM  --  4.4  --   --   --   --   --   --  4.1  --  4.5   CHLORIDE  --  102  --   --   --   --   --   --  104  --  107   CO2  --  31*  --   --   --   --   --   --  31*  --  28   ANIONGAP  --  8  --   --   --   --   --   --  9  --  10   GLC 80 91 92   < >  --    < >  --    < > 99   < > 95   BUN  --  8.4  --   --   --   --   --   --  8.9  --  9.4   CR  --  0.74  --   --   --   --   --   --  0.80  --  0.83   FADUMO  --  8.9  --   --   --   --   --   --  9.5  --  9.2    < > = values in this interval not displayed.     Recent Labs   Lab 12/14/23  0450   WBC 13.9*   RBC 2.70*   HGB 8.2*   HCT 26.9*      MCH 30.4   MCHC 30.5*   RDW 16.9*        IMAGING:  No results found for this or any previous visit (from the past 24 hour(s)).

## 2023-12-14 NOTE — DISCHARGE SUMMARY
[facility] Discharge Summary and Instructions    Mirian Cunningham MRN# [id number]   Age: 43 year old YOB: 1980     Date of Admission:  10/21/2023  Date of Discharge::  12/18/2023  Admitting Physician:  True Buenrostro MD  Discharge Physician:  True Buenrostro MD          Admission Diagnoses:   Pituitary tumor [D49.7]    #Obesity  #brain compression  #cerebral edema          Discharge Diagnosis:     Pituitary tumor [D49.7]    #Obesity  #brain compression  #cerebral edema          Procedures:   12/01/2023  1.  Modified transbasal approach for approach to the anterior and posterior fossa  2.  Resection of anterior and posterior fossa skull base mass (probable giant pituitary adenoma)  3.  Endoscopic endonasal transplanum approach to the anterior fossa  4.  Endoscopic resection of anterior and posterior fossa skull base mass (probable giant pituitary adenoma)  5.  Use of operative microscope  6.  Stereotactic neuronavigation  7.  Right frontal ventriculostomy  8.  Boyce of left pericranial flap pedicled on the supratrochlear and supraorbital arteries  9.  Neuromonitoring for SSEP, EEG           Brief History of Illness:   Mirian Cunningham is a 43-year-old female with a past medical history of schizophrenia hypertension, asthma and a known pituitary mass since 2018 who was transferred to the [facility] for surgical evaluation of her lesion.  Initially, she presented at [facility] in [place] on October 6 of this year.  In that interval she had multiple complaints which included stress, dizziness, head pressure and worsening left eye vision.  She previously had a history of right eye blindness and reported worsening vision in the functional eye.  She had a number of mental health concerns and social health issues that resulted in an inability to provide informed consent for the surgery at that interval.  In 2018 she was evaluated by neurosurgery who initially  recommended surgical resection of the lesion if her vision worsened, but she did not cooperate in the vision assessment at the time and did not consent for the MRI.  In 2023 however, she underwent an MRI under sedation after a designated guardian ultimately gave consent for sedation.  This MRI demonstrated a large suprasellar mass with effacement of the inferior frontal lobe, hypothalamus, optic chiasm, third ventricle and midbrain as well as encasement of the basilar artery and bilateral carotid arteries.  At the time she was evaluated by the [facility], which recommended a staged subtotal resection with subsequent chemotherapy and radiation.  At the time, the patient did not agree to the surgery.  From [facility], [facility] was pursued as a possible area of transfer however, she was rejected due to not providing consent for the offered surgery in the past, and the lack of postoperative discharge plan given her complex psychosocial situation.  For this reason she was accepted at the [facility].  At presentation, she reported significant vision loss in both eyes did not know the full time course of these visual field deficits.            Hospital Course:   Patient was admitted 10/21/2023 and remained stable. Psychiatry was consulted and determined that Adri, family member, was an appropriate decision maker and thus the patient was consented for imaging with anesthesia and consented for the surgery.  To get the MRI, the patient was intubated and sent to the ICU, but subsequently developed pulmonary edema at this interval.  Imaging was completed however there was a concern for aspiration pneumonia at this interval.  On 10/28, patient had improvement in respiratory status and was subsequently extubated.  [place] [facility]  On 10/29, general medicine was consulted for optimization and to perform a preoperative assessment.  They determined the patient had a 6.9%  cardiac risk, and a 40% pulmonary risk.  From 10/30 through 11/27, there was a significant difficulty in medicating with Adri who was determined to be the decision maker.  During this interval, the patient was refusing multiple cares during the day, and refusing to ambulate.  On 11/27, the procedure was finally confirmed, and after getting in touch with Adri is the primary decision maker she was consented for the surgery listed above.  The planned surgery was determined to be a two-stage surgery which included an endoscopic endonasal component as well as a cranial component for the resection of the large tumor.  The surgery started on 12/1 and was completed on 12/2.  Postoperatively, the patient remained ventilated in the ICU.  The postoperative CT was without any significant signs.  Given the extent of resection and concern for panhypopituitarism, endocrinology was consulted.  Given the elevated urine output and sodium levels in the serum, there was a significant concern for diabetes insipidus in the early postoperative period.  The management of DI involved endocrinology and neurosurgery to work collaboratively over the course of the ICU stay and the remainder of the hospitalization.  On 12/4, through 12/6, the patient's respiratory status was optimized for extubation, involving ongoing DDAVP administrations and management of urine output.  On 12/6, the patient was successfully extubated.  On 12/7 given the successful extubation and the diminishing respiratory requirements she was upgraded to a full liquid diet.  At this interval, levothyroxine and hydrocortisone were both initiated at this interval to maximize pituitary replacement, being actively titrated with endocrinology.  On 12/8, the patient was given a regular diet.  On 12/9, the patient was transferred to the floor given her ability to maintain appropriate respiratory status, ambulate appropriately, and void without requiring a Galeas catheter. Nasal  "packing was removed, and Endocrinology continued to follow and offer recommendations with regards to her Diabetes Insipidus and Adrenal Insufficiency. There was also concern for dysregulation of sex hormones as well as thyroid function, for which endocrinology will continue to follow and work-up outpatient.  Patient was monitored through post-operative day 15 after her pituitary tumor resection. During this period she continued to improve while working with Physical and Occupational therapies. On day 15 after her surgery, she was deemed suitable for transfer back to [facility].   DISCHARGE EXAM   /78 (BP Location: Left arm)   Pulse 88   Temp 98.7  F (37.1  C) (Axillary)   Resp 18   Ht 1.549 m (5' 1\")   Wt 96.7 kg (213 lb 1.6 oz)   SpO2 98%   BMI 40.26 kg/m    Generally well appearing NAD  Breathing comfortably on RA  Pulm exam as above  Normal muscle tone  Wound: Incision, clean, dry, intact, frontal fluctuant edema without erythema  Neurologic:  Awake, alert  Follows commands in all extremities  No gaze preference, does not participate in EOMI exam  R pupil non-reactive (baseline), L pupil briskly reactive, approximated 20/40 by Bedside [place]  No vision in left eye, endorses sight in right eye- no finger counting; serviceable central vision, reduced peripheral vision bilaterally  Face symmetric at rest  Moving all extremities symmetrically without focal deficits  Sensation intact and symmetric to light touch throughout         Discharge Medications:     Current Discharge Medication List        START taking these medications    Details   acetaminophen (TYLENOL) 325 MG tablet 2 tablets (650 mg) by Oral or Feeding Tube route every 4 hours as needed for other (For optimal non-opioid multimodal pain management to improve pain control.)    Associated Diagnoses: Pituitary tumor      calcium carbonate (TUMS) 500 MG chewable tablet Take 1 tablet (500 mg) by mouth daily as needed for heartburn    " "Associated Diagnoses: Pituitary tumor      !! desmopressin (DDAVP) 0.1 MG tablet Take 1.5 tablets (0.15 mg) by mouth every morning    Associated Diagnoses: Pituitary tumor      !! desmopressin (DDAVP) 0.2 MG tablet Take 1 tablet (0.2 mg) by mouth at bedtime    Associated Diagnoses: Pituitary tumor      diphenhydrAMINE (BENADRYL) 25 MG capsule Take 1 capsule (25 mg) by mouth nightly as needed for itching    Associated Diagnoses: Pituitary tumor      famotidine (PEPCID) 20 MG tablet Take 1 tablet (20 mg) by mouth 2 times daily    Associated Diagnoses: Pituitary tumor      Heparin Sodium, Porcine, (HEPARIN ANTICOAGULANT) 5000 UNIT/0.5ML injection Inject 0.5 mLs (5,000 Units) Subcutaneous every 8 hours    Associated Diagnoses: Pituitary tumor      hydrocortisone (CORTEF) 5 MG tablet Take 15 mg (3 tablets) in the morning every day around breakfast time, and, Take 5 mg (1 tablet) in the evening every day around dinner time. ALSO, \"Sick day rule\": in case you have any illness including fever, sore throat or any condition which makes you sick or you undergoes any surgical procedure you need to double the dose of hydrocortisone for 3 days or until you feel better.    Associated Diagnoses: Pituitary tumor      insulin aspart (NOVOLOG PEN) 100 UNIT/ML pen Inject 1-12 Units Subcutaneous every 4 hours    Associated Diagnoses: Pituitary tumor      ipratropium - albuterol 0.5 mg/2.5 mg/3 mL (DUONEB) 0.5-2.5 (3) MG/3ML neb solution Take 1 vial (3 mLs) by nebulization every 4 hours as needed for wheezing or shortness of breath (RCAT acuity level 6- PRN)    Associated Diagnoses: Pituitary tumor      levothyroxine (SYNTHROID/LEVOTHROID) 112 MCG tablet Take 1 tablet (112 mcg) by mouth every morning (before breakfast)    Associated Diagnoses: Pituitary tumor      magnesium hydroxide (MILK OF MAGNESIA) 400 MG/5ML suspension 30 mLs by Oral or Feeding Tube route daily as needed for constipation (Use if preventive measures (senna-docusate, " docusate, and polyethylene glycol) are not effective.)    Associated Diagnoses: Pituitary tumor      methocarbamol (ROBAXIN) 500 MG tablet Take 1 tablet (500 mg) by mouth 4 times daily as needed for muscle spasms    Associated Diagnoses: Pituitary tumor      ondansetron (ZOFRAN ODT) 4 MG ODT tab Take 1 tablet (4 mg) by mouth every 6 hours as needed for nausea or vomiting    Associated Diagnoses: Pituitary tumor      polyethylene glycol (MIRALAX) 17 GM/Dose powder Take 17 g by mouth daily    Associated Diagnoses: Pituitary tumor      prochlorperazine (COMPAZINE) 10 MG tablet Take 1 tablet (10 mg) by mouth every 6 hours as needed for nausea or vomiting    Associated Diagnoses: Pituitary tumor      senna-docusate (SENOKOT-S/PERICOLACE) 8.6-50 MG tablet 2 tablets by Oral or Feeding Tube route 2 times daily    Associated Diagnoses: Pituitary tumor      simethicone (MYLICON) 80 MG chewable tablet Take 1 tablet (80 mg) by mouth every 6 hours as needed for cramping    Associated Diagnoses: Pituitary tumor      sodium chloride (OCEAN) 0.65 % nasal spray Spray 1 spray into both nostrils 4 times daily    Associated Diagnoses: Pituitary tumor       !! - Potential duplicate medications found. Please discuss with provider.        CONTINUE these medications which have CHANGED    Details   albuterol (ACCUNEB) 1.25 MG/3ML neb solution Take 1 vial (1.25 mg) by nebulization every 8 hours as needed for shortness of breath, wheezing or cough    Associated Diagnoses: Hard to intubate, initial encounter      albuterol (PROAIR HFA/PROVENTIL HFA/VENTOLIN HFA) 108 (90 Base) MCG/ACT inhaler Inhale 2 puffs into the lungs every 6 hours as needed for shortness of breath, wheezing or cough    Comments: Pharmacy may dispense brand covered by insurance (Proair, or proventil or ventolin or generic albuterol inhaler)  Associated Diagnoses: Hard to intubate, initial encounter      fluticasone-salmeterol (ADVAIR) 100-50 MCG/ACT inhaler Inhale 1 puff  into the lungs 2 times daily    Associated Diagnoses: Hard to intubate, initial encounter      Lidocaine (LIDOCARE) 4 % Patch Place 3 patches onto the skin every 24 hours To prevent lidocaine toxicity, patient should be patch free for 12 hrs daily.    Associated Diagnoses: Pituitary tumor           STOP taking these medications       fluticasone (FLONASE) 50 MCG/ACT nasal spray Comments:   Reason for Stopping:         levETIRAcetam (KEPPRA) 500 MG tablet Comments:   Reason for Stopping:         predniSONE (DELTASONE) 20 MG tablet Comments:   Reason for Stopping:         PRENATAL VIT-FE FUMARATE-FA PO Comments:   Reason for Stopping:         tretinoin (RETIN-A) 0.025 % external cream Comments:   Reason for Stopping:                       Discharge Instructions and Follow-Up:     Discharge diet: Regular   Discharge activity: You may advance activity as tolerated. No strenuous exercise or heay lifting greater than 10 lbs for 4 weeks or until seen and cleared in clinic.    Per your Neurosurgeon, required to ambulate 4-6 times per day in facility   Discharge follow-up: Endocrinology follow up  12/20/2023 9:00 AM Bret Barnes MD [facility] DIABETES     Neurosurgery virtual follow up with CT head in 2 weeks    Referral to ENT, Ophthalmology, and Radiation Oncology outpatient   Wound care: Ok to shower, however no scrubbing of the wound and no soaking of the wound, meaning no bathtubs or swimming pools. Pat dry only. Leave wound open to air.    Sutures are absorbable and do not need to be removed.     Patient is NOT safe for Positive Pressure Ventilation like BIPAP or CPAP.  No positive pressure ventilation  No nasal cannula  ONLY face tent for supplemental oxygenation    Please follow sinus precautions upon discharge until seen by ENT, precautions as follows:   1.  Do not blow nose  2.  Dab gently under the nose to collect drainage  3.  Do not pick nose or insert tissues/cloths inside of nose  4.  Sneeze with mouth  open  5.  Do not strain, please take stool softeners as prescribed       Please call if you have:  1. increased pain, redness, drainage, swelling at your incision  2. fevers > 101.5 F degrees  3. with any questions or concerns.  You may reach the Neurosurgery clinic at [phone] during regular work hours. ER at [phone].    and ask for the Neurosurgery Resident on call at [phone], during off hours or weekends.         Discharge Disposition:     Transferred to [facility]        MD AYAH Jaimes PGY-3

## 2023-12-14 NOTE — PROGRESS NOTES
Care Management Follow Up    Length of Stay (days): 54    Expected Discharge Date: 12/17/2023     Concerns to be Addressed:     Discharge back to [facility]   Patient plan of care discussed at interdisciplinary rounds: Yes    Anticipated Discharge Disposition:  Hospital transfer     Anticipated Discharge Services:  Transportation to [facility] (paid for by [facility])  Anticipated Discharge DME:  NA    Patient/family educated on Medicare website which has current facility and service quality ratings:  NA  Education Provided on the Discharge Plan: NA    Patient/Family in Agreement with the Plan:  YES    Referrals Placed by CM/SW:  ODETTE  Private pay costs discussed: Not applicable    Additional Information:  Pt has a return transfer agreement on file with [facility] in [place].     RNCC received a voicemail from Minna SALAZAR at [facility]. Minna stated they are ready for the transfer on 12/16 but request a call @[phone] when [facility] is ready to initiate the transfer.     NEXT: Weekend RNCC to arrange transfer.    Return to [facility] in [place] when pt's work up is complete at [facility] for transfer:   [phone]      Keisha Sanchez RNCC Float  Covering for 6A  Phone [phone]  Pager [phone]  Nurse Coordinator     Social Work and Care Management Department     For Weekend & Holiday on call RN Care Coordinator:  (Tasks: Home care, home infusion, medical equipment, transportation, IMM [employer] HIGGINS forms, etc.)  [place] (0800 - 1630) Saturday and Sunday    Units: 5A, 5B, & 5C: [phone]    Units: 6B, 6C, 6D: [phone]    Units: 7A, 7B, 7C, 7D: [phone]    Units: 6A/ICU : [phone]     [facility] ([id number]) Saturday and Sunday    Units: 6 Med/Surg, 8A, 10 ICU, & Pediatric Units: [phone]    Units: 5 Ortho, 5Med/Surg & WB ED: [phone]

## 2023-12-14 NOTE — PLAN OF CARE
Goal Outcome Evaluation:    Status: S/P combined endonasal and transcranial approach for resection of pituitary adenoma and placement of12/01. Packing removed at 12/9. PMHx paranoid schizophrenia.   Vitals: VSS on 2L oxymask  Neuros: AOX4. Forgetful. Blind in R eye with  fixed pupil. L eye field cut. BUE strengths 5/5, BLE 4/5    IV: DL PICC SL  Labs/Electrolytes: HGB 8.2  Resp/trach: Desatting low as 35 when asleep. Team notified, chest Xray ordered. Currently On 2L oxymask  Diet: Regular.  Strict I&Os  Bowel status: LBM 12/12  : Voiding spontaneously to BR. Strict I&Os  Skin: Head incision JAIMIE  Pain: PRN tylenol given for headache   Activity: SBA with GB  Plan: Q8H Na checks. Continue to monitor and follow POC

## 2023-12-15 NOTE — PLAN OF CARE
Goal Outcome Evaluation:    Plan of care reviewed with: Care team    Overall patient progress: No change    Outcome evaluation: Plan for patient to transfer back to  on 12/18/23.

## 2023-12-15 NOTE — PROGRESS NOTES
Care Management Follow Up    Length of Stay (days): 55  Expected Discharge Date: 12/18/2023   Concerns to be Addressed: Discharge/transfer planning   Patient plan of care discussed at interdisciplinary rounds: Yes  Anticipated Discharge Disposition: Transfer back to sending facility- [facility]  Patient/family educated on Medicare website which has current facility and service quality ratings: No, N/A  Private pay costs discussed: Not applicable    Additional Information:  Per care team, patient will be ready to transfer back to [facility] ([place], [place]) on 12/18/2023. Spoke with Harpal ([phone]) at [facility]; informed of plan for patient to be ready to transfer back on 12/18/2023; Harpal requested a call on 12/18/23 to confirm details and facilitate transfer.     [facility]  [phone]    Azalia Brown RNCC  Covering for 6A    SEARCHABLE in AMCOM - search CARE COORDINATOR   [place] [facility] ([phone]) Saturday & Sunday; [phone]) [facility] Recognized Holidays  Units: 5A & 5C  Pager: [phone]   Units: 6B, 6C & 6D    Pager: [phone]  Units: 7A, 7B, & 7C   Pager: [phone]  Units: 6A & ICU   Pager: [phone]  Units: 5 Ortho, 5MS & WB ED Pager: [phone]   Units: 6MS, 8A & 10 ICU  Pager [phone]

## 2023-12-15 NOTE — PLAN OF CARE
Status: Pt on 6A s/p combined endonasal and transcranial approach for resection of pituitary adenoma and placement of EVD 12/01. Packing removed 12/9. PMHx paranoid schizophrenia.   Vitals: VSS on 2L oxymask. O2 dropped to low 70s on RA while sleeping.   Neuros: Ox4, lethargic this afternoon. R eye fixed and blind, dysconjugate intermittently. L eye field cut. BUE 5/5, BLE 4/5.   IV: DL PICC SL, good blood return.   Labs/Electrolytes: Q8h Na checks. BG checks ACHS.   Resp/trach: LS clear. Desat to 70s when sleeping. 2L Oxymask on while asleep.   Diet: Regular, good intake. Strict I&Os. Nasal Precautions.  Bowel status: BS+. LBM 12/12. Refused BM meds.   : Voids spontaneously.   Skin: Head incision JAIMIE and approximated.   Pain: C/o severe HA, PRN Tylenol given x1, pt declined further intervention.   Activity: SBA, pt refusing GB. Up to bathroom only.   Social: No visitors this shift.  Plan: Expected discharge to [facility] 12/16 or 12/17.   Updates this shift: Worked with OT and PT this shift.
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
## 2023-12-15 NOTE — PLAN OF CARE
Goal Outcome Evaluation:    Status: S/P combined endonasal and transcranial approach for resection of pituitary adenoma and placement of12/01. Packing removed at 12/9. PMHx paranoid schizophrenia.   Vitals: VSS, desats when sleeping, on 2L oxymask  Neuros: AOX4. Forgetful. R eye fixed and blind, dysconjugate at times. L eye field cut. BUE strengths 5/5, BLE 4/5    IV: DL PICC SL  Labs/Electrolytes: HGB 8.2. Mag, Na, Pot WNL  Resp/trach: Desatting when sleeping. On 2L oxymask  Diet: Regular.  Strict I&Os, Nasal Precaution  Bowel status: LBM 12/12. Has PRN bowel med MOM  : Voiding spontaneously to BR. Strict I&Os  Skin: Head incision JAIMIE  Pain: PRN tylenol given for headache   Activity: SBA with GB. Refuses GB   Plan: Q8H Na checks. Continue to monitor and follow POC

## 2023-12-15 NOTE — PROGRESS NOTES
[facility], [place]   12/15/2023   Neurosurgery Progress Note:    Interval History: No acute events overnight. CXR stable, DVT US neg    Assessment:  Mirian Cunningham is a 43 year old female with complex psychosocial situation including paranoid schizophrenia and lack of active guardianship transferred to [facility] with known large sellar/suprasellar mass. Godmother Adri is appointed as the medical decision maker. No other family members available or willing to participate in her care.  MRI with large multilobulated pituitary mass/adenoma with mass effect and encasement of the cavernous structures including the cavernous internal carotid arteries, M1 segment of the right MCA, and A1 and proximal A2 segments of both anterior cerebral arteries.     Now post operative day 12 status post combined endonasal and transcranial approach for resection of pituitary adenoma and placement of external ventricular drain.    Plan:  Q4h neuro exams  Decadron taper completed- cortef taper per Endocrinology  Keppra 7 days (completed)  SBP < 160  ENT following- appreciate recs  Sinus precautions  DI watch - strict I&O q1h, daily weights  DDAVP PO scheduled BID by Endo reccs  Endocrinology following- appreciate recs  Regular diet- appreciate SLP recs  Bowel regimen  DVT prophylaxis: SCDs, SQH  Dispo: Floor  Medically ready for discharge- working on transfer back to hospital  -----------------------------------  Can Garcia MD  Neurosurgery Resident  Pager: [phone]    Please contact neurosurgery resident on call with questions.    Dial * * [phone], enter [phone] when prompted.     -----------------------------------  Objective:   Temp:  [97.3  F (36.3  C)-98.8  F (37.1  C)] 98.3  F (36.8  C)  Pulse:  [] 73  Resp:  [16-20] 16  BP: ()/(67-99) 130/67  SpO2:  [77 %-100 %] 97 %  I/O last 3 completed shifts:  In: 2118 [P.O.:2108; I.V.:10]  Out: 1750 [Urine:1750]    Gen: no acute distress  Wound: clean, dry,  intact  Neurologic:  Awake, alert  Follows commands in all extremities  No gaze preference, does not participate in EOMI exam  R pupil non-reactive (baseline), L pupil briskly reactive  No vision in left eye, endorses sight in right eye- no finger counting; serviceable central vision, reduced peripheral vision bilaterally  Face symmetric at rest  Moving all extremities    LABS:  Recent Labs   Lab 12/15/23  0535 12/15/23  0403 12/15/23  0001 12/14/23  2205 12/14/23  1640 12/14/23  1342 12/14/23  0718 12/14/23  0450 12/13/23  0704 12/13/23  0549     142  --   --  141  --  142  --  141  141   < > 144  144   POTASSIUM 4.3  --   --   --   --   --   --  4.4  --  4.1   CHLORIDE 104  --   --   --   --   --   --  102  --  104   CO2 29  --   --   --   --   --   --  31*  --  31*   ANIONGAP 9  --   --   --   --   --   --  8  --  9   GLC 88 86 110*  --    < >  --    < > 91   < > 99   BUN 8.7  --   --   --   --   --   --  8.4  --  8.9   CR 0.76  --   --   --   --   --   --  0.74  --  0.80   FADUMO 8.5*  --   --   --   --   --   --  8.9  --  9.5    < > = values in this interval not displayed.     Recent Labs   Lab 12/15/23  0535   WBC 12.7*   RBC 2.69*   HGB 8.2*   HCT 27.5*   *   MCH 30.5   MCHC 29.8*   RDW 16.8*        IMAGING:  Recent Results (from the past 24 hour(s))   US Lower Extremity Venous Duplex Bilateral    Narrative    EXAMINATION: DOPPLER VENOUS ULTRASOUND OF BILATERAL LOWER EXTREMITIES,  12/14/2023 8:38 AM     COMPARISON: None.    HISTORY: Shortness of breath, bilateral lower extremity swelling.    TECHNIQUE:  Gray-scale evaluation with compression, spectral flow and  color Doppler assessment of the deep venous system of both legs from  groin to knee, and then at the ankles.    FINDINGS:  In both lower extremities, the common femoral, femoral, popliteal and  posterior tibial veins demonstrate normal compressibility and blood  flow.      Impression    IMPRESSION:  No evidence of deep venous  thrombosis in either lower extremity.    I have personally reviewed the examination and initial interpretation  and I agree with the findings.    REGINA VIZCARRA MD         SYSTEM ID:  [id number]

## 2023-12-15 NOTE — TELEPHONE ENCOUNTER
[facility] Call Center    Phone Message    May a detailed message be left on voicemail: yes     Reason for Call: Follow-up with Neurosurgery     Action Taken: Other: Routed to [facility] Neurosurgery Adult [facility]    Travel Screening: Not Applicable

## 2023-12-15 NOTE — PLAN OF CARE
Status: S/P combined endonasal and transcranial approach for resection of pituitary adenoma and placement of12/01. Packing removed at 12/9. PMHx paranoid schizophrenia.   Vitals: VSS except desats when sleeping. Oxy mask when asleep currently on 1 L.  Neuros: AOX4 with forgetfullness. R eye fixed and blind with intermittent dysconjugate. L eye has field cuts. 5/5 uppers. 4/5 lowers. Pt is labile in mood, not new. Pt seemed to have 1x episode of visual hallucination.  IV: PICC with two lumen on R arm, blood return and hep locked.   Labs/Electrolytes: Mg replaced this am.  Glucose check q 4. NA checks q 8. Na 142. at 13h42.  Resp/trach: LS clear no wheezing. Pt requested albuterol PRN neb and given 2x.   Diet: Reg diet good intake.   Bowel status: LBM 12/12 am. BS+. Passing gas.   : Strict Is and Os. Params to follow for urine output. Pt voids spont. Good output, no need to contact provider.  Skin: Head incision JAIMIE and approximated. Nasal packing removed 12/9. Nasal precautions.  Pain: Pt given Tylenol PRN PO for headache.  Activity: SBA GB. Refused to walk halls this eduardo.  Social: No visitors.   Plan: Expected discharge date 12/16-17 to [facility].   Updates: Pt had xray and lower extremity ultrasound d/t desatting, both came back clear.    Goal Outcome Evaluation:      Plan of Care Reviewed With: patient    Overall Patient Progress: improvingOverall Patient Progress: improving    Outcome Evaluation: Pt Continues to pee less, most recent

## 2023-12-16 NOTE — PLAN OF CARE
Status: Pt on 6A s/p combined endonasal and transcranial approach for resection of pituitary adenoma and placement of EVD 12/01. Packing removed 12/9. PMHx paranoid schizophrenia.   Vitals: VSS on 2-3L oxymask. O2 dropped to low 80s on RA while sleeping.   Neuros: Ox4, lethargic between cares. R eye fixed and blind, dysconjugate intermittently. L eye field cut. BUE 5/5, BLE 4/5. Pt refused to participate in noon assessment. Intermittent visual hallucinations.  IV: DL PICC SL, good blood return.   Labs/Electrolytes: Q8h Na checks. Na 141 at 1407. BG checks ACHS.   Resp/trach: LS clear. Desat to 70s-80s when sleeping. 2-3L Oxymask on while asleep.   Diet: Regular, good intake. Strict I&Os. Nasal Precautions.  Bowel status: BS+. LBM 12/12. BM meds given.  : Voids spontaneously.   Skin: Head incision JAIMIE and approximated. Refused CHG wipes. Pt asked for a shower multiple times during shift, but when staff entered room to help pt with shower, pt was too sleepy.   Pain: C/o severe HA, PRN Tylenol given x1, pt declined further intervention.   Activity: SBA, pt refusing GB. Up to bathroom only. Refused all attempts at ambulating outside room.   Social: No visitors this shift. Sister updated over the phone.  Plan: Expected discharge to [facility] on Monday.

## 2023-12-16 NOTE — PLAN OF CARE
Goal Outcome Evaluation:      Plan of Care Reviewed With: patient    Overall Patient Progress: no changeOverall Patient Progress: no change           Status: Pituitary tumor removal  Activity: Standby assist x1 w/ gait belt  Neuro: WDL, A&O x4, 5/5 uppers and 4/5 lowers  Cardiac: WDL  Respiratory: On 2.5 L Oxy mask at night  GI/: Voids w/d, urine is pale yellow, last BM 12/12 pt refuses scheduled bowel meds, water provided to pt.  Diet: Regular, BG ACHS, Nasal Precautions  Skin: Upper extremities cool to touch, lower extremities warm. +2 generalized edema throughout, Crani incision open to air.   Lines/Drains: Double lumen PICC saline locked.  Pain/Nausea: 8/10 back pain managed with PRN tylenol  Changes:

## 2023-12-16 NOTE — PROGRESS NOTES
[facility], [place]   12/16/2023   Neurosurgery Progress Note:    Interval History: No acute events overnight. Acuity left nasal field 20/40    Assessment:  Mirian Cunningham is a 43 year old female with complex psychosocial situation including paranoid schizophrenia and lack of active guardianship transferred to [facility] with known large sellar/suprasellar mass. Godmother Adri is appointed as the medical decision maker. No other family members available or willing to participate in her care.  MRI with large multilobulated pituitary mass/adenoma with mass effect and encasement of the cavernous structures including the cavernous internal carotid arteries, M1 segment of the right MCA, and A1 and proximal A2 segments of both anterior cerebral arteries.     Now post operative day 14 status post combined endonasal and transcranial approach for resection of pituitary adenoma and placement of external ventricular drain.    Plan:  Q4h neuro exams  Decadron taper completed- cortef taper per Endocrinology  Keppra 7 days (completed)  SBP < 160  ENT following- appreciate recs  Sinus precautions  DI watch - strict I&O q1h, daily weights  DDAVP PO scheduled BID by Endo reccs  Endocrinology following- appreciate recs  Regular diet- appreciate SLP recs  Bowel regimen  DVT prophylaxis: SCDs, SQH  Dispo: Floor  Medically ready for discharge- working on transfer back to hospital  -----------------------------------  Can Garcia MD  Neurosurgery Resident  Pager: [phone]    Please contact neurosurgery resident on call with questions.    Dial * * [phone], enter [phone] when prompted.     -----------------------------------  Objective:   Temp:  [97.5  F (36.4  C)-98.6  F (37  C)] 98.6  F (37  C)  Pulse:  [50-96] 93  Resp:  [14-18] 16  BP: ()/(66-84) 97/66  SpO2:  [73 %-100 %] 100 %  I/O last 3 completed shifts:  In: 2615 [P.O.:2585; I.V.:30]  Out: 4200 [Urine:4200]    Gen: no acute distress  Wound: clean, dry,  intact  Neurologic:  Awake, alert  Follows commands in all extremities  No gaze preference, does not participate in EOMI exam  R pupil non-reactive (baseline), L pupil briskly reactive  No vision in left eye, endorses sight in right eye- no finger counting; serviceable central vision, reduced peripheral vision bilaterally  Face symmetric at rest  Moving all extremities    LABS:  Recent Labs   Lab 12/16/23  0550 12/16/23  0419 12/16/23  0020 12/15/23  2209 12/15/23  1649 12/15/23  1441 12/15/23  1206 12/15/23  0535 12/14/23  0718 12/14/23  0450     141  --   --  144  --  144  --  142  142   < > 141  141   POTASSIUM 4.2  --   --   --   --   --   --  4.3  --  4.4   CHLORIDE 103  --   --   --   --   --   --  104  --  102   CO2 30*  --   --   --   --   --   --  29  --  31*   ANIONGAP 8  --   --   --   --   --   --  9  --  8   GLC 98 111* 111*  --    < >  --    < > 88   < > 91   BUN 9.3  --   --   --   --   --   --  8.7  --  8.4   CR 0.70  --   --   --   --   --   --  0.76  --  0.74   FADUMO 8.7  --   --   --   --   --   --  8.5*  --  8.9    < > = values in this interval not displayed.     Recent Labs   Lab 12/16/23  0550   WBC 10.1   RBC 2.71*   HGB 8.3*   HCT 27.2*      MCH 30.6   MCHC 30.5*   RDW 16.6*        IMAGING:  No results found for this or any previous visit (from the past 24 hour(s)).
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
## 2023-12-17 NOTE — PLAN OF CARE
Status: Pt on 6A s/p combined endonasal and transcranial approach for resection of pituitary adenoma and placement of EVD 12/01. Packing removed 12/9. PMHx paranoid schizophrenia.   Vitals: VSS on 2-3L oxymask while sleeping, RA while awake. O2 dropped to low 80s on RA while asleep.   Neuros: Ox4. Intermittently lethargic. R eye fixed and blind, dysconjugate intermittently. L eye field cut. BUE 5/5, BLE 4/5. Intermittent visual hallucinations.  IV: DL PICC SL, good blood return. Dressing changed this shift. CHG wipes done.  Labs/Electrolytes: Q8h Na checks. Na 142 at 1400. BG checks ACHS.   Resp/trach: LS clear. Desat to 70s-80s when sleeping. 2-3L Oxymask on while asleep. C/o mild SOB this AM, PRN albuterol neb given x1.   Diet: Regular, good intake. Strict I&Os. Nasal Precautions.  Bowel status: BS+. LBM today.  : Voids spontaneously.   Skin: Head incision JAIMIE and approximated.   Pain: C/o severe HA, but declined all interventions including PRN Tylenol.    Activity: SBA, pt refusing GB. Up to bathroom only. Refused all attempts at ambulating outside room.   Social: No visitors this shift. Pt talking to family over the phone.   Plan: Expected discharge to [facility] tomorrow.   Updates this shift: Head CT completed this shift.
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
## 2023-12-17 NOTE — PLAN OF CARE
Status:  Pt on 6A s/p combined endonasal and transcranial approach for resection of pituitary adenoma and placement of EVD 12/01. Packing removed 12/9. PMHx paranoid schizophrenia.    Vitals: VSS on 2L Oxymask  Neuros: A&Ox4, L field cut, R eye blind and pupil fixed. Dysconjugate intermittently BUE 5/5, BLE 4/5   IV: PICC DL flushed with good blood return   Labs/Electrolytes: NA checks q8hr, BG checks q4hr   Resp/trach: On 2L via Oxymask while asleep   Diet: Regular diet, good PO intake. Strict I/O. Sinus precautions   Bowel status: LBM 12/16, patient refused bowel meds   : Voiding   Skin: Crani incision JAIMIE    Pain: Tylenol given for HA     Activity: SBA, GB. Pt refused to walk this shift, said she is tired.     Plan: Continue to monitor and follow POC       Shift Update: Patient was able to find her cell phone, tablet, money, bank cards and SSC ex one debit card per patient. It is all in the tan bag-patient wants to keep the bag close to her in the bed

## 2023-12-17 NOTE — PROGRESS NOTES
[facility] [place]   12/17/2023   Neurosurgery Progress Note:    Interval History: No acute events overnight. Acuity left nasal field approximately 20/40 by bedside [place] chart.    Assessment:  Mirian Cunningham is a 43 year old female with complex psychosocial situation including paranoid schizophrenia and lack of active guardianship transferred to [facility] with known large sellar/suprasellar mass. Godmother Adri is appointed as the medical decision maker. No other family members available or willing to participate in her care.  MRI with large multilobulated pituitary mass/adenoma with mass effect and encasement of the cavernous structures including the cavernous internal carotid arteries, M1 segment of the right MCA, and A1 and proximal A2 segments of both anterior cerebral arteries.     Now post operative day 15 status post combined endonasal and transcranial approach for resection of pituitary adenoma and placement of external ventricular drain.    Plan:  Head CT today to assess interval change before discharge  Q4h neuro exams  Decadron taper completed- cortef taper per Endocrinology  Keppra 7 days (completed)  SBP < 160  ENT following- appreciate recs  Sinus precautions  DI watch - strict I&O q1h, daily weights  DDAVP PO scheduled BID by Endo reccs  Endocrinology following- appreciate recs  Regular diet- appreciate SLP recs  Bowel regimen  DVT prophylaxis: SCDs, SQH  Dispo: Floor  Medically ready for discharge- working on transfer back to hospital, anticipating Monday  -----------------------------------  CLINTON MARINO MD on 12/17/2023 at 10:59 AM    Please contact neurosurgery resident on call with questions.    Dial * * *[phone], enter [phone] when prompted.     -----------------------------------  Objective:   Temp:  [97.7  F (36.5  C)-98.5  F (36.9  C)] 98.5  F (36.9  C)  Pulse:  [] 93  Resp:  [16-20] 17  BP: (108-134)/(60-92) 117/72  SpO2:  [92 %-99 %] 96 %  I/O last 3 completed  shifts:  In: 2960 [P.O.:2950; I.V.:10]  Out: 2750 [Urine:2750]    Gen: no acute distress  Wound: clean, dry, intact; still with frontal fluctuant edema without redness  Neurologic:  Awake, alert  Follows commands in all extremities  No gaze preference, does not participate in EOMI exam  R pupil non-reactive (baseline), L pupil briskly reactive, approximated 20/40 by Bedside Phil Campbell  No vision in left eye, endorses sight in right eye- no finger counting; serviceable central vision, reduced peripheral vision bilaterally  Face symmetric at rest  Moving all extremities    LABS:  Recent Labs   Lab 12/17/23  0654 12/17/23  0405 12/17/23  0044 12/16/23  2208 12/16/23  1538 12/16/23  1407 12/16/23  0840 12/16/23  0550 12/15/23  1206 12/15/23  0535     143  --   --  139  --  141  --  141  141   < > 142  142   POTASSIUM 4.3  --   --   --   --   --   --  4.2  --  4.3   CHLORIDE 105  --   --   --   --   --   --  103  --  104   CO2 31*  --   --   --   --   --   --  30*  --  29   ANIONGAP 7  --   --   --   --   --   --  8  --  9   GLC 89 88 104*  --    < >  --    < > 98   < > 88   BUN 10.5  --   --   --   --   --   --  9.3  --  8.7   CR 0.77  --   --   --   --   --   --  0.70  --  0.76   FADUMO 8.9  --   --   --   --   --   --  8.7  --  8.5*    < > = values in this interval not displayed.     Recent Labs   Lab 12/17/23  0654   WBC 9.4   RBC 2.68*   HGB 8.4*   HCT 27.3*   *   MCH 31.3   MCHC 30.8*   RDW 17.0*        IMAGING:  No results found for this or any previous visit (from the past 24 hour(s)).

## 2023-12-17 NOTE — PLAN OF CARE
Status:  Pt on 6A s/p combined endonasal and transcranial approach for resection of pituitary adenoma and placement of EVD 12/01. Packing removed 12/9. PMHx paranoid schizophrenia.    Vitals: VSS on 2L Oxymask overnight, intermittently tachy   Neuros: A&Ox4, L field cut, R eye blind, pupil fixed, and dysconjugate intermittently. 4-5/5 throughout with generalized weakness   IV: PICC DL flushed with good blood return   Labs/Electrolytes: NA checks q8hr, BG checks q4hr   Resp/trach: On 2L via Oxymask while asleep   Diet: Regular diet. Strict I/O. Sinus precautions   Bowel status: Had a BM this shift  : Voiding   Skin: Crani incision JAIMIE    Pain: C/o HA pain managed with PRN tylenol   Activity: SBAZHOU. Refuses GB  Plan: Anticipated discharge 12/18 back to sending facility- [facility]. Continue to monitor and follow POC

## 2023-12-18 NOTE — PLAN OF CARE
Goal Outcome Evaluation:      Plan of Care Reviewed With: patient    Overall Patient Progress: improvingOverall Patient Progress: improving    Outcome Evaluation: Plan for patient to transfer back to [facility] on 12/18/23    Status: Pt on 6A s/p combined endonasal and transcranial approach for resection of pituitary adenoma and placement of EVD 12/01. Packing removed 12/9. PMHx paranoid schizophrenia.    Vitals: VSS on 2-3L oxymask while sleeping, RA while awake. O2 dropped to low 80s on RA while asleep   Neuros: AOx4. R eye fixed and blind, dysconjugate intermittently, L eye field cut, Generalized weakness, strengths 4-5/5 throughout Intermittent visual hallucinations   IV: DL PICC SL, BR+  Labs/Electrolytes: NA checks q8hr, BG checks q4hr     Resp/trach: On 2L via Oxymask while asleep, PRN neb requested and , Strict nasal precautions  Diet: Regular, good intake  Bowel status: Loose BM 12/17, bowel meds held, Strict I's and O's  : voiding spont, team paged for high output  Skin: Head incision JAIMIE and approximated   Pain: Headache managed with PRN tylenol  Activity: SBA, pt refusing BG, steady on feet, refused to walk the halls with staff  Plan: Anticipated discharge 12/18 back to sending facility- [facility]. Continue to monitor and follow POC     Non-urgent Mirian Cunningham 4460  FYI pt urine output has been high this evening. 1100mL out since 2030. Sodium draw scheduled for 2200.   Thanks, Shea [phone]     Mood disorder/Cluster B Personality disorder/traits

## 2023-12-18 NOTE — PLAN OF CARE
Status: Pt on 6A s/p combined endonasal and transcranial approach for resection of pituitary adenoma and placement of EVD 12/01. Packing removed 12/9. PMHx paranoid schizophrenia.   Vitals: VSS on 2-3L oxymask while sleeping, RA while awake. O2 dropped to low 80s on RA while asleep.   Neuros: Ox4. Intermittently lethargic. R eye fixed and blind, dysconjugate intermittently. L eye field cut. BUE 5/5, BLE 4/5. Intermittent visual hallucinations.  IV: DL PICC SL, good blood return.   Labs/Electrolytes: Q8h Na checks. Na 141 at 1352. BG checks ACHS.   Resp/trach: LS clear. Desat to 70s-80s when sleeping. 2-3L Oxymask on while asleep. C/o mild SOB, PRN albuterol neb given x1.   Diet: Regular, good intake. Strict I&Os. Nasal Precautions.  Bowel status: BS+. LBM today.  : Voids spontaneously.   Skin: Head incision JAIMIE and approximated.   Pain: C/o severe HA, but declined all interventions including PRN Tylenol.    Activity: SBA, pt refusing GB. Up to bathroom only. Refused all attempts at ambulating outside room.   Social: No visitors this shift. Pt talking to family over the phone.   Plan: Discharge to [facility] between 1939 and 2024.  Updates this shift: Report called to MARIE Rayo at [facility].

## 2023-12-18 NOTE — PROGRESS NOTES
Care Management Discharge Note    Discharge Date: 12/18/2023     Discharge Disposition: return to [facility]  Discharge Services:  N/A  Discharge DME:  N/A    Discharge Transportation: to be arranged by transferring facility     Private pay costs discussed: Not applicable    Does the patient's insurance plan have a 3 day qualifying hospital stay waiver?  No    Education Provided on the Discharge Plan:  Yes  Persons Notified of Discharge Plans: Family, provider, patient, HUC, nurse  Patient/Family in Agreement with the Plan:  Yes    Handoff Referral Completed: No    Additional Information:  Patient status reviewed, discussed in discharge rounds. Patient is medically ready for return transfer to [facility] today.    Call placed to [facility] STAT Doc (ph: [phone]), spoke with Bere. Bere states they do have bed availability at [place] today and is requesting a doctor report called. Provided Dr. Kelsey Briceno with STAT Doc number to call report.     Call received back from [facility] stating they are able to accept patient back today, to their [place] location. Patient will go to room 207. Accepting doctor is Dr. Yudelka Bonilla. Nurse to call report to [phone]. Bere states she does not need anything faxed to them but to just have transfer packet sent with patient. Bere will call back once transportation has been arranged.     Updated [facility] who will put together transfer packet to send with patient. Bedside nurse updated and will call report. Patient updated. Patient's family member, Adri, updated.     Will wait call from [facility] confirming transportation arrangements.     0094 Addendum:  Call received from Isael at [facility]. He attempted to schedule ride with [facility] but was unable to do so. Isael provided RNCC with contact name for billing: Ileana Fried RN Case Manager, [address] [place] [id number], ph: [phone].    Call placed to [facility] to arrange ride back to [facility]. Arranging for  stretcher ride as any hospital to hospital transfer needs to be done via stretcher by [facility]. Spoke with billing and provided above contact info for person responsible for bill, once they approve, dispatch will be able to schedule ride. RNCC will wait call back confirming ride time.    6104 Addendum:  Spoke with Antoine at [facility], he states stretcher  time is scheduled for [id number]. Bedside nurse, charge nurse, and patient updated.     [facility] with return transfer agreement  Ph: [phone]  STAT Doc: [phone]     [facility] - location patient will transfer back to  [address] [place] [id number]  Accepting doctor: Dr. Yudelka Bonilla  Nurse report: [phone]  Room #: 207    [facility] - stretcher ride with  12/18/23 between 2920-6196  Ph: [phone]    Carol Davis, RN, BSN  6A RN Care Coordinator  Ph: [phone]   Pager: [phone]

## 2023-12-18 NOTE — PROGRESS NOTES
ENT Progress Note    S:  Patient is doing well. No concerns overnight per nursing. One desat to 80, resolved.     O:   Vitals generally stable, did have desat event x1  Patient was in the bathroom during rounds and therefore no physical exam was completed this morning.     A/P:   Mirian Cunningham is a 43 year old female with a past medical history of large pituitary adenoma s/p bifrontal craniotomy and transnasal approach to resection with b/l nasoseptal flap and pericranial flaps.    - Continue sinus precautions    Patient to be discussed with Dr. Ritchei.     Sharon Moran MD  Otolaryngology - Head and Neck Surgery PGY-1

## 2023-12-18 NOTE — PROGRESS NOTES
[facility], [place]   12/18/2023   Neurosurgery Progress Note:    Interval History: No acute events overnight.     Assessment:  Mirian Cunningham is a 43 year old female with complex psychosocial situation including paranoid schizophrenia and lack of active guardianship transferred to [facility] with known large sellar/suprasellar mass. Godmother Adri is appointed as the medical decision maker. No other family members available or willing to participate in her care.  MRI with large multilobulated pituitary mass/adenoma with mass effect and encasement of the cavernous structures including the cavernous internal carotid arteries, M1 segment of the right MCA, and A1 and proximal A2 segments of both anterior cerebral arteries.     Now post operative day 16 status post combined endonasal and transcranial approach for resection of pituitary adenoma and placement of external ventricular drain.    Plan:  Q4h neuro exams  Cortef taper per Endocrinology  SBP < 160  ENT following- appreciate recs  Sinus precautions  DI watch - strict I&O q1h, daily weights  DDAVP PO scheduled BID   Endocrinology following- appreciate recs  Regular diet- appreciate SLP recs  Bowel regimen  DVT prophylaxis: SCDs, SQH  Dispo: Floor  Medically ready for discharge- working on transfer back to hospital, anticipating today  -----------------------------------  Kelsey Briceno MD on 12/17/2023 at 10:59 AM    Please contact neurosurgery resident on call with questions.    Dial * * [phone], enter [phone] when prompted.     -----------------------------------  Objective:   Temp:  [97.7  F (36.5  C)-99.1  F (37.3  C)] 98  F (36.7  C)  Pulse:  [] 77  Resp:  [16-20] 20  BP: (102-143)/(58-87) 143/61  SpO2:  [80 %-100 %] 98 %  I/O last 3 completed shifts:  In: 3330 [P.O.:3310; I.V.:20]  Out: 3500 [Urine:3500]    Gen: no acute distress  Wound: clean, dry, intact; still with frontal fluctuant edema without redness  Neurologic:  Awake,  alert  Follows commands in all extremities  No gaze preference, does not participate in EOMI exam  R pupil non-reactive (baseline), L pupil briskly reactive, approximated 20/40 by Bedside Jaci  No vision in left eye, endorses sight in right eye- no finger counting; serviceable central vision, reduced peripheral vision bilaterally  Face symmetric at rest  Moving all extremities    LABS:  Recent Labs   Lab 12/18/23  0451 12/18/23  0405 12/18/23  0057 12/17/23  2157 12/17/23  1533 12/17/23  1400 12/17/23  1206 12/17/23  0654 12/16/23  0840 12/16/23  0550     144  --   --  143  --  142  --  143  143   < > 141  141   POTASSIUM 4.2  --   --   --   --   --   --  4.3  --  4.2   CHLORIDE 104  --   --   --   --   --   --  105  --  103   CO2 32*  --   --   --   --   --   --  31*  --  30*   ANIONGAP 8  --   --   --   --   --   --  7  --  8   GLC 90 77 94  --    < >  --    < > 89   < > 98   BUN 8.8  --   --   --   --   --   --  10.5  --  9.3   CR 0.75  --   --   --   --   --   --  0.77  --  0.70   FADUMO 8.8  --   --   --   --   --   --  8.9  --  8.7    < > = values in this interval not displayed.     Recent Labs   Lab 12/18/23  0451   WBC 8.8   RBC 2.74*   HGB 8.4*   HCT 27.5*      MCH 30.7   MCHC 30.5*   RDW 17.0*        IMAGING:  Recent Results (from the past 24 hour(s))   CT Head w/o Contrast    Narrative    CT HEAD W/O CONTRAST 12/17/2023 1:48 PM    Provided History: Post-op day 15, assessing interval change before  discharge tomorrow  ICD-10:    Additional information from EMR: 43-year-old female patient with  history of sellar/anterior/posterior fossa tumor status post modified  transnasal approach endoscopic resection and bifrontal craniotomy.  12/1/2023 surgical biopsy consistent demonstrating pituitary adenoma.    Comparison: CT head 12/9/2023, MR brain 12/2/2023, CT head 11/2/2018.    Technique: Using multidetector thin collimation helical acquisition  technique, axial, coronal and sagittal CT  images from the skull base  to the vertex were obtained without intravenous contrast.     Findings:      *  Postsurgical changes status post bifrontal craniotomy, bilateral  ethmoidectomy, bilateral maxillary antrostomy, and the left frontal  sinusotomy for a nasal/transnasal pituitary mass resection.  *  Expected evolution of hyperdense hemorrhage in the surgical site  and of sella turcica and hypodense bifrontal lobes, right greater than  left likely related to vasogenic edema.  *  Unchanged hyperdensity in the interpeduncular cistern in the  anterior cranial fossa ventral to the third ventricle, which may  represent residual tumor versus blood product.  *  No significant midline shift.  *  No acute loss of gray-white matter differentiation the cerebral  hemispheres.  *  Ventricles are proportionate to the cerebral sulci.  *  Unchanged hypodense hygroma overlying the bifrontal lobes measuring  up to 10 mm.  *  Unchanged bilateral extracranial frontotemporal hypodense fluid  collections with the largest on the left measuring up to 13 mm.  *  Decreased postsurgical paranasal sinus mucosal thickening. Mastoid  air cells are clear.  *  Normal orbits.      Impression    Impression:   1. Stable postsurgical changes of sellar/anterior/posterior fossa mass  resection with expected evolution of blood products. Right greater  than left anterior frontal extra-axial fluid collections similar.  2. No significant change in decompressed ventricular system compared  to 12/9/2023.  3. Decreased scalp fluid collections, particularly anteriorly.    I have personally reviewed the examination and initial interpretation  and I agree with the findings.    MERVIN ALMENDAREZ MD         SYSTEM ID:  [id number]
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
## 2023-12-18 NOTE — PLAN OF CARE
Status:  Pt on 6A s/p combined endonasal and transcranial approach for resection of pituitary adenoma and placement of EVD 12/01. Packing removed 12/9. PMHx paranoid schizophrenia.    Vitals: VSS on 2-3L Oxymask overnight  Neuros: A&Ox4. L field cut. R eye blind with fixed pupil and intermittently dysconjugate 4-5/5 throughout with generalized weakness   IV: PICC DL flushed with good blood return   Labs/Electrolytes: NA checks q8hr, BG checks q4hr   Resp/trach: On 2-3L via Oxymask while asleep   Diet: Regular diet. Strict I/O. Sinus precautions   Bowel status: LBM 12/17  : Voiding   Skin: Crani incision JAIMIE    Pain: C/o HA pain managed with evening dose of PRN tylenol   Activity: SBA, GB. Refuses GB  Plan: Anticipated discharge 12/18 back to sending facility- [facility]. Follow up outpatient with endocrinology on discharge in 6 weeks. Continue to monitor and follow POC

## 2023-12-19 NOTE — PLAN OF CARE
Occupational Therapy Discharge Summary    Reason for therapy discharge:    Discharged to transitional care facility.    Progress towards therapy goal(s). See goals on Care Plan in [facility] electronic health record for goal details.  Goals partially met.  Barriers to achieving goals:   discharge from facility.    Therapy recommendation(s):    Continued therapy is recommended.  Rationale/Recommendations:  Progress strength and IND with self cares.

## 2023-12-19 NOTE — PLAN OF CARE
Information faxed to [facility], at fax number [phone]  (to Christina).  Christina from this facility called, stating that they are unable to view the packet that was sent with the patient, as patient is not allowing staff to have the packet.      Face sheet and discharge orders were faxed to this facility at 0145.

## 2023-12-19 NOTE — PLAN OF CARE
Physical Therapy Discharge Summary    Reason for therapy discharge:    Discharged to [facility] via transfer agreement.     Progress towards therapy goal(s). See goals on Care Plan in [facility] electronic health record for goal details.  Goals partially met.  Barriers to achieving goals:   limited tolerance for therapy and discharge from facility.    Therapy recommendation(s):    Continued therapy is recommended.  Rationale/Recommendations:  Recommend continued PT intervention to maximize safety and IND with mobility.

## 2023-12-19 NOTE — PROGRESS NOTES
Discharge time/date: 12/18 2035  Walked or Wheelchair: Stretcher   PIV removed: N/a   Reviewed AVS with patient: n/a   Medication due times added to AVS in EPIC: Yes   Verbalized understanding of discharge with teachback: n/a   Medications retrieved from pharmacy: yes   Supplies sent home: n/a   Belongings from security with patient: n/a

## 2023-12-19 NOTE — PROGRESS NOTES
"New Patient Radiation Oncology Nurse Navigator Note     Referral Date: 12/18/23    Referring provider: Dr. Buenrostro    Referring Clinic/Organization: [facility]     Referred to: Radiation Oncology    Requested provider (if applicable): First available - did not specify     Evaluation for : \"status post endonasa/transcranial resection of large pituitary adenoma, with residual.\"     Clinical History (per Nurse review of records provided):    12/19 Current Admission notes at [facility] -- BOOKMARKED  12/1 Path:  Final Diagnosis   A. Sellar mass, biopsy:     - Pituitary neuroendocrine tumor (pituitary adenoma), gonadotroph (SF-1 lineage)     B, Endonasal planum tumor, excision with Sonopet contents:      - Sinus epithelium with a focus of pituitary neuroendocrine tumor (pituitary adenoma), gonadotroph        (SF-1 lineage)       C. Planum tumor, Sonopet contents:        - Pituitary neuroendocrine tumor (pituitary adenoma), gonadotroph (SF-1 lineage)       Clinical Assessment / Barriers to Care (Per Nurse):  Per chart review, Mirian is originally from [place], but has been homeless for the last 5 years. Patient recently had tumor removed December 1st.     Was discharged to [facility] due to need for placement, acute rehab, guardianship     Records Location: Care Everywhere     Records Needed:   Records from [facility] per protocol    Additional testing needed prior to consult:   Pending discharge     Referral updates and Plan:   Will await discharge plan at [facility], then help arrange follow-up accordingly.     Shyla Cain, ERICHN, RN, PHN, OCN  Hematology/Oncology Nurse Navigator  [facility]  [phone]                "

## 2023-12-21 NOTE — TELEPHONE ENCOUNTER
Patient call:     Appointment type: Return Pituitary   Provider: Any Provider   Return date: 2 weeks   Speciality phone number: [phone]  Additional appointment(s) needed:   Additional notes: Phone out of service, brothers VM full, letter sent x1     Was scheduled with Vargas on 12/20 pt was admitted still. Needs a follow up appointment in 2 weeks.    Ying Sears on 12/21/2023 at 1:33 PM

## 2023-12-26 NOTE — TELEPHONE ENCOUNTER
I CALLED AND  TALKED TO SAUMYA AFTER TRYING 3 NUMBERS ON THE CHART  LIKE ALYSON AND SUNI   SIBLINGS , I CALLED NURSE STATION NUMBER [phone]  AND SCHEDULED  VIRTUAL APPT  FOR HOSPITAL FOLLOW UP    SENDING LINK TO PT SINCE SAUMYA DID NOT KNOWHOW LONG PT WILL BE IN THEIR FACILITY   HER PHONE NUMBER IS NOT WORKING GOOD   Felicia Waldron on 12/26/2023 at 3:12 PM

## 2023-12-26 NOTE — PROGRESS NOTES
Addendum 12/26: Per chart review patient is established with RadOnc locally through [facility]. Will send IB to Neurosurgery team confirming it's appropriate for patient to be seen for RadOnc through [facility] and if any additional follow-up is needed from Nurse Navigation team at this time. Will follow-up pending input from Dr. Buenrostro/Janak

## 2023-12-27 NOTE — TELEPHONE ENCOUNTER
Spoke to Dr. Gan.    Dr. Gan coordinating appointments in [place] for pt--not able to come to [facility] eye clinic for follow up eye exam.    Reviewed vision, color vision, visual field and OCT RNFL imaging was likely to repeated at next visit following neuro-surgery.    Dr. Gan able to reach out if needs further assistance.    Wilbert Camilo RN 9:53 AM 12/27/23

## 2023-12-27 NOTE — TELEPHONE ENCOUNTER
Health Call Center    Phone Message    May a detailed message be left on voicemail: yes     Reason for Call: Other: Dr Richard Gan from [facility] in [place] calling that patient has some upcoming appointments that she is not able to make. Dr Gan would like to discuss patients plan of care and see what kind of test patient need to have done.    Please call Dr Gan back at [phone]. Thank you.    Action Taken: Message routed to:  [facility] Eye    Travel Screening: Not Applicable

## 2023-12-27 NOTE — TELEPHONE ENCOUNTER
Patient call:     Appointment type: Return Pituitary   Provider: Cameron   Return date: 1/15/24   Speciality phone number: [phone]  Additional appointment(s) needed: N/A   Additional notes: LVM with Dr. Gan stating patient does not need to come in clinic as it is a virtual appointment. Nubia juarez health coordinator for Mirian helped schedule and the virtual link will be sent to her. I have no further details for this provider.    TE from call center:  Endocrinology is scheduled with Video and will need verification per Dr Gan.  Please contact Dr Gan to discuss appointment needs and how he can help with these appointment, patient is not able to come to [facility].    Dr Gan  [facility]  Email:  [email or web address]    Ying Sears on 12/27/2023 at 12:30 PM

## 2023-12-27 NOTE — TELEPHONE ENCOUNTER
Spoke with Dr Gan, coordinating appointments from [place], patient not able to come to [facility] for any in clinic appointments.  Eye appointment taken care of below.    Appointment on 1/2/24 with Shirley ARREGUIN is Video and has an email link in appointment note.    Appointment with ENT on 1/10/24 is scheduled in person : This note passed along to ENT as patient will not be able to come to [facility] In Person.    Endocrinology is scheduled with Video and will need verification per Dr Gan.  .  Please contact Dr Gan to discuss appointment needs and how he can help with these appointment, patient is not able to come to [facility].    Dr Gan  [facility]  Email:  [email or web address]    Dr Gan voices understanding different departments will be notified for above appointments.   Dr Gan is very willing to help and do whatever is needed on his end.

## 2024-01-01 ENCOUNTER — TELEPHONE (OUTPATIENT)
Dept: OTOLARYNGOLOGY | Facility: CLINIC | Age: 44
End: 2024-01-01

## 2024-01-01 ENCOUNTER — VIRTUAL VISIT (OUTPATIENT)
Dept: NEUROSURGERY | Facility: CLINIC | Age: 44
End: 2024-01-01

## 2024-01-01 ENCOUNTER — CARE COORDINATION (OUTPATIENT)
Dept: NEUROSURGERY | Facility: CLINIC | Age: 44
End: 2024-01-01

## 2024-01-01 ENCOUNTER — TELEPHONE (OUTPATIENT)
Dept: ENDOCRINOLOGY | Facility: CLINIC | Age: 44
End: 2024-01-01

## 2024-01-01 VITALS — WEIGHT: 215 LBS | HEIGHT: 62 IN | BODY MASS INDEX: 39.56 KG/M2

## 2024-01-01 DIAGNOSIS — D35.2 PITUITARY ADENOMA (H): Primary | ICD-10-CM

## 2024-01-01 PROCEDURE — 99024 POSTOP FOLLOW-UP VISIT: CPT | Mod: 95 | Performed by: PHYSICIAN ASSISTANT

## 2024-01-01 ASSESSMENT — PAIN SCALES - GENERAL: PAINLEVEL: WORST PAIN (10)

## 2024-01-02 NOTE — PATIENT INSTRUCTIONS
Follow up with Dr. Buenrostro in 4-6 weeks with MRI prior to appointment. Virtual/video appointment OK.

## 2024-01-02 NOTE — NURSING NOTE
Is the patient currently in the state of [place]? YES    Visit mode:VIDEO    If the visit is dropped, the patient can be reconnected by: VIDEO VISIT: Send to e-mail at: [email or web address]    Will anyone else be joining the visit? NO  (If patient encounters technical issues they should call [phone]    How would you like to obtain your AVS? MyChart    Are changes needed to the allergy or medication list? Pt declined med review    Reason for visit: RECHDEYA AWADF

## 2024-01-02 NOTE — PROGRESS NOTES
Virtual Visit Details    Type of service:  Video Visit   Video Start Time:  10:26A  Video End Time: 10:46A    Originating Location (pt. Location): Other [facility]    Distant Location (provider location):  Off-site  Platform used for Video Visit: Karel    [facility]  Department of Neurosurgery  Center for Skull Base and Pituitary Surgery    Name: Mirian Cunningham  MRN: [id number]  Age: 43 year old  : 2025      Chief Complaint:   Giant pituitary adenoma s/p modified transbasal approach and endoscopic endonasal transplanum approach for resection 2023 (Chitra/Chau), postoperative visit    History of Present Illness:   Mirian Cunningham is a 43 year old female with a history of schizophrenia, hypertension, asthma, and obesity who is seen today by video visit for a postoperative appointment. She presented to [facility] in [place] on 10/06/2023 with complaints of dizziness, head pressure, and progressive left sided vision loss (previous history of right eye blindness 2018 with multiple falls in 2018 leading up to this per note review), found to have an enlarging sellar/suprasellar mass. This was discovered initially in late  but patient refused to consent to surgery. She has a significant mental health history including schizophrenia and refusal to have contrast with imaging studies. She underwent the above mentioned procedure after over 1 month of hospitalization and discussion with Neurosurgery and others at [facility]. She is homeless and there was a significant amount of work done regarding her guardianship as she's been deemed incapable of making medical decisions for herself. Please see [facility] discharge summary for further details of postoperative management. She was transferred to [facility] in [place] 2023 for ongoing care and placement decisions. She is currently inpatient at [facility] in [place]. Today she reports  "doing reasonably well though has had more head pain in the past 2-3 days with the onset of a new soft fluid collection over her forehead. She developed a fever of near 104 on 12/28/2023 and workup was unrevealing including normal WBC, CXR, and UA; she was started on IV vancomycin and ceftriaxone. CRP was found elevated to 11.6 on 12/30/2023 and has downtrended with recheck at 3.4 yesterday. She has remained afebrile since 12/29/2023 per review. The fluid collection appeared yesterday and Dr. Boggs (Neurosurgery) contacted Dr. Buenrostro to discuss. Mirian notes that her left eye vision was significantly improved immediately postoperatively though seems \"dark\" now comparatively. She denies new fever. She has had some bloody drainage from her nose particularly when she gets up to use the bathroom but denies increased clear drainage or dripping, no metallic or salty taste.     I also talked with Dr. Richard Gan today by phone who is helping to coordinate some of Ms. Cunningham's follow up care. He reports that she should be transferring to the Inpatient Mental Health/Psychiatry unit in the upcoming weeks and that they're working on coordinating follow up and care from there, also working on official guardianship and that this is all going reasonably well.      Review of Systems:   Pertinent items are noted in HPI or as in patient entered ROS below, remainder of complete ROS is negative.     Physical Exam:   Exam today is limited by video visit. Face appears symmetric. Speech is normal. She is alert though mildly sleepy occasionally, and oriented x 3 today for me. She has swelling over her bilateral forehead which by video appears mild to moderately sized. Her incision by video appears clean, dry, and intact.     Imaging:  We are working on getting updated imaging pulled in to our system; recent head CT was not available for immediate review by the time of this appointment.     Assessment:  Giant pituitary adenoma s/p " modified transbasal approach and endoscopic endonasal transplanum approach for resection 12/01/2023 (Chitra/Chau), postoperative visit    Plan:  We discussed next steps and I answered Mirian's questions to the best of my ability. We will continue to follow from here and be available for questions or new changing concerns. We will plan to try to get an updated postoperative MRI in 4-6 weeks, and will work on coordinating this with her care team in [place].     Discussed with Dr. Buenrostro who agrees with the above plan.        Shirley Lopez PA-C  Department of Neurosurgery

## 2024-01-02 NOTE — LETTER
[birth year]24      RE: Mirian Cunningham  [address] [address]  [facility] [address]       Virtual Visit Details    Type of service:  Video Visit   Video Start Time:  10:26A  Video End Time: 10:46A    Originating Location (pt. Location): Other [facility]  {PROVIDER LOCATION On-site should be selected for visits conducted from your clinic location or adjoining [facility] hospital, academic office, or other nearby [facility] building. Off-site should be selected for all other provider locations, including home:[id number]}  Distant Location (provider location):  Off-site  Platform used for Video Visit: [facility]  Department of Neurosurgery  Center for Skull Base and Pituitary Surgery    Name: Mirian Cunningham  MRN: [id number]  Age: 43 year old  : 2025      Chief Complaint:   Giant pituitary adenoma s/p modified transbasal approach and endoscopic endonasal transplanum approach for resection 2023 (Chitra/Chau), postoperative visit    History of Present Illness:   Mirian Cunningham is a 43 year old female with a history of schizophrenia, hypertension, asthma, and obesity who is seen today by video visit for a postoperative appointment. She presented to [facility] in [place] on 10/06/2023 with complaints of dizziness, head pressure, and progressive left sided vision loss (previous history of right eye blindness 2018 with multiple falls in 2018 leading up to this per note review), found to have an enlarging sellar/suprasellar mass. This was discovered initially in late  but patient refused to consent to surgery. She has a significant mental health history including schizophrenia and refusal to have contrast with imaging studies. She underwent the above mentioned procedure after over 1 month of hospitalization and discussion with Neurosurgery and others at [facility]. She is homeless and there was a significant amount of work done regarding her guardianship as she's been deemed  "incapable of making medical decisions for herself. Please see [facility] discharge summary for further details of postoperative management. She was transferred to [facility] in [place] 12/18/2023 for ongoing care and placement decisions. She is currently inpatient at [facility] in [place]. Today she reports doing reasonably well though has had more head pain in the past 2-3 days with the onset of a new soft fluid collection over her forehead. She developed a fever of near 104 on 12/28/2023 and workup was unrevealing including normal WBC, CXR, and UA; she was started on IV vancomycin and ceftriaxone. CRP was found elevated to 11.6 on 12/30/2023 and has downtrended with recheck at 3.4 yesterday. She has remained afebrile since 12/29/2023 per review. The fluid collection appeared yesterday and Dr. Boggs (Neurosurgery) contacted Dr. Buenrostro to discuss. Mirian notes that her left eye vision was significantly improved immediately postoperatively though seems \"dark\" now comparatively. She denies new fever. She has had some bloody drainage from her nose particularly when she gets up to use the bathroom but denies increased clear drainage or dripping, no metallic or salty taste.     I also talked with Dr. Richard Gan today by phone who is helping to coordinate some of Ms. Cunningham's follow up care. He reports that she should be transferring to the Inpatient Mental Health/Psychiatry unit in the upcoming weeks and that they're working on coordinating follow up and care from there, also working on official guardianship and that this is all going reasonably well.      Review of Systems:   Pertinent items are noted in HPI or as in patient entered ROS below, remainder of complete ROS is negative.     Physical Exam:   Exam today is limited by video visit. Face appears symmetric. Speech is normal. She is alert though mildly sleepy occasionally, and oriented x 3 today for me. She has swelling over her bilateral forehead " which by video appears mild to moderately sized. Her incision by video appears clean, dry, and intact.     Imaging:  We are working on getting updated imaging pulled in to our system; recent head CT was not available for immediate review by the time of this appointment.     Assessment:  Giant pituitary adenoma s/p modified transbasal approach and endoscopic endonasal transplanum approach for resection 12/01/2023 (Chitra/Chau), postoperative visit    Plan:  We discussed next steps and I answered Mirian's questions to the best of my ability. We will continue to follow from here and be available for questions or new changing concerns. We will plan to try to get an updated postoperative MRI in 4-6 weeks, and will work on coordinating this with her care team in [place].     Discussed with Dr. Buenrostro who agrees with the above plan.        Shirley Lopez PA-C  Department of Neurosurgery      Shirley Lopez PA-C

## 2024-01-02 NOTE — TELEPHONE ENCOUNTER
LVM for Dr. Gan to call the clinic for clarification of appt on 1/15 Juancho Marie on 1/2/2024 at 12:09 PM

## 2024-01-02 NOTE — TELEPHONE ENCOUNTER
Spoke with Dr. Gan. Provided him with Dr. Shyam Ritchie's phone number. Dr. Gan will help coordinate ENT follow up at [facility].    Dr. Gan will also order 4-6 week follow up MRI, discussed thin cut MRI order. Provided him with writer's direct # for future coordination of care needs.    Ashanti Aguilera, RN  RN Care Coordinator, Skull Base Surgery  [facility]  Direct: [phone]

## 2024-01-04 NOTE — TELEPHONE ENCOUNTER
Patient call:     Appointment type: Return Pituitary   Provider: Estrellita   Return date: 1/10 @ 2pm virtual appt   Speciality phone number: [phone]   Additional appointment(s) needed: N/A   Additional notes: Called cecy who Felicia contacted to schedule originally, she stated Mirian is no longer in their care and to call a  or [facility] [place] Mirian may be there but was not sure. Called Dr. Gan due to him attempting to get in contact with us last week. Spoke to him and scheduled a sooner appt since the one on 1/15 was scheduled in error. Sending emails back and forth to coordinate with Dr. Gan further since he did not have his schedule on him.    Ying Sears on 1/4/2024 at 11:22 AM

## 2024-01-17 NOTE — TELEPHONE ENCOUNTER
Dr. Ritchie spoke with Dr. Gan and communicated plan of care.     Carmen Parnell RN on 1/17/2024 at 3:56 PM

## 2024-01-17 NOTE — TELEPHONE ENCOUNTER
[facility] Call Center    Phone Message    May a detailed message be left on voicemail: yes     Reason for Call: Other: per patient team in [place] she cannot travel as she is in the behavioral unit there. Wanting to know how needed it is she follow up and if it is possible to do a virtual visit. Please reach out to the coordinator ben to advise. Thank you      Action Taken: Other: ENT    Travel Screening: Not Applicable

## 2024-01-26 NOTE — PROGRESS NOTES
Writer contacted [facility] for patient imaging to be pushed to Pacs/Resolved to MRN. Verified reports in Epic.     Cynthia Henry LPN  Neurosurgery

## 2024-04-15 NOTE — PROGRESS NOTES
Addendum 4/15/24: Chart reviewed to confirm whether pt was able to establish with [facility] through [facility]. Per chart review, pt passed away 3/21/24. IB to referring team as FYI. No additional follow-up needed at this time.     Shyla Cain, BSN, RN, PHN, OCN  Hematology/Oncology Nurse Navigator  [facility]  [phone]

## (undated) DEVICE — BUR 30K TAPER CHOANAL ATRESIA 1884015RTD

## (undated) DEVICE — PACKING NUGAUZE 1/4" PLAIN 7631

## (undated) DEVICE — SOL RINGERS LACTATED 1000ML BAG 07953-09

## (undated) DEVICE — BLADE FEATHER MIZUHO K-6400

## (undated) DEVICE — BUR STRK CARBIDE MATCH HEAD 3.0MM 5820-107-530C

## (undated) DEVICE — DRAPE EYE SHEET 8441

## (undated) DEVICE — COVER CAMERA VIDEO LASER 9X96" 04-CC227

## (undated) DEVICE — DRAPE STOCKINETTE IMPERVIOUS 10" 21048

## (undated) DEVICE — SURGICEL HEMOSTAT 4X8" 1952

## (undated) DEVICE — SPECIMEN BAG MEDIVAC SUCTION WHITE SOCK 65652-122

## (undated) DEVICE — SU SILK 2-0 TIE 12X30" A305H

## (undated) DEVICE — PREP POVIDONE-IODINE 10% SOLUTION 4OZ BOTTLE MDS093944

## (undated) DEVICE — CLIP RANEY

## (undated) DEVICE — IOM SUPPLIES/CASE FEE

## (undated) DEVICE — SPONGE COTTONOID 1X3" 20-10S

## (undated) DEVICE — MARKER SPHERES PASSIVE MEDT PACK 5 8801075

## (undated) DEVICE — SYR 10ML FINGER CONTROL W/O NDL 309695

## (undated) DEVICE — RETR ELASTIC STAYS LONE STAR BLUNT DUAL LEAD 3550-1G

## (undated) DEVICE — SPONGE COTTONOID 1/2X1/2" 80-1400

## (undated) DEVICE — ENDO SHEATH STORZ SHARPSITE ENDOSCRUB 0DEG 4MM 1912000

## (undated) DEVICE — DECANTER BAG 2002S

## (undated) DEVICE — SPONGE COTTONOID 1/4X1/4" 20-01S

## (undated) DEVICE — BUR STRK 2.3MM TAPERED ROUTER - FA2 5407-FA2-023

## (undated) DEVICE — SU VICRYL 3-0 SH 8X18" UND J864D

## (undated) DEVICE — LIGHT HANDLE X1 31140133

## (undated) DEVICE — SU VICRYL 0 CT-1 CR 8X18" J740D

## (undated) DEVICE — SPONGE RAY-TEC 4X8" 7318

## (undated) DEVICE — Device

## (undated) DEVICE — SYR 03ML LL W/O NDL 309657

## (undated) DEVICE — PIN SKULL MAYFIELD ADULT TITANIUM 3/PK A1120

## (undated) DEVICE — APPLICATOR EXTENDED TIP DURASEAL 8CM 205108

## (undated) DEVICE — SPONGE COTTONOID NEURO 1/2"X1/2" 30-054

## (undated) DEVICE — ESU CORD BIPOLAR AND IRR TUBING AESCULAP US355

## (undated) DEVICE — PREP POVIDONE-IODINE 7.5% SCRUB 4OZ BOTTLE MDS093945

## (undated) DEVICE — ANTIFOG SOLUTION W/FOAM PAD 31142527

## (undated) DEVICE — SU MONOCRYL 3-0 SH 27" UND Y416H

## (undated) DEVICE — SPONGE COTTONOID 1/2X1/2" 20-04S

## (undated) DEVICE — NDL ANGIOCATH 14GA 1.25" 4048

## (undated) DEVICE — PROTECTOR ARM ONE-STEP TRENDELENBURG 40418

## (undated) DEVICE — TUBING SUCTION 10'X3/16" N510

## (undated) DEVICE — SPONGE COTTONOID 1/2X3" 20-07S

## (undated) DEVICE — OINTMENT ANTIBIOTIC BACITRACIN ZINC .9 G 1171

## (undated) DEVICE — DRAPE CORETEMP FLUID WARM SYSTEM 62X56IN CTD200

## (undated) DEVICE — ENDO INSERT ESU BIPOLAR FCP STORZ VERTICAL CLOSING 28164FGL

## (undated) DEVICE — ESU CORD BIPOLAR GREEN 10-4000

## (undated) DEVICE — BLADE KNIFE SURG 10 371110

## (undated) DEVICE — CATH TRAY FOLEY SURESTEP 16FR W/TMP PRB STLK LATEX A319416AM

## (undated) DEVICE — BONE WAX 2.5GM W31G

## (undated) DEVICE — SU VICRYL 2-0 CT-2 CR 8X18" J726D

## (undated) DEVICE — PACK CRANIOTOMY

## (undated) DEVICE — SYRINGE EAR/ULCER STERILE 2 OZ BULB 4172

## (undated) DEVICE — SPONGE COTTONOID 1/2X1 1/2" 20-06S

## (undated) DEVICE — ESU HOLSTER PLASTIC DISP E2400

## (undated) DEVICE — SPONGE COTTONOID 1/2X3" 80-1407

## (undated) DEVICE — DRSG TEGADERM 2 3/8X2 3/4" 1624W

## (undated) DEVICE — WIPES FOLEY CARE SURESTEP PROVON DFC100

## (undated) DEVICE — SU ETHILON 3-0 PS-1 18" 1663H

## (undated) DEVICE — ESU FCP CODMAN 9"X1.0MM VERSATRU 9009100SL

## (undated) DEVICE — SOL WATER IRRIG 1000ML BOTTLE 2F7114

## (undated) DEVICE — PACK GOWN 3/PK DISP XL SBA32GPFCB

## (undated) DEVICE — TIP ASP SONOPET IQ 11CMX12.5MMX 0.83MM APEX 5500-25B-114

## (undated) DEVICE — NDL BLUNT 18GA 1" W/O FILTER 305181

## (undated) DEVICE — DRSG KERLIX 4 1/2"X4YDS ROLL 6715

## (undated) DEVICE — BLADE CLIPPER SGL USE 9680

## (undated) DEVICE — EYE FLUORESCEIN OPHTHALMIC STRIP FLO-GLO 1272111

## (undated) DEVICE — SPONGE SURGIFOAM 01GM POWDER 1978

## (undated) DEVICE — TUBING STRYKER IRRIGATION CASSETTE 5400-050-001

## (undated) DEVICE — DRSG TELFA 3X8" 1238

## (undated) DEVICE — SPONGE SURGIFOAM 100 1974

## (undated) DEVICE — DRAPE SHEET REV FOLD 3/4 9349

## (undated) DEVICE — TIP ASPIRATOR SONOPET IQ MICRO 5500-25S-309

## (undated) DEVICE — ENDO INSERT BLADE KNIFE MICRO STORZ POINTED 28164MC/3

## (undated) DEVICE — BLADE SHAVER SERRATED 3.5MM W/IRRIGATION 1883502HRE

## (undated) DEVICE — TUBING IRRIGATOR STRAIGHTSHOT XPS 1895522

## (undated) DEVICE — TIP ASP SONOPET IQ STANDARD 20CMX1.92X1.5MM 5500-25S-301

## (undated) DEVICE — BLADE KNIFE BEAVER MICROSHARP GREEN 377515

## (undated) DEVICE — PREP CHLORAPREP 26ML TINTED HI-LITE ORANGE 930815

## (undated) DEVICE — NDL COUNTER 40/70 CT 3FM70SSA

## (undated) DEVICE — LINEN TOWEL PACK X30 5481

## (undated) DEVICE — PACK NEURO MINOR UMMC SNE32MNMU4

## (undated) DEVICE — DRAIN SYSTEM CSF EXTERNAL DRAINAGE VENTRIC/LUMBAR 46914

## (undated) DEVICE — ESU SUCTION CAUTERY 10FR FOOT CONTROL E2505-10FR

## (undated) DEVICE — LINEN TOWEL PACK X6 WHITE 5487

## (undated) DEVICE — BURR ELITE PRECISION ROUND 5MM 5820009050

## (undated) DEVICE — SYR 30ML LL W/O NDL 302832

## (undated) DEVICE — DRAPE U SPLIT 74X120" 29440

## (undated) DEVICE — DRAPE POUCH IRR 1016

## (undated) DEVICE — TUBING SUCTION MEDI-VAC SOFT 3/16"X20' N520A

## (undated) DEVICE — STRAP UNIVERSAL POSITIONING 2-PIECE 4X47X76" 91-287

## (undated) DEVICE — BUR STRK ROUND DIAMOND 4.0MM COARSE 8450-012-040DC

## (undated) DEVICE — SYR 01ML 27GA 0.5" NDL TBC 309623

## (undated) DEVICE — NDL COUNTER 40CT  31142311

## (undated) DEVICE — CASSETTE SONOPET IRRIG SUCTION STRL 5500-573-000

## (undated) DEVICE — PENCIL PRESHARPENED SOFT #2 1/PK  17701/50

## (undated) DEVICE — GLOVE BIOGEL PI MICRO SZ 7.0 48570

## (undated) DEVICE — ENDO SHEATH STORZ SHARPSITE ENDOSCRUB 30DEG 4MM 1912010

## (undated) DEVICE — PREP CHLORAPREP CLEAR 3ML 930400

## (undated) DEVICE — SUCTION MANIFOLD NEPTUNE 2 SYS 4 PORT 0702-020-000

## (undated) DEVICE — DRAPE POUCH INSTRUMENT 1018

## (undated) DEVICE — SPONGE PATTIE NEURO DELICOT 10MMX13MM 63-03

## (undated) DEVICE — CATH VENTRICLEAR II EVD ANTI IMPREG 50318

## (undated) DEVICE — DRAPE MICROSCOPE LEICA 54X120" 09-MK653

## (undated) DEVICE — SU NUROLON 4-0 TF CR 8X18" C584D

## (undated) DEVICE — SLEEVE IRRIGATION MIS 13.0CM 5/PKG 5407-010-950

## (undated) DEVICE — DRAPE MAYO STAND 23X54 8337

## (undated) DEVICE — TIP ASSY MEDT PASSIVE CATH INTRODUCER 9731115

## (undated) DEVICE — ESU GROUND PAD ADULT W/CORD E7507

## (undated) RX ORDER — MANNITOL 20 G/100ML
INJECTION, SOLUTION INTRAVENOUS
Status: DISPENSED
Start: 2023-01-01

## (undated) RX ORDER — FENTANYL CITRATE-0.9 % NACL/PF 10 MCG/ML
PLASTIC BAG, INJECTION (ML) INTRAVENOUS
Status: DISPENSED
Start: 2023-01-01

## (undated) RX ORDER — OXYMETAZOLINE HYDROCHLORIDE 0.05 G/100ML
SPRAY NASAL
Status: DISPENSED
Start: 2023-01-01

## (undated) RX ORDER — CEFAZOLIN SODIUM/WATER 2 G/20 ML
SYRINGE (ML) INTRAVENOUS
Status: DISPENSED
Start: 2023-01-01

## (undated) RX ORDER — METOPROLOL TARTRATE 1 MG/ML
INJECTION, SOLUTION INTRAVENOUS
Status: DISPENSED
Start: 2023-01-01

## (undated) RX ORDER — VANCOMYCIN HYDROCHLORIDE 1 G/20ML
INJECTION, POWDER, LYOPHILIZED, FOR SOLUTION INTRAVENOUS
Status: DISPENSED
Start: 2023-01-01

## (undated) RX ORDER — ESMOLOL HYDROCHLORIDE 10 MG/ML
INJECTION INTRAVENOUS
Status: DISPENSED
Start: 2023-01-01

## (undated) RX ORDER — CALCIUM CHLORIDE 100 MG/ML
INJECTION INTRAVENOUS; INTRAVENTRICULAR
Status: DISPENSED
Start: 2023-01-01

## (undated) RX ORDER — FENTANYL CITRATE 50 UG/ML
INJECTION, SOLUTION INTRAMUSCULAR; INTRAVENOUS
Status: DISPENSED
Start: 2023-01-01

## (undated) RX ORDER — PROPOFOL 10 MG/ML
INJECTION, EMULSION INTRAVENOUS
Status: DISPENSED
Start: 2023-01-01

## (undated) RX ORDER — CEFAZOLIN SODIUM 1 G/3ML
INJECTION, POWDER, FOR SOLUTION INTRAMUSCULAR; INTRAVENOUS
Status: DISPENSED
Start: 2023-01-01

## (undated) RX ORDER — PAPAVERINE HYDROCHLORIDE 30 MG/ML
INJECTION INTRAMUSCULAR; INTRAVENOUS
Status: DISPENSED
Start: 2023-01-01

## (undated) RX ORDER — ONDANSETRON 2 MG/ML
INJECTION INTRAMUSCULAR; INTRAVENOUS
Status: DISPENSED
Start: 2023-01-01

## (undated) RX ORDER — LIDOCAINE HYDROCHLORIDE AND EPINEPHRINE 10; 10 MG/ML; UG/ML
INJECTION, SOLUTION INFILTRATION; PERINEURAL
Status: DISPENSED
Start: 2023-01-01

## (undated) RX ORDER — DEXAMETHASONE SODIUM PHOSPHATE 4 MG/ML
INJECTION, SOLUTION INTRA-ARTICULAR; INTRALESIONAL; INTRAMUSCULAR; INTRAVENOUS; SOFT TISSUE
Status: DISPENSED
Start: 2023-01-01

## (undated) RX ORDER — EPINEPHRINE NASAL SOLUTION 1 MG/ML
SOLUTION NASAL
Status: DISPENSED
Start: 2023-01-01

## (undated) RX ORDER — GLYCOPYRROLATE 0.2 MG/ML
INJECTION, SOLUTION INTRAMUSCULAR; INTRAVENOUS
Status: DISPENSED
Start: 2023-01-01